# Patient Record
Sex: FEMALE | Race: WHITE | Employment: FULL TIME | URBAN - NONMETROPOLITAN AREA
[De-identification: names, ages, dates, MRNs, and addresses within clinical notes are randomized per-mention and may not be internally consistent; named-entity substitution may affect disease eponyms.]

---

## 2020-11-04 ENCOUNTER — OFFICE VISIT (OUTPATIENT)
Dept: OBGYN CLINIC | Age: 30
End: 2020-11-04
Payer: COMMERCIAL

## 2020-11-04 VITALS — WEIGHT: 236 LBS | SYSTOLIC BLOOD PRESSURE: 104 MMHG | DIASTOLIC BLOOD PRESSURE: 64 MMHG

## 2020-11-04 PROCEDURE — 99213 OFFICE O/P EST LOW 20 MIN: CPT | Performed by: ADVANCED PRACTICE MIDWIFE

## 2020-11-04 RX ORDER — HYDROXYZINE 50 MG/1
TABLET, FILM COATED ORAL
COMMUNITY
Start: 2020-10-14 | End: 2021-06-10 | Stop reason: ALTCHOICE

## 2020-11-04 RX ORDER — AMITRIPTYLINE HYDROCHLORIDE 25 MG/1
TABLET, FILM COATED ORAL
COMMUNITY
Start: 2020-10-14 | End: 2021-06-10 | Stop reason: ALTCHOICE

## 2020-11-04 RX ORDER — NYSTATIN 100000 U/G
CREAM TOPICAL
Qty: 1 TUBE | Refills: 1 | Status: SHIPPED | OUTPATIENT
Start: 2020-11-04 | End: 2021-02-17 | Stop reason: ALTCHOICE

## 2020-11-04 NOTE — PROGRESS NOTES
PROBLEM VISIT     Date of service: 2020    Krishna Mcgovern  Is a 31 y.o.  female    PT's PCP is: Lynne Herbert DO     : 1990                                          Review of Systems   Constitutional: Negative. HENT: Negative. Endocrine: Negative. Skin: Positive for rash (Bilateral rash to underside of breasts). Hematological: Negative. No LMP recorded. OB History    Para Term  AB Living   0 0 0 0 0 0   SAB TAB Ectopic Molar Multiple Live Births   0 0 0 0 0 0        Social History     Tobacco Use   Smoking Status Never Smoker   Smokeless Tobacco Never Used        Social History     Substance and Sexual Activity   Alcohol Use Yes       Allergies: Vicodin [hydrocodone-acetaminophen]      Current Outpatient Medications:     OMEPRAZOLE PO, Take by mouth, Disp: , Rfl:     nystatin (MYCOSTATIN) 756551 UNIT/GM cream, Apply topically 3 times daily. , Disp: 1 Tube, Rfl: 1    hydrOXYzine (ATARAX) 50 MG tablet, , Disp: , Rfl:     amitriptyline (ELAVIL) 25 MG tablet, , Disp: , Rfl:     Social History     Substance and Sexual Activity   Sexual Activity Yes         Chief Complaint   Patient presents with    Breast Problem     Pt here states that she had a rash under both breasts that was present for 2 weeks. States since then its gotten better but with family hx breast cancer wanted it checked. Physical Exam  Constitutional:       Appearance: She is obese. HENT:      Head: Normocephalic. Eyes:      Pupils: Pupils are equal, round, and reactive to light. Neck:      Musculoskeletal: Normal range of motion. Musculoskeletal: Normal range of motion. Neurological:      Mental Status: She is alert and oriented to person, place, and time. Skin:     General: Skin is warm and dry. Findings: Rash (Yeast like rash to bilateral underside, erythema, no drainage, irregular borders for lesions) present.    Psychiatric:         Mood and Affect: Mood normal.

## 2020-11-04 NOTE — PROGRESS NOTES
Chaperone for Intimate Exam   Chaperone was offered and accepted as part of the rooming process.    Chaperone: Agapito Núñez

## 2021-02-04 ENCOUNTER — TELEPHONE (OUTPATIENT)
Dept: OBGYN CLINIC | Age: 31
End: 2021-02-04

## 2021-02-04 NOTE — TELEPHONE ENCOUNTER
Patient called stating that she has an appt on 2/17 to follow up on an usn, but she is having some cramping and wanting to know what she can do in the meantime. Explained that we do not have her usn results. Fax sent to [facility] to have a copy sent to our office. Explained that I will get the results and discuss the next step with a provider. Patient agreeable to the plan.

## 2021-02-17 ENCOUNTER — OFFICE VISIT (OUTPATIENT)
Dept: OBGYN CLINIC | Age: 31
End: 2021-02-17
Payer: COMMERCIAL

## 2021-02-17 ENCOUNTER — HOSPITAL ENCOUNTER (OUTPATIENT)
Age: 31
Setting detail: SPECIMEN
Discharge: HOME | End: 2021-02-17
Payer: COMMERCIAL

## 2021-02-17 VITALS
HEIGHT: 65 IN | WEIGHT: 228.6 LBS | DIASTOLIC BLOOD PRESSURE: 88 MMHG | BODY MASS INDEX: 38.09 KG/M2 | SYSTOLIC BLOOD PRESSURE: 126 MMHG

## 2021-02-17 DIAGNOSIS — R10.2 PELVIC PAIN: ICD-10-CM

## 2021-02-17 DIAGNOSIS — Z01.419 WOMEN'S ANNUAL ROUTINE GYNECOLOGICAL EXAMINATION: Primary | ICD-10-CM

## 2021-02-17 PROCEDURE — 99395 PREV VISIT EST AGE 18-39: CPT | Performed by: NURSE PRACTITIONER

## 2021-02-17 PROCEDURE — G8484 FLU IMMUNIZE NO ADMIN: HCPCS | Performed by: NURSE PRACTITIONER

## 2021-02-17 RX ORDER — OMEPRAZOLE 20 MG/1
20 CAPSULE, DELAYED RELEASE ORAL DAILY PRN
COMMUNITY
Start: 2021-02-04 | End: 2022-03-10

## 2021-02-17 NOTE — PROGRESS NOTES
YEARLY PHYSICAL    Date of service: 2021    Xander Briscoe  Is a 27 y.o. female    PT's PCP is: Brittany Wasserman DO     : 1990                                         Chaperone for Intimate Exam   Chaperone was offered as part of the rooming process. Patient declined and agrees to continue with exam without a chaperone. Subjective:       Patient's last menstrual period was 2021 (exact date). Are your menses regular: yes    OB History    Para Term  AB Living   0 0 0 0 0 0   SAB TAB Ectopic Molar Multiple Live Births   0 0 0 0 0 0        Social History     Tobacco Use   Smoking Status Former Smoker    Types: Cigarettes   Smokeless Tobacco Never Used        Social History     Substance and Sexual Activity   Alcohol Use Yes       Family History   Problem Relation Age of Onset    Breast Cancer Maternal Grandmother         older than 48    Heart Attack Paternal Uncle     Stroke Maternal Aunt     Hypertension Father        Any family history of breast or ovarian cancer:  Yes    Any family history of blood clots: No      Allergies: Vicodin [hydrocodone-acetaminophen]      Current Outpatient Medications:     omeprazole (PRILOSEC) 20 MG delayed release capsule, , Disp: , Rfl:     Prenatal Vit-Fe Fumarate-FA (PRENATAL VITAMIN PO), Take by mouth, Disp: , Rfl:     hydrOXYzine (ATARAX) 50 MG tablet, , Disp: , Rfl:     amitriptyline (ELAVIL) 25 MG tablet, , Disp: , Rfl:     Social History     Substance and Sexual Activity   Sexual Activity Yes       Any bleeding or pain with intercourse: No    Last Yearly:  10/2019    Last pap: 2017    Last HPV: n/a    Last Mammogram: n/a    Last Dexascan n/a    Last colorectal screen- n/a    Do you do self breast exams: encouraged monthly SBE    Past Medical History:   Diagnosis Date    GERD (gastroesophageal reflux disease)     Migraines     Seasonal allergies        Past Surgical History:   Procedure Laterality Date    FOOT SURGERY      nerve release, and plantar fascitis    MANDIBLE SURGERY      tooth implant    WISDOM TOOTH EXTRACTION         Family History   Problem Relation Age of Onset    Breast Cancer Maternal Grandmother         older than 48    Heart Attack Paternal Uncle     Stroke Maternal Aunt     Hypertension Father        Chief Complaint   Patient presents with    Gynecologic Exam     Pap due.  Pelvic Pain     C/O severe intermittent pelvic pain with nausea, vomiting and diarrhea. Follow up usn from 5/2020. PE:  Vital Signs  Blood pressure 126/88, height 5' 5\" (1.651 m), weight 228 lb 9.6 oz (103.7 kg), last menstrual period 01/18/2021, not currently breastfeeding. Estimated body mass index is 38.04 kg/m² as calculated from the following:    Height as of this encounter: 5' 5\" (1.651 m). Weight as of this encounter: 228 lb 9.6 oz (103.7 kg). HPI: Patient presents today for annual and usn following from 5/2020. Denies breast concerns. Reports monthly menses. Pap due. TTC since 10/2019. Reports intermittent severe pelvic pain causing nausea, vomiting on one occasion and diarrhea- sometimes will wake up in the morning with the pain. Denies aggravating/allievating factors. Occurring 2-3x per month since May. Review of Systems   Genitourinary: Positive for pelvic pain (2-3x per month ). All other systems reviewed and are negative. Objective  Heent:   negative   Cor: regular rate and rhythm, no murmurs              Pul:clear to auscultation bilaterally- no wheezes, rales or rhonchi, normal air movement, no respiratory distress      GI: Abdomen soft, non-tender.  BS normal. No masses,  No organomegaly           Extremities: normal strength, tone, and muscle mass, ROM of all joints is normal   Breasts: Breast:normal appearance, no masses or tenderness, No nipple retraction or dimpling, No nipple discharge or bleeding   Pelvic Exam:

## 2021-02-19 LAB
HPV SAMPLE: NORMAL
HPV, GENOTYPE 16: NOT DETECTED
HPV, GENOTYPE 18: NOT DETECTED
HPV, HIGH RISK OTHER: NOT DETECTED
HPV, INTERPRETATION: NORMAL
SPECIMEN DESCRIPTION: NORMAL

## 2021-02-25 LAB — CYTOLOGY REPORT: NORMAL

## 2021-03-03 ENCOUNTER — TELEPHONE (OUTPATIENT)
Dept: OBGYN CLINIC | Age: 31
End: 2021-03-03

## 2021-03-16 ENCOUNTER — OFFICE VISIT (OUTPATIENT)
Dept: OBGYN CLINIC | Age: 31
End: 2021-03-16
Payer: COMMERCIAL

## 2021-03-16 VITALS — DIASTOLIC BLOOD PRESSURE: 83 MMHG | WEIGHT: 226.2 LBS | SYSTOLIC BLOOD PRESSURE: 116 MMHG | BODY MASS INDEX: 37.64 KG/M2

## 2021-03-16 DIAGNOSIS — R10.2 PELVIC PAIN: Primary | ICD-10-CM

## 2021-03-16 PROCEDURE — 99213 OFFICE O/P EST LOW 20 MIN: CPT | Performed by: NURSE PRACTITIONER

## 2021-03-16 PROCEDURE — G8484 FLU IMMUNIZE NO ADMIN: HCPCS | Performed by: NURSE PRACTITIONER

## 2021-03-16 PROCEDURE — 1036F TOBACCO NON-USER: CPT | Performed by: NURSE PRACTITIONER

## 2021-03-16 PROCEDURE — G8417 CALC BMI ABV UP PARAM F/U: HCPCS | Performed by: NURSE PRACTITIONER

## 2021-03-16 PROCEDURE — G8427 DOCREV CUR MEDS BY ELIG CLIN: HCPCS | Performed by: NURSE PRACTITIONER

## 2021-03-16 NOTE — PROGRESS NOTES
PROBLEM VISIT     Date of service: 3/16/2021    Shelly Garber  Is a 27 y.o. female    PT's PCP is: Kt Alexander DO     : 1990                                             Subjective:       Patient's last menstrual period was 2021. OB History    Para Term  AB Living   0 0 0 0 0 0   SAB TAB Ectopic Molar Multiple Live Births   0 0 0 0 0 0        Social History     Tobacco Use   Smoking Status Former Smoker    Types: Cigarettes   Smokeless Tobacco Never Used        Social History     Substance and Sexual Activity   Alcohol Use Yes       Allergies: Vicodin [hydrocodone-acetaminophen]      Current Outpatient Medications:     omeprazole (PRILOSEC) 20 MG delayed release capsule, , Disp: , Rfl:     Prenatal Vit-Fe Fumarate-FA (PRENATAL VITAMIN PO), Take by mouth, Disp: , Rfl:     hydrOXYzine (ATARAX) 50 MG tablet, , Disp: , Rfl:     amitriptyline (ELAVIL) 25 MG tablet, , Disp: , Rfl:     Social History     Substance and Sexual Activity   Sexual Activity Yes       Chief Complaint   Patient presents with    Follow-up     Follow up usn for pelvic pain.  Other     Bought OPT and started testing on 3/7 and has not had any positive results. PE:  Vital Signs  Blood pressure 116/83, weight 226 lb 3.2 oz (102.6 kg), last menstrual period 2021, not currently breastfeeding. HPI: Patient here following gyn usn. Started tracking menses more accurately and using ovulation predictors- has not had any positives yet indicating ovulation. Reports monthly menses, small variation in start day from month to month. PT denies fever, chills, nausea and vomiting       Review of Systems   Constitutional: Negative. Genitourinary: Negative. Physical Exam  Constitutional:       Appearance: Normal appearance. HENT:      Head: Normocephalic. Pulmonary:      Effort: Pulmonary effort is normal.   Musculoskeletal: Normal range of motion.    Neurological:      General: No focal deficit present. Mental Status: She is alert. Psychiatric:         Mood and Affect: Mood normal.         Behavior: Behavior normal.         Assessment and Plan          Diagnosis Orders   1. Pelvic pain         usn from 3/4- endometrium measuring 2.9mm, normal ovaries. No evidence of endometrial polyps on this usn- was questionable on previous usn. Encouraged to continue using OPK for the next several cycles, tracking BBT and cevrical mucus. I am having Arland Divine maintain her hydrOXYzine, amitriptyline, omeprazole, and Prenatal Vit-Fe Fumarate-FA (PRENATAL VITAMIN PO). Return if symptoms worsen or fail to improve. There are no Patient Instructions on file for this visit.     Mallorie Jorge,3/17/2021 1:22 PM

## 2021-06-10 ENCOUNTER — OFFICE VISIT (OUTPATIENT)
Dept: OBGYN CLINIC | Age: 31
End: 2021-06-10
Payer: COMMERCIAL

## 2021-06-10 VITALS — DIASTOLIC BLOOD PRESSURE: 78 MMHG | WEIGHT: 218.8 LBS | SYSTOLIC BLOOD PRESSURE: 121 MMHG | BODY MASS INDEX: 36.41 KG/M2

## 2021-06-10 DIAGNOSIS — N91.1 AMENORRHEA, SECONDARY: Primary | ICD-10-CM

## 2021-06-10 PROCEDURE — G8417 CALC BMI ABV UP PARAM F/U: HCPCS | Performed by: NURSE PRACTITIONER

## 2021-06-10 PROCEDURE — G8427 DOCREV CUR MEDS BY ELIG CLIN: HCPCS | Performed by: NURSE PRACTITIONER

## 2021-06-10 PROCEDURE — 1036F TOBACCO NON-USER: CPT | Performed by: NURSE PRACTITIONER

## 2021-06-10 PROCEDURE — 99213 OFFICE O/P EST LOW 20 MIN: CPT | Performed by: NURSE PRACTITIONER

## 2021-06-10 RX ORDER — OXYBUTYNIN CHLORIDE 5 MG/1
TABLET, EXTENDED RELEASE ORAL
COMMUNITY
Start: 2021-05-25 | End: 2021-06-10

## 2021-06-10 ASSESSMENT — ENCOUNTER SYMPTOMS
NAUSEA: 1
VOMITING: 1
ABDOMINAL PAIN: 0

## 2021-06-10 NOTE — PROGRESS NOTES
PROBLEM VISIT     Date of service: 6/10/2021    Kimberly Shukla  Is a 27 y.o. female    PT's PCP is: Teagan Kellogg DO     : 1990                                             Subjective:       Patient's last menstrual period was 2021 (exact date). OB History    Para Term  AB Living   1 0 0 0 0 0   SAB TAB Ectopic Molar Multiple Live Births   0 0 0 0 0 0      # Outcome Date GA Lbr Charli/2nd Weight Sex Delivery Anes PTL Lv   1 Current                 Social History     Tobacco Use   Smoking Status Former Smoker    Types: Cigarettes   Smokeless Tobacco Never Used        Social History     Substance and Sexual Activity   Alcohol Use Yes       Allergies: Vicodin [hydrocodone-acetaminophen]      Current Outpatient Medications:     oxybutynin (DITROPAN-XL) 5 MG extended release tablet, , Disp: , Rfl:     omeprazole (PRILOSEC) 20 MG delayed release capsule, , Disp: , Rfl:     Prenatal Vit-Fe Fumarate-FA (PRENATAL VITAMIN PO), Take by mouth, Disp: , Rfl:     hydrOXYzine (ATARAX) 50 MG tablet, , Disp: , Rfl:     amitriptyline (ELAVIL) 25 MG tablet, , Disp: , Rfl:     Social History     Substance and Sexual Activity   Sexual Activity Yes       Chief Complaint   Patient presents with    Follow-up     Follow up viability usn. Voices no concerns,         PE:  Vital Signs  Blood pressure 121/78, weight 218 lb 12.8 oz (99.2 kg), last menstrual period 2021, not currently breastfeeding. HPI: Patient presents today following viability ultrasound. Denies vaginal bleeding or abdominal pain. Has had some breast tenderness and intermittent N&V. PT denies fever, chills, nausea and vomiting       Review of Systems   Constitutional: Positive for fatigue. Gastrointestinal: Positive for nausea and vomiting. Negative for abdominal pain. Genitourinary: Positive for menstrual problem (amenorrhea, pos HPT). Negative for dysuria, frequency, pelvic pain, vaginal bleeding and vaginal discharge.

## 2021-06-10 NOTE — PROGRESS NOTES
Made PT aware that we deliver in [place], PT requested that we continue to make appts in our office and that her and her  will go home and discuss if they are transferring out.

## 2021-06-16 ENCOUNTER — INITIAL PRENATAL (OUTPATIENT)
Dept: OBGYN CLINIC | Age: 31
End: 2021-06-16
Payer: COMMERCIAL

## 2021-06-16 ENCOUNTER — HOSPITAL ENCOUNTER (OUTPATIENT)
Age: 31
Setting detail: SPECIMEN
Discharge: HOME | End: 2021-06-16
Payer: COMMERCIAL

## 2021-06-16 VITALS
BODY MASS INDEX: 36.39 KG/M2 | HEIGHT: 65 IN | WEIGHT: 218.4 LBS | DIASTOLIC BLOOD PRESSURE: 78 MMHG | SYSTOLIC BLOOD PRESSURE: 122 MMHG

## 2021-06-16 DIAGNOSIS — Z3A.09 9 WEEKS GESTATION OF PREGNANCY: Primary | ICD-10-CM

## 2021-06-16 DIAGNOSIS — Z3A.09 9 WEEKS GESTATION OF PREGNANCY: ICD-10-CM

## 2021-06-16 LAB
AMPHETAMINE SCREEN URINE: NEGATIVE
BARBITURATE SCREEN URINE: NEGATIVE
BENZODIAZEPINE SCREEN, URINE: NEGATIVE
BILIRUBIN URINE: NEGATIVE
BUPRENORPHINE URINE: ABNORMAL
CANNABINOID SCREEN URINE: POSITIVE
COCAINE METABOLITE, URINE: NEGATIVE
COLOR: YELLOW
COMMENT UA: NORMAL
GLUCOSE URINE: NEGATIVE
KETONES, URINE: NEGATIVE
LEUKOCYTE ESTERASE, URINE: NEGATIVE
MDMA URINE: ABNORMAL
METHADONE SCREEN, URINE: NEGATIVE
METHAMPHETAMINE, URINE: ABNORMAL
NITRITE, URINE: NEGATIVE
OPIATES, URINE: NEGATIVE
OXYCODONE SCREEN URINE: NEGATIVE
PH UA: 7 (ref 5–8)
PHENCYCLIDINE, URINE: NEGATIVE
PROPOXYPHENE, URINE: ABNORMAL
PROTEIN UA: NEGATIVE
SPECIFIC GRAVITY UA: 1.02 (ref 1–1.03)
TEST INFORMATION: ABNORMAL
TRICYCLIC ANTIDEPRESSANTS, UR: ABNORMAL
TURBIDITY: CLEAR
URINE HGB: NEGATIVE
UROBILINOGEN, URINE: NORMAL

## 2021-06-16 PROCEDURE — 0500F INITIAL PRENATAL CARE VISIT: CPT | Performed by: OBSTETRICS & GYNECOLOGY

## 2021-06-16 NOTE — PATIENT INSTRUCTIONS
Patient Education        Learning About Starting to Breastfeed  Planning ahead     Before your baby is born, plan ahead. Learn all you can about breastfeeding. This helps make breastfeeding easier. · Early in your pregnancy, talk to your doctor or midwife about breastfeeding. · Learn the basics before your baby is born. The staff at hospitals and birthing centers can help you find a lactation specialist. This person is often a nurse who has been trained to teach and advise about breastfeeding. Or you can take a breastfeeding class. · Plan ahead for times when you will need help after your baby is born. You may want to get help from friends and family. You can also join a support group to talk to others who breastfeed. · Buy the equipment you'll need. Examples are breast pads, nipple cream, extra pillows, and nursing bras. Find out about breast pumps too. Getting help from your hospital or birthing center  It's important to have support from the doctors, nurses, and hospital staff who care for you and your baby. Before it's time for you to give birth, ask about the breastfeeding policies at your hospital or birthing center. Look for a hospital or birthing center that has policies for:  · \"Rooming in. \" This policy encourages you to have your baby in the room with you. It can allow you to breastfeed more often. · Supplemental feedings. Tell the staff that your baby is to get only your breast milk from birth. If staff feed your baby water, sugar solution, or formula right after birth without a medical reason, it may make it harder for you to breastfeed. · Pacifiers or artificial nipples. Staff should not give your  these items. They may interfere with breastfeeding. · Follow-up. Find out if your hospital can help you with breastfeeding issues after you go home. See if you can get information on support groups or other contacts.  They might help if you need help setting up and staying with your breastfeeding routine. Your first feeding  It's best to start breastfeeding within 1 hour of birth. For each feeding, you go through these basic steps:  · Get ready for the feeding. Be calm and relaxed, and try not to be distracted. Get some water or juice for yourself. Use two or three pillows to help support your baby while nursing. · Find a breastfeeding position that is comfortable for you and your baby. Examples are the cradle and the football positions. Make sure the baby's head and chest are lined up straight and facing your breast. It's best to switch which breast you start with each time. · Get the baby latched on well. Your baby's mouth needs to be wide open, like a yawn. So you may need to gently touch the middle of your baby's lower lip. When your baby's mouth is open wide, quickly bring the baby onto your nipple and areola. The areola is the dark [place] around your nipple. · Provide a complete feeding. Let your baby decide how long to nurse. Be sure to burp your baby after each breast.  In the first days after birth, your breasts make a thick, yellow liquid called colostrum. This liquid gives your baby nutrients and antibodies against infection. It is all that babies need at first. Your breasts will fill with milk a few days after the birth. Talk to your doctor, midwife, or lactation specialist right away if you are having problems and aren't sure what to do. How often to breastfeed  Plan to breastfeed your baby on demand rather than setting a strict schedule. For the first 2 weeks, be prepared to breastfeed at least 8 times in a 24-hour period. In the first few days, you may need to wake a sleeping baby to feed. If you breastfeed more often, it will help your breasts to produce more milk. After you go home  After you're home, don't be afraid to call your doctor, midwife, or lactation specialist with questions. That's true even if you don't know what's bothering you.  They are used to parents of newborns calling. They can help you figure out if there is a problem, and if so, how to fix it. Plan for times when you will be apart from your baby. Use a breast pump to collect breast milk ahead of time. You can store milk in the refrigerator or freezer. Then it's ready when someone else will be taking care of your baby. Experts recommend waiting about a month until breastfeeding is going well before offering a bottle. Breastfeeding is a learned skill that gets easier over time. You are more likely to succeed if you plan ahead, learn the basic techniques, and know where to get help and support. Where can you learn more? Go to https://chpepiceweb.BioMedFlex. org and sign in to your [employer] account. Enter E915 in the Guardly box to learn more about \"Learning About Starting to Breastfeed. \"     If you do not have an account, please click on the \"Sign Up Now\" link. Current as of: October 8, 2020               Content Version: 12.9  © 2006-2021 [employer]. Care instructions adapted under license by [facility] ([facility]). If you have questions about a medical condition or this instruction, always ask your healthcare professional. [place] [address] any warranty or liability for your use of this information. Patient Education        Nutrition During Pregnancy: Care Instructions  Your Care Instructions     Healthy eating when you are pregnant is important for you and your baby. It can help you feel well and have a successful pregnancy and delivery. During pregnancy your nutrition needs increase. Even if you have excellent eating habits, your doctor may recommend a multivitamin to make sure you get enough iron and folic acid. Many pregnant women wonder how much weight they should gain. In general, women who were at a healthy weight before they became pregnant should gain between 25 and 35 pounds.  Women who were overweight before pregnancy are usually advised to gain 15 to 25 pounds. Women who were underweight before pregnancy are usually advised to gain 28 to 40 pounds. Your doctor will work with you to set a weight goal that is right for you. Gaining a healthy amount of weight helps you have a healthy baby. Follow-up care is a key part of your treatment and safety. Be sure to make and go to all appointments, and call your doctor if you are having problems. It's also a good idea to know your test results and keep a list of the medicines you take. How can you care for yourself at home? · Eat plenty of fruits and vegetables. Include a variety of orange, yellow, and leafy dark-green vegetables every day. · Choose whole-grain bread, cereal, and pasta. Good choices include whole wheat bread, whole wheat pasta, brown rice, and oatmeal.  · Get 4 or more servings of milk and milk products each day. Good choices include nonfat or low-fat milk, yogurt, and cheese. If you cannot eat milk products, you can get calcium from calcium-fortified products such as orange juice, soy milk, and tofu. Other non-milk sources of calcium include leafy green vegetables, such as broccoli, kale, mustard greens, turnip greens, bok sheree, and brussels sprouts. · If you eat meat, pick lower-fat types. Good choices include lean cuts of meat and chicken or turkey without the skin. · Do not eat shark, swordfish, otf mackerel, or tilefish. They have high levels of mercury, which is dangerous to your baby. You can eat up to 12 ounces a week of fish or shellfish that have low mercury levels. Good choices include shrimp, wild salmon, pollock, and catfish. Limit some other types of fish, such as white (albacore) tuna, to 4 oz (0.1 kg) a week. · Heat lunch meats (such as turkey, ham, or bologna) to 165°F before you eat them. This reduces your risk of getting sick from a kind of bacteria that can be found in lunch meats.   · Do not eat unpasteurized soft cheeses, such as brie, feta, fresh mozzarella, and blue cheese. They have a bacteria that could harm your baby. · Limit caffeine. If you drink coffee or tea, have no more than 1 cup a day. Caffeine is also found in theresa. · Do not drink any alcohol. No amount of alcohol has been found to be safe during pregnancy. · Do not diet or try to lose weight. For example, do not follow a low-carbohydrate diet. If you are overweight at the start of your pregnancy, your doctor will work with you to manage your weight gain. · Tell your doctor about all vitamins and supplements you take. When should you call for help? Watch closely for changes in your health, and be sure to contact your doctor if you have any problems. Where can you learn more? Go to https://LabcytepeChaikin Analyticseb.FeedBurner. org and sign in to your [employer]ful account. Enter Y785 in the iKoa box to learn more about \"Nutrition During Pregnancy: Care Instructions. \"     If you do not have an account, please click on the \"Sign Up Now\" link. Current as of: October 8, 2020               Content Version: 12.9  © 2006-2021 [employer]. Care instructions adapted under license by [facility] ([facility]). If you have questions about a medical condition or this instruction, always ask your healthcare professional. [address] any warranty or liability for your use of this information. Patient Education        Weeks 10 to 14 of Your Pregnancy: Care Instructions  Overview     By weeks 10 to 15 of your pregnancy, the placenta has formed inside your uterus. The placenta's main job is to give your baby oxygen and nutrients through the umbilical cord. It's possible to hear your baby's heartbeat with a special ultrasound device. Your baby's organs are developing. The arms and legs can bend. This is a good time to think about testing for birth defects. There are two types of tests: screening and diagnostic. Screening tests show the chance that a baby has a certain birth defect. They can't tell you for sure that your baby has a problem. Diagnostic tests show if a baby has a certain birth defect. It's your choice whether to have these tests. You and your partner can talk to your doctor or midwife about tests for birth defects. Follow-up care is a key part of your treatment and safety. Be sure to make and go to all appointments, and call your doctor if you are having problems. It's also a good idea to know your test results and keep a list of the medicines you take. How can you care for yourself at home? Decide about tests  · You can have screening tests and diagnostic tests to check for birth defects. The decision to have a test for birth defects is personal. Think about your age, your chance of passing on a family disease, your need to know about any problems, and what you might do after you have the test results. ? Quadruple (quad) blood test. This screening test can be done between 15 and 22 weeks of pregnancy. It checks the amount of four substances in your blood. The doctor looks at these test results, along with your age and other factors, to find out the chance that your baby may have certain problems. ? Amniocentesis. This diagnostic test is used to look for chromosomal problems in the baby's cells. It can be done between 15 and 20 weeks of pregnancy, usually around week 16.  ? Nuchal translucency test. This test uses ultrasound to measure the thickness of the area at the back of the baby's neck. An increase in the thickness can be an early sign of Down syndrome. ? Chorionic villus sampling (CVS). This is a test that looks for certain genetic problems with your baby. The same genes that are in your baby are in the placenta. A small piece of the placenta is taken out and tested. This test is done when you are 10 to 13 weeks pregnant. Ease discomfort  · Slow down and take naps when you feel tired. · If your emotions swing, talk to someone.   · If your gums bleed, try a softer toothbrush. If your gums are puffy and bleed a lot, see your dentist.  · If you feel dizzy:  ? Get up slowly after sitting or lying down. ? Drink plenty of fluids. ? Eat small snacks to keep your blood sugar stable. ? Put your head between your legs as though you were tying your shoelaces. ? Lie down with your legs higher than your head. Use pillows to prop up your feet. · If you have a headache:  ? Lie down. ? Ask your partner or a good friend for a neck massage. ? Try cool cloths over your forehead or across the back of your neck. ? Use acetaminophen (Tylenol) for pain relief. Do not use nonsteroidal anti-inflammatory drugs (NSAIDs), such as ibuprofen (Advil, Motrin) or naproxen (Aleve), unless your doctor says it is okay. · If you have a nosebleed, pinch your nose gently, and hold it for a short while. To prevent nosebleeds, try massaging a small dab of petroleum jelly, such as Vaseline, in your nostrils. · If your nose is stuffed up, try saline (saltwater) nose sprays. Do not use decongestant sprays. Care for your breasts  · Wear a bra that gives you good support. · Know that changes in your breasts are normal.  ? Your breasts may get larger and more tender. Tenderness usually gets better by 12 weeks. ? Your nipples may get darker and larger, and small bumps around your nipples may show more. ? The veins in your chest and breasts may show more. Where can you learn more? Go to https://Anemoi Renovablesadelaideeb.Instantis. org and sign in to your [employer] account. Enter S816 in the GnamGnam box to learn more about \"Weeks 10 to 14 of Your Pregnancy: Care Instructions. \"     If you do not have an account, please click on the \"Sign Up Now\" link. Current as of: October 8, 2020               Content Version: 12.9  © 9105-5168 Healthwise, Incorporated. Care instructions adapted under license by [facility] ([facility]).  If you have questions about a medical condition or this instruction, always ask your

## 2021-06-16 NOTE — PROGRESS NOTES
Here for PNI, has had nausea. Denies hx of MRSA. Unsure about genetic testing. Early 1hr gtt done today with  d/t BMI. Hx marijuana use which she states she hasn't used it since she found out she was pregnant. Urine drug screen done today. Has NOB appt on 6/30. Questions answered and forms signed.

## 2021-06-17 LAB
ABO/RH: NORMAL
ABSOLUTE EOS #: 0.06 K/UL (ref 0–0.44)
ABSOLUTE IMMATURE GRANULOCYTE: <0.03 K/UL (ref 0–0.3)
ABSOLUTE LYMPH #: 2.29 K/UL (ref 1.1–3.7)
ABSOLUTE MONO #: 0.41 K/UL (ref 0.1–1.2)
ANTIBODY SCREEN: NEGATIVE
BASOPHILS # BLD: 0 % (ref 0–2)
BASOPHILS ABSOLUTE: <0.03 K/UL (ref 0–0.2)
CULTURE: NORMAL
DIFFERENTIAL TYPE: ABNORMAL
EOSINOPHILS RELATIVE PERCENT: 1 % (ref 1–4)
GLUCOSE ADMINISTRATION: NORMAL
GLUCOSE TOLERANCE SCREEN 50G: 118 MG/DL (ref 70–135)
HCT VFR BLD CALC: 41 % (ref 36.3–47.1)
HEMOGLOBIN: 12.8 G/DL (ref 11.9–15.1)
HEPATITIS B SURFACE ANTIGEN: NONREACTIVE
HEPATITIS C ANTIBODY: NONREACTIVE
HIV AG/AB: NONREACTIVE
IMMATURE GRANULOCYTES: 0 %
LYMPHOCYTES # BLD: 26 % (ref 24–43)
Lab: NORMAL
MCH RBC QN AUTO: 29 PG (ref 25.2–33.5)
MCHC RBC AUTO-ENTMCNC: 31.2 G/DL (ref 28.4–34.8)
MCV RBC AUTO: 93 FL (ref 82.6–102.9)
MONOCYTES # BLD: 5 % (ref 3–12)
NRBC AUTOMATED: 0 PER 100 WBC
PDW BLD-RTO: 13.2 % (ref 11.8–14.4)
PLATELET # BLD: 316 K/UL (ref 138–453)
PLATELET ESTIMATE: ABNORMAL
PMV BLD AUTO: 10.2 FL (ref 8.1–13.5)
RBC # BLD: 4.41 M/UL (ref 3.95–5.11)
RBC # BLD: ABNORMAL 10*6/UL
RUBV IGG SER QL: 48.3 IU/ML
SEG NEUTROPHILS: 68 % (ref 36–65)
SEGMENTED NEUTROPHILS ABSOLUTE COUNT: 6.08 K/UL (ref 1.5–8.1)
SPECIMEN DESCRIPTION: NORMAL
T. PALLIDUM, IGG: NONREACTIVE
WBC # BLD: 8.9 K/UL (ref 3.5–11.3)
WBC # BLD: ABNORMAL 10*3/UL

## 2021-06-30 ENCOUNTER — INITIAL PRENATAL (OUTPATIENT)
Dept: OBGYN CLINIC | Age: 31
End: 2021-06-30

## 2021-06-30 ENCOUNTER — HOSPITAL ENCOUNTER (OUTPATIENT)
Age: 31
Setting detail: SPECIMEN
Discharge: HOME | End: 2021-06-30
Payer: COMMERCIAL

## 2021-06-30 VITALS — WEIGHT: 218.6 LBS | DIASTOLIC BLOOD PRESSURE: 80 MMHG | SYSTOLIC BLOOD PRESSURE: 120 MMHG | BODY MASS INDEX: 36.38 KG/M2

## 2021-06-30 DIAGNOSIS — Z3A.11 11 WEEKS GESTATION OF PREGNANCY: Primary | ICD-10-CM

## 2021-06-30 DIAGNOSIS — Z34.01 ENCOUNTER FOR SUPERVISION OF NORMAL FIRST PREGNANCY, FIRST TRIMESTER: ICD-10-CM

## 2021-06-30 PROCEDURE — 0500F INITIAL PRENATAL CARE VISIT: CPT | Performed by: NURSE PRACTITIONER

## 2021-07-01 DIAGNOSIS — Z34.01 ENCOUNTER FOR SUPERVISION OF NORMAL FIRST PREGNANCY, FIRST TRIMESTER: ICD-10-CM

## 2021-07-02 LAB
C TRACH DNA GENITAL QL NAA+PROBE: NEGATIVE
N. GONORRHOEAE DNA: NEGATIVE
SPECIMEN DESCRIPTION: NORMAL

## 2021-07-08 ENCOUNTER — TELEPHONE (OUTPATIENT)
Dept: OBGYN CLINIC | Age: 31
End: 2021-07-08

## 2021-07-19 ENCOUNTER — ROUTINE PRENATAL (OUTPATIENT)
Dept: OBGYN CLINIC | Age: 31
End: 2021-07-19

## 2021-07-19 VITALS — WEIGHT: 216.8 LBS | BODY MASS INDEX: 36.08 KG/M2 | DIASTOLIC BLOOD PRESSURE: 70 MMHG | SYSTOLIC BLOOD PRESSURE: 100 MMHG

## 2021-07-19 DIAGNOSIS — Z34.02 ENCOUNTER FOR SUPERVISION OF NORMAL FIRST PREGNANCY IN SECOND TRIMESTER: Primary | ICD-10-CM

## 2021-07-19 PROCEDURE — 0502F SUBSEQUENT PRENATAL CARE: CPT | Performed by: ADVANCED PRACTICE MIDWIFE

## 2021-07-19 NOTE — PROGRESS NOTES
Jasbir Mackey is a 27 y.o. female 14w1d      The patient was seen and evaluated. Fetal movement was Absent . No contractions or leakage of fluid. Signs and symptoms of  labor as well as labor were reviewed. Genetic testing was not ordered and reviewed. MSAFP was not ordered for a 15-20 week gestational age window.  Reviewed. Dates were reviewed with the patient. An 18-22 week anatomy ultrasound has been ordered. The patient will return to the office for her next visit in 4 weeks. See antepartum flow sheet. Discussed OTC medications for constipation and diarrhea    Positive THC- will address at new OB     Does pt have history of infant delivery before 37 weeks: No  Previous c/s: No  Prenatal testing: Unsure about genetic testing (declines 21)  Ped: Cosimo Fulling CNP  Blood type:  Rhogam:   Flu shot: Declines  TDAP (27-36 wks):  GBS:  GTT:  GC/CT:  30 week Growth:  MRSA: Denies Hx        Assessment:  1. Jasbir Mackey is a 27 y.o. female  2.   3. 14w1d    There are no problems to display for this patient. Diagnosis Orders   1. Encounter for supervision of normal first pregnancy in second trimester           Plan:  Return in about 4 weeks (around 2021) for OB Follow Up. There are no Patient Instructions on file for this visit.

## 2021-09-08 ENCOUNTER — ROUTINE PRENATAL (OUTPATIENT)
Dept: OBGYN CLINIC | Age: 31
End: 2021-09-08

## 2021-09-08 VITALS
BODY MASS INDEX: 36.94 KG/M2 | SYSTOLIC BLOOD PRESSURE: 104 MMHG | WEIGHT: 222 LBS | DIASTOLIC BLOOD PRESSURE: 74 MMHG | HEART RATE: 97 BPM

## 2021-09-08 DIAGNOSIS — Z3A.21 21 WEEKS GESTATION OF PREGNANCY: Primary | ICD-10-CM

## 2021-09-08 PROCEDURE — 0502F SUBSEQUENT PRENATAL CARE: CPT | Performed by: OBSTETRICS & GYNECOLOGY

## 2021-09-08 NOTE — PROGRESS NOTES
Santino Hernandez is here at 21w3d for:    Chief Complaint   Patient presents with    Routine Prenatal Visit     pt. had routine usn today. Estimated Due Date: Estimated Date of Delivery: 22    OB History    Para Term  AB Living   1 0 0 0 0 0   SAB TAB Ectopic Molar Multiple Live Births   0 0 0 0 0 0      # Outcome Date GA Lbr Charli/2nd Weight Sex Delivery Anes PTL Lv   1 Current                 Past Medical History:   Diagnosis Date    Anxiety     GERD (gastroesophageal reflux disease)     Migraines     Seasonal allergies        Past Surgical History:   Procedure Laterality Date    FOOT SURGERY      nerve release, and plantar fascitis    MANDIBLE SURGERY      tooth implant    MOUTH SURGERY      WISDOM TOOTH EXTRACTION         Social History     Tobacco Use   Smoking Status Former Smoker    Types: Cigarettes   Smokeless Tobacco Never Used        Social History     Substance and Sexual Activity   Alcohol Use Not Currently       No results found for this visit on 21. Vitals:  /74   Pulse 97   Wt 222 lb (100.7 kg)   LMP 2021 (Approximate)   BMI 36.94 kg/m²   Estimated body mass index is 36.94 kg/m² as calculated from the following:    Height as of 21: 5' 5\" (1.651 m). Weight as of this encounter: 222 lb (100.7 kg). HPI: here for routine ov usn and visit    Yes PT denies fever, chills, nausea and vomiting       Abdomen: enlarged, gravid     Results reviewed today:    No results found for this visit on 21. See prenatal vital sign section and fetal assessment section    ASSESSMENT & Plan    Diagnosis Orders   1. 21 weeks gestation of pregnancy               I am having Santino Hernandez maintain her omeprazole, Prenatal Vit-Fe Fumarate-FA (PRENATAL VITAMIN PO), (Doxylamine Succinate, Sleep, (UNISOM PO)), and Loratadine (CLARITIN PO). Return in about 4 weeks (around 10/6/2021) for ob.     There are no Patient Instructions on file for this visit.           Verena Cosby DO,9/8/2021 4:11 PM

## 2021-10-05 ENCOUNTER — TELEPHONE (OUTPATIENT)
Dept: OBGYN CLINIC | Age: 31
End: 2021-10-05

## 2021-10-05 ENCOUNTER — ROUTINE PRENATAL (OUTPATIENT)
Dept: OBGYN CLINIC | Age: 31
End: 2021-10-05

## 2021-10-05 VITALS — BODY MASS INDEX: 37.58 KG/M2 | WEIGHT: 225.8 LBS | SYSTOLIC BLOOD PRESSURE: 90 MMHG | DIASTOLIC BLOOD PRESSURE: 50 MMHG

## 2021-10-05 DIAGNOSIS — Z34.02 ENCOUNTER FOR SUPERVISION OF NORMAL FIRST PREGNANCY IN SECOND TRIMESTER: Primary | ICD-10-CM

## 2021-10-05 PROCEDURE — 0502F SUBSEQUENT PRENATAL CARE: CPT | Performed by: ADVANCED PRACTICE MIDWIFE

## 2021-10-05 NOTE — TELEPHONE ENCOUNTER
Was here today for a Prenatal appointment and wanted to know if you had any information of birthing classes. Please call when possible.

## 2021-10-05 NOTE — PROGRESS NOTES
Alon Alarcon is a 27 y.o. female 25w2d    The patient was seen and evaluated. There was positive fetal movements. No contractions or leakage of fluid. Signs and symptoms of  labor as well as labor were reviewed. The patients anatomy ultrasound has been completed and reviewed with patient. A 28 week lab panel was ordered. This includes a (HH, 1 hr GTT). The patient is to complete this in the next two to four weeks. Reports intermittent low back pain - has not tried heat, ice, tylenol, chiro - reviewed s/s of concern and normal changes in pregnancy    Reports bilateral groin pain - intermittent and alternates from side to side but right > left. Reports pain is short duration and sometimes a \"warm senstation\"    Friday night and Saturday had feeling of something \"sitting on my chest\" while working and discomfort came and went. Reports that when burped it would help. Felt like increased heart rate and denied SOB. Reports over weekend then started to have cold s/s and temp of 99.2. Cough Monday only related to mucus \"in my throat\". Heart rate regular s1s2, no murmur and lungs clear with auscultation. Discussed fever markers and tylenol use. Discussed COVID swab - declines. Discussed possible reflux - discussed different medications safe for this in pregnancy. The S/S of Pre-Eclampsia were reviewed with the patient in detail. She is to report any of these if they occur. She currently denies any of these. The patient is RH positive Rhogam Ordered no    The patient was instructed on fetal kick counts and was encouraged to monitor every 8 hours. This is to begin at 28 weeks gestation. She was instructed that the baby should move at a minimum of ten times within one hour after a meal. The patient was instructed to lay down on her left side twenty minutes after eating and count movements for up to one hour with a target value of ten movements.   She was instructed to notify the office if she did not make that target after two attempts or if after any attempt there was less than four movements. Positive THC- will address at new OB     Does pt have history of infant delivery before 37 weeks: No  Previous c/s: No  Prenatal testing: Unsure about genetic testing (declines 21)  Ped: Jaclyn Dominguez CNP  Blood type: O+  Rhogam: N/A  Flu shot: Declines  TDAP (27-36 wks):  GBS:  GTT:  GC/CT:  30 week Growth:  MRSA: Denies Hx      Assessment:  1. Peter Grullon is a 27 y.o. female  2.   3. 25w2d    There are no problems to display for this patient. Diagnosis Orders   1. Encounter for supervision of normal first pregnancy in second trimester           Plan:  The patient will return to the office for her next visit in 3 weeks. See antepartum flow sheet.

## 2021-10-05 NOTE — PATIENT INSTRUCTIONS
Patient Education        Fever: Care Instructions  Overview     A fever is a high body temperature. It's one way your body fights illness. A temperature of up to 102°F can be helpful, because it helps the body respond to infection. Most healthy people can have a fever as high as 103°F to 104°F for short periods of time without problems. In most cases, a fever means that you have a minor illness. Follow-up care is a key part of your treatment and safety. Be sure to make and go to all appointments, and call your doctor if you are having problems. It's also a good idea to know your test results and keep a list of the medicines you take. How can you care for yourself at home? · Drink plenty of fluids to prevent dehydration. Choose water and other clear liquids. If you have to limit fluids because of a health problem, talk with your doctor before you increase the amount of fluids you drink. · Take an over-the-counter medicine, such as acetaminophen (Tylenol) to relieve your symptoms. Read and follow all instructions on the label. No one younger than 20 should take aspirin. It has been linked to Reye syndrome, a serious illness. · Rest, and limit activity. · Wear lightweight clothing. · Eat mild foods, such as soup. When should you call for help? Call your doctor now or seek immediate medical care if:    · You have a fever of 104°F or higher.     · You have a fever that stays high.     · You have a fever and feel confused or often feel dizzy.     · You have trouble breathing.     · You have a fever with a stiff neck or a severe headache. Watch closely for changes in your health, and be sure to contact your doctor if:    · You have any problems with your medicine, or you get a fever after starting a new medicine.     · You do not get better as expected. Where can you learn more? Go to https://chadelaideeb.Flashstarts. org and sign in to your [employer] account.  Enter F025 in the [employer]ERS box to learn more about \"Fever: Care Instructions. \"     If you do not have an account, please click on the \"Sign Up Now\" link. Current as of: July 1, 2021               Content Version: 13.0  © 2902-0424 Healthwise, Incorporated. Care instructions adapted under license by [facility] ([facility]). If you have questions about a medical condition or this instruction, always ask your healthcare professional. [address] any warranty or liability for your use of this information.

## 2021-10-07 ENCOUNTER — TELEPHONE (OUTPATIENT)
Dept: OBGYN CLINIC | Age: 31
End: 2021-10-07

## 2021-10-07 NOTE — TELEPHONE ENCOUNTER
Pt states she was in for an appt on Tuesday and was offered a COVID test d/t some vague sx. Pt declined at t he time, but now believe she has it because she has now lost taste and smell. Discussed recommendations/medications because she is pregnant.  Pt does not have appt until 10/21

## 2021-10-10 ENCOUNTER — APPOINTMENT (OUTPATIENT)
Dept: GENERAL RADIOLOGY | Age: 31
End: 2021-10-10
Payer: COMMERCIAL

## 2021-10-10 ENCOUNTER — TELEPHONE (OUTPATIENT)
Dept: OBGYN CLINIC | Age: 31
End: 2021-10-10

## 2021-10-10 ENCOUNTER — HOSPITAL ENCOUNTER (EMERGENCY)
Age: 31
Discharge: HOME | End: 2021-10-10
Attending: FAMILY MEDICINE
Payer: COMMERCIAL

## 2021-10-10 VITALS
RESPIRATION RATE: 22 BRPM | OXYGEN SATURATION: 94 % | SYSTOLIC BLOOD PRESSURE: 106 MMHG | DIASTOLIC BLOOD PRESSURE: 71 MMHG | TEMPERATURE: 99.9 F | HEART RATE: 118 BPM

## 2021-10-10 DIAGNOSIS — U07.1 PNEUMONIA DUE TO COVID-19 VIRUS: Primary | ICD-10-CM

## 2021-10-10 DIAGNOSIS — J12.82 PNEUMONIA DUE TO COVID-19 VIRUS: Primary | ICD-10-CM

## 2021-10-10 LAB
-: ABNORMAL
ABSOLUTE EOS #: <0.03 K/UL (ref 0–0.44)
ABSOLUTE IMMATURE GRANULOCYTE: 0.18 K/UL (ref 0–0.3)
ABSOLUTE LYMPH #: 0.85 K/UL (ref 1.1–3.7)
ABSOLUTE MONO #: 0.27 K/UL (ref 0.1–1.2)
ALBUMIN SERPL-MCNC: 2.9 G/DL (ref 3.5–5.2)
ALBUMIN/GLOBULIN RATIO: 0.9 (ref 1–2.5)
ALP BLD-CCNC: 133 U/L (ref 35–104)
ALT SERPL-CCNC: 85 U/L (ref 5–33)
AMORPHOUS: ABNORMAL
ANION GAP SERPL CALCULATED.3IONS-SCNC: 14 MMOL/L (ref 9–17)
AST SERPL-CCNC: 67 U/L
BACTERIA: ABNORMAL
BASOPHILS # BLD: 0 % (ref 0–2)
BASOPHILS ABSOLUTE: <0.03 K/UL (ref 0–0.2)
BILIRUB SERPL-MCNC: 0.5 MG/DL (ref 0.3–1.2)
BILIRUBIN URINE: ABNORMAL
BUN BLDV-MCNC: 4 MG/DL (ref 6–20)
BUN/CREAT BLD: 9 (ref 9–20)
CALCIUM SERPL-MCNC: 8.1 MG/DL (ref 8.6–10.4)
CASTS UA: ABNORMAL /LPF
CHLORIDE BLD-SCNC: 102 MMOL/L (ref 98–107)
CO2: 19 MMOL/L (ref 20–31)
COLOR: YELLOW
COMMENT UA: ABNORMAL
CREAT SERPL-MCNC: 0.44 MG/DL (ref 0.5–0.9)
CRYSTALS, UA: ABNORMAL /HPF
DIFFERENTIAL TYPE: ABNORMAL
EOSINOPHILS RELATIVE PERCENT: 0 % (ref 1–4)
EPITHELIAL CELLS UA: ABNORMAL /HPF (ref 0–25)
GFR AFRICAN AMERICAN: >60 ML/MIN
GFR NON-AFRICAN AMERICAN: >60 ML/MIN
GFR SERPL CREATININE-BSD FRML MDRD: ABNORMAL ML/MIN/{1.73_M2}
GFR SERPL CREATININE-BSD FRML MDRD: ABNORMAL ML/MIN/{1.73_M2}
GLUCOSE BLD-MCNC: 91 MG/DL (ref 70–99)
GLUCOSE URINE: NEGATIVE
HCT VFR BLD CALC: 36.8 % (ref 36.3–47.1)
HEMOGLOBIN: 12 G/DL (ref 11.9–15.1)
IMMATURE GRANULOCYTES: 3 %
KETONES, URINE: ABNORMAL
LACTIC ACID, WHOLE BLOOD: NORMAL MMOL/L (ref 0.7–2.1)
LACTIC ACID: 0.8 MMOL/L (ref 0.5–2.2)
LEUKOCYTE ESTERASE, URINE: ABNORMAL
LYMPHOCYTES # BLD: 12 % (ref 24–43)
MCH RBC QN AUTO: 29.5 PG (ref 25.2–33.5)
MCHC RBC AUTO-ENTMCNC: 32.6 G/DL (ref 28.4–34.8)
MCV RBC AUTO: 90.4 FL (ref 82.6–102.9)
MONOCYTES # BLD: 4 % (ref 3–12)
MUCUS: ABNORMAL
NITRITE, URINE: NEGATIVE
NRBC AUTOMATED: 0 PER 100 WBC
OTHER OBSERVATIONS UA: ABNORMAL
PDW BLD-RTO: 13.7 % (ref 11.8–14.4)
PH UA: 6.5 (ref 5–9)
PLATELET # BLD: 178 K/UL (ref 138–453)
PLATELET ESTIMATE: ABNORMAL
PMV BLD AUTO: 9.1 FL (ref 8.1–13.5)
POTASSIUM SERPL-SCNC: 3.4 MMOL/L (ref 3.7–5.3)
PROTEIN UA: ABNORMAL
RBC # BLD: 4.07 M/UL (ref 3.95–5.11)
RBC # BLD: ABNORMAL 10*6/UL
RBC UA: ABNORMAL /HPF (ref 0–2)
RENAL EPITHELIAL, UA: ABNORMAL /HPF
SARS-COV-2, RAPID: DETECTED
SEG NEUTROPHILS: 81 % (ref 36–65)
SEGMENTED NEUTROPHILS ABSOLUTE COUNT: 5.73 K/UL (ref 1.5–8.1)
SODIUM BLD-SCNC: 135 MMOL/L (ref 135–144)
SPECIFIC GRAVITY UA: 1.02 (ref 1.01–1.02)
SPECIMEN DESCRIPTION: ABNORMAL
TOTAL PROTEIN: 6.1 G/DL (ref 6.4–8.3)
TRICHOMONAS: ABNORMAL
TURBIDITY: CLEAR
URINE HGB: NEGATIVE
UROBILINOGEN, URINE: ABNORMAL
WBC # BLD: 7.1 K/UL (ref 3.5–11.3)
WBC # BLD: ABNORMAL 10*3/UL
WBC UA: ABNORMAL /HPF (ref 0–5)
YEAST: ABNORMAL

## 2021-10-10 PROCEDURE — 6370000000 HC RX 637 (ALT 250 FOR IP): Performed by: FAMILY MEDICINE

## 2021-10-10 PROCEDURE — 87086 URINE CULTURE/COLONY COUNT: CPT

## 2021-10-10 PROCEDURE — 99283 EMERGENCY DEPT VISIT LOW MDM: CPT

## 2021-10-10 PROCEDURE — 81001 URINALYSIS AUTO W/SCOPE: CPT

## 2021-10-10 PROCEDURE — 83605 ASSAY OF LACTIC ACID: CPT

## 2021-10-10 PROCEDURE — 87635 SARS-COV-2 COVID-19 AMP PRB: CPT

## 2021-10-10 PROCEDURE — 36415 COLL VENOUS BLD VENIPUNCTURE: CPT

## 2021-10-10 PROCEDURE — 71045 X-RAY EXAM CHEST 1 VIEW: CPT

## 2021-10-10 PROCEDURE — 85025 COMPLETE CBC W/AUTO DIFF WBC: CPT

## 2021-10-10 PROCEDURE — 2580000003 HC RX 258: Performed by: FAMILY MEDICINE

## 2021-10-10 PROCEDURE — 80053 COMPREHEN METABOLIC PANEL: CPT

## 2021-10-10 PROCEDURE — C9803 HOPD COVID-19 SPEC COLLECT: HCPCS

## 2021-10-10 RX ORDER — ACETAMINOPHEN 325 MG/1
650 TABLET ORAL ONCE
Status: COMPLETED | OUTPATIENT
Start: 2021-10-10 | End: 2021-10-10

## 2021-10-10 RX ORDER — 0.9 % SODIUM CHLORIDE 0.9 %
1000 INTRAVENOUS SOLUTION INTRAVENOUS ONCE
Status: COMPLETED | OUTPATIENT
Start: 2021-10-10 | End: 2021-10-10

## 2021-10-10 RX ADMIN — ACETAMINOPHEN 650 MG: 325 TABLET ORAL at 15:07

## 2021-10-10 RX ADMIN — SODIUM CHLORIDE 1000 ML: 9 INJECTION, SOLUTION INTRAVENOUS at 15:08

## 2021-10-10 ASSESSMENT — ENCOUNTER SYMPTOMS
EYES NEGATIVE: 1
CHEST TIGHTNESS: 0
APNEA: 0
CHOKING: 0
SHORTNESS OF BREATH: 1
COUGH: 1
ABDOMINAL PAIN: 1

## 2021-10-10 ASSESSMENT — PAIN SCALES - GENERAL
PAINLEVEL_OUTOF10: 3
PAINLEVEL_OUTOF10: 3

## 2021-10-10 ASSESSMENT — PAIN DESCRIPTION - ORIENTATION: ORIENTATION: RIGHT;LEFT

## 2021-10-10 ASSESSMENT — PAIN DESCRIPTION - PAIN TYPE: TYPE: ACUTE PAIN

## 2021-10-10 ASSESSMENT — PAIN DESCRIPTION - FREQUENCY: FREQUENCY: CONTINUOUS

## 2021-10-10 ASSESSMENT — PAIN DESCRIPTION - LOCATION: LOCATION: ABDOMEN;CHEST

## 2021-10-10 ASSESSMENT — PAIN DESCRIPTION - DESCRIPTORS: DESCRIPTORS: ACHING

## 2021-10-10 NOTE — PROGRESS NOTES
PCT Eamon Alonzo CNM to discuss pt status. Reported that pt has no UCs reported, denies  labor sxs. Pt states she has upper abdominal pain when she coughs. FHR reactive, no leaking, no bleeding, and no UCs on monitor. Pt FHR 140s with good variability, occasional variables but pt is 26 weeks gestation. Pt will be cleared from an OB status per phone call. Pt will be taken off monitor. Pt remains in ER for further evaluation.  notified that OB RN leaving bedside and that RN should contact OB should any additional needs.

## 2021-10-10 NOTE — LETTER
[facility] ED  [address] [place] [address] [place] [address]  Phone: [phone]  Fax: [phone]               October 10, 2021    Patient: Madan Olsen   YOB: 1990   Date of Visit: 10/10/2021       To Whom It May Concern:    Mark Bob was seen and treated in our emergency department on 10/10/2021. She may return to school on October 18, 2021.       Sincerely,       Julia George RN         Signature:__________________________________

## 2021-10-10 NOTE — ED PROVIDER NOTES
[facility] ED  EMERGENCY DEPARTMENT ENCOUNTER      Pt Name: Denisha Lopez  MRN: [id number]  [place] 1990  Date of evaluation: 10/10/2021  Provider: Violet Barbour MD    CHIEF COMPLAINT     Chief Complaint   Patient presents with    Cough     started aweek ago friday loss of taste wednesday evening    Shortness of Breath     yesterday started Pt is 26 weeks preg. due date jan. 16th    Abdominal Pain     with coughing         HISTORY OF PRESENT ILLNESS   (Location/Symptom, Timing/Onset, Context/Setting,Quality, Duration, Modifying Factors, Severity)  Note limiting factors. Denisha Lopez is a34 y.o. female who presents to the emergency department      This is a 27years old patient G1, P0 at 26 weeks of pregnancy presented due to concerns of possible Covid infection. She reports that she has been having some productive cough with yellow sputum, some abdominal pain with coughing, alteration in her senses of smell and taste. She reports her appetite is somewhat decreased due to the fact that she cannot taste anything. Her symptoms started roughly about 9 days ago on the Friday before-last week. Nursing Notes werereviewed. REVIEW OF SYSTEMS    (2-9 systems for level 4, 10 or more for level 5)     Review of Systems   Constitutional: Positive for activity change, appetite change, chills, fatigue and fever. Negative for diaphoresis and unexpected weight change. HENT: Negative. Eyes: Negative. Respiratory: Positive for cough and shortness of breath. Negative for apnea, choking and chest tightness. Cardiovascular: Positive for chest pain. Negative for palpitations and leg swelling. Gastrointestinal: Positive for abdominal pain. Patient has pain mostly with coughing and at that is bilateral mid abdomen. Endocrine: Negative. Genitourinary: Negative. Musculoskeletal: Positive for myalgias. Skin: Negative. Neurological: Positive for dizziness. Hematological: Negative. Psychiatric/Behavioral: Negative. Except as noted above the remainder of the review of systems was reviewed and negative.        PAST MEDICAL HISTORY     Past Medical History:   Diagnosis Date    Anxiety     GERD (gastroesophageal reflux disease)     Migraines     Seasonal allergies          SURGICALHISTORY       Past Surgical History:   Procedure Laterality Date    FOOT SURGERY      nerve release, and plantar fascitis    MANDIBLE SURGERY      tooth implant    MOUTH SURGERY      WISDOM TOOTH EXTRACTION           CURRENT MEDICATIONS       Previous Medications    DOXYLAMINE SUCCINATE, SLEEP, (UNISOM PO)    Take by mouth    LORATADINE (CLARITIN PO)    Take by mouth    OMEPRAZOLE (PRILOSEC) 20 MG DELAYED RELEASE CAPSULE        PRENATAL VIT-FE FUMARATE-FA (PRENATAL VITAMIN PO)    Take by mouth            Vicodin [hydrocodone-acetaminophen]    FAMILY HISTORY       Family History   Problem Relation Age of Onset    Breast Cancer Maternal Grandmother         older than 48    Heart Attack Paternal Uncle     Stroke Maternal Aunt     Hypertension Father     No Known Problems Paternal Grandfather     Stroke Paternal Grandmother     No Known Problems Maternal Grandfather     Hypertension Mother     No Known Problems Brother     No Known Problems Brother           SOCIAL HISTORY       Social History     Socioeconomic History    Marital status:      Spouse name: None    Number of children: None    Years of education: None    Highest education level: None   Occupational History    None   Tobacco Use    Smoking status: Former Smoker     Types: Cigarettes    Smokeless tobacco: Never Used   Vaping Use    Vaping Use: Former    Substances: Nicotine   Substance and Sexual Activity    Alcohol use: Not Currently    Drug use: Not Currently     Types: Marijuana     Comment: last smoked May 2021    Sexual activity: Yes   Other Topics Concern    None   Social History Narrative    None     Social Determinants of Health     Financial Resource Strain:     Difficulty of Paying Living Expenses:    Food Insecurity:     Worried About Running Out of Food in the Last Year:     [address] in the Last Year:    Transportation Needs:     Lack of Transportation (Medical):  Lack of Transportation (Non-Medical):    Physical Activity:     Days of Exercise per Week:     Minutes of Exercise per Session:    Stress:     Feeling of Stress :    Social Connections:     Frequency of Communication with Friends and Family:     Frequency of Social Gatherings with Friends and Family:     Attends [employer] Services:     Active Member of Clubs or Organizations:     Attends Club or Organization Meetings:     Marital Status:    Intimate Partner Violence:     Fear of Current or Ex-Partner:     Emotionally Abused:     Physically Abused:     Sexually Abused:        SCREENINGS                    PHYSICAL EXAM    (up to 7 for level 4, 8 or more for level 5)     ED Triage Vitals [10/10/21 1151]   BP Temp Temp Source Pulse Resp SpO2 Height Weight   106/71 97.9 °F (36.6 °C) Tympanic 130 22 94 % -- --       Physical Exam  Vitals and nursing note reviewed. Constitutional:       General: She is not in acute distress. Appearance: She is well-developed. She is not ill-appearing, toxic-appearing or diaphoretic. HENT:      Head: Normocephalic and atraumatic. Cardiovascular:      Rate and Rhythm: Tachycardia present. Pulmonary:      Effort: Pulmonary effort is normal.      Breath sounds: Normal breath sounds. No decreased breath sounds, wheezing, rhonchi or rales. Abdominal:      Palpations: Abdomen is soft. There is no hepatomegaly or mass. Tenderness: There is no guarding. Comments: Pregnant uterus consistent with dates. Musculoskeletal:         General: Normal range of motion. Cervical back: Normal range of motion and neck supple. Skin:     General: Skin is warm. Capillary Refill: Capillary refill takes less than 2 seconds. Neurological:      General: No focal deficit present. Mental Status: She is alert and oriented to person, place, and time.          DIAGNOSTIC RESULTS     EKG: All EKG's are interpreted by the Emergency Department Physician who either signs orCo-signs this chart in the absence of a cardiologist.        RADIOLOGY:   plain film images such as CT, Ultrasound and MRI are read by the radiologist. Plain radiographic images are visualized and preliminarily interpreted by the emergency physician with the below findings:        Interpretation per the Radiologist below, ifavailable at the time of this note:    XR CHEST (SINGLE VIEW FRONTAL)   Final Result   Bilateral ill-defined pulmonary opacities suggesting COVID pneumonia               ED BEDSIDE ULTRASOUND:   Performed by ED Physician - none    LABS:  Labs Reviewed   COVID-19, RAPID - Abnormal; Notable for the following components:       Result Value    SARS-CoV-2, Rapid DETECTED (*)     All other components within normal limits   URINE RT REFLEX TO CULTURE - Abnormal; Notable for the following components:    Bilirubin Urine MODERATE (*)     Ketones, Urine 4+ (*)     Protein, UA 1+ (*)     Urobilinogen, Urine ELEVATED (*)     Leukocyte Esterase, Urine TRACE (*)     All other components within normal limits   MICROSCOPIC URINALYSIS - Abnormal; Notable for the following components:    Bacteria, UA 2+ (*)     All other components within normal limits   CBC WITH AUTO DIFFERENTIAL - Abnormal; Notable for the following components:    Seg Neutrophils 81 (*)     Lymphocytes 12 (*)     Eosinophils % 0 (*)     Immature Granulocytes 3 (*)     Absolute Lymph # 0.85 (*)     All other components within normal limits   COMPREHENSIVE METABOLIC PANEL - Abnormal; Notable for the following components:    BUN 4 (*)     CREATININE 0.44 (*)     Calcium 8.1 (*)     Potassium 3.4 (*)     CO2 19 (*)     Alkaline Phosphatase 133 (*)     ALT 85 (*)     AST 67 (*)     Total Protein 6.1 (*)     Albumin 2.9 (*)     Albumin/Globulin Ratio 0.9 (*)     All other components within normal limits   CULTURE, URINE   LACTIC ACID, PLASMA       All other labs were within normal range ornot returned as of this dictation. EMERGENCY DEPARTMENT COURSE and DIFFERENTIAL DIAGNOSIS/MDM:   Vitals:    Vitals:    10/10/21 1151   BP: 106/71   Pulse: 130   Resp: 22   Temp: 97.9 °F (36.6 °C)   TempSrc: Tympanic   SpO2: 94%           MDM  Number of Diagnoses or Management Options  Diagnosis management comments: 27years old patient 26-week pregnancy presenting with shortness of breath and pleuritic chest pain. She is found be tachycardic and she is positive for Covid. Chest x-ray shows Covid pneumonia. She is satting 94 to 98% on room air. Her heart rate initially 130s is down to 110s after small amount of fluid and she is continuing her infusion at this time. She reports pleuritic chest pain she states she is feeling better. Patient is a 26-year-old pregnant lady at 32 weeks pregnancy. She is here due to the fact that she has had some shortness of breath coughing and viral-like symptoms. Turn out that she is Covid positive. She improved after some fluids her heart rate went from 132 - 115. We discussed with her OB/GYN  -consensus is that she can be discharged home to follow-up outpatient as scheduled. She will have to maintain her temperature under control with Tylenol. She is to return if she has escalation of her symptoms but if he develops any significant shortness of breath or any respiratory distress. CRITICAL CARE TIME   Total CriticalCare time was  minutes, excluding separately reportable procedures. There was a high probability of clinically significant/life threatening deterioration in the patient's condition which required my urgent intervention.      CONSULTS:  None    PROCEDURES:  Unlessotherwise noted below, none

## 2021-10-10 NOTE — TELEPHONE ENCOUNTER
Caller states that she has had COVID for 9 days and she is 26 weeks pregnant. Caller reports cough and shortness of breath with activity and a radiating pain that begins in right rib area and radiates around her back to the other side when coughing. Caller also states that she has no taste or sense of smell and does not have a fever. Patient states that there is no shortness of breath right now but it has been going on the last 2 days. Writer spoke with on call provider Rosalba Trujillo CNM who advised for patient to go to [facility] ER, Patient called and informed of this direction.

## 2021-10-10 NOTE — ED NOTES
Contacted Dr. Amilcar Gold, ob/gyn per Dr. Bo Desouza request.     [facility]  10/10/21 1252

## 2021-10-11 ENCOUNTER — CARE COORDINATION (OUTPATIENT)
Dept: CARE COORDINATION | Age: 31
End: 2021-10-11

## 2021-10-11 ENCOUNTER — TELEPHONE (OUTPATIENT)
Dept: OBGYN | Age: 31
End: 2021-10-11

## 2021-10-11 LAB
CULTURE: NORMAL
Lab: NORMAL
SPECIMEN DESCRIPTION: NORMAL

## 2021-10-11 NOTE — TELEPHONE ENCOUNTER
Pt called back and stated that she feels like crap. She has a bad cough that hurts her ribs. Pt is able to keep water down and only eat poptarts. Advised to eat more protein and drink water. Pt voiced understanding.

## 2021-10-11 NOTE — TELEPHONE ENCOUNTER
Patient seen last week - offered COVID swab - declined. Ended up in ER and lab positive COVID after sense changes and s/s changed    Please call and see how she is feeling?

## 2021-10-12 NOTE — CARE COORDINATION
-2nd attempt to reach Catarina Motta for ED Follow Up/COVID-19 monitoring   -Unable to reach Catarina Motta     -No further calls planned at this time

## 2021-10-13 ENCOUNTER — HOSPITAL ENCOUNTER (EMERGENCY)
Age: 31
Discharge: ANOTHER HOSPITAL | End: 2021-10-13
Payer: COMMERCIAL

## 2021-10-13 ENCOUNTER — HOSPITAL ENCOUNTER (INPATIENT)
Age: 31
LOS: 16 days | Discharge: HOME | DRG: 003 | End: 2021-10-29
Attending: OBSTETRICS & GYNECOLOGY | Admitting: INTERNAL MEDICINE
Payer: COMMERCIAL

## 2021-10-13 ENCOUNTER — APPOINTMENT (OUTPATIENT)
Dept: CT IMAGING | Age: 31
End: 2021-10-13
Payer: COMMERCIAL

## 2021-10-13 ENCOUNTER — TELEPHONE (OUTPATIENT)
Dept: OBGYN | Age: 31
End: 2021-10-13

## 2021-10-13 ENCOUNTER — APPOINTMENT (OUTPATIENT)
Dept: GENERAL RADIOLOGY | Age: 31
End: 2021-10-13
Payer: COMMERCIAL

## 2021-10-13 VITALS
OXYGEN SATURATION: 92 % | RESPIRATION RATE: 43 BRPM | HEIGHT: 64 IN | DIASTOLIC BLOOD PRESSURE: 66 MMHG | BODY MASS INDEX: 38.41 KG/M2 | WEIGHT: 225 LBS | SYSTOLIC BLOOD PRESSURE: 118 MMHG | HEART RATE: 121 BPM

## 2021-10-13 DIAGNOSIS — A41.02 MRSA (METHICILLIN RESISTANT STAPHYLOCOCCUS AUREUS) SEPTICEMIA (HCC): ICD-10-CM

## 2021-10-13 DIAGNOSIS — U07.1 COVID-19: Primary | ICD-10-CM

## 2021-10-13 DIAGNOSIS — Z3A.26 26 WEEKS GESTATION OF PREGNANCY: Primary | ICD-10-CM

## 2021-10-13 DIAGNOSIS — I26.99 PULMONARY EMBOLISM ON LEFT (HCC): ICD-10-CM

## 2021-10-13 PROBLEM — J12.82 PNEUMONIA DUE TO COVID-19 VIRUS: Status: ACTIVE | Noted: 2021-10-13

## 2021-10-13 LAB
ABSOLUTE EOS #: 0 K/UL (ref 0–0.4)
ABSOLUTE EOS #: <0.03 K/UL (ref 0–0.44)
ABSOLUTE IMMATURE GRANULOCYTE: 0.43 K/UL (ref 0–0.3)
ABSOLUTE IMMATURE GRANULOCYTE: 0.53 K/UL (ref 0–0.3)
ABSOLUTE LYMPH #: 0.6 K/UL (ref 1–4.8)
ABSOLUTE LYMPH #: 0.99 K/UL (ref 1.1–3.7)
ABSOLUTE MONO #: 0.15 K/UL (ref 0.1–0.8)
ABSOLUTE MONO #: 0.54 K/UL (ref 0.1–1.2)
ALBUMIN SERPL-MCNC: 2.9 G/DL (ref 3.5–5.2)
ALBUMIN/GLOBULIN RATIO: 0.9 (ref 1–2.5)
ALLEN TEST: ABNORMAL
ALLEN TEST: ABNORMAL
ALP BLD-CCNC: 181 U/L (ref 35–104)
ALT SERPL-CCNC: 45 U/L (ref 5–33)
ANION GAP SERPL CALCULATED.3IONS-SCNC: 18 MMOL/L (ref 9–17)
ANION GAP SERPL CALCULATED.3IONS-SCNC: 20 MMOL/L (ref 9–17)
AST SERPL-CCNC: 30 U/L
BASOPHILS # BLD: 0 % (ref 0–2)
BASOPHILS # BLD: 0 % (ref 0–2)
BASOPHILS ABSOLUTE: 0 K/UL (ref 0–0.2)
BASOPHILS ABSOLUTE: 0.03 K/UL (ref 0–0.2)
BETA-HYDROXYBUTYRATE: 4.57 MMOL/L (ref 0.02–0.27)
BILIRUB SERPL-MCNC: 0.66 MG/DL (ref 0.3–1.2)
BILIRUBIN URINE: ABNORMAL
BNP INTERPRETATION: NORMAL
BUN BLDV-MCNC: 3 MG/DL (ref 6–20)
BUN BLDV-MCNC: 4 MG/DL (ref 6–20)
BUN/CREAT BLD: 9 (ref 9–20)
BUN/CREAT BLD: ABNORMAL (ref 9–20)
CALCIUM SERPL-MCNC: 8.4 MG/DL (ref 8.6–10.4)
CALCIUM SERPL-MCNC: 8.7 MG/DL (ref 8.6–10.4)
CARBOXYHEMOGLOBIN: 0.9 % (ref 0–5)
CARBOXYHEMOGLOBIN: ABNORMAL % (ref 0–5)
CHLORIDE BLD-SCNC: 107 MMOL/L (ref 98–107)
CHLORIDE BLD-SCNC: 112 MMOL/L (ref 98–107)
CO2: 10 MMOL/L (ref 20–31)
CO2: 7 MMOL/L (ref 20–31)
COLOR: ABNORMAL
COMMENT UA: ABNORMAL
CREAT SERPL-MCNC: 0.37 MG/DL (ref 0.5–0.9)
CREAT SERPL-MCNC: 0.44 MG/DL (ref 0.5–0.9)
D-DIMER QUANTITATIVE: 0.72 MG/L FEU
D-DIMER QUANTITATIVE: 0.83 MG/L FEU (ref 0–0.59)
DIFFERENTIAL TYPE: ABNORMAL
DIFFERENTIAL TYPE: ABNORMAL
EKG ATRIAL RATE: 118 BPM
EKG P AXIS: 34 DEGREES
EKG P-R INTERVAL: 126 MS
EKG Q-T INTERVAL: 296 MS
EKG QRS DURATION: 84 MS
EKG QTC CALCULATION (BAZETT): 414 MS
EKG R AXIS: 10 DEGREES
EKG VENTRICULAR RATE: 118 BPM
EOSINOPHILS RELATIVE PERCENT: 0 % (ref 1–4)
EOSINOPHILS RELATIVE PERCENT: 0 % (ref 1–4)
FERRITIN: 264 UG/L (ref 13–150)
FIBRINOGEN: 595 MG/DL (ref 140–420)
FIO2: 32
FIO2: ABNORMAL
GFR AFRICAN AMERICAN: >60 ML/MIN
GFR AFRICAN AMERICAN: >60 ML/MIN
GFR NON-AFRICAN AMERICAN: >60 ML/MIN
GFR NON-AFRICAN AMERICAN: >60 ML/MIN
GFR SERPL CREATININE-BSD FRML MDRD: ABNORMAL ML/MIN/{1.73_M2}
GLUCOSE BLD-MCNC: 121 MG/DL (ref 65–105)
GLUCOSE BLD-MCNC: 121 MG/DL (ref 65–105)
GLUCOSE BLD-MCNC: 127 MG/DL (ref 70–99)
GLUCOSE BLD-MCNC: 85 MG/DL (ref 70–99)
GLUCOSE URINE: NEGATIVE
HCO3 VENOUS: 11.6 MMOL/L (ref 24–30)
HCO3 VENOUS: 7.4 MMOL/L (ref 24–30)
HCT VFR BLD CALC: 38.2 % (ref 36.3–47.1)
HCT VFR BLD CALC: 40.1 % (ref 36.3–47.1)
HCT VFR BLD CALC: 40.9 % (ref 36.3–47.1)
HEMOGLOBIN: 12.2 G/DL (ref 11.9–15.1)
HEMOGLOBIN: 12.5 G/DL (ref 11.9–15.1)
HEMOGLOBIN: 13.3 G/DL (ref 11.9–15.1)
IMMATURE GRANULOCYTES: 4 %
IMMATURE GRANULOCYTES: 7 %
INR BLD: 0.9
KETONES, URINE: ABNORMAL
LACTATE DEHYDROGENASE: 389 U/L (ref 135–214)
LACTIC ACID, SEPSIS WHOLE BLOOD: 0.8 MMOL/L (ref 0.5–1.9)
LACTIC ACID, SEPSIS: NORMAL MMOL/L (ref 0.5–1.9)
LEUKOCYTE ESTERASE, URINE: NEGATIVE
LYMPHOCYTES # BLD: 10 % (ref 24–43)
LYMPHOCYTES # BLD: 8 % (ref 24–44)
MCH RBC QN AUTO: 29 PG (ref 25.2–33.5)
MCH RBC QN AUTO: 29.5 PG (ref 25.2–33.5)
MCH RBC QN AUTO: 29.6 PG (ref 25.2–33.5)
MCHC RBC AUTO-ENTMCNC: 31.2 G/DL (ref 28.4–34.8)
MCHC RBC AUTO-ENTMCNC: 31.9 G/DL (ref 28.4–34.8)
MCHC RBC AUTO-ENTMCNC: 32.5 G/DL (ref 28.4–34.8)
MCV RBC AUTO: 90.9 FL (ref 82.6–102.9)
MCV RBC AUTO: 92.5 FL (ref 82.6–102.9)
MCV RBC AUTO: 93 FL (ref 82.6–102.9)
METHEMOGLOBIN: ABNORMAL % (ref 0–1.5)
METHEMOGLOBIN: ABNORMAL % (ref 0–1.9)
MODE: ABNORMAL
MODE: ABNORMAL
MONOCYTES # BLD: 2 % (ref 1–7)
MONOCYTES # BLD: 6 % (ref 3–12)
MORPHOLOGY: NORMAL
NEGATIVE BASE EXCESS, VEN: 13.5 MMOL/L (ref 0–2)
NEGATIVE BASE EXCESS, VEN: 19.1 MMOL/L (ref 0–2)
NITRITE, URINE: NEGATIVE
NOTIFICATION TIME: ABNORMAL
NOTIFICATION TIME: ABNORMAL
NOTIFICATION: ABNORMAL
NOTIFICATION: ABNORMAL
NRBC AUTOMATED: 0 PER 100 WBC
O2 DEVICE/FLOW/%: ABNORMAL
O2 DEVICE/FLOW/%: ABNORMAL
O2 SAT, VEN: 89.2 % (ref 60–85)
O2 SAT, VEN: 99 % (ref 60–85)
OXYHEMOGLOBIN: ABNORMAL % (ref 95–98)
OXYHEMOGLOBIN: ABNORMAL % (ref 95–98)
PARTIAL THROMBOPLASTIN TIME: 31.8 SEC (ref 23.9–33.8)
PARTIAL THROMBOPLASTIN TIME: 40.5 SEC (ref 20.5–30.5)
PATIENT TEMP: 37
PATIENT TEMP: 37
PCO2, VEN, TEMP ADJ: ABNORMAL MMHG (ref 39–55)
PCO2, VEN, TEMP ADJ: ABNORMAL MMHG (ref 39–55)
PCO2, VEN: 18.8 (ref 39–55)
PCO2, VEN: 25.9 (ref 39–55)
PDW BLD-RTO: 14.1 % (ref 11.8–14.4)
PDW BLD-RTO: 14.2 % (ref 11.8–14.4)
PDW BLD-RTO: 14.3 % (ref 11.8–14.4)
PEEP/CPAP: ABNORMAL
PEEP/CPAP: ABNORMAL
PH UA: 6 (ref 5–8)
PH VENOUS: 7.22 (ref 7.32–7.42)
PH VENOUS: 7.27 (ref 7.32–7.42)
PH, VEN, TEMP ADJ: ABNORMAL (ref 7.32–7.42)
PH, VEN, TEMP ADJ: ABNORMAL (ref 7.32–7.42)
PLATELET # BLD: 247 K/UL (ref 138–453)
PLATELET # BLD: 254 K/UL (ref 138–453)
PLATELET # BLD: 282 K/UL (ref 138–453)
PLATELET ESTIMATE: ABNORMAL
PLATELET ESTIMATE: ABNORMAL
PMV BLD AUTO: 8.8 FL (ref 8.1–13.5)
PMV BLD AUTO: 9 FL (ref 8.1–13.5)
PMV BLD AUTO: 9.1 FL (ref 8.1–13.5)
PO2, VEN, TEMP ADJ: ABNORMAL MMHG (ref 30–50)
PO2, VEN, TEMP ADJ: ABNORMAL MMHG (ref 30–50)
PO2, VEN: 176 (ref 30–50)
PO2, VEN: 62.2 (ref 30–50)
POSITIVE BASE EXCESS, VEN: ABNORMAL MMOL/L (ref 0–2)
POSITIVE BASE EXCESS, VEN: ABNORMAL MMOL/L (ref 0–2)
POTASSIUM SERPL-SCNC: 3.6 MMOL/L (ref 3.7–5.3)
POTASSIUM SERPL-SCNC: 3.9 MMOL/L (ref 3.7–5.3)
PRO-BNP: <20 PG/ML
PROTEIN UA: ABNORMAL
PROTHROMBIN TIME: 10 SEC (ref 9.1–12.3)
PSV: ABNORMAL
PSV: ABNORMAL
PT. POSITION: ABNORMAL
PT. POSITION: ABNORMAL
RBC # BLD: 4.13 M/UL (ref 3.95–5.11)
RBC # BLD: 4.31 M/UL (ref 3.95–5.11)
RBC # BLD: 4.5 M/UL (ref 3.95–5.11)
RBC # BLD: ABNORMAL 10*6/UL
RBC # BLD: ABNORMAL 10*6/UL
RESPIRATORY RATE: ABNORMAL
RESPIRATORY RATE: ABNORMAL
SAMPLE SITE: ABNORMAL
SAMPLE SITE: ABNORMAL
SEG NEUTROPHILS: 80 % (ref 36–65)
SEG NEUTROPHILS: 83 % (ref 36–66)
SEGMENTED NEUTROPHILS ABSOLUTE COUNT: 6.22 K/UL (ref 1.8–7.7)
SEGMENTED NEUTROPHILS ABSOLUTE COUNT: 7.69 K/UL (ref 1.5–8.1)
SET RATE: ABNORMAL
SET RATE: ABNORMAL
SODIUM BLD-SCNC: 137 MMOL/L (ref 135–144)
SODIUM BLD-SCNC: 137 MMOL/L (ref 135–144)
SPECIFIC GRAVITY UA: 1.05 (ref 1–1.03)
T. PALLIDUM, IGG: NONREACTIVE
TEXT FOR RESPIRATORY: ABNORMAL
TEXT FOR RESPIRATORY: ABNORMAL
TOTAL HB: ABNORMAL G/DL (ref 12–16)
TOTAL HB: ABNORMAL G/DL (ref 12–16)
TOTAL PROTEIN: 6.3 G/DL (ref 6.4–8.3)
TOTAL RATE: ABNORMAL
TOTAL RATE: ABNORMAL
TROPONIN INTERP: NORMAL
TROPONIN T: NORMAL NG/ML
TROPONIN, HIGH SENSITIVITY: <6 NG/L (ref 0–14)
TURBIDITY: CLEAR
URINE HGB: NEGATIVE
UROBILINOGEN, URINE: NORMAL
VT: ABNORMAL
VT: ABNORMAL
WBC # BLD: 7.5 K/UL (ref 3.5–11.3)
WBC # BLD: 9 K/UL (ref 3.5–11.3)
WBC # BLD: 9.7 K/UL (ref 3.5–11.3)
WBC # BLD: ABNORMAL 10*3/UL
WBC # BLD: ABNORMAL 10*3/UL

## 2021-10-13 PROCEDURE — 6360000002 HC RX W HCPCS: Performed by: PHYSICIAN ASSISTANT

## 2021-10-13 PROCEDURE — 99223 1ST HOSP IP/OBS HIGH 75: CPT | Performed by: OBSTETRICS & GYNECOLOGY

## 2021-10-13 PROCEDURE — 82728 ASSAY OF FERRITIN: CPT

## 2021-10-13 PROCEDURE — 85025 COMPLETE CBC W/AUTO DIFF WBC: CPT

## 2021-10-13 PROCEDURE — 93010 ELECTROCARDIOGRAM REPORT: CPT | Performed by: INTERNAL MEDICINE

## 2021-10-13 PROCEDURE — 93005 ELECTROCARDIOGRAM TRACING: CPT | Performed by: EMERGENCY MEDICINE

## 2021-10-13 PROCEDURE — 6360000002 HC RX W HCPCS: Performed by: STUDENT IN AN ORGANIZED HEALTH CARE EDUCATION/TRAINING PROGRAM

## 2021-10-13 PROCEDURE — 71045 X-RAY EXAM CHEST 1 VIEW: CPT

## 2021-10-13 PROCEDURE — 83605 ASSAY OF LACTIC ACID: CPT

## 2021-10-13 PROCEDURE — 85379 FIBRIN DEGRADATION QUANT: CPT

## 2021-10-13 PROCEDURE — 6370000000 HC RX 637 (ALT 250 FOR IP): Performed by: PHYSICIAN ASSISTANT

## 2021-10-13 PROCEDURE — 99285 EMERGENCY DEPT VISIT HI MDM: CPT

## 2021-10-13 PROCEDURE — 96374 THER/PROPH/DIAG INJ IV PUSH: CPT

## 2021-10-13 PROCEDURE — 86901 BLOOD TYPING SEROLOGIC RH(D): CPT

## 2021-10-13 PROCEDURE — 82010 KETONE BODYS QUAN: CPT

## 2021-10-13 PROCEDURE — 96372 THER/PROPH/DIAG INJ SC/IM: CPT

## 2021-10-13 PROCEDURE — 86780 TREPONEMA PALLIDUM: CPT

## 2021-10-13 PROCEDURE — 36415 COLL VENOUS BLD VENIPUNCTURE: CPT

## 2021-10-13 PROCEDURE — 83880 ASSAY OF NATRIURETIC PEPTIDE: CPT

## 2021-10-13 PROCEDURE — 84484 ASSAY OF TROPONIN QUANT: CPT

## 2021-10-13 PROCEDURE — 96375 TX/PRO/DX INJ NEW DRUG ADDON: CPT

## 2021-10-13 PROCEDURE — 85027 COMPLETE CBC AUTOMATED: CPT

## 2021-10-13 PROCEDURE — 86920 COMPATIBILITY TEST SPIN: CPT

## 2021-10-13 PROCEDURE — 6360000002 HC RX W HCPCS

## 2021-10-13 PROCEDURE — 85730 THROMBOPLASTIN TIME PARTIAL: CPT

## 2021-10-13 PROCEDURE — 2580000003 HC RX 258: Performed by: STUDENT IN AN ORGANIZED HEALTH CARE EDUCATION/TRAINING PROGRAM

## 2021-10-13 PROCEDURE — 83615 LACTATE (LD) (LDH) ENZYME: CPT

## 2021-10-13 PROCEDURE — 82805 BLOOD GASES W/O2 SATURATION: CPT

## 2021-10-13 PROCEDURE — 99291 CRITICAL CARE FIRST HOUR: CPT | Performed by: OBSTETRICS & GYNECOLOGY

## 2021-10-13 PROCEDURE — 85384 FIBRINOGEN ACTIVITY: CPT

## 2021-10-13 PROCEDURE — 6360000004 HC RX CONTRAST MEDICATION: Performed by: PHYSICIAN ASSISTANT

## 2021-10-13 PROCEDURE — 94664 DEMO&/EVAL PT USE INHALER: CPT

## 2021-10-13 PROCEDURE — 86850 RBC ANTIBODY SCREEN: CPT

## 2021-10-13 PROCEDURE — 71260 CT THORAX DX C+: CPT

## 2021-10-13 PROCEDURE — 80048 BASIC METABOLIC PNL TOTAL CA: CPT

## 2021-10-13 PROCEDURE — 80053 COMPREHEN METABOLIC PANEL: CPT

## 2021-10-13 PROCEDURE — 2580000003 HC RX 258: Performed by: PHYSICIAN ASSISTANT

## 2021-10-13 PROCEDURE — 1220000000 HC SEMI PRIVATE OB R&B

## 2021-10-13 PROCEDURE — 81001 URINALYSIS AUTO W/SCOPE: CPT

## 2021-10-13 PROCEDURE — 82947 ASSAY GLUCOSE BLOOD QUANT: CPT

## 2021-10-13 PROCEDURE — 82308 ASSAY OF CALCITONIN: CPT

## 2021-10-13 PROCEDURE — 86900 BLOOD TYPING SEROLOGIC ABO: CPT

## 2021-10-13 PROCEDURE — 85610 PROTHROMBIN TIME: CPT

## 2021-10-13 PROCEDURE — 2500000003 HC RX 250 WO HCPCS

## 2021-10-13 RX ORDER — METHYLPREDNISOLONE SODIUM SUCCINATE 125 MG/2ML
80 INJECTION, POWDER, LYOPHILIZED, FOR SOLUTION INTRAMUSCULAR; INTRAVENOUS ONCE
Status: COMPLETED | OUTPATIENT
Start: 2021-10-13 | End: 2021-10-13

## 2021-10-13 RX ORDER — BETAMETHASONE SODIUM PHOSPHATE AND BETAMETHASONE ACETATE 3; 3 MG/ML; MG/ML
12 INJECTION, SUSPENSION INTRA-ARTICULAR; INTRALESIONAL; INTRAMUSCULAR; SOFT TISSUE ONCE
Status: COMPLETED | OUTPATIENT
Start: 2021-10-14 | End: 2021-10-14

## 2021-10-13 RX ORDER — HEPARIN SODIUM 10000 [USP'U]/100ML
5-30 INJECTION, SOLUTION INTRAVENOUS CONTINUOUS
Status: DISCONTINUED | OUTPATIENT
Start: 2021-10-13 | End: 2021-10-14

## 2021-10-13 RX ORDER — HEPARIN SODIUM 10000 [USP'U]/100ML
5-30 INJECTION, SOLUTION INTRAVENOUS CONTINUOUS
Status: DISCONTINUED | OUTPATIENT
Start: 2021-10-13 | End: 2021-10-13 | Stop reason: HOSPADM

## 2021-10-13 RX ORDER — ONDANSETRON 4 MG/1
4 TABLET, ORALLY DISINTEGRATING ORAL EVERY 8 HOURS PRN
Status: DISCONTINUED | OUTPATIENT
Start: 2021-10-13 | End: 2021-10-30 | Stop reason: HOSPADM

## 2021-10-13 RX ORDER — METOCLOPRAMIDE HYDROCHLORIDE 5 MG/ML
10 INJECTION INTRAMUSCULAR; INTRAVENOUS ONCE
Status: COMPLETED | OUTPATIENT
Start: 2021-10-13 | End: 2021-10-13

## 2021-10-13 RX ORDER — LORAZEPAM 2 MG/ML
1 INJECTION INTRAMUSCULAR ONCE
Status: COMPLETED | OUTPATIENT
Start: 2021-10-13 | End: 2021-10-13

## 2021-10-13 RX ORDER — ONDANSETRON 2 MG/ML
4 INJECTION INTRAMUSCULAR; INTRAVENOUS EVERY 6 HOURS PRN
Status: DISCONTINUED | OUTPATIENT
Start: 2021-10-13 | End: 2021-10-30 | Stop reason: HOSPADM

## 2021-10-13 RX ORDER — 0.9 % SODIUM CHLORIDE 0.9 %
1000 INTRAVENOUS SOLUTION INTRAVENOUS ONCE
Status: COMPLETED | OUTPATIENT
Start: 2021-10-13 | End: 2021-10-13

## 2021-10-13 RX ORDER — 0.9 % SODIUM CHLORIDE 0.9 %
2000 INTRAVENOUS SOLUTION INTRAVENOUS ONCE
Status: COMPLETED | OUTPATIENT
Start: 2021-10-13 | End: 2021-10-14

## 2021-10-13 RX ORDER — ACETAMINOPHEN 325 MG/1
650 TABLET ORAL ONCE
Status: COMPLETED | OUTPATIENT
Start: 2021-10-13 | End: 2021-10-13

## 2021-10-13 RX ORDER — ACETAMINOPHEN 325 MG/1
650 TABLET ORAL EVERY 4 HOURS PRN
Status: DISCONTINUED | OUTPATIENT
Start: 2021-10-13 | End: 2021-10-30 | Stop reason: HOSPADM

## 2021-10-13 RX ORDER — IPRATROPIUM BROMIDE AND ALBUTEROL SULFATE 2.5; .5 MG/3ML; MG/3ML
1 SOLUTION RESPIRATORY (INHALATION) ONCE
Status: COMPLETED | OUTPATIENT
Start: 2021-10-13 | End: 2021-10-13

## 2021-10-13 RX ORDER — ALBUTEROL SULFATE 2.5 MG/3ML
2.5 SOLUTION RESPIRATORY (INHALATION) ONCE
Status: COMPLETED | OUTPATIENT
Start: 2021-10-13 | End: 2021-10-13

## 2021-10-13 RX ORDER — HEPARIN SODIUM 1000 [USP'U]/ML
80 INJECTION, SOLUTION INTRAVENOUS; SUBCUTANEOUS PRN
Status: DISCONTINUED | OUTPATIENT
Start: 2021-10-13 | End: 2021-10-13 | Stop reason: HOSPADM

## 2021-10-13 RX ORDER — SODIUM CHLORIDE, SODIUM LACTATE, POTASSIUM CHLORIDE, CALCIUM CHLORIDE 600; 310; 30; 20 MG/100ML; MG/100ML; MG/100ML; MG/100ML
INJECTION, SOLUTION INTRAVENOUS CONTINUOUS
Status: DISCONTINUED | OUTPATIENT
Start: 2021-10-13 | End: 2021-10-14

## 2021-10-13 RX ORDER — HEPARIN SODIUM 1000 [USP'U]/ML
80 INJECTION, SOLUTION INTRAVENOUS; SUBCUTANEOUS ONCE
Status: COMPLETED | OUTPATIENT
Start: 2021-10-13 | End: 2021-10-13

## 2021-10-13 RX ORDER — LORAZEPAM 2 MG/ML
INJECTION INTRAMUSCULAR
Status: COMPLETED
Start: 2021-10-13 | End: 2021-10-13

## 2021-10-13 RX ORDER — HEPARIN SODIUM 1000 [USP'U]/ML
40 INJECTION, SOLUTION INTRAVENOUS; SUBCUTANEOUS PRN
Status: DISCONTINUED | OUTPATIENT
Start: 2021-10-13 | End: 2021-10-13 | Stop reason: HOSPADM

## 2021-10-13 RX ORDER — BETAMETHASONE SODIUM PHOSPHATE AND BETAMETHASONE ACETATE 3; 3 MG/ML; MG/ML
12 INJECTION, SUSPENSION INTRA-ARTICULAR; INTRALESIONAL; INTRAMUSCULAR; SOFT TISSUE ONCE
Status: COMPLETED | OUTPATIENT
Start: 2021-10-13 | End: 2021-10-13

## 2021-10-13 RX ADMIN — METOCLOPRAMIDE 10 MG: 5 INJECTION, SOLUTION INTRAMUSCULAR; INTRAVENOUS at 22:35

## 2021-10-13 RX ADMIN — Medication 12 MG: at 19:01

## 2021-10-13 RX ADMIN — ACETAMINOPHEN 650 MG: 325 TABLET ORAL at 13:41

## 2021-10-13 RX ADMIN — ALBUTEROL SULFATE 2.5 MG: 2.5 SOLUTION RESPIRATORY (INHALATION) at 19:07

## 2021-10-13 RX ADMIN — HEPARIN SODIUM AND DEXTROSE 18 UNITS/KG/HR: 10000; 5 INJECTION INTRAVENOUS at 21:18

## 2021-10-13 RX ADMIN — LORAZEPAM 1 MG: 2 INJECTION INTRAMUSCULAR; INTRAVENOUS at 22:59

## 2021-10-13 RX ADMIN — SODIUM CHLORIDE 1000 ML: 9 INJECTION, SOLUTION INTRAVENOUS at 11:43

## 2021-10-13 RX ADMIN — IOPAMIDOL 75 ML: 755 INJECTION, SOLUTION INTRAVENOUS at 14:15

## 2021-10-13 RX ADMIN — IPRATROPIUM BROMIDE AND ALBUTEROL SULFATE 1 AMPULE: 2.5; .5 SOLUTION RESPIRATORY (INHALATION) at 11:07

## 2021-10-13 RX ADMIN — METHYLPREDNISOLONE SODIUM SUCCINATE 80 MG: 125 INJECTION, POWDER, FOR SOLUTION INTRAMUSCULAR; INTRAVENOUS at 11:46

## 2021-10-13 RX ADMIN — HEPARIN SODIUM AND DEXTROSE 8168 UNITS/HR: 10000; 5 INJECTION INTRAVENOUS at 22:12

## 2021-10-13 RX ADMIN — HEPARIN SODIUM 8170 UNITS: 1000 INJECTION, SOLUTION INTRAVENOUS; SUBCUTANEOUS at 16:02

## 2021-10-13 RX ADMIN — HEPARIN SODIUM AND DEXTROSE 22 UNITS/KG/HR: 10000; 5 INJECTION INTRAVENOUS at 23:28

## 2021-10-13 RX ADMIN — SODIUM CHLORIDE 2000 ML: 9 INJECTION, SOLUTION INTRAVENOUS at 22:44

## 2021-10-13 RX ADMIN — HEPARIN SODIUM AND DEXTROSE 18 UNITS/KG/HR: 10000; 5 INJECTION INTRAVENOUS at 16:02

## 2021-10-13 RX ADMIN — SODIUM CHLORIDE, POTASSIUM CHLORIDE, SODIUM LACTATE AND CALCIUM CHLORIDE: 600; 310; 30; 20 INJECTION, SOLUTION INTRAVENOUS at 21:19

## 2021-10-13 RX ADMIN — LORAZEPAM 1 MG: 2 INJECTION INTRAMUSCULAR at 22:59

## 2021-10-13 ASSESSMENT — ENCOUNTER SYMPTOMS
SPUTUM PRODUCTION: 0
VOMITING: 0
COUGH: 1
SORE THROAT: 0
WHEEZING: 0
SHORTNESS OF BREATH: 1

## 2021-10-13 ASSESSMENT — PAIN DESCRIPTION - FREQUENCY: FREQUENCY: INTERMITTENT

## 2021-10-13 ASSESSMENT — PAIN DESCRIPTION - LOCATION: LOCATION: CHEST

## 2021-10-13 ASSESSMENT — PAIN SCALES - GENERAL
PAINLEVEL_OUTOF10: 4
PAINLEVEL_OUTOF10: 4
PAINLEVEL_OUTOF10: 0

## 2021-10-13 ASSESSMENT — PAIN DESCRIPTION - PAIN TYPE: TYPE: ACUTE PAIN

## 2021-10-13 ASSESSMENT — PAIN DESCRIPTION - DESCRIPTORS: DESCRIPTORS: SORE

## 2021-10-13 NOTE — ED NOTES
Dr Sole Singh called at office and connected with [facility]     Varinder Luna  10/13/21 0632

## 2021-10-13 NOTE — ED NOTES
[facility] called with Dr Lindsay Kang, OB at [facility].  Connected call to [place], [place]  10/13/21 1820

## 2021-10-13 NOTE — ED PROVIDER NOTES
[facility] ED  eMERGENCY dEPARTMENT eNCOUnter      Pt Name: Sandhya Carvalho  MRN: [id number]  [place] 1990  Date of evaluation: 10/13/21      CHIEF COMPLAINT       Chief Complaint   Patient presents with    Shortness of Breath     worsening, states pulse ox at home reading 88%    Positive For Covid-19     symptoms on 10/01 tested positive 10/10, pt 26 wks pregnant         HISTORY OF PRESENT ILLNESS    Sandhya Carvalho is a 27 y.o. female who presents complaining of sob has covid dx with covid pneumonia a few days ago  The history is provided by the patient. Shortness of Breath  Severity:  Moderate  Onset quality:  Gradual  Duration:  5 days  Timing:  Intermittent  Chronicity:  New  Context: activity    Relieved by:  Rest  Worsened by: Activity  Ineffective treatments:  None tried  Associated symptoms: cough    Associated symptoms: no headaches, no neck pain, no rash, no sore throat, no sputum production, no vomiting and no wheezing    Risk factors: obesity    Risk factors comment:  Pregnancy      REVIEW OF SYSTEMS       Review of Systems   HENT: Negative for sore throat. Respiratory: Positive for cough and shortness of breath. Negative for sputum production and wheezing. Gastrointestinal: Negative for vomiting. Musculoskeletal: Negative for neck pain. Skin: Negative for rash. Neurological: Negative for headaches. All other systems reviewed and are negative.       PAST MEDICAL HISTORY     Past Medical History:   Diagnosis Date    Anxiety     GERD (gastroesophageal reflux disease)     Migraines     Seasonal allergies        SURGICAL HISTORY       Past Surgical History:   Procedure Laterality Date     SECTION N/A 10/15/2021     SECTION performed by [facility] at [facility]      nerve release, and plantar fascitis    HC  PICC POWERPIC TRIPLE  10/15/2021         MANDIBLE SURGERY      tooth implant    MOUTH SURGERY      WISDOM TOOTH EXTRACTION         CURRENT MEDICATIONS       Discharge Medication List as of 10/13/2021  7:43 PM      CONTINUE these medications which have NOT CHANGED    Details   doxyLAMINE succinate (UNISOM SLEEPTABS) 25 MG tablet Take 25 mg by mouth nightlyHistorical Med      omeprazole (PRILOSEC) 20 MG delayed release capsule Historical Med      Prenatal Vit-Fe Fumarate-FA (PRENATAL VITAMIN PO) Take 1 tablet by mouth daily Historical Med             ALLERGIES     is allergic to vicodin [hydrocodone-acetaminophen]. FAMILY HISTORY     She indicated that her mother is alive. She indicated that her father is alive. She indicated that both of her brothers are alive. She indicated that her maternal grandmother is alive. She indicated that her maternal grandfather is . She indicated that her paternal grandmother is . She indicated that her paternal grandfather is alive. She indicated that the status of her maternal aunt is unknown. She indicated that the status of her paternal uncle is unknown. SOCIAL HISTORY      reports that she has quit smoking. Her smoking use included cigarettes. She has never used smokeless tobacco. She reports previous alcohol use. She reports previous drug use. Drug: Marijuana. PHYSICAL EXAM     INITIAL VITALS: /66   Pulse 121   Resp (!) 43   Ht 5' 4\" (1.626 m)   Wt 225 lb (102.1 kg)   LMP 2021 (Approximate)   SpO2 92%   BMI 38.62 kg/m²      Physical Exam  Vitals and nursing note reviewed. Constitutional:       Appearance: She is ill-appearing. HENT:      Head: Normocephalic and atraumatic. Cardiovascular:      Rate and Rhythm: Tachycardia present. Pulmonary:      Effort: Tachypnea present. No respiratory distress. Musculoskeletal:         General: Normal range of motion. Skin:     General: Skin is warm. Capillary Refill: Capillary refill takes less than 2 seconds. Neurological:      Mental Status: She is alert.          MEDICAL DECISION MAKING: Patient with shortness of breath cough has positive Covid diagnosis. Presents today for increased shortness of breath. On the emergency department today she was given IV fluids site Medrol and breathing treatment. She improved somewhat she is still short of breath even at rest.  CT PE protocol shows positive for pulmonary embolism in the left segmental subsegmental area. I did speak with Dr. Devon Melchor the OB/GYN on-call he requested the patient be transferred to a facility that is more capable of caring for high risk patient. Transfer ordered. Patient continues to be monitored heparin was started. She continues to have some tachypnea O2 sats ranged from 90 to 97%. Awaiting transport at this time. Patient stable. Patient was updated and reassessed frequently. DIAGNOSTIC RESULTS     EKG: All EKG's are interpreted by the Emergency Department Physician who either signs or Co-signs this chart in the absence of acardiologist.        RADIOLOGY:Allplain film, CT, MRI, and formal ultrasound images (except ED bedside ultrasound) are read by the radiologist and the images and interpretations are directly viewed by the emergency physician. LABS:All lab results were reviewed by myself, and all abnormals are listed below.   Labs Reviewed   BASIC METABOLIC PANEL - Abnormal; Notable for the following components:       Result Value    BUN 4 (*)     CREATININE 0.44 (*)     Potassium 3.6 (*)     CO2 10 (*)     Anion Gap 20 (*)     All other components within normal limits   CBC WITH AUTO DIFFERENTIAL - Abnormal; Notable for the following components:    Seg Neutrophils 80 (*)     Lymphocytes 10 (*)     Eosinophils % 0 (*)     Immature Granulocytes 4 (*)     Absolute Lymph # 0.99 (*)     Absolute Immature Granulocyte 0.43 (*)     All other components within normal limits   BLOOD GAS, VENOUS - Abnormal; Notable for the following components:    pH, Monico 7.268 (*)     pCO2, Monico 25.9 (*)     pO2, Monico 62.2 (*)     HCO3, Venous 11.6 (*)     Negative Base Excess, Monico 13.5 (*)     O2 Sat, Monico 89.2 (*)     All other components within normal limits   D-DIMER, QUANTITATIVE - Abnormal; Notable for the following components:    D-Dimer, Quant 0.83 (*)     All other components within normal limits   BRAIN NATRIURETIC PEPTIDE   TROPONIN   CBC   APTT         EMERGENCY DEPARTMENT COURSE:   Vitals:    Vitals:    10/13/21 1515 10/13/21 1700 10/13/21 1730 10/13/21 1800   BP:  125/64 108/64 118/66   Pulse: 123 116 119 121   Resp: (!) 35 (!) 33 (!) 43 (!) 43   SpO2: 94% 92% 91% 92%   Weight:       Height:           The patient was given the following medications while in the emergency department:  Orders Placed This Encounter   Medications    0.9 % sodium chloride bolus    ipratropium-albuterol (DUONEB) nebulizer solution 1 ampule     Order Specific Question:   Initiate RT Bronchodilator Protocol     Answer: Yes    methylPREDNISolone sodium (SOLU-MEDROL) injection 80 mg    acetaminophen (TYLENOL) tablet 650 mg    iopamidol (ISOVUE-370) 76 % injection 75 mL    heparin (porcine) injection 8,170 Units    DISCONTD: heparin (porcine) injection 8,170 Units    DISCONTD: heparin (porcine) injection 4,080 Units    DISCONTD: heparin 25,000 units in dextrose 5% 250 mL (premix) infusion    betamethasone acetate-betamethasone sodium phosphate (CELESTONE) injection 12 mg    albuterol (PROVENTIL) nebulizer solution 2.5 mg     Order Specific Question:   Initiate RT Bronchodilator Protocol     Answer:   Yes       -------------------------      CRITICAL CARE:    CONSULTS:  None    PROCEDURES:  Procedures    FINAL IMPRESSION      1. COVID-19    2. Pulmonary embolism on left [facility])          DISPOSITION/PLAN   DISPOSITION Decision To Transfer 10/13/2021 03:10:24 PM      PATIENT REFERREDTO:  No follow-up provider specified.     DISCHARGEMEDICATIONS:  Discharge Medication List as of 10/13/2021  7:43 PM          (Please note that portions of this note were completed with a voice recognition program.  Efforts were made to edit thedictations but occasionally words are mis-transcribed.)    CHRIS Cook PA-C  10/22/21 1047

## 2021-10-13 NOTE — ED NOTES
Patient started on heparin drip and patient given explaination by Michele ; PA, for reasons behind heparin drip.      Carmen Pope RN  10/13/21 5383

## 2021-10-14 ENCOUNTER — ANESTHESIA EVENT (OUTPATIENT)
Dept: OPERATING ROOM | Age: 31
DRG: 003 | End: 2021-10-14
Payer: COMMERCIAL

## 2021-10-14 PROBLEM — R06.82 TACHYPNEA: Status: ACTIVE | Noted: 2021-10-14

## 2021-10-14 PROBLEM — I26.99 PULMONARY EMBOLISM (HCC): Status: ACTIVE | Noted: 2021-10-14

## 2021-10-14 PROBLEM — O43.199 MARGINAL INSERTION OF UMBILICAL CORD AFFECTING MANAGEMENT OF MOTHER: Status: ACTIVE | Noted: 2021-10-14

## 2021-10-14 PROBLEM — T73.0XXA STARVATION KETOACIDOSIS: Status: ACTIVE | Noted: 2021-10-14

## 2021-10-14 PROBLEM — E43 SEVERE MALNUTRITION (HCC): Status: ACTIVE | Noted: 2021-10-14

## 2021-10-14 PROBLEM — R00.0 TACHYCARDIA: Status: ACTIVE | Noted: 2021-10-14

## 2021-10-14 PROBLEM — F41.9 ANXIETY: Status: ACTIVE | Noted: 2021-10-14

## 2021-10-14 PROBLEM — E87.29 STARVATION KETOACIDOSIS: Status: ACTIVE | Noted: 2021-10-14

## 2021-10-14 LAB
-: NORMAL
ABSOLUTE EOS #: 0 K/UL (ref 0–0.4)
ABSOLUTE IMMATURE GRANULOCYTE: 0.25 K/UL (ref 0–0.3)
ABSOLUTE LYMPH #: 0.66 K/UL (ref 1–4.8)
ABSOLUTE MONO #: 0.33 K/UL (ref 0.1–0.8)
ACTION: NORMAL
ALBUMIN SERPL-MCNC: 2.7 G/DL (ref 3.5–5.2)
ALBUMIN/GLOBULIN RATIO: 0.9 (ref 1–2.5)
ALLEN TEST: ABNORMAL
ALLEN TEST: POSITIVE
ALLEN TEST: POSITIVE
ALP BLD-CCNC: 161 U/L (ref 35–104)
ALT SERPL-CCNC: 35 U/L (ref 5–33)
AMORPHOUS: NORMAL
ANION GAP SERPL CALCULATED.3IONS-SCNC: 11 MMOL/L (ref 9–17)
ANION GAP SERPL CALCULATED.3IONS-SCNC: 13 MMOL/L (ref 9–17)
ANION GAP SERPL CALCULATED.3IONS-SCNC: 14 MMOL/L (ref 9–17)
ANION GAP SERPL CALCULATED.3IONS-SCNC: 16 MMOL/L (ref 9–17)
AST SERPL-CCNC: 37 U/L
BACTERIA: NORMAL
BASOPHILS # BLD: 1 % (ref 0–2)
BASOPHILS ABSOLUTE: 0.08 K/UL (ref 0–0.2)
BETA-HYDROXYBUTYRATE: 2.98 MMOL/L (ref 0.02–0.27)
BILIRUB SERPL-MCNC: 0.44 MG/DL (ref 0.3–1.2)
BILIRUBIN DIRECT: 0.35 MG/DL
BILIRUBIN, INDIRECT: 0.09 MG/DL (ref 0–1)
BUN BLDV-MCNC: 3 MG/DL (ref 6–20)
BUN BLDV-MCNC: 4 MG/DL (ref 6–20)
BUN BLDV-MCNC: 4 MG/DL (ref 6–20)
BUN BLDV-MCNC: 5 MG/DL (ref 6–20)
BUN/CREAT BLD: ABNORMAL (ref 9–20)
C-REACTIVE PROTEIN: 59.8 MG/L (ref 0–5)
CALCIUM IONIZED: 1.19 MMOL/L (ref 1.13–1.33)
CALCIUM IONIZED: 1.21 MMOL/L (ref 1.13–1.33)
CALCIUM SERPL-MCNC: 7.9 MG/DL (ref 8.6–10.4)
CALCIUM SERPL-MCNC: 8 MG/DL (ref 8.6–10.4)
CALCIUM SERPL-MCNC: 8.1 MG/DL (ref 8.6–10.4)
CALCIUM SERPL-MCNC: 8.5 MG/DL (ref 8.6–10.4)
CASTS UA: NORMAL /LPF (ref 0–8)
CHLORIDE BLD-SCNC: 107 MMOL/L (ref 98–107)
CHLORIDE BLD-SCNC: 110 MMOL/L (ref 98–107)
CHLORIDE BLD-SCNC: 111 MMOL/L (ref 98–107)
CHLORIDE BLD-SCNC: 113 MMOL/L (ref 98–107)
CO2: 10 MMOL/L (ref 20–31)
CO2: 13 MMOL/L (ref 20–31)
CO2: 16 MMOL/L (ref 20–31)
CO2: 8 MMOL/L (ref 20–31)
CREAT SERPL-MCNC: 0.25 MG/DL (ref 0.5–0.9)
CREAT SERPL-MCNC: 0.25 MG/DL (ref 0.5–0.9)
CREAT SERPL-MCNC: 0.26 MG/DL (ref 0.5–0.9)
CREAT SERPL-MCNC: 0.34 MG/DL (ref 0.5–0.9)
CRYSTALS, UA: NORMAL /HPF
DATE AND TIME: NORMAL
DIFFERENTIAL TYPE: ABNORMAL
EOSINOPHILS RELATIVE PERCENT: 0 % (ref 1–4)
EPITHELIAL CELLS UA: NORMAL /HPF (ref 0–5)
ESTIMATED AVERAGE GLUCOSE: 103 MG/DL
FIO2: 100
FIO2: 15
FIO2: 80
GFR AFRICAN AMERICAN: >60 ML/MIN
GFR NON-AFRICAN AMERICAN: >60 ML/MIN
GFR SERPL CREATININE-BSD FRML MDRD: ABNORMAL ML/MIN/{1.73_M2}
GLOBULIN: ABNORMAL G/DL (ref 1.5–3.8)
GLUCOSE BLD-MCNC: 123 MG/DL (ref 74–100)
GLUCOSE BLD-MCNC: 143 MG/DL (ref 65–105)
GLUCOSE BLD-MCNC: 145 MG/DL (ref 70–99)
GLUCOSE BLD-MCNC: 146 MG/DL (ref 70–99)
GLUCOSE BLD-MCNC: 155 MG/DL (ref 65–105)
GLUCOSE BLD-MCNC: 156 MG/DL (ref 65–105)
GLUCOSE BLD-MCNC: 156 MG/DL (ref 74–100)
GLUCOSE BLD-MCNC: 158 MG/DL (ref 70–99)
GLUCOSE BLD-MCNC: 158 MG/DL (ref 74–100)
GLUCOSE BLD-MCNC: 163 MG/DL (ref 65–105)
GLUCOSE BLD-MCNC: 168 MG/DL (ref 70–99)
HBA1C MFR BLD: 5.2 % (ref 4–6)
HCT VFR BLD CALC: 36.2 % (ref 36.3–47.1)
HEMOGLOBIN: 11.3 G/DL (ref 11.9–15.1)
IMMATURE GRANULOCYTES: 3 %
LACTATE DEHYDROGENASE: 338 U/L (ref 135–214)
LACTIC ACID, WHOLE BLOOD: 0.8 MMOL/L (ref 0.7–2.1)
LYMPHOCYTES # BLD: 8 % (ref 24–44)
MAGNESIUM: 1.7 MG/DL (ref 1.6–2.6)
MAGNESIUM: 1.8 MG/DL (ref 1.6–2.6)
MCH RBC QN AUTO: 29 PG (ref 25.2–33.5)
MCHC RBC AUTO-ENTMCNC: 31.2 G/DL (ref 28.4–34.8)
MCV RBC AUTO: 93.1 FL (ref 82.6–102.9)
MODE: ABNORMAL
MONOCYTES # BLD: 4 % (ref 1–7)
MORPHOLOGY: NORMAL
MUCUS: NORMAL
NEGATIVE BASE EXCESS, ART: 10 (ref 0–2)
NEGATIVE BASE EXCESS, ART: 17 (ref 0–2)
NEGATIVE BASE EXCESS, ART: 6 (ref 0–2)
NOTIFY: NORMAL
NRBC AUTOMATED: 0 PER 100 WBC
NUCLEATED RED BLOOD CELLS: 1 PER 100 WBC
O2 DEVICE/FLOW/%: ABNORMAL
OTHER OBSERVATIONS UA: NORMAL
PARTIAL THROMBOPLASTIN TIME: 45.2 SEC (ref 20.5–30.5)
PARTIAL THROMBOPLASTIN TIME: 57.1 SEC (ref 20.5–30.5)
PARTIAL THROMBOPLASTIN TIME: 83.8 SEC (ref 20.5–30.5)
PARTIAL THROMBOPLASTIN TIME: >120 SEC (ref 20.5–30.5)
PATIENT TEMP: ABNORMAL
PDW BLD-RTO: 14.4 % (ref 11.8–14.4)
PHOSPHORUS: 2.5 MG/DL (ref 2.6–4.5)
PLATELET # BLD: 262 K/UL (ref 138–453)
PLATELET ESTIMATE: ABNORMAL
PMV BLD AUTO: 9 FL (ref 8.1–13.5)
POC HCO3: 14.5 MMOL/L (ref 21–28)
POC HCO3: 17.9 MMOL/L (ref 21–28)
POC HCO3: 8.5 MMOL/L (ref 21–28)
POC LACTIC ACID: 0.76 MMOL/L (ref 0.56–1.39)
POC O2 SATURATION: 92 % (ref 94–98)
POC O2 SATURATION: 98 % (ref 94–98)
POC O2 SATURATION: 98 % (ref 94–98)
POC PCO2 TEMP: ABNORMAL MM HG
POC PCO2: 20.9 MM HG (ref 35–48)
POC PCO2: 25.7 MM HG (ref 35–48)
POC PCO2: 30.1 MM HG (ref 35–48)
POC PH TEMP: ABNORMAL
POC PH: 7.22 (ref 7.35–7.45)
POC PH: 7.36 (ref 7.35–7.45)
POC PH: 7.38 (ref 7.35–7.45)
POC PO2 TEMP: ABNORMAL MM HG
POC PO2: 101.3 MM HG (ref 83–108)
POC PO2: 73.5 MM HG (ref 83–108)
POC PO2: 99.8 MM HG (ref 83–108)
POSITIVE BASE EXCESS, ART: ABNORMAL (ref 0–3)
POTASSIUM SERPL-SCNC: 3.4 MMOL/L (ref 3.7–5.3)
POTASSIUM SERPL-SCNC: 3.6 MMOL/L (ref 3.7–5.3)
POTASSIUM SERPL-SCNC: 3.9 MMOL/L (ref 3.7–5.3)
POTASSIUM SERPL-SCNC: 4 MMOL/L (ref 3.7–5.3)
RBC # BLD: 3.89 M/UL (ref 3.95–5.11)
RBC # BLD: ABNORMAL 10*6/UL
RBC UA: NORMAL /HPF (ref 0–4)
READ BACK: YES
RENAL EPITHELIAL, UA: NORMAL /HPF
SAMPLE SITE: ABNORMAL
SEG NEUTROPHILS: 84 % (ref 36–66)
SEGMENTED NEUTROPHILS ABSOLUTE COUNT: 6.88 K/UL (ref 1.8–7.7)
SODIUM BLD-SCNC: 134 MMOL/L (ref 135–144)
SODIUM BLD-SCNC: 135 MMOL/L (ref 135–144)
SODIUM BLD-SCNC: 136 MMOL/L (ref 135–144)
SODIUM BLD-SCNC: 137 MMOL/L (ref 135–144)
TCO2 (CALC), ART: ABNORMAL MMOL/L (ref 22–29)
TOTAL PROTEIN: 5.7 G/DL (ref 6.4–8.3)
TRICHOMONAS: NORMAL
URIC ACID: 5.5 MG/DL (ref 2.4–5.7)
WBC # BLD: 8.2 K/UL (ref 3.5–11.3)
WBC # BLD: ABNORMAL 10*3/UL
WBC UA: NORMAL /HPF (ref 0–5)
YEAST: NORMAL

## 2021-10-14 PROCEDURE — 82947 ASSAY GLUCOSE BLOOD QUANT: CPT

## 2021-10-14 PROCEDURE — 82010 KETONE BODYS QUAN: CPT

## 2021-10-14 PROCEDURE — 94660 CPAP INITIATION&MGMT: CPT

## 2021-10-14 PROCEDURE — 94761 N-INVAS EAR/PLS OXIMETRY MLT: CPT

## 2021-10-14 PROCEDURE — 2580000003 HC RX 258: Performed by: STUDENT IN AN ORGANIZED HEALTH CARE EDUCATION/TRAINING PROGRAM

## 2021-10-14 PROCEDURE — 80048 BASIC METABOLIC PNL TOTAL CA: CPT

## 2021-10-14 PROCEDURE — 83036 HEMOGLOBIN GLYCOSYLATED A1C: CPT

## 2021-10-14 PROCEDURE — 99255 IP/OBS CONSLTJ NEW/EST HI 80: CPT | Performed by: INTERNAL MEDICINE

## 2021-10-14 PROCEDURE — 82570 ASSAY OF URINE CREATININE: CPT

## 2021-10-14 PROCEDURE — 80076 HEPATIC FUNCTION PANEL: CPT

## 2021-10-14 PROCEDURE — 93970 EXTREMITY STUDY: CPT

## 2021-10-14 PROCEDURE — 83735 ASSAY OF MAGNESIUM: CPT

## 2021-10-14 PROCEDURE — 2500000003 HC RX 250 WO HCPCS: Performed by: STUDENT IN AN ORGANIZED HEALTH CARE EDUCATION/TRAINING PROGRAM

## 2021-10-14 PROCEDURE — 99291 CRITICAL CARE FIRST HOUR: CPT | Performed by: INTERNAL MEDICINE

## 2021-10-14 PROCEDURE — 99253 IP/OBS CNSLTJ NEW/EST LOW 45: CPT | Performed by: PEDIATRICS

## 2021-10-14 PROCEDURE — 2700000000 HC OXYGEN THERAPY PER DAY

## 2021-10-14 PROCEDURE — 3E0333Z INTRODUCTION OF ANTI-INFLAMMATORY INTO PERIPHERAL VEIN, PERCUTANEOUS APPROACH: ICD-10-PCS | Performed by: INTERNAL MEDICINE

## 2021-10-14 PROCEDURE — 36620 INSERTION CATHETER ARTERY: CPT

## 2021-10-14 PROCEDURE — 6360000002 HC RX W HCPCS: Performed by: STUDENT IN AN ORGANIZED HEALTH CARE EDUCATION/TRAINING PROGRAM

## 2021-10-14 PROCEDURE — 82330 ASSAY OF CALCIUM: CPT

## 2021-10-14 PROCEDURE — 86140 C-REACTIVE PROTEIN: CPT

## 2021-10-14 PROCEDURE — 36415 COLL VENOUS BLD VENIPUNCTURE: CPT

## 2021-10-14 PROCEDURE — 85730 THROMBOPLASTIN TIME PARTIAL: CPT

## 2021-10-14 PROCEDURE — 2000000000 HC ICU R&B

## 2021-10-14 PROCEDURE — APPSS60 APP SPLIT SHARED TIME 46-60 MINUTES: Performed by: NURSE PRACTITIONER

## 2021-10-14 PROCEDURE — 83615 LACTATE (LD) (LDH) ENZYME: CPT

## 2021-10-14 PROCEDURE — 6360000002 HC RX W HCPCS: Performed by: NURSE PRACTITIONER

## 2021-10-14 PROCEDURE — 82803 BLOOD GASES ANY COMBINATION: CPT

## 2021-10-14 PROCEDURE — 03H833Z INSERTION OF INFUSION DEVICE INTO LEFT BRACHIAL ARTERY, PERCUTANEOUS APPROACH: ICD-10-PCS | Performed by: INTERNAL MEDICINE

## 2021-10-14 PROCEDURE — 83605 ASSAY OF LACTIC ACID: CPT

## 2021-10-14 PROCEDURE — 85025 COMPLETE CBC W/AUTO DIFF WBC: CPT

## 2021-10-14 PROCEDURE — 6370000000 HC RX 637 (ALT 250 FOR IP): Performed by: STUDENT IN AN ORGANIZED HEALTH CARE EDUCATION/TRAINING PROGRAM

## 2021-10-14 PROCEDURE — 37799 UNLISTED PX VASCULAR SURGERY: CPT

## 2021-10-14 PROCEDURE — 36600 WITHDRAWAL OF ARTERIAL BLOOD: CPT

## 2021-10-14 PROCEDURE — 99231 SBSQ HOSP IP/OBS SF/LOW 25: CPT | Performed by: OBSTETRICS & GYNECOLOGY

## 2021-10-14 PROCEDURE — 84550 ASSAY OF BLOOD/URIC ACID: CPT

## 2021-10-14 PROCEDURE — 84156 ASSAY OF PROTEIN URINE: CPT

## 2021-10-14 PROCEDURE — 84100 ASSAY OF PHOSPHORUS: CPT

## 2021-10-14 RX ORDER — DEXTROSE MONOHYDRATE 25 G/50ML
12.5 INJECTION, SOLUTION INTRAVENOUS PRN
Status: DISCONTINUED | OUTPATIENT
Start: 2021-10-14 | End: 2021-10-30 | Stop reason: HOSPADM

## 2021-10-14 RX ORDER — POTASSIUM CHLORIDE 7.45 MG/ML
10 INJECTION INTRAVENOUS ONCE
Status: COMPLETED | OUTPATIENT
Start: 2021-10-14 | End: 2021-10-14

## 2021-10-14 RX ORDER — DEXTROSE AND SODIUM CHLORIDE 5; .45 G/100ML; G/100ML
INJECTION, SOLUTION INTRAVENOUS CONTINUOUS
Status: DISCONTINUED | OUTPATIENT
Start: 2021-10-14 | End: 2021-10-14

## 2021-10-14 RX ORDER — BETAMETHASONE SODIUM PHOSPHATE AND BETAMETHASONE ACETATE 3; 3 MG/ML; MG/ML
12 INJECTION, SUSPENSION INTRA-ARTICULAR; INTRALESIONAL; INTRAMUSCULAR; SOFT TISSUE ONCE
Status: DISCONTINUED | OUTPATIENT
Start: 2021-10-15 | End: 2021-10-14

## 2021-10-14 RX ORDER — LORAZEPAM 2 MG/ML
0.5 INJECTION INTRAMUSCULAR EVERY 6 HOURS PRN
Status: DISCONTINUED | OUTPATIENT
Start: 2021-10-14 | End: 2021-10-15

## 2021-10-14 RX ORDER — HEPARIN SODIUM 10000 [USP'U]/100ML
5-30 INJECTION, SOLUTION INTRAVENOUS CONTINUOUS
Status: DISCONTINUED | OUTPATIENT
Start: 2021-10-14 | End: 2021-10-22

## 2021-10-14 RX ORDER — DEXAMETHASONE SODIUM PHOSPHATE 10 MG/ML
6 INJECTION INTRAMUSCULAR; INTRAVENOUS EVERY 24 HOURS
Status: DISCONTINUED | OUTPATIENT
Start: 2021-10-14 | End: 2021-10-22

## 2021-10-14 RX ORDER — HEPARIN SODIUM 1000 [USP'U]/ML
40 INJECTION, SOLUTION INTRAVENOUS; SUBCUTANEOUS PRN
Status: DISCONTINUED | OUTPATIENT
Start: 2021-10-14 | End: 2021-10-17

## 2021-10-14 RX ORDER — SODIUM CHLORIDE 9 MG/ML
INJECTION, SOLUTION INTRAVENOUS CONTINUOUS
Status: DISCONTINUED | OUTPATIENT
Start: 2021-10-14 | End: 2021-10-30 | Stop reason: HOSPADM

## 2021-10-14 RX ORDER — HEPARIN SODIUM 1000 [USP'U]/ML
80 INJECTION, SOLUTION INTRAVENOUS; SUBCUTANEOUS PRN
Status: DISCONTINUED | OUTPATIENT
Start: 2021-10-14 | End: 2021-10-17

## 2021-10-14 RX ORDER — DEXTROSE MONOHYDRATE 50 MG/ML
100 INJECTION, SOLUTION INTRAVENOUS PRN
Status: DISCONTINUED | OUTPATIENT
Start: 2021-10-14 | End: 2021-10-30 | Stop reason: HOSPADM

## 2021-10-14 RX ORDER — SODIUM CHLORIDE, SODIUM LACTATE, POTASSIUM CHLORIDE, AND CALCIUM CHLORIDE .6; .31; .03; .02 G/100ML; G/100ML; G/100ML; G/100ML
1000 INJECTION, SOLUTION INTRAVENOUS ONCE
Status: COMPLETED | OUTPATIENT
Start: 2021-10-14 | End: 2021-10-14

## 2021-10-14 RX ORDER — NICOTINE POLACRILEX 4 MG
15 LOZENGE BUCCAL PRN
Status: DISCONTINUED | OUTPATIENT
Start: 2021-10-14 | End: 2021-10-30 | Stop reason: HOSPADM

## 2021-10-14 RX ADMIN — INSULIN LISPRO 2 UNITS: 100 INJECTION, SOLUTION INTRAVENOUS; SUBCUTANEOUS at 04:35

## 2021-10-14 RX ADMIN — BETAMETHASONE SODIUM PHOSPHATE AND BETAMETHASONE ACETATE 12 MG: 3; 3 INJECTION, SUSPENSION INTRA-ARTICULAR; INTRALESIONAL; INTRAMUSCULAR at 18:31

## 2021-10-14 RX ADMIN — LORAZEPAM 0.5 MG: 2 INJECTION INTRAMUSCULAR; INTRAVENOUS at 05:55

## 2021-10-14 RX ADMIN — SODIUM BICARBONATE 50 MEQ: 84 INJECTION, SOLUTION INTRAVENOUS at 04:48

## 2021-10-14 RX ADMIN — DEXAMETHASONE SODIUM PHOSPHATE 6 MG: 10 INJECTION INTRAMUSCULAR; INTRAVENOUS at 11:27

## 2021-10-14 RX ADMIN — LORAZEPAM 0.5 MG: 2 INJECTION INTRAMUSCULAR; INTRAVENOUS at 22:25

## 2021-10-14 RX ADMIN — LORAZEPAM 0.5 MG: 2 INJECTION INTRAMUSCULAR; INTRAVENOUS at 12:57

## 2021-10-14 RX ADMIN — INSULIN LISPRO 2 UNITS: 100 INJECTION, SOLUTION INTRAVENOUS; SUBCUTANEOUS at 15:38

## 2021-10-14 RX ADMIN — DEXTROSE AND SODIUM CHLORIDE: 5; 450 INJECTION, SOLUTION INTRAVENOUS at 01:04

## 2021-10-14 RX ADMIN — INSULIN LISPRO 2 UNITS: 100 INJECTION, SOLUTION INTRAVENOUS; SUBCUTANEOUS at 21:04

## 2021-10-14 RX ADMIN — SODIUM CHLORIDE 5 ML/HR: 9 INJECTION, SOLUTION INTRAVENOUS at 05:55

## 2021-10-14 RX ADMIN — HEPARIN SODIUM 3950 UNITS: 1000 INJECTION, SOLUTION INTRAVENOUS; SUBCUTANEOUS at 11:28

## 2021-10-14 RX ADMIN — Medication: at 12:43

## 2021-10-14 RX ADMIN — INSULIN LISPRO 2 UNITS: 100 INJECTION, SOLUTION INTRAVENOUS; SUBCUTANEOUS at 09:50

## 2021-10-14 RX ADMIN — SODIUM CHLORIDE, POTASSIUM CHLORIDE, SODIUM LACTATE AND CALCIUM CHLORIDE 1000 ML: 600; 310; 30; 20 INJECTION, SOLUTION INTRAVENOUS at 01:23

## 2021-10-14 RX ADMIN — POTASSIUM CHLORIDE 10 MEQ: 7.46 INJECTION, SOLUTION INTRAVENOUS at 04:44

## 2021-10-14 RX ADMIN — POTASSIUM PHOSPHATE, MONOBASIC AND POTASSIUM PHOSPHATE, DIBASIC 10 MMOL: 224; 236 INJECTION, SOLUTION, CONCENTRATE INTRAVENOUS at 12:04

## 2021-10-14 ASSESSMENT — PAIN SCALES - GENERAL
PAINLEVEL_OUTOF10: 0

## 2021-10-14 NOTE — FLOWSHEET NOTE
Pt to L&D via life flight from [facility] for [facility] admission. Pt arrives on 4L of O2. EFM applied, . Vitals as charted. Dr. Carolyn Villarreal present in room discussing POC with pt and significant other via phone.

## 2021-10-14 NOTE — TELEPHONE ENCOUNTER
Received information via [facility] OB dept that patient was transferred for positive COVID and PE to [facility].   Will ensure to check on patient status

## 2021-10-14 NOTE — PROGRESS NOTES
Obstetric/Gynecology Resident Interval Note    Dr. Bessy Fonseca has remained at bedside and continued to assess patient as labs have resulted. CMP w/ glucose 127, critical CO2 of 7, anion gap 18, ALT elevated at 45. CBC wnl. VBG Ph 7/216, pCO2 18.8, Po2 176.0, HCO3 7.4, average O2 sat 99.0. INR 0.9. PTT 40.5. Fibrinogen 595     Lactate 0.8. Ferritin 264.     D- Dimer 0.72, improved from 0.0.83 before transfer. Conference call w/ writer, Dr. Bessy Fonseca and critical care resident Dr. Korin Quintero. Patient appears to be in metabolic acidosis w/ large anion gap. Euglycemic. After discussion there is now high suspicion for euglycemic starvation acidosis as patient as not eaten a meal in at least one week per patient report. UA is pending. Will add on BHB and begin resuscitation w/ fluids. 2L NS ordered. Labs have resulted and UA w/ large ketones and +2 protein and BHB 4.57. Will continue to treat for euglycemic starvation acidosis w/ aggressive resuscitation. Will continue trending labs. Patient has now been transported down to SICU w/ cEFM. She has been transitioned to 40L hig flow NC and is more comfortable. Will continue to trend labs. Critical care team will take primary at this time and OBGYN will remain on as consult and readily available for collaboration and/or delivery. Please see H&P and below for guidance for management of COVID in pregnancy. Respiratory Considerations for Pregnant Patients  Patients who are pregnant have a significantly higher risk of respiratory failure. This is due to a decreased functional residual capacity (decreased by 18%). The risk of respiratory failure is highest in the third trimester. Physiologic changes of pregnancy due to progesterone cause hyperventilation and subsequent respiratory alkalosis.        In pregnancy, be aware of these changes to respiratory mechanics:   -decrease in FRC (decrease 18%)  -increase minute volume (20-40% above baseline)  -increase in oxygen consumption (increase 20%)  -decrease in pulmonary vascular resistance (decrease 34%)   -decrease in colloid osmotic pressure (14%, and as much as 50% in preeclampsia)  -increase in tidal volume  -FEV1 doesnt change     Normal PaCo2 in pregnancy is <31mmHg. Normal O2 in pregnancy is approximately 105mmHg ( range). Normal HCO3 is 16-22. Below are normal values in pregnancy in the second and third trimesters:  CRP: 0.4-20.3  D Dimer: 0.32-1.7  Ferritin: 0-230  LDH:   Fibrinogen: 200-600 (consider cryo if <200)     Hypoxia increases uterine activity in pregnancy. Therefore, consider treating the hypoxia prior to using tocolytics as stabilization of the patient will improve fetal outcomes. Magnesium sulfate is tocolytic of choice in the majority of critically ill patients. Please be aware that Magnesium increases aveolar permeability and is associated with a risk of pulmonary edema. If Magnesium therapy is performed, closely monitor Is/Os and attempt to leave patient balanced. Total fluids should not exceed 75cc/hour. Adjuvant Therapy in Pregnant/ Postpartum Patients:  -Decadron: Dexamethasone is regularly used to induce fetal lung maturity with two doses of 12mg twenty four hours apart between [id number]. If Decadron needs to be used for both fetal lung maturity and maternal respiratory status, the recommendation is to dose it 12mg q 24 x 2 doses, and then complete the remaining doses as the usual 6mg q day for 10 days or until discharge. The risk of continued use of Decadron in pregnancy is unknown and risk to the fetus cannot be ruled out. The lactation risk of Decadron has no data, but is likely compatible.  If Decadron is indicated for maternal respiratory status, the benefits of this medication exceed the theoretical risk to the fetus and critical illness in the mother has far more catastrophic consequences for the fetus.   -Convalescent Plasma: recommended in pregnancy if patient meets inclusion requirements  -Remdesivir: recommended in pregnancy if patient meets inclusion requirements  -Monoclonal Antibodies: little data to inform recommendations, weigh risks and benefits     Anticoagulation in COVID positive pregnant patients  While there are no definitive recommendations yet, below are generally agreed upon expert recommendations:  -->Symptomatic hospitalized patients: Prophylatic dosing during hospital stay, can be discharged on 2 weeks of prophylactic dosing if additional risk factors. Recommend Heparin if patient is pregnant and there is any chance of delivery during the hospitalization.  -->Asymptomatic hospitalized patients: May consider prophylactic dosing while in the hospital, no recommendation to continue dosing after discharge  -->Symptomatic patients being managed at home: No recommendation for anticoagulation     Managing Hypoxia in Pregnant Patients  CPAP in pregnancy can be considered if it is believed that the patient is likely to only require respiratory support short term. Their is a highest risk of aspiration in pregnancy with CPAP. Respiratory failure requiring intubation can be considered for the usual indications: hypoxia, hypercapnia, increased work of breathing, severe metabolic acidosis, need for sedation, reduction in myocardial/ systemic oxygen demand, and altered mental status.  As respiratory failure is a higher risk in pregnancy, the following are potential indications for intubation:     -O2 saturations <95%  -RR >30     Initial vent settings in pregnancy do not deviate significantly from non-pregnant adults:     Tidal Volume: 6-8ml/kg (use predicted body weight)  Inspiratory Flow rate: 60L/min  RR: 15-18 breaths/min  PEEP: 5  FiO2: start at 100% decrease quickly, titrate to sat >95%     -Peak pressures: 30-35cm (consider repositioning as needed as engorged breasts can make it challenging to achieve goal if patient is lying completely flat) -Attempt to avoid permissive hypercapnia if possible d/t fetal concerns. However, if optimization of maternal oxygenation requires permissive hypercapnia, prioritize maternal status     There are no changes in sedation for pregnancy and all medications used for sedation are well tolerated during pregnancy. Pregnancy increases GFR and therefore patients may require more sedation medication. Nitric Oxide for pulmonary hypertension is tolerated in pregnancy. Pregnant patients are candidates for ECMO without delivery in certain clinical situations. However, if pregnant patient has a severe event and requires code, perimortem section should be immediately performed to optimize maternal survivability if patient is greater >20 weeks. COVID in pregnancy general considerations      Risks of COVID-19 infection in pregnancy includes: worsening respiratory status, early intubation, ICU admission, injury to other organs such as kidneys and liver, blood product transfusion,  delivery, fetal distress, and maternal/fetal/ mortality. COVID in pregnancy often presents with lymphopenia, elevated LFTs/LDH/CRP/ferritin/D Dimer/PT/Troponin, and XIMENA. Clinically, it presents as  labor and/or intrapartum fever, or mimicking preeclampsia. Droplet Plus precautions should be utilized for all COVID+/PUI patients. Aerosolization procedures unique to pregnancy include  delivery, second stage of labor (pushing), and postpartum hemorrhage in which all medical staff should have appropriate PPE (N95 or PAPR). In labor, the following considerations occur. An early epidural is encouraged to decrease the need for and risks related to general anesthesia. Due to the extra time needed for appropriate protective equipment, there may be a higher risk of  delivery if there is early evidence of fetal intolerance to labor.  Considerable delays will result to ensure the safety of the medical staff and therefore a stat c/s would be impossible to achieve in this patient population. All efforts should be made to prevent emergent or urgent situations during labor for these patients. Studies to date have not shown low rates of vertical transmission of COVID-19 (1-3%), however it is unclear in these small number of documented cases whether transmission was vertical or postpartum exposure. Currently, ACOG and the CDC are recommending shared decision making to determine if maternal fetal separation at the time of delivery is appropriate. Breastmilk via nursing or pumping is encouraged and there has been no documented transmission via breastmilk.       Isabel Jones DO  OB/GYN Resident, PGY3  [facility]  10/14/2021, 3:44 AM

## 2021-10-14 NOTE — PLAN OF CARE
Problem: Airway Clearance - Ineffective  Goal: Achieve or maintain patent airway  Outcome: Met This Shift     Problem: Gas Exchange - Impaired  Goal: Absence of hypoxia  Outcome: Met This Shift  Goal: Promote optimal lung function  Outcome: Met This Shift     Problem: Breathing Pattern - Ineffective  Goal: Ability to achieve and maintain a regular respiratory rate  Outcome: Met This Shift     Problem:  Body Temperature -  Risk of, Imbalanced  Goal: Ability to maintain a body temperature within defined limits  Outcome: Met This Shift  Goal: Will regain or maintain usual level of consciousness  Outcome: Met This Shift  Goal: Complications related to the disease process, condition or treatment will be avoided or minimized  Outcome: Met This Shift     Problem: Isolation Precautions - Risk of Spread of Infection  Goal: Prevent transmission of infection  Outcome: Met This Shift     Problem: Nutrition Deficits  Goal: Optimize nutritional status  Outcome: Met This Shift     Problem: Risk for Fluid Volume Deficit  Goal: Maintain normal heart rhythm  Outcome: Met This Shift  Goal: Maintain absence of muscle cramping  Outcome: Met This Shift  Goal: Maintain normal serum potassium, sodium, calcium, phosphorus, and pH  Outcome: Met This Shift     Problem: Loneliness or Risk for Loneliness  Goal: Demonstrate positive use of time alone when socialization is not possible  Outcome: Met This Shift     Problem: Fatigue  Goal: Verbalize increase energy and improved vitality  Outcome: Met This Shift     Problem: Patient Education: Go to Patient Education Activity  Goal: Patient/Family Education  Outcome: Met This Shift     Problem: Nutrition  Goal: Optimal nutrition therapy  10/14/2021 1916 by Chavo House RN  Outcome: Met This Shift  10/14/2021 1336 by Tammi Sim RD, LD  Note: Nutrition Problem #1: Inadequate oral intake  Intervention: Food and/or Nutrient Delivery: Continue Current Diet, Start Oral Nutrition Supplement  Nutritional Goals: Intake to meet > 50% of estimated nutrition needs

## 2021-10-14 NOTE — PROGRESS NOTES
OB/GYN PROGRESS NOTE    Dejon Lambert is a 27 y.o. female  at [facility] Day: 2    Subjective:   Patient has been seen this morning. She appears more comfortable. Not working hard to breath. O2 sats improving. Labs improving. Objective:   Vitals:  Vitals:    10/14/21 0400 10/14/21 0500 10/14/21 0509 10/14/21 0600   BP: 137/82 126/88  136/74   Pulse: 126 113  111   Resp: (!) 40 (!) 36 (!) 31 26   Temp:       TempSrc: Axillary      SpO2: (!) 87% 95% 95% 97%   Weight: 217 lb 9.5 oz (98.7 kg)      Height: 5' 4\" (1.626 m)            FHT: 150 moderate variability w/ accels (10x10) occasional variables TOCO quiet         Assessment/Plan:  Dejon Lambert is a 27 y.o. female  at 30w1d IUP   - Rh +/ Rubella immune/ GBS negative   - Cat 1 FHT, TOCO quiet    COVID 19 w/ worsening respiratory status              - Tachycardia and tachypnea, now necessitating 40L HF NL and satting 97-99%              - Heparin drip              - Next dose of celestone ordered for 10/14 2 1900   - Critical care primary   - OBGYN on as consult, please do no hesitate to contact               - Colleagues in NICU aware of patient and that situation is ever evolving. They have vocalized importance of steroids and mag sulfate at this gestational age, appreciate collaboration.  Will complete celestone course and will initiate mag sulfate bolus if clinical status necessitates delivery              - CS consent obtained and on chart   - Calcitonin pending    - PTT, POCT glucose q6h   - BMP, CA, CBC, Mag, Phos QD   - MD ISS for coverage, patient has received celestone and solumedrol so anticipate hyperglycemia              - Trending labs                         - BHB 4.57    -    -Crt 0.44>0.37>0.34    -ALT/AST 45/30    -CO2 7>8    Lactate 0.8>0.8    -PTT 40.5> >120.0    - INR 0.9    -D-Dimer 0.83>0.72    -Fibrinogen 595    -Ferritin 264    -Trop <6    -BNP <20    Euglycemic starvation ketoacidosis   - Patient reports no real oral intake for about 1 week   - BHB 4.57, large ketone on UA   - Metabolic acidosis    - Will continue to treat w/ aggressive fluid hydration   - Patient can eat if she feels she is able to, prefer CLD 2/2 difficult airway     Left lower lobe PE              - Dx 10/13 at [place] [facility]              - Single small nonocclusive segmental to subsegmental pulmonary embolism in the left lower lobe, no central emboli, multilobar COVID-19 pneumonia               - Will continue heparin drip and trend coags     Tachycardia              - 2/2 COVID, pregnancy, PE                Tachypnea               - respirations in the 30s-40 per minute              - Patient does not appear to be working hard to breath at this time              - On 40L HF NC and satting 97-99%     Marginal cord              - Per chart review \"cord insertion is right at the tip of the placenta\"     GERD              - IV reglan ordered     Hx migraines              - Per chart review     Anxiety     BMI 38     Respiratory Considerations for Pregnant Patients  Patients who are pregnant have a significantly higher risk of respiratory failure. This is due to a decreased functional residual capacity (decreased by 18%). The risk of respiratory failure is highest in the third trimester. Physiologic changes of pregnancy due to progesterone cause hyperventilation and subsequent respiratory alkalosis.       In pregnancy, be aware of these changes to respiratory mechanics:   -decrease in FRC (decrease 18%)  -increase minute volume (20-40% above baseline)  -increase in oxygen consumption (increase 20%)  -decrease in pulmonary vascular resistance (decrease 34%)   -decrease in colloid osmotic pressure (14%, and as much as 50% in preeclampsia)  -increase in tidal volume  -FEV1 doesnt change     Normal PaCo2 in pregnancy is <31mmHg. Normal O2 in pregnancy is approximately 105mmHg ( range).  Normal HCO3 is 16-22.       Below are normal values in pregnancy in the second and third trimesters:  CRP: 0.4-20.3  D Dimer: 0.32-1.7  Ferritin: 0-230  LDH:   Fibrinogen: 200-600 (consider cryo if <200)     Hypoxia increases uterine activity in pregnancy. Therefore, consider treating the hypoxia prior to using tocolytics as stabilization of the patient will improve fetal outcomes. Magnesium sulfate is tocolytic of choice in the majority of critically ill patients. Please be aware that Magnesium increases aveolar permeability and is associated with a risk of pulmonary edema. If Magnesium therapy is performed, closely monitor Is/Os and attempt to leave patient balanced. Total fluids should not exceed 75cc/hour.      Adjuvant Therapy in Pregnant/ Postpartum Patients:  -Decadron: Dexamethasone is regularly used to induce fetal lung maturity with two doses of 12mg twenty four hours apart between [id number]-[id number]. If Decadron needs to be used for both fetal lung maturity and maternal respiratory status, the recommendation is to dose it 12mg q 24 x 2 doses, and then complete the remaining doses as the usual 6mg q day for 10 days or until discharge.   The risk of continued use of Decadron in pregnancy is unknown and risk to the fetus cannot be ruled out. The lactation risk of Decadron has no data, but is likely compatible.  If Decadron is indicated for maternal respiratory status, the benefits of this medication exceed the theoretical risk to the fetus and critical illness in the mother has far more catastrophic consequences for the fetus.   -Convalescent Plasma: recommended in pregnancy if patient meets inclusion requirements  -Remdesivir: recommended in pregnancy if patient meets inclusion requirements  -Monoclonal Antibodies: little data to inform recommendations, weigh risks and benefits     Anticoagulation in COVID positive pregnant patients  While there are no definitive recommendations yet, below are generally agreed upon expert recommendations:  -->Symptomatic hospitalized patients: Prophylatic dosing during hospital stay, can be discharged on 2 weeks of prophylactic dosing if additional risk factors. Recommend Heparin if patient is pregnant and there is any chance of delivery during the hospitalization.  -->Asymptomatic hospitalized patients: May consider prophylactic dosing while in the hospital, no recommendation to continue dosing after discharge  -->Symptomatic patients being managed at home: No recommendation for anticoagulation     Managing Hypoxia in Pregnant Patients  CPAP in pregnancy can be considered if it is believed that the patient is likely to only require respiratory support short term. Their is a highest risk of aspiration in pregnancy with CPAP.     Respiratory failure requiring intubation can be considered for the usual indications: hypoxia, hypercapnia, increased work of breathing, severe metabolic acidosis, need for sedation, reduction in myocardial/ systemic oxygen demand, and altered mental status. As respiratory failure is a higher risk in pregnancy, the following are potential indications for intubation:     -O2 saturations <95%  -RR >30     Initial vent settings in pregnancy do not deviate significantly from non-pregnant adults:     Tidal Volume: 6-8ml/kg (use predicted body weight)  Inspiratory Flow rate: 60L/min  RR: 15-18 breaths/min  PEEP: 5  FiO2: start at 100% decrease quickly, titrate to sat >95%     -Peak pressures: 30-35cm (consider repositioning as needed as engorged breasts can make it challenging to achieve goal if patient is lying completely flat)     -Attempt to avoid permissive hypercapnia if possible d/t fetal concerns. However, if optimization of maternal oxygenation requires permissive hypercapnia, prioritize maternal status     There are no changes in sedation for pregnancy and all medications used for sedation are well tolerated during pregnancy. Pregnancy increases GFR and therefore patients may require more sedation medication.  Nitric Oxide for pulmonary hypertension is tolerated in pregnancy.     Pregnant patients are candidates for ECMO without delivery in certain clinical situations. However, if pregnant patient has a severe event and requires code, perimortem section should be immediately performed to optimize maternal survivability if patient is greater >20 weeks.     COVID in pregnancy general considerations      Risks of COVID-19 infection in pregnancy includes: worsening respiratory status, early intubation, ICU admission, injury to other organs such as kidneys and liver, blood product transfusion,  delivery, fetal distress, and maternal/fetal/ mortality.      COVID in pregnancy often presents with lymphopenia, elevated LFTs/LDH/CRP/ferritin/D Dimer/PT/Troponin, and XIMENA. Clinically, it presents as  labor and/or intrapartum fever, or mimicking preeclampsia.     Droplet Plus precautions should be utilized for all COVID+/PUI patients. Aerosolization procedures unique to pregnancy include  delivery, second stage of labor (pushing), and postpartum hemorrhage in which all medical staff should have appropriate PPE (N95 or PAPR).       In labor, the following considerations occur. An early epidural is encouraged to decrease the need for and risks related to general anesthesia. Due to the extra time needed for appropriate protective equipment, there may be a higher risk of  delivery if there is early evidence of fetal intolerance to labor. Considerable delays will result to ensure the safety of the medical staff and therefore a stat c/s would be impossible to achieve in this patient population. All efforts should be made to prevent emergent or urgent situations during labor for these patients.       Studies to date have not shown low rates of vertical transmission of COVID-19 (1-3%), however it is unclear in these small number of documented cases whether transmission was vertical or postpartum exposure. Currently, ACOG and the CDC are recommending shared decision making to determine if maternal fetal separation at the time of delivery is appropriate. Breastmilk via nursing or pumping is encouraged and there has been no documented transmission via breastmilk.        Patient Active Problem List    Diagnosis Date Noted    Tachycardia 10/14/2021    Tachypnea 10/14/2021    Anxiety 10/14/2021    Pulmonary embolism ([address] 10/14/2021    Marginal insertion of umbilical cord affecting management of mother 10/14/2021    Starvation ketoacidosis 10/14/2021    Pneumonia due to COVID-19 virus 10/13/2021    26 weeks gestation of pregnancy 10/13/2021       Will update Dr. Ruben Ballesteros. Lena Martin DO  Ob/Gyn Resident  10/14/2021, 4:08 AM     Date: 10/14/2021  Time: 7:52 PM      Patient Name: Kyree Novoa  Patient : 1990  Room/Bed: [facility]03-  Admission Date/Time: 10/13/2021  8:19 PM        Attending Physician Statement  I have personally seen, evaluated and discussed the care of Kyree Novoa, including pertinent history and exam findings with the resident. I have reviewed and edited their note in the electronic medical record. The key elements of all parts of the encounter have been performed/reviewed by me. I agree with the assessment, plan and orders as documented by the resident. The level of care submitted represents to the best of my ability the care documented in the medical record today. GC Modifier. This service has been performed in part by a resident under the direction of a teaching physician. Attending's Name:  Herminio Mendez MD        Patient seen and spoke also by phone to her  Mely Ayala. She remains stable on high flow oxygen and her tachypnea is mid 30s improved from 40s. She remains critically ill in the ICU. We again reviewed the indications for delivery and need to optimize Anika's state for both her benefit as well as her fetus. The ob team remains readily available. Please also see Dr. Griselda Labs note for details for management of severe covid in pregnancy. She will need to remain anticoagulated for the remainder of the pregnancy and post-partum state. Once clinically stable and risk of delivery decreased may consider transition to Lovenox. Her questions were answered as were those of her .

## 2021-10-14 NOTE — PROGRESS NOTES
OB Attending Summary Note    Mykel Horton is a 26 yo  at 26w4d who was transferred from [facility] with COVID pneumonia and PE. I have been coordinating her care with the team in [place] since 5pm yesterday evening. Mykel Horton was accepted to the internal medicine service with the plan for OB on consult. However, upon review of her worsening vital signs (respirations 35-45), increasing oxygen requirements (on 3L not meeting goal O2 saturation of 95% for pregnancy), and concerning VBG, she was deemed critical and decision was made to fly her to [facility] for higher level of care. Plan was full maternal and fetal assessment and stabilization on L&D, with ultimate transfer to [facility] or SICU pending her level of need. I ordered immediate initiation of betamethasone course for fetal lung maturity and this initial dose was given in [place] at 7pm on 10/13. Upon arrival, Mykel Horton was tachypnic with significant conversation dyspnea. She has been transported on 10L of oxygen and required 15L upon arrival to maintain sats of >95%. I discussed her care and her transport with Dr. Shanelle Sifuentes who agreed that she was appropriate for the SICU and that the only appropriate adjuvant therapy at this stage of her disease was Dexamethasone. Greatly appreciate Dr. [place] assistance in care coordination and facilitation of transfer. Worked closely with Padmini Lundberg, house supervisor to obtain ICU bed after it was clear that Mykel Horton would require these services. We successfully were able to keep her close to the Main OR in the SICU in case of a need for urgent delivery. Case discussed with critical care team member Dr. Vivi Washburn given concern for profound acidemia, critically low CO2, and critically low bicarbonate. Dr. Sarah Jennings noted metabolic acidemia that was unusual in the setting of tachypnea and respiratory distress. We discussed possible etiologies of the metabolic acidosis. Her lactic acid is normal at 0.8.  This patient does not have a history of pregestational or gestational diabetes, but she has not eaten in several days and we strongly suspected euglycemic starvation ketoacidosis as the most likely etiology. This was confirmed by beta hydroxybutyrate of 4.57 and no hyperglycemia despite concurrent betamethasone and dexamethasone administration. Treatment for euglycemic starvation acidosis is mainly supportive with aggressive IV hydration. We initiated an immediate 2L bolus of NS and this aggressive treatment (likely 6L over 24 hours) will continue when patient transitions to ICU. I am not concerned about fluid overload as Celina Kelly has no evidence of cardiomyopathy and a well functioning renal system. She has a miles in place. I have discussed this case extensively with Celina Kelly, her  Julissa Cash, and Boo's mother and stepmother who came to the hospital. We discussed indications for delivery and specified that we would move towards delivery if Celina Kelly had worsening oxygen requirements that could not be satisfied by either high flow or BiPAP. If we moved towards intubation, it has been the practice of our team to move toward delivery as this will improve maternal respiratory mechanics. We also discussed that the only adjuvant therapy appropriate in this case is dexamethasone and that we would proceed with two days of betamethasone (12mg IM q 24 x 2 doses) and then finish her course with the routine Dexamethasone 6mg q day for the next 8 days. We discussed that this would not only serve Anika, but that this would promote fetal lung maturity, reduce the risk of NEC in the infant, and reduce the risk of  IVH as well as optimize care for Anika's COVID pneumonia.  We discussed that her course is highly variable and that it is impossible to make predictions about how long she will be in the hospital, but it is the experience of this team that the majority of patients we have cared for with this level of critical illness have required delivery and anywhere from a 6-125 day hospital stay. Our hope is that Anika's course may be substantially different given her unique metabolic acidosis component that we can treat. Finally, the plan for now will be continuous fetal monitoring until [place] stabilizes. I am hopeful that a resolution of her starvation ketoacidosis with aggressive IV hydration will greatly improve her tachypnea and overall respiratory status. We will also encourage clear liquids (especially high carb drinks) if she can tolerate and slow transition back to solids when appropriate. She should remain on the Heparin drip until she has stabilized as we still have to maintain of a high level of preparedness for emergent delivery via c/s in case of acute maternal decompensation. Our team remains exceedingly grateful to the critical care team, the transport team, and the many multidisciplinary team members caring for Noe baez and her baby. Noe baez clarified that her  Trip Jensen [phone] is her decision maker and the decision maker for the baby. We have been in close contact with him via phone all night. Neuro  -no acute issues    CV  -BNP <20, troponins negative  -normotensive  -doing well    Resp  -Currently comfortable on high flow 40L, meeting O2 sat goal in pregnancy of >95%  -Will continue aggressive IV hydration (goal 6L over 24 hours) to resolve metabolic acidosis and improve critically low bicarb (7.4 on VBG, 8.5 on ABG) and low CO2  -Continue to monitor closely  -CT at [place] demonstrate small segmental PE likely is not yielding hypoxia.  CT demonstrated multilobar pneumonia    GI  -clear liquids for now as patient will have a very challenging airway (pregnant/ COVID/ respiratory distress) if she has sudden decompensation  -currently no plans for delivery, but we maintain high level of preparedness given her critically ill state      -maintain miles until Anika can ambulate to bedside commode without significant hypoxia  -Cr 0.25, no evidence of renal failure    Heme  -small segmental PE in left lower lobe, continue Heparin drip  -maintain Heparin for now until stabilizes in case patient needs to be emergently delivered via c/s  -6 hr PTT for heparin drip    ID  -Dexamethasone for adjuvant therapy for COVID  -otherwise care is supportive    Endo  -Euglycemic starvation acidosis will be treated with aggressive fluid hydration (goal 6L over 24 hours, agree with NS or LR pending no electrolyte derangement)  -Recommend q 6 hour CMP to evaluate electrolyte, anion gap, bicarb, as well as serum ketones  -Agree with D5  -Encourage carbohydrate drinks and slowly attempt to refeed    OB  -continuous monitoring for now  -when patient stabilizes, perform BPP for fetal assessment and then can stop CEFM  -second dose of BMZ today at 7pm  -Category 1 tracing    Will continue to monitor closely. Respiratory Considerations for Pregnant Patients  Patients who are pregnant have a significantly higher risk of respiratory failure. This is due to a decreased functional residual capacity (decreased by 18%). The risk of respiratory failure is highest in the third trimester. Physiologic changes of pregnancy due to progesterone cause hyperventilation and subsequent respiratory alkalosis. In pregnancy, be aware of these changes to respiratory mechanics:   -decrease in FRC (decrease 18%)  -increase minute volume (20-40% above baseline)  -increase in oxygen consumption (increase 20%)  -decrease in pulmonary vascular resistance (decrease 34%)   -decrease in colloid osmotic pressure (14%, and as much as 50% in preeclampsia)  -increase in tidal volume  -FEV1 doesnt change    Normal PaCo2 in pregnancy is <31mmHg. Normal O2 in pregnancy is approximately 105mmHg ( range). Normal HCO3 is 16-22.       Below are normal values in pregnancy in the second and third trimesters:  CRP: 0.4-20.3  D Dimer: 0.32-1.7  Ferritin: 0-230  LDH:   Fibrinogen: 200-600 (consider cryo if <200)    Hypoxia increases uterine activity in pregnancy. Therefore, consider treating the hypoxia prior to using tocolytics as stabilization of the patient will improve fetal outcomes. Magnesium sulfate is tocolytic of choice in the majority of critically ill patients. Please be aware that Magnesium increases aveolar permeability and is associated with a risk of pulmonary edema. If Magnesium therapy is performed, closely monitor Is/Os and attempt to leave patient balanced. Total fluids should not exceed 75cc/hour. Adjuvant Therapy in Pregnant/ Postpartum Patients:  -Decadron: Dexamethasone is regularly used to induce fetal lung maturity with two doses of 12mg twenty four hours apart between [id number]. If Decadron needs to be used for both fetal lung maturity and maternal respiratory status, the recommendation is to dose it 12mg q 24 x 2 doses, and then complete the remaining doses as the usual 6mg q day for 10 days or until discharge. The risk of continued use of Decadron in pregnancy is unknown and risk to the fetus cannot be ruled out. The lactation risk of Decadron has no data, but is likely compatible. If Decadron is indicated for maternal respiratory status, the benefits of this medication exceed the theoretical risk to the fetus and critical illness in the mother has far more catastrophic consequences for the fetus.   -Convalescent Plasma: recommended in pregnancy if patient meets inclusion requirements  -Remdesivir: recommended in pregnancy if patient meets inclusion requirements  -Monoclonal Antibodies: little data to inform recommendations, weigh risks and benefits    Anticoagulation in COVID positive pregnant patients  While there are no definitive recommendations yet, below are generally agreed upon expert recommendations:  -->Symptomatic hospitalized patients: Prophylatic dosing during hospital stay, can be discharged on 2 weeks of prophylactic dosing if additional risk factors. Recommend Heparin if patient is pregnant and there is any chance of delivery during the hospitalization.  -->Asymptomatic hospitalized patients: May consider prophylactic dosing while in the hospital, no recommendation to continue dosing after discharge  -->Symptomatic patients being managed at home: No recommendation for anticoagulation    Managing Hypoxia in Pregnant Patients  CPAP in pregnancy can be considered if it is believed that the patient is likely to only require respiratory support short term. Their is a highest risk of aspiration in pregnancy with CPAP. Respiratory failure requiring intubation can be considered for the usual indications: hypoxia, hypercapnia, increased work of breathing, severe metabolic acidosis, need for sedation, reduction in myocardial/ systemic oxygen demand, and altered mental status. As respiratory failure is a higher risk in pregnancy, the following are potential indications for intubation:    -O2 saturations <95%  -RR >30    Initial vent settings in pregnancy do not deviate significantly from non-pregnant adults:    Tidal Volume: 6-8ml/kg (use predicted body weight)  Inspiratory Flow rate: 60L/min  RR: 15-18 breaths/min  PEEP: 5  FiO2: start at 100% decrease quickly, titrate to sat >95%    -Peak pressures: 30-35cm (consider repositioning as needed as engorged breasts can make it challenging to achieve goal if patient is lying completely flat)    -Attempt to avoid permissive hypercapnia if possible d/t fetal concerns. However, if optimization of maternal oxygenation requires permissive hypercapnia, prioritize maternal status    There are no changes in sedation for pregnancy and all medications used for sedation are well tolerated during pregnancy. Pregnancy increases GFR and therefore patients may require more sedation medication. Nitric Oxide for pulmonary hypertension is tolerated in pregnancy.     Pregnant patients are candidates for ECMO without delivery in certain clinical situations. However, if pregnant patient has a severe event and requires code, perimortem section should be immediately performed to optimize maternal survivability if patient is greater >20 weeks. COVID in pregnancy general considerations     Risks of COVID-19 infection in pregnancy includes: worsening respiratory status, early intubation, ICU admission, injury to other organs such as kidneys and liver, blood product transfusion,  delivery, fetal distress, and maternal/fetal/ mortality. COVID in pregnancy often presents with lymphopenia, elevated LFTs/LDH/CRP/ferritin/D Dimer/PT/Troponin, and XIMENA. Clinically, it presents as  labor and/or intrapartum fever, or mimicking preeclampsia. Droplet Plus precautions should be utilized for all COVID+/PUI patients. Aerosolization procedures unique to pregnancy include  delivery, second stage of labor (pushing), and postpartum hemorrhage in which all medical staff should have appropriate PPE (N95 or PAPR). In labor, the following considerations occur. An early epidural is encouraged to decrease the need for and risks related to general anesthesia. Due to the extra time needed for appropriate protective equipment, there may be a higher risk of  delivery if there is early evidence of fetal intolerance to labor. Considerable delays will result to ensure the safety of the medical staff and therefore a stat c/s would be impossible to achieve in this patient population. All efforts should be made to prevent emergent or urgent situations during labor for these patients. Studies to date have not shown low rates of vertical transmission of COVID-19 (1-3%), however it is unclear in these small number of documented cases whether transmission was vertical or postpartum exposure.  Currently, ACOG and the CDC are recommending shared decision making to determine if maternal fetal separation at the time of delivery is appropriate. Breastmilk via nursing or pumping is encouraged and there has been no documented transmission via breastmilk.

## 2021-10-14 NOTE — H&P
reports pregnancy is otherwise uncomplicated and denies any other significant medical history. The patient reports fetal movement is present, denies contractions, denies loss of fluid, denies vaginal bleeding. DATING:  LMP: Patient's last menstrual period was 2021 (approximate).   Estimated Date of Delivery: 22   Based on: early ultrasound, at 8 4/7 weeks GA    PREGNANCY RISK FACTORS:  Patient Active Problem List   Diagnosis    Pneumonia due to COVID-19 virus    26 weeks gestation of pregnancy        Steroids Given In This Pregnancy:  yes, date: 10/13/21     REVIEW OF SYSTEMS:   Constitutional: negative fever, negative chills, negative weight changes   HEENT: negative visual disturbances, negative headaches, negative dizziness, negative hearing loss  Breast: Negative breast abnormalities, negative breast lumps, negative nipple discharge  Respiratory: + dyspnea, + cough, + SOB  Cardiovascular: negative chest pain,  negative palpitations, negative arrhythmia, negative syncope   Gastrointestinal: negative abdominal pain, negative RUQ pain, negative N/V, negative diarrhea, negative constipation, negative bowel changes, negative heartburn   Genitourinary: negative dysuria, negative hematuria, negative urinary incontinence, negative vaginal discharge, negative vaginal bleeding or spotting  Dermatological: negative rash, negative pruritis, negative mole or other skin changes  Hematologic: negative bruising  Immunologic/Lymphatic: negative recent illness, negative recent sick contact  Musculoskeletal: negative back pain, negative myalgias, negative arthralgias  Neurological:  negative dizziness, negative migraines, negative seizures, negative weakness  Behavior/Psych: negative depression, negative anxiety, negative SI, negative HI    OBSTETRICAL HISTORY:   OB History    Para Term  AB Living   1 0 0 0 0 0   SAB TAB Ectopic Molar Multiple Live Births   0 0 0 0 0 0      # Outcome Date GA Lbr Charli/2nd Weight Sex Delivery Anes PTL Lv   1 Current                PAST MEDICAL HISTORY:   has a past medical history of Anxiety, GERD (gastroesophageal reflux disease), Migraines, and Seasonal allergies. PAST SURGICAL HISTORY:   has a past surgical history that includes [place] tooth extraction; Mandible surgery; Foot surgery; and Mouth surgery. ALLERGIES:  is allergic to vicodin [hydrocodone-acetaminophen]. MEDICATIONS:  Prior to Admission medications    Medication Sig Start Date End Date Taking? Authorizing Provider   doxyLAMINE succinate (UNISOM SLEEPTABS) 25 MG tablet Take 25 mg by mouth nightly   Yes Historical Provider, MD   Prenatal Vit-Fe Fumarate-FA (PRENATAL VITAMIN PO) Take 1 tablet by mouth daily    Yes Historical Provider, MD   omeprazole (PRILOSEC) 20 MG delayed release capsule  2/4/21   Historical Provider, MD       FAMILY HISTORY:  family history includes Breast Cancer in her maternal grandmother; Heart Attack in her paternal uncle; Hypertension in her father and mother; No Known Problems in her brother, brother, maternal grandfather, and paternal grandfather; Stroke in her maternal aunt and paternal grandmother. SOCIAL HISTORY:   reports that she has quit smoking. Her smoking use included cigarettes. She has never used smokeless tobacco. She reports previous alcohol use. She reports previous drug use. Drug: Marijuana.     VITALS:  10/13/21 2019  99.8 (37.7)  38  120  136/71  --  --  --  --  94  --  --  --  --      10/13/21 2015  --  --  --  --  --  --  --  --  --  --  --  225 lb (102.1 kg)  0         PHYSICAL EXAM:  Fetal Heart Monitor:  Baseline Heart Rate 155, moderate variability, present accelerations (10x10), absent decelerations  [place]: contractions, none    General appearance:  Apparent respiratory distress, alert and cooperative  Lungs:  Unable to speak in full sentences, working hard to breath, utilizing accessory muscles   Heart:  Tachycardic, regular rate  Abdomen: soft, gravid, non-tender, no rebound, guarding, or rigidity, no RUQ or epigastric tenderness, no signs or symptoms of abruption, no signs or symptoms of chorioamnionitis  Extremities:  no calf tenderness, non edematous, no varicosities, full range of motion in all four extremities  Musculoskeletal: Gross strength equal and intact throughout, no gross abnormalities, range of motion normal in hips, knees, shoulders and spine  Psychiatric: Mood appropriate, normal affect   Rectal Exam: not indicated        PRENATAL LAB RESULTS:   Blood Type/Rh: O pos  Antibody Screen: negative  Hemoglobin, Hematocrit, Platelets: Hgb 80.2/NHH 41. 0/Plt 316  Rubella: immune  T. Pallidum, IgG: non-reactive  Hepatitis B Surface Antigen: non-reactive   Hepatitis C Antibody: non-reactive   HIV: non-reactive   Sickle Cell Screen: not available  Gonorrhea: negative  Chlamydia: negative  Urine culture: negative, date: 10/10/21    Early 1 hour Glucose Tolerance Test: 118  1 hour Glucose Tolerance Test: not available    Group B Strep: not done  Cystic Fibrosis Screen: not available  First Trimester Screen: not available  MSAFP/Multiple Markers: not available  Non-Invasive Prenatal Testing: not available  Anatomy US: anterior placenta, 3VC with marginal insertion, male gender, normal anatomy    ASSESSMENT & PLAN:  Kwadwo Barton is a 27 y.o. female  at 30w1d    - GBS unknown / Rh positive / R immune   - No indication for GBS prophylaxis unless vaginal delivery is imminent    COVID 19 w/ worsening respiratory status   - Tachycardia and tachypnea, now necessitating 15L non-re breather to saturate >95%    - ATSO Dr. Peterson Murrell. Will evaluate on L&D and determine need for delivery vs appropriate unit placement, ICU vs step down.  Already in contact w/ colleagues on for critical care, appreciate recs and collaboration as we care for this patient   - Stat labs ordered: VBG, CBC, CMP, T&S, TPAL, PT/PTT, fibrinogen, lactate   - Will continue heparin drip   - Next dose of celestone ordered for 10/14 2 1900   - cEFM/TOCO: currently cat 1   - Colleagues in NICU aware of patient and that situation is ever evolving. They have vocalized importance of steroids and mag sulfate at this gestational age, appreciate collaboration. Will complete celestone course and will initiate mag sulfate bolus if clinical status necessitates delivery   -  ml/hr LR   - CS consent obtained and on chart   - Dr. Hank Hale in room with patient and in contact with family that is present at the hospital as well as patient's partner over the phone. Will continue to update as situation evolves. - Labs prior to transfer:     - D-Dimer 0.83    - Trop <6    - BNP <20    - Creatinine 0.44    - CO2 10    - Anion gap 20    - CBC unremarkable     - VBG Ph 7.268 pCO2 25.9 pO2 62.2 HCO3 11.6 O2 Sat 89.2    Left lower lobe PE   - Dx 10/13 at [facility]   - Single small nonocclusive segmental to subsegmental pulmonary embolism in the left lower lobe, no central emboli, multilobar COVID-19 pneumonia    - Will continue heparin drip and trend coags    Tachycardia   - 2/2 COVID, pregnancy, PE     Tachypnea    - respirations in the 30s-40 per minute   - Patient not able to maintain conversation at this time d/t SOB   - O2 increased to 15 L non re breather    Marginal cord   - Per chart review \"cord insertion is right at the tip of the placenta\"    GERD   - IV reglan ordered    Hx migraines   - Per chart review    Anxiety    BMI 38    Respiratory Considerations for Pregnant Patients  Patients who are pregnant have a significantly higher risk of respiratory failure. This is due to a decreased functional residual capacity (decreased by 18%). The risk of respiratory failure is highest in the third trimester. Physiologic changes of pregnancy due to progesterone cause hyperventilation and subsequent respiratory alkalosis.        In pregnancy, be aware of these changes to respiratory mechanics:   -decrease in [facility] (decrease 18%)  -increase minute volume (20-40% above baseline)  -increase in oxygen consumption (increase 20%)  -decrease in pulmonary vascular resistance (decrease 34%)   -decrease in colloid osmotic pressure (14%, and as much as 50% in preeclampsia)  -increase in tidal volume  -FEV1 doesnt change     Normal PaCo2 in pregnancy is <31mmHg. Normal O2 in pregnancy is approximately 105mmHg ( range). Normal HCO3 is 16-22. Below are normal values in pregnancy in the second and third trimesters:  CRP: 0.4-20.3  D Dimer: 0.32-1.7  Ferritin: 0-230  LDH:   Fibrinogen: 200-600 (consider cryo if <200)     Hypoxia increases uterine activity in pregnancy. Therefore, consider treating the hypoxia prior to using tocolytics as stabilization of the patient will improve fetal outcomes. Magnesium sulfate is tocolytic of choice in the majority of critically ill patients. Please be aware that Magnesium increases aveolar permeability and is associated with a risk of pulmonary edema. If Magnesium therapy is performed, closely monitor Is/Os and attempt to leave patient balanced. Total fluids should not exceed 75cc/hour. Adjuvant Therapy in Pregnant/ Postpartum Patients:  -Decadron: Dexamethasone is regularly used to induce fetal lung maturity with two doses of 12mg twenty four hours apart between [id number]. If Decadron needs to be used for both fetal lung maturity and maternal respiratory status, the recommendation is to dose it 12mg q 24 x 2 doses, and then complete the remaining doses as the usual 6mg q day for 10 days or until discharge. The risk of continued use of Decadron in pregnancy is unknown and risk to the fetus cannot be ruled out. The lactation risk of Decadron has no data, but is likely compatible.  If Decadron is indicated for maternal respiratory status, the benefits of this medication exceed the theoretical risk to the fetus and critical illness in the mother has far more catastrophic consequences for the fetus.   -Convalescent Plasma: recommended in pregnancy if patient meets inclusion requirements  -Remdesivir: recommended in pregnancy if patient meets inclusion requirements  -Monoclonal Antibodies: little data to inform recommendations, weigh risks and benefits     Anticoagulation in COVID positive pregnant patients  While there are no definitive recommendations yet, below are generally agreed upon expert recommendations:  -->Symptomatic hospitalized patients: Prophylatic dosing during hospital stay, can be discharged on 2 weeks of prophylactic dosing if additional risk factors. Recommend Heparin if patient is pregnant and there is any chance of delivery during the hospitalization.  -->Asymptomatic hospitalized patients: May consider prophylactic dosing while in the hospital, no recommendation to continue dosing after discharge  -->Symptomatic patients being managed at home: No recommendation for anticoagulation     Managing Hypoxia in Pregnant Patients  CPAP in pregnancy can be considered if it is believed that the patient is likely to only require respiratory support short term. Their is a highest risk of aspiration in pregnancy with CPAP. Respiratory failure requiring intubation can be considered for the usual indications: hypoxia, hypercapnia, increased work of breathing, severe metabolic acidosis, need for sedation, reduction in myocardial/ systemic oxygen demand, and altered mental status.  As respiratory failure is a higher risk in pregnancy, the following are potential indications for intubation:     -O2 saturations <95%  -RR >30     Initial vent settings in pregnancy do not deviate significantly from non-pregnant adults:     Tidal Volume: 6-8ml/kg (use predicted body weight)  Inspiratory Flow rate: 60L/min  RR: 15-18 breaths/min  PEEP: 5  FiO2: start at 100% decrease quickly, titrate to sat >95%     -Peak pressures: 30-35cm (consider repositioning as needed as engorged breasts can make it challenging to achieve goal if patient is lying completely flat)     -Attempt to avoid permissive hypercapnia if possible d/t fetal concerns. However, if optimization of maternal oxygenation requires permissive hypercapnia, prioritize maternal status     There are no changes in sedation for pregnancy and all medications used for sedation are well tolerated during pregnancy. Pregnancy increases GFR and therefore patients may require more sedation medication. Nitric Oxide for pulmonary hypertension is tolerated in pregnancy. Pregnant patients are candidates for ECMO without delivery in certain clinical situations. However, if pregnant patient has a severe event and requires code, perimortem section should be immediately performed to optimize maternal survivability if patient is greater >20 weeks. COVID in pregnancy general considerations      Risks of COVID-19 infection in pregnancy includes: worsening respiratory status, early intubation, ICU admission, injury to other organs such as kidneys and liver, blood product transfusion,  delivery, fetal distress, and maternal/fetal/ mortality. COVID in pregnancy often presents with lymphopenia, elevated LFTs/LDH/CRP/ferritin/D Dimer/PT/Troponin, and XIMENA. Clinically, it presents as  labor and/or intrapartum fever, or mimicking preeclampsia. Droplet Plus precautions should be utilized for all COVID+/PUI patients. Aerosolization procedures unique to pregnancy include  delivery, second stage of labor (pushing), and postpartum hemorrhage in which all medical staff should have appropriate PPE (N95 or PAPR). In labor, the following considerations occur. An early epidural is encouraged to decrease the need for and risks related to general anesthesia. Due to the extra time needed for appropriate protective equipment, there may be a higher risk of  delivery if there is early evidence of fetal intolerance to labor. Considerable delays will result to ensure the safety of the medical staff and therefore a stat c/s would be impossible to achieve in this patient population. All efforts should be made to prevent emergent or urgent situations during labor for these patients. Studies to date have not shown low rates of vertical transmission of COVID-19 (1-3%), however it is unclear in these small number of documented cases whether transmission was vertical or postpartum exposure. Currently, ACOG and the CDC are recommending shared decision making to determine if maternal fetal separation at the time of delivery is appropriate. Breastmilk via nursing or pumping is encouraged and there has been no documented transmission via breastmilk. Patient Active Problem List    Diagnosis Date Noted    Tachycardia 10/14/2021    Tachypnea 10/14/2021    Anxiety 10/14/2021    Pulmonary embolism ([address] 10/14/2021    Marginal insertion of umbilical cord affecting management of mother 10/14/2021    Pneumonia due to COVID-19 virus 10/13/2021    26 weeks gestation of pregnancy 10/13/2021       Plan discussed with Dr. Jonn Blank, who is agreeable. Steroids given this admission: given prior to transfer, next dose tomorrow    Risks, benefits, alternatives and possible complications have been discussed in detail with the patient. Admission, and post admission procedures and expectations were discussed in detail. All questions were answered. Attending's Name: Dr. Sudheer Maciel DO  Ob/Gyn Resident  10/14/2021, 2:58 AM    Date: 10/14/2021  Time: 10:30 AM      Patient Name: Vera Horton  Patient : 1990  Room/Bed: 0103/0103-01  Admission Date/Time: 10/13/2021  8:19 PM        Attending Physician Statement  I have discussed the care of Vera Horton, including pertinent history and exam findings with the resident. I have reviewed and edited their note in the electronic medical record.  The key elements of all parts of the encounter have been performed/reviewed by me . I agree with the assessment, plan and orders as documented by the resident. The level of care submitted represents to the best of my ability the care documented in the medical record today. GC Modifier. This service has been performed in part by a resident under the direction of a teaching physician.         Attending's Name:  Parker Ramirez DO

## 2021-10-14 NOTE — CONSULTS
Prenatal Consult      At the request of OB, I was asked to do a prenatal consult on [address]  regarding premature birth and the associated  complications of a 26 4/7 weeks gestation delivery. Mother is a 28 y/o  with covid pneumonia and pulmonary embolism. She is on supplemental oxygen at this time, receiving prenatal steroids. Prenatal labs are unremarkable. UDS  In  is + for [facility]       Discussed: with Omid Claros the following issues  1. Survival rates: 80-90%  2. Fetal growth & development associated with 26 4/7weeks gestation  3. Respiratory related complications:  RDS-etiology & treatment including surfactant administration & modes of ventilation & oxygen administration   Apnea of prematurity, use of caffeine  4. Intraventricular Hemorrhage and future developmental risks   5. Infection & universal precautions  6. Hyperbilirubinemia  7. Patent ductus arteriosus, use of medication and possibility of surgical                ligation  8. ROP  9. Feeding & Growth issues: Mother's plan-discussed breast feeding and its benefits for the baby  Immaturity of suck-swallow-breath coordination   Indications for parenteral vs. enteral nutrition  Indications for fortification & supplementation of feeding  Monitoring growth  10. Thermoregulation issues  11. Anemia & blood transfusions  12. Immature renal function and low urine output  13. Prolonged NICU stay and discharge criteria    Parent(s) given opportunity to ask questions. Verbalized understanding of premature birth and the associated  complications.      Time spent >55 min, > 50% spent in face to face counseling of the family and care management    Electronically signed by: Zuhair Flores MD 10/14/2021 12:55 PM

## 2021-10-14 NOTE — FLOWSHEET NOTE
Vascular tech here at bedside to perform bilat lower extremity dopplers. Pt back on her back, tracing intermittently.

## 2021-10-14 NOTE — H&P
Critical Care - History and Physical Examination    Patient's name:  Becka Neves Record Number: [id number]  Patient's account/billing number: [de-identified]  Patient's YOB: 1990  Age: 27 y.o. Date of Admission: 10/13/2021  8:19 PM  Date of History and Physical Examination: 10/14/2021      Primary Care Physician: Orin Shepherd  Attending Physician: Dr. Samia Mccollum     Code Status: Full Code    Chief complaint:   Chief Complaint   Patient presents with    Other     covid positive          HISTORY OF PRESENT ILLNESS:      History was obtained from chart review and the patient. Vandana Mercer is a 27 y.o. with PMH of current pregnancy at 26 weeks and 3 days gestation who initially presented to outside facility, [facility] on 10/13/2021. Patient reported that symptoms began on 10/01 although she did not test positive until 10/10. Patient reports that on 10/01 she started having general symptoms including shortness of breath and cough. Patient has had associated decreased oral intake for 7 days now reportedly having her last meal on 10/7/2021. Patient states that she has been tolerating oral liquids since that time although has not been able to keep any substantial food products down. Upon arrival to outside facility patient was noted to have an SPO2 of 90% with tachycardia and tachypnea. Patient was transferred to [address] [place] [address] labor and delivery unit for higher level of care due to patient being 26 weeks gestation and need for a tertiary facility pending patient may require immediate delivery. Initially was accepted by internal medicine team due to main concern for COVID pneumonia being primary cause of hospitalization and patient was planned to e stabilized in the labor and delivery unit with continuous fetal monitoring and transferred to covid unit as able.      Critical care consulted due to patient having an VBG which was notable for pH of 7.2 and increasing new oxygen requirements upon arrival to our facility. Discussion with OB team was had and it was noted that despite patient's acidosis she was compensating respiratory wise significantly and CO2 was initially 18 with a bicarb of 7. At this time consideration for euglycemic vs starvation ketoacidosis was made due to picture being more metabolic acidosis with an appropriate respiratory compromise although acidosis was uncompensated for fully. Upon my evaluation of patient she is tachypneic and tachycardic although able to respond appropriately A+O x4. Patient is denying any chest pain at this time. Has had intermittent diarrhea and vomiting since onset of symptoms. Does have some nausea although no vomiting currently. Denies any dysuria. Patient does note that she has had increased thirst and increased frequency of urination. Admission VBG showed pH of 7.2, PCO2 of 18, HCO3 of 7 and Lactic of 0.8    Labs showed no leukocytosis,  normal electrolites (  ), normal kidney function tests, and normal LFT. Xray showed: Scattered bilateral pulmonary infiltrates consistent with pneumonia which has mildly worsened compared to prior study on 10/10. CT with PE protocol reviewed from previous facility. Small subsegmental pulmonary embolism noted. Pt was started on nonrebreather at 15 L in the labor and delivery unit. Discussed with OB team and patient will transition to critical care team as primary, patient will be transferred to SICU unit and we will take over as primary with OB team following closely as well.     PAST MEDICAL HISTORY:         Diagnosis Date    Anxiety     GERD (gastroesophageal reflux disease)     Migraines     Seasonal allergies          PAST SURGICAL HISTORY:         Procedure Laterality Date    FOOT SURGERY      nerve release, and plantar fascitis    MANDIBLE SURGERY      tooth implant    MOUTH SURGERY      WISDOM TOOTH EXTRACTION         ALLERGIES:      Allergies   Allergen Reactions    Vicodin [Hydrocodone-Acetaminophen]      nausea         HOME MEDS: :      Prior to Admission medications    Medication Sig Start Date End Date Taking? Authorizing Provider   doxyLAMINE succinate (UNISOM SLEEPTABS) 25 MG tablet Take 25 mg by mouth nightly   Yes Historical Provider, MD   Prenatal Vit-Fe Fumarate-FA (PRENATAL VITAMIN PO) Take 1 tablet by mouth daily    Yes Historical Provider, MD   omeprazole (PRILOSEC) 20 MG delayed release capsule  2/4/21   Historical Provider, MD       SOCIAL HISTORY:       TOBACCO:   reports that she has quit smoking. Her smoking use included cigarettes. She has never used smokeless tobacco.  ETOH:   reports previous alcohol use. DRUGS:  reports previous drug use. Drug: Marijuana.   OCCUPATION:  n/a     FAMILY HISTORY:          Problem Relation Age of Onset    Breast Cancer Maternal Grandmother         older than 48    Heart Attack Paternal Uncle     Stroke Maternal Aunt     Hypertension Father     No Known Problems Paternal Grandfather     Stroke Paternal Grandmother     No Known Problems Maternal Grandfather     Hypertension Mother     No Known Problems Brother     No Known Problems Brother        REVIEW OF SYSTEMS (ROS):     Review of Systems -   General ROS: generally unwell, weak, fatigued  Psychological ROS: negative  Hematological and Lymphatic ROS: negative  Respiratory ROS: positive for cough, intermittently productive, no hemoptysis, shortness of breath   Cardiovascular ROS: no chest pain, positive for dyspnea on exertion   Gastrointestinal ROS:nausea, vomiting, diarrhea,   Genito-Urinary ROS: negative  Musculoskeletal ROS: weak, generalized body aches  Neurological ROS: negative  Dermatological ROS: negative    OBJECTIVE:     VITAL SIGNS:  BP (!) 119/56   Pulse 122   Temp 99.8 °F (37.7 °C) (Axillary)   Resp (!) 31   Ht 5' 4\" (1.626 m)   Wt 225 lb (102.1 kg)   LMP 04/01/2021 (Approximate)   SpO2 98%   BMI 38.62 kg/m²   Tmax over 24 hours: Temp (24hrs), Av.8 °F (37.7 °C), Min:99.8 °F (37.7 °C), Max:99.8 °F (37.7 °C)      Patient Vitals for the past 8 hrs:   BP Temp Temp src Pulse Resp SpO2 Height Weight   10/14/21 0030 -- -- -- -- (!) 31 98 % -- --   10/14/21 0022 -- -- -- -- (!) 46 96 % -- --   10/13/21 2331 (!) 119/56 -- -- 122 (!) 40 94 % -- --   10/13/21 2301 134/73 -- -- 129 (!) 35 94 % -- --   10/13/21 2231 129/78 -- -- 118 (!) 35 96 % -- --   10/13/21 2205 (!) 117/48 -- -- 115 (!) 34 96 % -- --   10/13/21 2131 125/74 -- -- 105 (!) 35 95 % -- --   10/13/21 2124 132/67 -- -- 127 (!) 35 94 % -- --   10/13/21 2036 -- -- -- -- -- -- 5' 4\" (1.626 m) 225 lb (102.1 kg)   10/13/21 2019 136/71 99.8 °F (37.7 °C) Axillary 120 (!) 38 94 % -- --   10/13/21 2015 -- -- -- -- -- -- -- 225 lb (102.1 kg)         Intake/Output Summary (Last 24 hours) at 10/14/2021 0149  Last data filed at 10/13/2021 2240  Gross per 24 hour   Intake --   Output 450 ml   Net -450 ml       Wt Readings from Last 3 Encounters:   10/13/21 225 lb (102.1 kg)   10/13/21 225 lb (102.1 kg)   10/05/21 225 lb 12.8 oz (102.4 kg)     Body mass index is 38.62 kg/m². PHYSICAL EXAM:  Constitutional: Appears ill, in moderate respiratory distress, speaking in broken sentences, requiring supplemental oxygen support. EENT: neck supple with midline trachea. Neck: Supple, symmetrical, trachea midline, no jvd, skin normal  Respiratory: labored breathing, tachypnea, course breath sounds bilaterally, diminished and course in bilateral lower lung fields.    Cardiovascular: tachycardic and regular rhythm, normal heart sounds, no murmur noted  Abdomen: soft, nontender, nondistended, no masses or organomegaly  Extremities:   no pedal edema, no clubbing or cyanosis    MEDICATIONS:  Scheduled Meds:   lactated ringers bolus  1,000 mL IntraVENous Once    insulin lispro  0-12 Units SubCUTAneous Q6H    betamethasone acetate-betamethasone sodium phosphate  12 mg IntraMUSCular Once UROBILINOGEN Normal 10/13/2021    BILIRUBINUR NEGATIVE  Verified by ictotest. 10/13/2021    GLUCOSEU NEGATIVE 10/13/2021    KETUA LARGE 10/13/2021    AMORPHOUS NOT REPORTED 10/13/2021       HgBA1c:  No results found for: LABA1C  TSH:  No results found for: TSH    Lactic Acid:   Lab Results   Component Value Date    LACTA 0.8 10/10/2021      Troponin: No results for input(s): TROPONINI in the last 72 hours. Radiological imaging  XR CHEST (SINGLE VIEW FRONTAL)    Result Date: 10/10/2021  EXAMINATION: ONE XRAY VIEW OF THE CHEST 10/10/2021 12:34 pm COMPARISON: None. HISTORY: ORDERING SYSTEM PROVIDED HISTORY: sob cough poss covid TECHNOLOGIST PROVIDED HISTORY: sob cough poss covid FINDINGS: Cardiomediastinal silhouette within normal limits. Bilateral ill-defined pulmonary opacities. No pneumothorax. No pleural effusion. Bilateral ill-defined pulmonary opacities suggesting COVID pneumonia     XR CHEST PORTABLE    Result Date: 10/13/2021  EXAMINATION: ONE XRAY VIEW OF THE CHEST 10/13/2021 11:00 am COMPARISON: Chest radiograph performed 10/10/2021. HISTORY: ORDERING SYSTEM PROVIDED HISTORY: dyspnea TECHNOLOGIST PROVIDED HISTORY: dyspnea FINDINGS: There are scattered bilateral infiltrates that are mildly worse. There is no pneumothorax. The heart is prominent. The upper abdomen unremarkable. The extrathoracic soft tissues are unremarkable. Scattered bilateral pulmonary infiltrates consistent with pneumonia and this is mildly worse compared to prior. CT CHEST PULMONARY EMBOLISM W CONTRAST    Result Date: 10/13/2021  EXAMINATION: CTA OF THE CHEST 10/13/2021 2:16 pm TECHNIQUE: CTA of the chest was performed after the administration of intravenous contrast.  Multiplanar reformatted images are provided for review. MIP images are provided for review.  Dose modulation, iterative reconstruction, and/or weight based adjustment of the mA/kV was utilized to reduce the radiation dose to as low as reasonably Latate dehydrogenase elevated at 389    - Ferritin elevated at 264    - Fibrinogen elevated at 595    - Will consult ID for recommendations    - Received Betamethasone 12 mg for fetal lung maturity x's 1     - OB team recommending Betamethasone 12 mg for one more dose, 24 hours after initial   - After Betamethasone course will proceed with Decadron 6 mg daily of 10 days, pending ID recs    Acute Hypoxic Respiratory Failure    - Initial saturations in the high 80's on non-rebreather   - Transition to High Flow Nasal Cannula    - Titrate for spo2 >95% as this is the ACOG recommendations    - ABG daily    - Discussed with RRT team.     Metabolic acidosis with inadequate respiratory compensation   - Starvation ketoacidosis suspected vs euglycemic acidosis    - VBG upon arrival pH of 7.21, pco2 18, po2 176, hco3 7, negative base excess 19    - Will obtain ABG    - Will send Urine for ketones - will follow up    - Betahydroxybutirate sent - will follow up    - Blood glucose 127 upon arrival although high suspicion for metabolic acidosis due to elevated   anion gap at 18    - initial lactic 0.8    - 2 liters IV fluids ordered - aggressive fluid resuscitation    - Will establish 2 large bore IVs.    - D5 and 0.45% maintenance fluids ordered    - Suspect patient will become hyperglycemic secondary to D5 fluids, steroids, and increased  insulin resistance in pregnancy     -Will monitor blood glucose closely - q6 hour     - medium dose sliding scale ordered   - General diet as tolerated     PE   -Continue heparin gtt    - CT from previous facility reviewed - small subsegmental PE noted    - PTT q6 hours    Pregnancy at 26 weeks and 3 days    - Discussed with OB team, they will be following closely    - Continuous Fetal monitoring - OB, RN at bedside     - Repeat labs pending this am - will follow up     Patient to continue care in the ICU for close monitoring of respiratory status and metabolic acidosis.      Ayaz Guardian Hans Mendes,  ICU resident   Jeevan  10/14/2021 1:49 AM    The critical care team assigned to the patient will be following up the patient in the intensive care unit. I have discussed the current plan with the critical care attending. The above mentioned assessment and plan will be reviewed again in detail by the critical care attending at bedside, and can be further changed or modified accordingly by the attending physician. Attending Physician Statement  I have discussed the care of Landen Desai, including pertinent history and exam findings,  with the resident. I have seen and examined the patient and the key elements of all parts of the encounter have been performed by me. I agree with the assessment, plan and orders as documented by the resident with additions . Acute hypoxic respiratory failure due to covid - 19 pneumonia   ag and nag metabolic acidosis with respiratory compensation   Ketosis - due to starvation - poor oral intake   26 weeks and 3 days gestation   Dehydration   Subsegmental pe small clot burden - venous dopplers negative     Plan   Continue high flow o2 keep sats >92 %   High risk for intubation   Cont iv fluids but will start bicarb gtt to optimize metabolic acidosis and dec respiratory work load to maintain compensation . Continue decadrone    Total critical care time caring for this patient with life threatening, unstable organ failure, including direct patient contact, management of life support systems, review of data including imaging and labs, discussions with other team members and physicians at least 28   Min so far today, excluding procedures. Treatment plan Discussed with nursing staff in detail , all questions answered . Electronically signed by Harsh Berry MD on   10/14/21 at 6:16 PM EDT    Please note that this chart was generated using voice recognition Dragon dictation software.   Although every effort was made to ensure the accuracy of this automated transcription, some errors in transcription may have occurred.

## 2021-10-14 NOTE — PLAN OF CARE
Nutrition Problem #1: Inadequate oral intake  Intervention: Food and/or Nutrient Delivery: Continue Current Diet, Start Oral Nutrition Supplement  Nutritional Goals: Intake to meet > 50% of estimated nutrition needs

## 2021-10-14 NOTE — CONSULTS
tachycardic with an SpO2 of 94-98% on room air. CXR showed bilateral ill-defined pulmonary opacities suggesting COVID pneumonia. After receiving a small fluid bolus and discussing her case with the patient's OBGYN, Dr. Mayank Singh, she was discharged home with instructions to return if her symptoms worsen. On 10/13/21, the patient again presented to [facility] ED after a home SpO2 reading was 88%. A CT chest revealed a small, non-obstructing LLL PE for which she was initiated on a heparin gtt. The patient's respiratory status worsened throughout the day and she was life-flighted to [facility] for a higher level of care. The patient was found to be tachypneic and tachycardic upon arrival. She was maintaining her SpO2 >95% on 15 L/min non-re-breather. Heparin gtt was continued and the patient was also ordered celestone for fetal lung maturity. The patient was admitted to the Covid ICU      CURRENT EVALUATION : 10/14/2021    Low grade fevers  VS stable with tachycardia  Tachycardic during the day, improved to 90 heart rate when she slept. Baby so far is doing okay Case discussed with obstetrics. Patient exhibiting respiratory distress  Respiratory secretions: yellow    Patient receiving supplemental oxygen. Hi-flow %  So2 94    Labs, X rays reviewed: 10/14/2021    BUN:4  Cr:0.25    WBC:8.2  Hb:11.3  Plat: 262    Absolute Neutrophils:6.88  Absolute Lymphocytes:0.66  Neutrophil/Lymphocyte Ratio: 10.4 high risk    CRP: pending  Ferritin:264  LDH: 389    D-dimer: 0.72  Fibrinogen: 592    Pro Calcitonin:      Cultures:  Urine:    Blood:    Sputum :    Wound:      CXR:   10/13/21      10/10/21          CAT:  10/13/21    Discussed with patient, RN, CC, IM. I have personally reviewed the past medical history, past surgical history, medications, social history, and family history, and I have updated the database accordingly.   Past Medical History:     Past Medical History:   Diagnosis Date  Anxiety     GERD (gastroesophageal reflux disease)     Migraines     Seasonal allergies        Past Surgical  History:     Past Surgical History:   Procedure Laterality Date    FOOT SURGERY      nerve release, and plantar fascitis    MANDIBLE SURGERY      tooth implant    MOUTH SURGERY      WISDOM TOOTH EXTRACTION         Medications:      insulin lispro  0-12 Units SubCUTAneous Q6H    betamethasone acetate-betamethasone sodium phosphate  12 mg IntraMUSCular Once       Social History:     Social History     Socioeconomic History    Marital status:      Spouse name: Not on file    Number of children: Not on file    Years of education: Not on file    Highest education level: Not on file   Occupational History    Not on file   Tobacco Use    Smoking status: Former Smoker     Types: Cigarettes    Smokeless tobacco: Never Used   Vaping Use    Vaping Use: Former    Substances: Nicotine   Substance and Sexual Activity    Alcohol use: Not Currently    Drug use: Not Currently     Types: Marijuana     Comment: last smoked May 2021    Sexual activity: Yes   Other Topics Concern    Not on file   Social History Narrative    Not on file     Social Determinants of Health     Financial Resource Strain:     Difficulty of Paying Living Expenses:    Food Insecurity:     Worried About Running Out of Food in the Last Year:     Ran Out of Food in the Last Year:    Transportation Needs:     Lack of Transportation (Medical):      Lack of Transportation (Non-Medical):    Physical Activity:     Days of Exercise per Week:     Minutes of Exercise per Session:    Stress:     Feeling of Stress :    Social Connections:     Frequency of Communication with Friends and Family:     Frequency of Social Gatherings with Friends and Family:     Attends [employer] Services:     Active Member of Clubs or Organizations:     Attends Club or Organization Meetings:     Marital Status:    Intimate Partner Violence:  Fear of Current or Ex-Partner:     Emotionally Abused:     Physically Abused:     Sexually Abused:        Family History:     Family History   Problem Relation Age of Onset    Breast Cancer Maternal Grandmother         older than 48    Heart Attack Paternal Uncle     Stroke Maternal Aunt     Hypertension Father     No Known Problems Paternal Grandfather     Stroke Paternal Grandmother     No Known Problems Maternal Grandfather     Hypertension Mother     No Known Problems Brother     No Known Problems Brother         Allergies:   Vicodin [hydrocodone-acetaminophen]     Review of Systems:       Constitutional: Low grade fevers  Head: No headaches  Eyes: No double vision or blurry vision. No conjunctival inflammation. ENT: No sore throat or runny nose. . No hearing loss, tinnitus or vertigo. Cardiovascular: No chest pain or palpitations. Shortness of breath. LLANOS  Lung:  Shortness of breath with cough. yellow sputum production  Abdomen: Positive intermittent nausea and diarrhea, Genitourinary: No increased urinary frequency, or dysuria. No hematuria. No suprapubic or CVA pain  Musculoskeletal: No muscle aches or pains. No joint effusions, swelling or deformities  Hematologic: No bleeding or bruising. Neurologic: No headache, weakness, numbness, or tingling. Integument: No rash, no ulcers. Psychiatric: No depression. Endocrine: No polyuria, no polydipsia, no polyphagia.     Physical Examination :     Patient Vitals for the past 8 hrs:   BP Temp src Pulse Resp SpO2 Height Weight   10/14/21 0600 136/74 -- 111 26 97 % -- --   10/14/21 0509 -- -- -- (!) 31 95 % -- --   10/14/21 0500 126/88 -- 113 (!) 36 95 % -- --   10/14/21 0400 137/82 Axillary 126 (!) 40 (!) 87 % 5' 4\" (1.626 m) 217 lb 9.5 oz (98.7 kg)   10/14/21 0342 -- -- -- (!) 31 98 % -- --   10/14/21 0300 -- -- 112 (!) 34 99 % -- --   10/14/21 0245 -- -- 107 (!) 34 98 % -- --   10/14/21 0230 -- -- 108 (!) 39 97 % -- --   10/14/21 0200 (!) 136/120 -- 117 29 93 % -- --   10/14/21 0100 121/71 -- 109 (!) 38 98 % -- --   10/14/21 0030 -- -- -- (!) 31 98 % -- --   10/14/21 0022 -- -- -- (!) 46 96 % -- --     General Appearance: Awake, alert, and in respiratory distress  Head:  Normocephalic, no trauma  Eyes: Pupils equal, round, reactive to light; sclera anicteric; conjunctivae pink. No embolic phenomena. ENT: Oropharynx clear, without erythema, exudate, or thrush. No tenderness of sinuses. Mouth/throat: mucosa pink and moist. No lesions. Dentition in good repair. Neck:Supple, without lymphadenopathy. Thyroid normal, No bruits. Pulmonary/Chest: Clear to auscultation, without wheezes, rales, or rhonchi. No dullness to percussion. Cardiovascular: tachycardia Regular  rhythm without murmurs, rubs, or gallops. Abdomen: Soft, non tender. Bowel sounds normal. No organomegaly  All four Extremities: No cyanosis, clubbing, edema, or effusions. Neurologic: No gross sensory or motor deficits. Skin: Warm and dry with good turgor. No signs of peripheral arterial or venous insufficiency. No ulcerations. No open wounds. Medical Decision Making -Laboratory:   I have independently reviewed/ordered the following labs:    CBC with Differential:   Recent Labs     10/13/21  2038 10/14/21  0303   WBC 7.5 8.2   HGB 12.5 11.3*   HCT 40.1 36.2*    262   LYMPHOPCT 8* 8*   MONOPCT 2 4     BMP:   Recent Labs     10/14/21  0303 10/14/21  0558    135   K 3.9 4.0   * 111*   CO2 8* 10*   BUN 3* 4*   CREATININE 0.34* 0.25*   MG 1.7  --      Hepatic Function Panel:   Recent Labs     10/13/21  2038   PROT 6.3*   LABALBU 2.9*   BILITOT 0.66   ALKPHOS 181*   ALT 45*   AST 30     No results for input(s): RPR in the last 72 hours. No results for input(s): HIV in the last 72 hours. No results for input(s): BC in the last 72 hours.   Lab Results   Component Value Date    MUCUS NOT REPORTED 10/13/2021    RBC 3.89 10/14/2021    TRICHOMONAS NOT REPORTED 10/13/2021 WBC 8.2 10/14/2021    YEAST NOT REPORTED 10/13/2021    TURBIDITY Clear 10/13/2021     Lab Results   Component Value Date    CREATININE 0.25 10/14/2021    GLUCOSE 168 10/14/2021       Medical Decision Making-Imaging:     Narrative   EXAMINATION:   CTA OF THE CHEST 10/13/2021 2:16 pm       TECHNIQUE:   CTA of the chest was performed after the administration of intravenous   contrast.  Multiplanar reformatted images are provided for review.  MIP   images are provided for review. Dose modulation, iterative reconstruction,   and/or weight based adjustment of the mA/kV was utilized to reduce the   radiation dose to as low as reasonably achievable.       COMPARISON:   Chest x-rays over the last few days. .       HISTORY:   ORDERING SYSTEM PROVIDED HISTORY: sob, tachy, 32 WEEKS PREGNANT, Spoke with   Radiologist   TECHNOLOGIST PROVIDED HISTORY:   sob, tachy, 32 WEEKS PREGNANT, Spoke with Radiologist   Decision Support Exception - unselect if not a suspected or confirmed   emergency medical condition->Emergency Medical Condition (MA)       FINDINGS:   Pulmonary Arteries:  There is a small nonocclusive segmental to subsegmental   embolism noted in the left lower lobe, on and around axial image [id number].  No   other definite emboli are seen.  No central emboli.  No pulmonary arterial   enlargement is identified.       Mediastinum: Mediastinal and hilar lymphadenopathy is identified.  No focal   esophageal thickening is identified.  The thyroid gland appears unremarkable.       Lungs/pleura: Multifocal ground-glass pneumonia with some mild areas of   consolidation.  No pleural effusion is identified.       Upper Abdomen: No acute process seen in the upper abdomen.       Soft Tissues/Bones: No axillary or supraclavicular lymphadenopathy is   identified.  No acute osseous abnormality is identified.           Impression   Single small nonocclusive segmental to subsegmental pulmonary embolism in the   left lower lobe.  No central emboli.  This was discussed with Yuly Rivera   at 2:44 p.m. on 10/13/2021.       Multilobar COVID-19 pneumonia.                 Medical Decision [id number]:       Medical Decision Making-Other:     Note:  Labs, medications, radiologic studies were reviewed with personal review of films  Large amounts of data were reviewed  Discussed with nursing Staff, Discharge planner  Infection Control and Prevention measures reviewed  All prior entries were reviewed  Administer medications as ordered  Prognosis: Guarded  Discharge planning reviewed  Follow up as outpatient. Thank you for allowing us to participate in the care of this patient. Please call with questions. FADUMO Richards - CNP  Pager: [phone] - Office: [phone]      I have discussed the care of the patient, including pertinent history and exam findings,  with the resident. I have seen and examined the patient and the key elements of all parts of the encounter have been performed by me. I agree with the assessment, plan and orders as documented by the resident.     Arti Chong, Infectious Diseases

## 2021-10-14 NOTE — PLAN OF CARE
Problem: Airway Clearance - Ineffective  Goal: Achieve or maintain patent airway  Outcome: Ongoing     Problem: Gas Exchange - Impaired  Goal: Absence of hypoxia  Outcome: Ongoing     Problem: Breathing Pattern - Ineffective  Goal: Ability to achieve and maintain a regular respiratory rate  Outcome: Ongoing     Problem:  Body Temperature -  Risk of, Imbalanced  Goal: Will regain or maintain usual level of consciousness  Outcome: Ongoing

## 2021-10-14 NOTE — CARE COORDINATION
Case Management Initial Discharge Plan  Doris Hansen to pt's  Jose Brandy to discuss discharge plans. (phone in pt room not working)  Information verified: address, contacts, phone number, , insurance Yes  Insurance Provider: [facility] [place]    Emergency Contact/Next of Kin name & number: pt's  Sarina Yen [phone]  Who are involved in patient's support system? Pt's     PCP: Orin Shepherd  Date of last visit: between 6mo- 1yr ago, pt sees OB, Dr. Criselda Bumpers in [place] regularly      Discharge Planning    Living Arrangements:  Spouse/Significant Other     Home has 1 story  2 stairs to climb to get into front door    Patient able to perform ADL's:Independent    Current Services (outpatient & in home) none  DME equipment: none  DME provider: n/a    Is patient receiving oral anticoagulation therapy? No    If indicated:   Physician managing anticoagulation treatment: n/a  Where does patient obtain lab work for ATC treatment? n/a      Potential Assistance Needed:  Home Care    Patient agreeable to home care: Yes  Freedom of choice provided: Follow for [facility] needs    Prior SNF/Rehab Placement and Facility: none  Agreeable to SNF/Rehab: No  [place] of choice provided: n/a     Evaluation: n/a    Expected Discharge date:  10/20/21    Patient expects to be discharged to: If home: is the family and/or caregiver wiling & able to provide support at home? yes  Who will be providing this support? Pt's     Follow Up Appointment: Best Day/ Time:      Transportation provider:   Transportation arrangements needed for discharge: No    Readmission Risk              Risk of Unplanned Readmission:  13           Does patient have a readmission risk score greater than 14?: No  If yes, follow-up appointment must be made within 7 days of discharge.      Goals of Care: safety    Educated pt's  on transitional options, provided freedom of choice and he is  agreeable with plan      Discharge Plan:  Pt on [place] O@ 40 L @ 100%, Goal is home- monitor for [facility]. needs.   will transport- pt sees OB in [facility] regularly    Electronically signed by Shannan Elena RN on 10/14/21 at 5:06 PM EDT

## 2021-10-14 NOTE — FLOWSHEET NOTE
Dr. Nurys Castañeda at bedside to discuss plan of care; she updates patient's  on her current status.

## 2021-10-15 ENCOUNTER — APPOINTMENT (OUTPATIENT)
Dept: GENERAL RADIOLOGY | Age: 31
DRG: 003 | End: 2021-10-15
Payer: COMMERCIAL

## 2021-10-15 ENCOUNTER — ANESTHESIA (OUTPATIENT)
Dept: OPERATING ROOM | Age: 31
DRG: 003 | End: 2021-10-15
Payer: COMMERCIAL

## 2021-10-15 VITALS — SYSTOLIC BLOOD PRESSURE: 122 MMHG | DIASTOLIC BLOOD PRESSURE: 73 MMHG | OXYGEN SATURATION: 91 % | TEMPERATURE: 95.8 F

## 2021-10-15 PROBLEM — Z98.891 HISTORY OF CLASSICAL CESAREAN SECTION: Status: ACTIVE | Noted: 2021-10-15

## 2021-10-15 PROBLEM — U07.1 COVID-19: Status: ACTIVE | Noted: 2021-10-15

## 2021-10-15 LAB
ABO/RH: NORMAL
ABSOLUTE EOS #: 0 K/UL (ref 0–0.4)
ABSOLUTE EOS #: 0 K/UL (ref 0–0.44)
ABSOLUTE IMMATURE GRANULOCYTE: 0.32 K/UL (ref 0–0.3)
ABSOLUTE IMMATURE GRANULOCYTE: 0.7 K/UL (ref 0–0.3)
ABSOLUTE LYMPH #: 0.83 K/UL (ref 1.1–3.7)
ABSOLUTE LYMPH #: 1.16 K/UL (ref 1–4.8)
ABSOLUTE MONO #: 0.32 K/UL (ref 0.1–0.8)
ABSOLUTE MONO #: 0.7 K/UL (ref 0.1–1.2)
ALBUMIN SERPL-MCNC: 2.4 G/DL (ref 3.5–5.2)
ALBUMIN/GLOBULIN RATIO: 1 (ref 1–2.5)
ALLEN TEST: ABNORMAL
ALP BLD-CCNC: 138 U/L (ref 35–104)
ALT SERPL-CCNC: 34 U/L (ref 5–33)
ANION GAP SERPL CALCULATED.3IONS-SCNC: 12 MMOL/L (ref 9–17)
ANION GAP SERPL CALCULATED.3IONS-SCNC: 13 MMOL/L (ref 9–17)
ANTIBODY SCREEN: NEGATIVE
ARM BAND NUMBER: NORMAL
AST SERPL-CCNC: 38 U/L
BASOPHILS # BLD: 0 % (ref 0–2)
BASOPHILS # BLD: 0 % (ref 0–2)
BASOPHILS ABSOLUTE: 0 K/UL (ref 0–0.2)
BASOPHILS ABSOLUTE: 0 K/UL (ref 0–0.2)
BETA-HYDROXYBUTYRATE: 0.17 MMOL/L (ref 0.02–0.27)
BILIRUB SERPL-MCNC: 0.44 MG/DL (ref 0.3–1.2)
BILIRUBIN DIRECT: 0.33 MG/DL
BILIRUBIN, INDIRECT: 0.11 MG/DL (ref 0–1)
BLD PROD TYP BPU: NORMAL
BUN BLDV-MCNC: 4 MG/DL (ref 6–20)
BUN BLDV-MCNC: 4 MG/DL (ref 6–20)
BUN/CREAT BLD: ABNORMAL (ref 9–20)
BUN/CREAT BLD: ABNORMAL (ref 9–20)
C-REACTIVE PROTEIN: 13.4 MG/L (ref 0–5)
CALCIUM IONIZED: 1.08 MMOL/L (ref 1.13–1.33)
CALCIUM SERPL-MCNC: 7.6 MG/DL (ref 8.6–10.4)
CALCIUM SERPL-MCNC: 7.6 MG/DL (ref 8.6–10.4)
CHLORIDE BLD-SCNC: 106 MMOL/L (ref 98–107)
CHLORIDE BLD-SCNC: 106 MMOL/L (ref 98–107)
CO2: 16 MMOL/L (ref 20–31)
CO2: 17 MMOL/L (ref 20–31)
CREAT SERPL-MCNC: 0.25 MG/DL (ref 0.5–0.9)
CREAT SERPL-MCNC: 0.28 MG/DL (ref 0.5–0.9)
CREATININE URINE: 71.6 MG/DL (ref 28–217)
CROSSMATCH RESULT: NORMAL
D-DIMER QUANTITATIVE: 2.11 MG/L FEU
DIFFERENTIAL TYPE: ABNORMAL
DIFFERENTIAL TYPE: ABNORMAL
DISPENSE STATUS BLOOD BANK: NORMAL
EOSINOPHILS RELATIVE PERCENT: 0 % (ref 1–4)
EOSINOPHILS RELATIVE PERCENT: 0 % (ref 1–4)
EXPIRATION DATE: NORMAL
FIBRINOGEN: 490 MG/DL (ref 140–420)
FIO2: 100
GFR AFRICAN AMERICAN: >60 ML/MIN
GFR AFRICAN AMERICAN: >60 ML/MIN
GFR NON-AFRICAN AMERICAN: >60 ML/MIN
GFR NON-AFRICAN AMERICAN: >60 ML/MIN
GFR SERPL CREATININE-BSD FRML MDRD: ABNORMAL ML/MIN/{1.73_M2}
GLOBULIN: ABNORMAL G/DL (ref 1.5–3.8)
GLUCOSE BLD-MCNC: 136 MG/DL (ref 65–105)
GLUCOSE BLD-MCNC: 139 MG/DL (ref 65–105)
GLUCOSE BLD-MCNC: 140 MG/DL (ref 65–105)
GLUCOSE BLD-MCNC: 153 MG/DL (ref 70–99)
GLUCOSE BLD-MCNC: 164 MG/DL (ref 70–99)
GLUCOSE BLD-MCNC: 172 MG/DL (ref 74–100)
HCT VFR BLD CALC: 29.7 % (ref 36.3–47.1)
HCT VFR BLD CALC: 30.1 % (ref 36.3–47.1)
HCT VFR BLD CALC: 32.6 % (ref 36.3–47.1)
HCT VFR BLD CALC: 33.4 % (ref 36.3–47.1)
HEMOGLOBIN: 10.8 G/DL (ref 11.9–15.1)
HEMOGLOBIN: 11 G/DL (ref 11.9–15.1)
HEMOGLOBIN: 9.7 G/DL (ref 11.9–15.1)
HEMOGLOBIN: 9.7 G/DL (ref 11.9–15.1)
IMMATURE GRANULOCYTES: 3 %
IMMATURE GRANULOCYTES: 5 %
INR BLD: 1
LV EF: 61 %
LVEF MODALITY: NORMAL
LYMPHOCYTES # BLD: 11 % (ref 24–44)
LYMPHOCYTES # BLD: 6 % (ref 24–43)
MAGNESIUM: 2.2 MG/DL (ref 1.6–2.6)
MCH RBC QN AUTO: 29.3 PG (ref 25.2–33.5)
MCH RBC QN AUTO: 29.3 PG (ref 25.2–33.5)
MCH RBC QN AUTO: 30 PG (ref 25.2–33.5)
MCHC RBC AUTO-ENTMCNC: 32.7 G/DL (ref 28.4–34.8)
MCHC RBC AUTO-ENTMCNC: 32.9 G/DL (ref 28.4–34.8)
MCHC RBC AUTO-ENTMCNC: 33.1 G/DL (ref 28.4–34.8)
MCV RBC AUTO: 88.8 FL (ref 82.6–102.9)
MCV RBC AUTO: 89.7 FL (ref 82.6–102.9)
MCV RBC AUTO: 90.6 FL (ref 82.6–102.9)
MODE: ABNORMAL
MONOCYTES # BLD: 3 % (ref 1–7)
MONOCYTES # BLD: 5 % (ref 3–12)
MORPHOLOGY: NORMAL
MORPHOLOGY: NORMAL
NEGATIVE BASE EXCESS, ART: 7 (ref 0–2)
NRBC AUTOMATED: 0 PER 100 WBC
O2 DEVICE/FLOW/%: ABNORMAL
PARTIAL THROMBOPLASTIN TIME: 25.4 SEC (ref 20.5–30.5)
PARTIAL THROMBOPLASTIN TIME: 53.4 SEC (ref 20.5–30.5)
PATIENT TEMP: 36
PDW BLD-RTO: 14 % (ref 11.8–14.4)
PDW BLD-RTO: 14 % (ref 11.8–14.4)
PDW BLD-RTO: 14.1 % (ref 11.8–14.4)
PHOSPHORUS: 2.3 MG/DL (ref 2.6–4.5)
PLATELET # BLD: 257 K/UL (ref 138–453)
PLATELET # BLD: 285 K/UL (ref 138–453)
PLATELET # BLD: 317 K/UL (ref 138–453)
PLATELET ESTIMATE: ABNORMAL
PLATELET ESTIMATE: ABNORMAL
PMV BLD AUTO: 8.9 FL (ref 8.1–13.5)
PMV BLD AUTO: 8.9 FL (ref 8.1–13.5)
PMV BLD AUTO: 9.8 FL (ref 8.1–13.5)
POC HCO3: 20.4 MMOL/L (ref 21–28)
POC LACTIC ACID: 0.48 MMOL/L (ref 0.56–1.39)
POC O2 SATURATION: 99 % (ref 94–98)
POC PCO2 TEMP: ABNORMAL MM HG
POC PCO2: 47.7 MM HG (ref 35–48)
POC PH TEMP: ABNORMAL
POC PH: 7.24 (ref 7.35–7.45)
POC PO2 TEMP: ABNORMAL MM HG
POC PO2: 185.8 MM HG (ref 83–108)
POSITIVE BASE EXCESS, ART: ABNORMAL (ref 0–3)
POTASSIUM SERPL-SCNC: 3.2 MMOL/L (ref 3.7–5.3)
POTASSIUM SERPL-SCNC: 4.1 MMOL/L (ref 3.7–5.3)
PROTHROMBIN TIME: 10.3 SEC (ref 9.1–12.3)
RBC # BLD: 3.31 M/UL (ref 3.95–5.11)
RBC # BLD: 3.6 M/UL (ref 3.95–5.11)
RBC # BLD: 3.76 M/UL (ref 3.95–5.11)
RBC # BLD: ABNORMAL 10*6/UL
RBC # BLD: ABNORMAL 10*6/UL
SAMPLE SITE: ABNORMAL
SEG NEUTROPHILS: 83 % (ref 36–66)
SEG NEUTROPHILS: 84 % (ref 36–65)
SEGMENTED NEUTROPHILS ABSOLUTE COUNT: 11.67 K/UL (ref 1.5–8.1)
SEGMENTED NEUTROPHILS ABSOLUTE COUNT: 8.7 K/UL (ref 1.8–7.7)
SODIUM BLD-SCNC: 135 MMOL/L (ref 135–144)
SODIUM BLD-SCNC: 135 MMOL/L (ref 135–144)
TCO2 (CALC), ART: ABNORMAL MMOL/L (ref 22–29)
TOTAL PROTEIN, URINE: 59 MG/DL
TOTAL PROTEIN: 4.9 G/DL (ref 6.4–8.3)
TRANSFUSION STATUS: NORMAL
UNIT DIVISION: 0
UNIT NUMBER: NORMAL
URINE TOTAL PROTEIN CREATININE RATIO: 0.82 (ref 0–0.2)
WBC # BLD: 10.5 K/UL (ref 3.5–11.3)
WBC # BLD: 13.9 K/UL (ref 3.5–11.3)
WBC # BLD: 16.2 K/UL (ref 3.5–11.3)
WBC # BLD: ABNORMAL 10*3/UL
WBC # BLD: ABNORMAL 10*3/UL

## 2021-10-15 PROCEDURE — 6360000002 HC RX W HCPCS: Performed by: STUDENT IN AN ORGANIZED HEALTH CARE EDUCATION/TRAINING PROGRAM

## 2021-10-15 PROCEDURE — 2000000000 HC ICU R&B

## 2021-10-15 PROCEDURE — 5A1955Z RESPIRATORY VENTILATION, GREATER THAN 96 CONSECUTIVE HOURS: ICD-10-PCS | Performed by: INTERNAL MEDICINE

## 2021-10-15 PROCEDURE — 2580000003 HC RX 258: Performed by: STUDENT IN AN ORGANIZED HEALTH CARE EDUCATION/TRAINING PROGRAM

## 2021-10-15 PROCEDURE — 99233 SBSQ HOSP IP/OBS HIGH 50: CPT | Performed by: INTERNAL MEDICINE

## 2021-10-15 PROCEDURE — 3600000013 HC SURGERY LEVEL 3 ADDTL 15MIN: Performed by: OBSTETRICS & GYNECOLOGY

## 2021-10-15 PROCEDURE — 02HV33Z INSERTION OF INFUSION DEVICE INTO SUPERIOR VENA CAVA, PERCUTANEOUS APPROACH: ICD-10-PCS | Performed by: STUDENT IN AN ORGANIZED HEALTH CARE EDUCATION/TRAINING PROGRAM

## 2021-10-15 PROCEDURE — 2580000003 HC RX 258: Performed by: OBSTETRICS & GYNECOLOGY

## 2021-10-15 PROCEDURE — 80076 HEPATIC FUNCTION PANEL: CPT

## 2021-10-15 PROCEDURE — 94002 VENT MGMT INPAT INIT DAY: CPT

## 2021-10-15 PROCEDURE — 83605 ASSAY OF LACTIC ACID: CPT

## 2021-10-15 PROCEDURE — 71045 X-RAY EXAM CHEST 1 VIEW: CPT

## 2021-10-15 PROCEDURE — 6370000000 HC RX 637 (ALT 250 FOR IP): Performed by: STUDENT IN AN ORGANIZED HEALTH CARE EDUCATION/TRAINING PROGRAM

## 2021-10-15 PROCEDURE — 2500000003 HC RX 250 WO HCPCS: Performed by: STUDENT IN AN ORGANIZED HEALTH CARE EDUCATION/TRAINING PROGRAM

## 2021-10-15 PROCEDURE — 3700000000 HC ANESTHESIA ATTENDED CARE: Performed by: OBSTETRICS & GYNECOLOGY

## 2021-10-15 PROCEDURE — 93306 TTE W/DOPPLER COMPLETE: CPT

## 2021-10-15 PROCEDURE — 36415 COLL VENOUS BLD VENIPUNCTURE: CPT

## 2021-10-15 PROCEDURE — 2580000003 HC RX 258: Performed by: NURSE ANESTHETIST, CERTIFIED REGISTERED

## 2021-10-15 PROCEDURE — 85384 FIBRINOGEN ACTIVITY: CPT

## 2021-10-15 PROCEDURE — 2700000000 HC OXYGEN THERAPY PER DAY

## 2021-10-15 PROCEDURE — 82010 KETONE BODYS QUAN: CPT

## 2021-10-15 PROCEDURE — 0BH17EZ INSERTION OF ENDOTRACHEAL AIRWAY INTO TRACHEA, VIA NATURAL OR ARTIFICIAL OPENING: ICD-10-PCS | Performed by: INTERNAL MEDICINE

## 2021-10-15 PROCEDURE — 59514 CESAREAN DELIVERY ONLY: CPT | Performed by: OBSTETRICS & GYNECOLOGY

## 2021-10-15 PROCEDURE — 82330 ASSAY OF CALCIUM: CPT

## 2021-10-15 PROCEDURE — 36620 INSERTION CATHETER ARTERY: CPT

## 2021-10-15 PROCEDURE — 2720000010 HC SURG SUPPLY STERILE: Performed by: OBSTETRICS & GYNECOLOGY

## 2021-10-15 PROCEDURE — 93356 MYOCRD STRAIN IMG SPCKL TRCK: CPT

## 2021-10-15 PROCEDURE — 85025 COMPLETE CBC W/AUTO DIFF WBC: CPT

## 2021-10-15 PROCEDURE — 2709999900 HC NON-CHARGEABLE SUPPLY: Performed by: OBSTETRICS & GYNECOLOGY

## 2021-10-15 PROCEDURE — 6360000002 HC RX W HCPCS

## 2021-10-15 PROCEDURE — 84100 ASSAY OF PHOSPHORUS: CPT

## 2021-10-15 PROCEDURE — 36569 INSJ PICC 5 YR+ W/O IMAGING: CPT

## 2021-10-15 PROCEDURE — 94761 N-INVAS EAR/PLS OXIMETRY MLT: CPT

## 2021-10-15 PROCEDURE — 85014 HEMATOCRIT: CPT

## 2021-10-15 PROCEDURE — 82947 ASSAY GLUCOSE BLOOD QUANT: CPT

## 2021-10-15 PROCEDURE — 37799 UNLISTED PX VASCULAR SURGERY: CPT

## 2021-10-15 PROCEDURE — 85018 HEMOGLOBIN: CPT

## 2021-10-15 PROCEDURE — 2500000003 HC RX 250 WO HCPCS: Performed by: NURSE ANESTHETIST, CERTIFIED REGISTERED

## 2021-10-15 PROCEDURE — 99291 CRITICAL CARE FIRST HOUR: CPT | Performed by: INTERNAL MEDICINE

## 2021-10-15 PROCEDURE — 3600000003 HC SURGERY LEVEL 3 BASE: Performed by: OBSTETRICS & GYNECOLOGY

## 2021-10-15 PROCEDURE — 83735 ASSAY OF MAGNESIUM: CPT

## 2021-10-15 PROCEDURE — 82803 BLOOD GASES ANY COMBINATION: CPT

## 2021-10-15 PROCEDURE — 85610 PROTHROMBIN TIME: CPT

## 2021-10-15 PROCEDURE — 6370000000 HC RX 637 (ALT 250 FOR IP): Performed by: ANESTHESIOLOGY

## 2021-10-15 PROCEDURE — 88307 TISSUE EXAM BY PATHOLOGIST: CPT

## 2021-10-15 PROCEDURE — 86140 C-REACTIVE PROTEIN: CPT

## 2021-10-15 PROCEDURE — 85027 COMPLETE CBC AUTOMATED: CPT

## 2021-10-15 PROCEDURE — 94660 CPAP INITIATION&MGMT: CPT

## 2021-10-15 PROCEDURE — 85730 THROMBOPLASTIN TIME PARTIAL: CPT

## 2021-10-15 PROCEDURE — 76937 US GUIDE VASCULAR ACCESS: CPT

## 2021-10-15 PROCEDURE — C1751 CATH, INF, PER/CENT/MIDLINE: HCPCS

## 2021-10-15 PROCEDURE — 3700000001 HC ADD 15 MINUTES (ANESTHESIA): Performed by: OBSTETRICS & GYNECOLOGY

## 2021-10-15 PROCEDURE — 6360000002 HC RX W HCPCS: Performed by: NURSE ANESTHETIST, CERTIFIED REGISTERED

## 2021-10-15 PROCEDURE — 80048 BASIC METABOLIC PNL TOTAL CA: CPT

## 2021-10-15 PROCEDURE — 85379 FIBRIN DEGRADATION QUANT: CPT

## 2021-10-15 RX ORDER — ONDANSETRON 2 MG/ML
4 INJECTION INTRAMUSCULAR; INTRAVENOUS EVERY 6 HOURS PRN
Status: CANCELLED | OUTPATIENT
Start: 2021-10-15

## 2021-10-15 RX ORDER — HEPARIN SODIUM 10000 [USP'U]/100ML
5-30 INJECTION, SOLUTION INTRAVENOUS CONTINUOUS
Status: CANCELLED | OUTPATIENT
Start: 2021-10-15

## 2021-10-15 RX ORDER — SODIUM CHLORIDE 0.9 % (FLUSH) 0.9 %
10 SYRINGE (ML) INJECTION PRN
Status: DISCONTINUED | OUTPATIENT
Start: 2021-10-15 | End: 2021-10-30 | Stop reason: HOSPADM

## 2021-10-15 RX ORDER — BISACODYL 10 MG
10 SUPPOSITORY, RECTAL RECTAL DAILY PRN
Status: DISCONTINUED | OUTPATIENT
Start: 2021-10-15 | End: 2021-10-30 | Stop reason: HOSPADM

## 2021-10-15 RX ORDER — HEPARIN SODIUM 1000 [USP'U]/ML
80 INJECTION, SOLUTION INTRAVENOUS; SUBCUTANEOUS PRN
Status: CANCELLED | OUTPATIENT
Start: 2021-10-15

## 2021-10-15 RX ORDER — VITAMIN A, ASCORBIC ACID, CHOLECALCIFEROL, .ALPHA.-TOCOPHEROL ACETATE, DL-, THIAMINE MONONITRATE, RIBOFLAVIN, NIACINAMIDE, PYRIDOXINE HYDROCHLORIDE, FOLIC ACID, CYANOCOBALAMIN, CALCIUM CARBONATE, IRON, ZINC OXIDE, AND CUPRIC OXIDE 4000; 120; 400; 22; 1.84; 3; 20; 10; 1; 12; 200; 29; 25; 2 [IU]/1; MG/1; [IU]/1; [IU]/1; MG/1; MG/1; MG/1; MG/1; MG/1; UG/1; MG/1; MG/1; MG/1; MG/1
1 TABLET ORAL DAILY
Status: CANCELLED | OUTPATIENT
Start: 2021-10-16

## 2021-10-15 RX ORDER — SUCCINYLCHOLINE/SOD CL,ISO/PF 100 MG/5ML
SYRINGE (ML) INTRAVENOUS PRN
Status: DISCONTINUED | OUTPATIENT
Start: 2021-10-15 | End: 2021-10-15 | Stop reason: SDUPTHER

## 2021-10-15 RX ORDER — LORAZEPAM 2 MG/ML
2 INJECTION INTRAMUSCULAR EVERY 5 MIN PRN
Status: CANCELLED | OUTPATIENT
Start: 2021-10-15

## 2021-10-15 RX ORDER — SODIUM CHLORIDE 9 MG/ML
25 INJECTION, SOLUTION INTRAVENOUS PRN
Status: DISCONTINUED | OUTPATIENT
Start: 2021-10-15 | End: 2021-10-30 | Stop reason: HOSPADM

## 2021-10-15 RX ORDER — CEPHALEXIN 500 MG/1
500 CAPSULE ORAL EVERY 8 HOURS SCHEDULED
Status: CANCELLED | OUTPATIENT
Start: 2021-10-16 | End: 2021-10-18

## 2021-10-15 RX ORDER — SODIUM CHLORIDE, SODIUM LACTATE, POTASSIUM CHLORIDE, CALCIUM CHLORIDE 600; 310; 30; 20 MG/100ML; MG/100ML; MG/100ML; MG/100ML
INJECTION, SOLUTION INTRAVENOUS CONTINUOUS
Status: DISCONTINUED | OUTPATIENT
Start: 2021-10-15 | End: 2021-10-15

## 2021-10-15 RX ORDER — HEPARIN SODIUM 1000 [USP'U]/ML
40 INJECTION, SOLUTION INTRAVENOUS; SUBCUTANEOUS PRN
Status: CANCELLED | OUTPATIENT
Start: 2021-10-15

## 2021-10-15 RX ORDER — MIDAZOLAM HYDROCHLORIDE 1 MG/ML
INJECTION INTRAMUSCULAR; INTRAVENOUS PRN
Status: DISCONTINUED | OUTPATIENT
Start: 2021-10-15 | End: 2021-10-15 | Stop reason: SDUPTHER

## 2021-10-15 RX ORDER — ALBUTEROL SULFATE 90 UG/1
2 AEROSOL, METERED RESPIRATORY (INHALATION) EVERY 6 HOURS PRN
Status: DISCONTINUED | OUTPATIENT
Start: 2021-10-15 | End: 2021-10-15 | Stop reason: HOSPADM

## 2021-10-15 RX ORDER — POLYETHYLENE GLYCOL 3350 17 G/17G
17 POWDER, FOR SOLUTION ORAL DAILY PRN
Status: DISCONTINUED | OUTPATIENT
Start: 2021-10-15 | End: 2021-10-30 | Stop reason: HOSPADM

## 2021-10-15 RX ORDER — NALOXONE HYDROCHLORIDE 0.4 MG/ML
0.4 INJECTION, SOLUTION INTRAMUSCULAR; INTRAVENOUS; SUBCUTANEOUS PRN
Status: DISCONTINUED | OUTPATIENT
Start: 2021-10-15 | End: 2021-10-18

## 2021-10-15 RX ORDER — POTASSIUM CHLORIDE 20MEQ/15ML
40 LIQUID (ML) ORAL ONCE
Status: COMPLETED | OUTPATIENT
Start: 2021-10-15 | End: 2021-10-15

## 2021-10-15 RX ORDER — SIMETHICONE 80 MG
80 TABLET,CHEWABLE ORAL EVERY 6 HOURS PRN
Status: CANCELLED | OUTPATIENT
Start: 2021-10-15

## 2021-10-15 RX ORDER — METRONIDAZOLE 500 MG/1
500 TABLET ORAL EVERY 8 HOURS SCHEDULED
Status: CANCELLED | OUTPATIENT
Start: 2021-10-16 | End: 2021-10-18

## 2021-10-15 RX ORDER — ONDANSETRON 2 MG/ML
4 INJECTION INTRAMUSCULAR; INTRAVENOUS EVERY 6 HOURS PRN
Status: DISCONTINUED | OUTPATIENT
Start: 2021-10-15 | End: 2021-10-30 | Stop reason: HOSPADM

## 2021-10-15 RX ORDER — LIDOCAINE HYDROCHLORIDE 10 MG/ML
5 INJECTION, SOLUTION INFILTRATION; PERINEURAL ONCE
Status: DISCONTINUED | OUTPATIENT
Start: 2021-10-15 | End: 2021-10-30 | Stop reason: HOSPADM

## 2021-10-15 RX ORDER — PROPOFOL 10 MG/ML
5-50 INJECTION, EMULSION INTRAVENOUS
Status: DISCONTINUED | OUTPATIENT
Start: 2021-10-15 | End: 2021-10-17

## 2021-10-15 RX ORDER — DIPHENHYDRAMINE HYDROCHLORIDE 50 MG/ML
25 INJECTION INTRAMUSCULAR; INTRAVENOUS EVERY 6 HOURS PRN
Status: CANCELLED | OUTPATIENT
Start: 2021-10-15

## 2021-10-15 RX ORDER — NICOTINE POLACRILEX 4 MG
15 LOZENGE BUCCAL PRN
Status: CANCELLED | OUTPATIENT
Start: 2021-10-15

## 2021-10-15 RX ORDER — BISACODYL 10 MG
10 SUPPOSITORY, RECTAL RECTAL DAILY PRN
Status: CANCELLED | OUTPATIENT
Start: 2021-10-15

## 2021-10-15 RX ORDER — METRONIDAZOLE 500 MG/1
500 TABLET ORAL EVERY 8 HOURS SCHEDULED
Status: DISCONTINUED | OUTPATIENT
Start: 2021-10-16 | End: 2021-10-16

## 2021-10-15 RX ORDER — SODIUM CHLORIDE 9 MG/ML
25 INJECTION, SOLUTION INTRAVENOUS PRN
Status: DISCONTINUED | OUTPATIENT
Start: 2021-10-15 | End: 2021-10-15

## 2021-10-15 RX ORDER — LIDOCAINE HYDROCHLORIDE 10 MG/ML
INJECTION, SOLUTION EPIDURAL; INFILTRATION; INTRACAUDAL; PERINEURAL PRN
Status: DISCONTINUED | OUTPATIENT
Start: 2021-10-15 | End: 2021-10-15 | Stop reason: SDUPTHER

## 2021-10-15 RX ORDER — DOCUSATE SODIUM 100 MG/1
100 CAPSULE, LIQUID FILLED ORAL 2 TIMES DAILY
Status: DISCONTINUED | OUTPATIENT
Start: 2021-10-15 | End: 2021-10-15

## 2021-10-15 RX ORDER — SODIUM CHLORIDE 0.9 % (FLUSH) 0.9 %
5-40 SYRINGE (ML) INJECTION EVERY 12 HOURS SCHEDULED
Status: DISCONTINUED | OUTPATIENT
Start: 2021-10-15 | End: 2021-10-30 | Stop reason: HOSPADM

## 2021-10-15 RX ORDER — PROPOFOL 10 MG/ML
INJECTION, EMULSION INTRAVENOUS
Status: COMPLETED
Start: 2021-10-15 | End: 2021-10-15

## 2021-10-15 RX ORDER — PROPOFOL 10 MG/ML
5-50 INJECTION, EMULSION INTRAVENOUS
Status: CANCELLED | OUTPATIENT
Start: 2021-10-15

## 2021-10-15 RX ORDER — ONDANSETRON 4 MG/1
4 TABLET, ORALLY DISINTEGRATING ORAL EVERY 8 HOURS PRN
Status: CANCELLED | OUTPATIENT
Start: 2021-10-15

## 2021-10-15 RX ORDER — SODIUM CHLORIDE, SODIUM LACTATE, POTASSIUM CHLORIDE, CALCIUM CHLORIDE 600; 310; 30; 20 MG/100ML; MG/100ML; MG/100ML; MG/100ML
INJECTION, SOLUTION INTRAVENOUS CONTINUOUS PRN
Status: DISCONTINUED | OUTPATIENT
Start: 2021-10-15 | End: 2021-10-15 | Stop reason: SDUPTHER

## 2021-10-15 RX ORDER — TRANEXAMIC ACID 100 MG/ML
INJECTION, SOLUTION INTRAVENOUS PRN
Status: DISCONTINUED | OUTPATIENT
Start: 2021-10-15 | End: 2021-10-15 | Stop reason: SDUPTHER

## 2021-10-15 RX ORDER — KETOROLAC TROMETHAMINE 30 MG/ML
30 INJECTION, SOLUTION INTRAMUSCULAR; INTRAVENOUS EVERY 6 HOURS
Status: DISCONTINUED | OUTPATIENT
Start: 2021-10-15 | End: 2021-10-15

## 2021-10-15 RX ORDER — CARBOPROST TROMETHAMINE 250 UG/ML
INJECTION, SOLUTION INTRAMUSCULAR PRN
Status: DISCONTINUED | OUTPATIENT
Start: 2021-10-15 | End: 2021-10-15 | Stop reason: SDUPTHER

## 2021-10-15 RX ORDER — SODIUM CHLORIDE 0.9 % (FLUSH) 0.9 %
5-40 SYRINGE (ML) INJECTION EVERY 12 HOURS SCHEDULED
Status: CANCELLED | OUTPATIENT
Start: 2021-10-15

## 2021-10-15 RX ORDER — SODIUM CHLORIDE 0.9 % (FLUSH) 0.9 %
5-40 SYRINGE (ML) INJECTION PRN
Status: DISCONTINUED | OUTPATIENT
Start: 2021-10-15 | End: 2021-10-30 | Stop reason: HOSPADM

## 2021-10-15 RX ORDER — SODIUM CHLORIDE 9 MG/ML
INJECTION, SOLUTION INTRAVENOUS CONTINUOUS
Status: CANCELLED | OUTPATIENT
Start: 2021-10-15

## 2021-10-15 RX ORDER — VITAMIN A, ASCORBIC ACID, CHOLECALCIFEROL, .ALPHA.-TOCOPHEROL ACETATE, DL-, THIAMINE MONONITRATE, RIBOFLAVIN, NIACINAMIDE, PYRIDOXINE HYDROCHLORIDE, FOLIC ACID, CYANOCOBALAMIN, CALCIUM CARBONATE, IRON, ZINC OXIDE, AND CUPRIC OXIDE 4000; 120; 400; 22; 1.84; 3; 20; 10; 1; 12; 200; 29; 25; 2 [IU]/1; MG/1; [IU]/1; [IU]/1; MG/1; MG/1; MG/1; MG/1; MG/1; UG/1; MG/1; MG/1; MG/1; MG/1
1 TABLET ORAL DAILY
Status: DISCONTINUED | OUTPATIENT
Start: 2021-10-15 | End: 2021-10-30 | Stop reason: HOSPADM

## 2021-10-15 RX ORDER — MAGNESIUM HYDROXIDE 1200 MG/15ML
LIQUID ORAL CONTINUOUS PRN
Status: COMPLETED | OUTPATIENT
Start: 2021-10-15 | End: 2021-10-15

## 2021-10-15 RX ORDER — SODIUM CHLORIDE 9 MG/ML
25 INJECTION, SOLUTION INTRAVENOUS PRN
Status: CANCELLED | OUTPATIENT
Start: 2021-10-15

## 2021-10-15 RX ORDER — SODIUM CHLORIDE 0.9 % (FLUSH) 0.9 %
5-40 SYRINGE (ML) INJECTION PRN
Status: CANCELLED | OUTPATIENT
Start: 2021-10-15

## 2021-10-15 RX ORDER — SENNA AND DOCUSATE SODIUM 50; 8.6 MG/1; MG/1
2 TABLET, FILM COATED ORAL DAILY
Status: CANCELLED | OUTPATIENT
Start: 2021-10-16

## 2021-10-15 RX ORDER — POLYETHYLENE GLYCOL 3350 17 G/17G
17 POWDER, FOR SOLUTION ORAL DAILY PRN
Status: CANCELLED | OUTPATIENT
Start: 2021-10-15

## 2021-10-15 RX ORDER — POTASSIUM CHLORIDE 7.45 MG/ML
10 INJECTION INTRAVENOUS
Status: COMPLETED | OUTPATIENT
Start: 2021-10-15 | End: 2021-10-15

## 2021-10-15 RX ORDER — DIPHENHYDRAMINE HYDROCHLORIDE 50 MG/ML
25 INJECTION INTRAMUSCULAR; INTRAVENOUS EVERY 6 HOURS PRN
Status: DISCONTINUED | OUTPATIENT
Start: 2021-10-15 | End: 2021-10-30 | Stop reason: HOSPADM

## 2021-10-15 RX ORDER — PROPOFOL 10 MG/ML
INJECTION, EMULSION INTRAVENOUS PRN
Status: DISCONTINUED | OUTPATIENT
Start: 2021-10-15 | End: 2021-10-15 | Stop reason: SDUPTHER

## 2021-10-15 RX ORDER — POLYETHYLENE GLYCOL 3350 17 G/17G
17 POWDER, FOR SOLUTION ORAL DAILY PRN
Status: DISCONTINUED | OUTPATIENT
Start: 2021-10-15 | End: 2021-10-15 | Stop reason: SDUPTHER

## 2021-10-15 RX ORDER — SENNA AND DOCUSATE SODIUM 50; 8.6 MG/1; MG/1
2 TABLET, FILM COATED ORAL DAILY
Status: DISCONTINUED | OUTPATIENT
Start: 2021-10-15 | End: 2021-10-30 | Stop reason: HOSPADM

## 2021-10-15 RX ORDER — SODIUM CHLORIDE, SODIUM LACTATE, POTASSIUM CHLORIDE, AND CALCIUM CHLORIDE .6; .31; .03; .02 G/100ML; G/100ML; G/100ML; G/100ML
500 INJECTION, SOLUTION INTRAVENOUS ONCE
Status: COMPLETED | OUTPATIENT
Start: 2021-10-15 | End: 2021-10-16

## 2021-10-15 RX ORDER — SODIUM CHLORIDE 0.9 % (FLUSH) 0.9 %
10 SYRINGE (ML) INJECTION PRN
Status: CANCELLED | OUTPATIENT
Start: 2021-10-15

## 2021-10-15 RX ORDER — CEPHALEXIN 500 MG/1
500 CAPSULE ORAL EVERY 8 HOURS SCHEDULED
Status: DISCONTINUED | OUTPATIENT
Start: 2021-10-16 | End: 2021-10-16

## 2021-10-15 RX ORDER — DEXTROSE MONOHYDRATE 50 MG/ML
100 INJECTION, SOLUTION INTRAVENOUS PRN
Status: CANCELLED | OUTPATIENT
Start: 2021-10-15

## 2021-10-15 RX ORDER — LANOLIN 72 %
OINTMENT (GRAM) TOPICAL
Status: CANCELLED | OUTPATIENT
Start: 2021-10-15

## 2021-10-15 RX ORDER — ROCURONIUM BROMIDE 10 MG/ML
INJECTION, SOLUTION INTRAVENOUS PRN
Status: DISCONTINUED | OUTPATIENT
Start: 2021-10-15 | End: 2021-10-15 | Stop reason: SDUPTHER

## 2021-10-15 RX ORDER — FENTANYL CITRATE 50 UG/ML
INJECTION, SOLUTION INTRAMUSCULAR; INTRAVENOUS PRN
Status: DISCONTINUED | OUTPATIENT
Start: 2021-10-15 | End: 2021-10-15 | Stop reason: SDUPTHER

## 2021-10-15 RX ORDER — CALCIUM GLUCONATE 20 MG/ML
2000 INJECTION, SOLUTION INTRAVENOUS ONCE
Status: COMPLETED | OUTPATIENT
Start: 2021-10-15 | End: 2021-10-15

## 2021-10-15 RX ORDER — LORAZEPAM 2 MG/ML
2 INJECTION INTRAMUSCULAR EVERY 5 MIN PRN
Status: DISCONTINUED | OUTPATIENT
Start: 2021-10-15 | End: 2021-10-30 | Stop reason: HOSPADM

## 2021-10-15 RX ORDER — DEXTROSE MONOHYDRATE 25 G/50ML
12.5 INJECTION, SOLUTION INTRAVENOUS PRN
Status: CANCELLED | OUTPATIENT
Start: 2021-10-15

## 2021-10-15 RX ORDER — NALOXONE HYDROCHLORIDE 0.4 MG/ML
0.4 INJECTION, SOLUTION INTRAMUSCULAR; INTRAVENOUS; SUBCUTANEOUS PRN
Status: CANCELLED | OUTPATIENT
Start: 2021-10-15

## 2021-10-15 RX ORDER — SIMETHICONE 80 MG
80 TABLET,CHEWABLE ORAL EVERY 6 HOURS PRN
Status: DISCONTINUED | OUTPATIENT
Start: 2021-10-15 | End: 2021-10-30 | Stop reason: HOSPADM

## 2021-10-15 RX ORDER — LANOLIN 72 %
OINTMENT (GRAM) TOPICAL
Status: DISCONTINUED | OUTPATIENT
Start: 2021-10-15 | End: 2021-10-30 | Stop reason: HOSPADM

## 2021-10-15 RX ORDER — DEXAMETHASONE SODIUM PHOSPHATE 10 MG/ML
6 INJECTION INTRAMUSCULAR; INTRAVENOUS EVERY 24 HOURS
Status: CANCELLED | OUTPATIENT
Start: 2021-10-16 | End: 2021-10-24

## 2021-10-15 RX ADMIN — PROPOFOL 50 MCG/KG/MIN: 10 INJECTION, EMULSION INTRAVENOUS at 17:22

## 2021-10-15 RX ADMIN — HEPARIN SODIUM AND DEXTROSE 18 UNITS/KG/HR: 10000; 5 INJECTION INTRAVENOUS at 15:10

## 2021-10-15 RX ADMIN — PROPOFOL 50 MCG/KG/MIN: 10 INJECTION, EMULSION INTRAVENOUS at 14:38

## 2021-10-15 RX ADMIN — CALCIUM GLUCONATE 2000 MG: 20 INJECTION, SOLUTION INTRAVENOUS at 09:18

## 2021-10-15 RX ADMIN — Medication 25 MCG/HR: at 02:16

## 2021-10-15 RX ADMIN — PROPOFOL 25 MCG/KG/MIN: 10 INJECTION, EMULSION INTRAVENOUS at 02:14

## 2021-10-15 RX ADMIN — Medication 200 MG: at 00:55

## 2021-10-15 RX ADMIN — INSULIN LISPRO 2 UNITS: 100 INJECTION, SOLUTION INTRAVENOUS; SUBCUTANEOUS at 03:35

## 2021-10-15 RX ADMIN — SODIUM CHLORIDE, POTASSIUM CHLORIDE, SODIUM LACTATE AND CALCIUM CHLORIDE: 600; 310; 30; 20 INJECTION, SOLUTION INTRAVENOUS at 00:59

## 2021-10-15 RX ADMIN — ROCURONIUM BROMIDE 10 MG: 10 INJECTION INTRAVENOUS at 00:55

## 2021-10-15 RX ADMIN — ROCURONIUM BROMIDE 40 MG: 10 INJECTION INTRAVENOUS at 01:06

## 2021-10-15 RX ADMIN — Medication 909 ML/HR: at 00:59

## 2021-10-15 RX ADMIN — CISATRACURIUM BESYLATE 2 MCG/KG/MIN: 200 INJECTION, SOLUTION INTRAVENOUS at 16:44

## 2021-10-15 RX ADMIN — POTASSIUM CHLORIDE 10 MEQ: 7.46 INJECTION, SOLUTION INTRAVENOUS at 07:40

## 2021-10-15 RX ADMIN — SODIUM CHLORIDE, PRESERVATIVE FREE 10 ML: 5 INJECTION INTRAVENOUS at 09:22

## 2021-10-15 RX ADMIN — MIDAZOLAM HYDROCHLORIDE 2 MG: 1 INJECTION, SOLUTION INTRAMUSCULAR; INTRAVENOUS at 01:14

## 2021-10-15 RX ADMIN — Medication 200 MCG/HR: at 17:47

## 2021-10-15 RX ADMIN — PROPOFOL 50 MCG/KG/MIN: 10 INJECTION, EMULSION INTRAVENOUS at 20:46

## 2021-10-15 RX ADMIN — Medication 100 MG: at 20:40

## 2021-10-15 RX ADMIN — MAGNESIUM SULFATE HEPTAHYDRATE 6000 MG: 500 INJECTION, SOLUTION INTRAMUSCULAR; INTRAVENOUS at 00:08

## 2021-10-15 RX ADMIN — FENTANYL CITRATE 100 MCG: 50 INJECTION, SOLUTION INTRAMUSCULAR; INTRAVENOUS at 01:29

## 2021-10-15 RX ADMIN — POTASSIUM CHLORIDE 40 MEQ: 40 SOLUTION ORAL at 04:51

## 2021-10-15 RX ADMIN — Medication 1 MG/HR: at 13:50

## 2021-10-15 RX ADMIN — Medication 1 TABLET: at 09:18

## 2021-10-15 RX ADMIN — Medication 100 MCG/HR: at 09:24

## 2021-10-15 RX ADMIN — TRANEXAMIC ACID 1000 MG: 100 INJECTION, SOLUTION INTRAVENOUS at 01:00

## 2021-10-15 RX ADMIN — DEXAMETHASONE SODIUM PHOSPHATE 6 MG: 10 INJECTION INTRAMUSCULAR; INTRAVENOUS at 09:21

## 2021-10-15 RX ADMIN — LIDOCAINE HYDROCHLORIDE 50 MG: 10 INJECTION, SOLUTION EPIDURAL; INFILTRATION; INTRACAUDAL; PERINEURAL at 00:55

## 2021-10-15 RX ADMIN — POTASSIUM CHLORIDE 10 MEQ: 7.46 INJECTION, SOLUTION INTRAVENOUS at 03:39

## 2021-10-15 RX ADMIN — SODIUM CHLORIDE, PRESERVATIVE FREE 10 ML: 5 INJECTION INTRAVENOUS at 10:45

## 2021-10-15 RX ADMIN — PROPOFOL 45 MCG/KG/MIN: 10 INJECTION, EMULSION INTRAVENOUS at 10:33

## 2021-10-15 RX ADMIN — PROPOFOL 45 MCG/KG/MIN: 10 INJECTION, EMULSION INTRAVENOUS at 06:36

## 2021-10-15 RX ADMIN — POTASSIUM CHLORIDE 10 MEQ: 7.46 INJECTION, SOLUTION INTRAVENOUS at 04:50

## 2021-10-15 RX ADMIN — PROPOFOL 200 MG: 10 INJECTION, EMULSION INTRAVENOUS at 00:55

## 2021-10-15 RX ADMIN — Medication: at 00:11

## 2021-10-15 RX ADMIN — SODIUM CHLORIDE, POTASSIUM CHLORIDE, SODIUM LACTATE AND CALCIUM CHLORIDE 500 ML: 600; 310; 30; 20 INJECTION, SOLUTION INTRAVENOUS at 23:13

## 2021-10-15 RX ADMIN — SODIUM CHLORIDE, POTASSIUM CHLORIDE, SODIUM LACTATE AND CALCIUM CHLORIDE: 600; 310; 30; 20 INJECTION, SOLUTION INTRAVENOUS at 00:37

## 2021-10-15 RX ADMIN — SODIUM CHLORIDE, PRESERVATIVE FREE 10 ML: 5 INJECTION INTRAVENOUS at 20:40

## 2021-10-15 RX ADMIN — Medication: at 13:09

## 2021-10-15 RX ADMIN — CARBOPROST TROMETHAMINE 250 MCG: 250 INJECTION, SOLUTION INTRAMUSCULAR at 01:02

## 2021-10-15 RX ADMIN — DOCUSATE SODIUM 50MG AND SENNOSIDES 8.6MG 2 TABLET: 8.6; 5 TABLET, FILM COATED ORAL at 11:49

## 2021-10-15 RX ADMIN — Medication 100 MG: at 11:49

## 2021-10-15 RX ADMIN — Medication 200 MCG/HR: at 20:40

## 2021-10-15 RX ADMIN — FENTANYL CITRATE 100 MCG: 50 INJECTION, SOLUTION INTRAMUSCULAR; INTRAVENOUS at 01:14

## 2021-10-15 RX ADMIN — HEPARIN SODIUM 3950 UNITS: 1000 INJECTION, SOLUTION INTRAVENOUS; SUBCUTANEOUS at 23:14

## 2021-10-15 RX ADMIN — ALBUTEROL SULFATE 5 PUFF: 90 AEROSOL, METERED RESPIRATORY (INHALATION) at 01:20

## 2021-10-15 RX ADMIN — MAGNESIUM SULFATE HEPTAHYDRATE 2000 MG/HR: 40 INJECTION, SOLUTION INTRAVENOUS at 00:19

## 2021-10-15 RX ADMIN — POTASSIUM CHLORIDE 10 MEQ: 7.46 INJECTION, SOLUTION INTRAVENOUS at 06:36

## 2021-10-15 RX ADMIN — ROCURONIUM BROMIDE 50 MG: 10 INJECTION INTRAVENOUS at 01:24

## 2021-10-15 RX ADMIN — DEXTROSE MONOHYDRATE 2000 MG: 50 INJECTION, SOLUTION INTRAVENOUS at 01:00

## 2021-10-15 ASSESSMENT — PULMONARY FUNCTION TESTS
PIF_VALUE: 33
PIF_VALUE: 32
PIF_VALUE: 2
PIF_VALUE: 32
PIF_VALUE: 0
PIF_VALUE: 32
PIF_VALUE: 61
PIF_VALUE: 32
PIF_VALUE: 35
PIF_VALUE: 32
PIF_VALUE: 1
PIF_VALUE: 32
PIF_VALUE: 2
PIF_VALUE: 33
PIF_VALUE: 29
PIF_VALUE: 0
PIF_VALUE: 33
PIF_VALUE: 33
PIF_VALUE: 0
PIF_VALUE: 4
PIF_VALUE: 32
PIF_VALUE: 2
PIF_VALUE: 32
PIF_VALUE: 3
PIF_VALUE: 23
PIF_VALUE: 0
PIF_VALUE: 33
PIF_VALUE: 1
PIF_VALUE: 17
PIF_VALUE: 32
PIF_VALUE: 32
PIF_VALUE: 1
PIF_VALUE: 32
PIF_VALUE: 0
PIF_VALUE: 32
PIF_VALUE: 0
PIF_VALUE: 35
PIF_VALUE: 32
PIF_VALUE: 1
PIF_VALUE: 1
PIF_VALUE: 33
PIF_VALUE: 32
PIF_VALUE: 3
PIF_VALUE: 32
PIF_VALUE: 0
PIF_VALUE: 34
PIF_VALUE: 32
PIF_VALUE: 1
PIF_VALUE: 32
PIF_VALUE: 32
PIF_VALUE: 0
PIF_VALUE: 0
PIF_VALUE: 30
PIF_VALUE: 0
PIF_VALUE: 32
PIF_VALUE: 5
PIF_VALUE: 32
PIF_VALUE: 32
PIF_VALUE: 36
PIF_VALUE: 32
PIF_VALUE: 34
PIF_VALUE: 0
PIF_VALUE: 30
PIF_VALUE: 5
PIF_VALUE: 32
PIF_VALUE: 0
PIF_VALUE: 0
PIF_VALUE: 33
PIF_VALUE: 0
PIF_VALUE: 2
PIF_VALUE: 34
PIF_VALUE: 32
PIF_VALUE: 32
PIF_VALUE: 33
PIF_VALUE: 32
PIF_VALUE: 35
PIF_VALUE: 0
PIF_VALUE: 1
PIF_VALUE: 0
PIF_VALUE: 32
PIF_VALUE: 1
PIF_VALUE: 32
PIF_VALUE: 33
PIF_VALUE: 2
PIF_VALUE: 31
PIF_VALUE: 32
PIF_VALUE: 32

## 2021-10-15 NOTE — PLAN OF CARE
Problem: OXYGENATION/RESPIRATORY FUNCTION  Goal: Patient will maintain patent airway  10/15/2021 0258 by Meri Georges RCP  Outcome: Ongoing  Goal: Patient will achieve/maintain normal respiratory rate/effort  Description: Respiratory rate and effort will be within normal limits for the patient  10/15/2021 0258 by Meri Georges RCP  Outcome: Ongoing     Problem: MECHANICAL VENTILATION  Goal: Patient will maintain patent airway  10/15/2021 0258 by Meri Georges RCP  Outcome: Ongoing  Goal: Oral health is maintained or improved  10/15/2021 0258 by Meri Georges RCP  Outcome: Ongoing  Goal: ET tube will be managed safely  10/15/2021 0258 by Meri Georges RCP  Outcome: Ongoing  Goal: Ability to express needs and understand communication  10/15/2021 0258 by Meri Georges RCP  Outcome: Ongoing  Goal: Mobility/activity is maintained at optimum level for patient  10/15/2021 0258 by Meri Georges RCP  Outcome: Ongoing     Problem: SKIN INTEGRITY  Goal: Skin integrity is maintained or improved  10/15/2021 0258 by Meri Georges RCP  Outcome: Ongoing

## 2021-10-15 NOTE — PLAN OF CARE
Plan to get a PICC line in for the patient. Called  Jv Francisco at [phone] to obtain a consent. [employer] and its full, can't leave a message. Will try again in a while. Got the consent for PICC line from Mr. Jv Francisco and witnessed  By two nurses, consent form signed and handed over to RN.      Rey Matthews MD, PGY2

## 2021-10-15 NOTE — PLAN OF CARE
Problem: OXYGENATION/RESPIRATORY FUNCTION  Goal: Patient will maintain patent airway  10/15/2021 1928 by Wood Latham RCP  Outcome: Ongoing     Problem: OXYGENATION/RESPIRATORY FUNCTION  Goal: Patient will achieve/maintain normal respiratory rate/effort  Description: Respiratory rate and effort will be within normal limits for the patient  10/15/2021 1928 by Tashia Latham RCP  Outcome: Ongoing     Problem: MECHANICAL VENTILATION  Goal: Patient will maintain patent airway  10/15/2021 1928 by Wood Latham RCP  Outcome: Ongoing     Problem: MECHANICAL VENTILATION  Goal: Oral health is maintained or improved  10/15/2021 1928 by Wood Latham RCP  Outcome: Ongoing     Problem: MECHANICAL VENTILATION  Goal: ET tube will be managed safely  10/15/2021 1928 by Tashia Latham RCP  Outcome: Ongoing     Problem: MECHANICAL VENTILATION  Goal: Ability to express needs and understand communication  10/15/2021 1928 by Tashia Latham RCP  Outcome: Ongoing     Problem: MECHANICAL VENTILATION  Goal: Mobility/activity is maintained at optimum level for patient  10/15/2021 1928 by Wood Latham RCP  Outcome: Ongoing     Problem: SKIN INTEGRITY  Goal: Skin integrity is maintained or improved  10/15/2021 1928 by Wood Latham RCP  Outcome: Ongoing

## 2021-10-15 NOTE — PROGRESS NOTES
Insert Arterial Line  Date/Time:  10/14/21, 11:31 PM  Performed by: Roger Truong RCP    Patient identity confirmed: arm band and provided demographic data   Time out: Immediately prior to procedure a \"time out\" was called to verify the correct patient, procedure, equipment, support staff. Preparation: Patient was prepped and draped in the usual sterile fashion.     Location:right radial    Travis's test normal: yes  Needle gauge: 20     Number of attempts: 1  Post-procedure: transparent dressing applied and line secured    Patient tolerance: well

## 2021-10-15 NOTE — PLAN OF CARE
Problem: Non-Violent Restraints  Goal: Removal from restraints as soon as assessed to be safe  Outcome: Completed  Goal: No harm/injury to patient while restraints in use  Outcome: Completed  Goal: Patient's dignity will be maintained  Outcome: Completed   Electronically signed by Zahraa Hardwick RN on 10/15/2021 at 5:31 PM

## 2021-10-15 NOTE — PLAN OF CARE
Problem: Airway Clearance - Ineffective  Goal: Achieve or maintain patent airway  Outcome: Ongoing     Problem: Gas Exchange - Impaired  Goal: Absence of hypoxia  Outcome: Ongoing  Goal: Promote optimal lung function  Outcome: Ongoing     Problem: Breathing Pattern - Ineffective  Goal: Ability to achieve and maintain a regular respiratory rate  Outcome: Ongoing     Problem:  Body Temperature -  Risk of, Imbalanced  Goal: Ability to maintain a body temperature within defined limits  Outcome: Ongoing  Goal: Will regain or maintain usual level of consciousness  Outcome: Ongoing  Goal: Complications related to the disease process, condition or treatment will be avoided or minimized  Outcome: Ongoing     Problem: Isolation Precautions - Risk of Spread of Infection  Goal: Prevent transmission of infection  Outcome: Ongoing     Problem: Nutrition Deficits  Goal: Optimize nutritional status  Outcome: Ongoing     Problem: Risk for Fluid Volume Deficit  Goal: Maintain normal heart rhythm  Outcome: Ongoing  Goal: Maintain absence of muscle cramping  Outcome: Ongoing  Goal: Maintain normal serum potassium, sodium, calcium, phosphorus, and pH  Outcome: Ongoing     Problem: Loneliness or Risk for Loneliness  Goal: Demonstrate positive use of time alone when socialization is not possible  Outcome: Ongoing     Problem: Fatigue  Goal: Verbalize increase energy and improved vitality  Outcome: Ongoing     Problem: Patient Education: Go to Patient Education Activity  Goal: Patient/Family Education  Outcome: Ongoing     Problem: Nutrition  Goal: Optimal nutrition therapy  10/15/2021 1800 by Edgar Middleton  Outcome: Ongoing  10/15/2021 1241 by Antonio Mackey RD, LD  Outcome: Ongoing  Note: Nutrition Problem #1: Inadequate oral intake  Intervention: Food and/or Nutrient Delivery: Start Tube Feeding  Nutritional Goals: Intake to meet > 50% of estimated nutrition needs       Problem: Skin Integrity:  Goal: Will show no infection signs and symptoms  Description: Will show no infection signs and symptoms  Outcome: Ongoing  Goal: Absence of new skin breakdown  Description: Absence of new skin breakdown  Outcome: Ongoing     Problem: OXYGENATION/RESPIRATORY FUNCTION  Goal: Patient will maintain patent airway  Outcome: Ongoing  Goal: Patient will achieve/maintain normal respiratory rate/effort  Description: Respiratory rate and effort will be within normal limits for the patient  Outcome: Ongoing     Problem: MECHANICAL VENTILATION  Goal: Patient will maintain patent airway  Outcome: Ongoing  Goal: Oral health is maintained or improved  Outcome: Ongoing  Goal: ET tube will be managed safely  Outcome: Ongoing  Goal: Ability to express needs and understand communication  Outcome: Ongoing  Goal: Mobility/activity is maintained at optimum level for patient  Outcome: Ongoing     Problem: SKIN INTEGRITY  Goal: Skin integrity is maintained or improved  Outcome: Ongoing

## 2021-10-15 NOTE — SIGNIFICANT EVENT
Ob Attending     I have been coordinating this critically ill patient's care for the past hour at the bedside as her respiratory status worsened. She required bipap with increasing respiratory rate. After discussion with the critical care team it was determined she would need intubation with in 1-2 hours. Given the coordination required with anesthesia teams, NICU team, ICU team and OB team will call in the OR staff and make preparations for delivery. I spoke twice on the phone with her  Lizzeth Hobbs first with the change to bipap and then with the decision that she would require intubation. Discussed the plan for  section for delivery to optimize her respiratory status and increase her changes for improvement. Rosina Tabor had an opportunity to ask questions as did her  Lizzeth Hobbs. NICU and anesthesia teams aware and setting up OR. Discussed briefly the plan of care and details of  delivery. She previously had been consented for  delivery and had no further questions about the risks/benefits/common complications of surgery. To OR for primary  delivery and intubation. Magnesium bolus given for neuroprotection, she is s/p course of steroids for fetal lung maturity. All questions answered, teams available.      Chiqui Oneil MD

## 2021-10-15 NOTE — PLAN OF CARE
Problem: Gas Exchange - Impaired  Goal: Absence of hypoxia  10/14/2021 2218 by Génesis Madrigal RN  Outcome: Ongoing     Problem: Breathing Pattern - Ineffective  Goal: Ability to achieve and maintain a regular respiratory rate  10/14/2021 2218 by Génesis Madrigal RN  Outcome: Ongoing     Problem: Isolation Precautions - Risk of Spread of Infection  Goal: Prevent transmission of infection  10/14/2021 2218 by Génesis Madrigal RN  Outcome: Ongoing

## 2021-10-15 NOTE — PROGRESS NOTES
Ob Attending     Called patient's  Thania Aguirre at [phone] discussed that Sinai Michael is now intubated in the ICU and stable. Their son born this evening via classical  section will be named Cheo Ponce and I was able to give him an update from the NICU team that he is stable, intubated and transitioning as expected for a baby born at 29 weeks. Reviewed the unpredictable course of recovery. Sinai Michael remains critically ill but with intubation we will continue to monitor her progress closely. His questions were answered. Will plan to have day team update him again mid morning.      Jeannette Jeffery MD

## 2021-10-15 NOTE — PROGRESS NOTES
Patient's respiratory status worsened on high flow and began having oxygen saturations in the mid to low 80s. Patient was then switched to BiPAP and oxygen saturations leonardo appropriately. Patient however, even when not being disturbed was breathing with a respiratory rate in the 40s and was beginning to become fatigued. Alerted the OB attending and OB team as well as critical care attending physician. After speaking with OB attending, decided that the best course of action would be to move patient to the operating room for an emergent  prior to patient's respiratory status worsening even further. For both mother and fetus, there would be clinically worse outcomes if  was delayed until patient required intubation. OB attending spoke with patient and patient's  and obtained consent for emergent . Patient kept on BiPAP, multiple labs were sent off including ABG. NICU and anesthesia team was notified by [facility] attending. Patient was then transported to the OR by OB attending, resident, myself, ICU nurse, and OB nurses.

## 2021-10-15 NOTE — FLOWSHEET NOTE
Dr. Maren Romeo talking with critical care residents about need for c/section if need for intubation. Dr. Maren Romeo in to discuss plan of care with patient.

## 2021-10-15 NOTE — ANESTHESIA PRE PROCEDURE
units in dextrose 5% 250 mL (premix) infusion  5-30 Units/kg/hr IntraVENous Continuous Madeline Milton MD 18.8 mL/hr at 10/14/21 1847 19 Units/kg/hr at 10/14/21 1847    sodium bicarbonate 75 mEq in dextrose 5 % 1,000 mL infusion   IntraVENous Continuous Madeline Milton  mL/hr at 10/14/21 1243 New Bag at 10/14/21 1243    magnesium sulfate 6,000 mg in dextrose 5 % 100 mL IVPB  6,000 mg IntraVENous Once Luvenia Lama, DO        magnesium sulfate (99089 mg/500mL infusion)  2,000 mg/hr IntraVENous Continuous Luvenia Lama, DO        ondansetron (ZOFRAN-ODT) disintegrating tablet 4 mg  4 mg Oral Q8H PRN Luvenia Lama, DO        Or    ondansetron [facility] injection 4 mg  4 mg IntraVENous Q6H PRN Luvenia Lama, DO        acetaminophen (TYLENOL) tablet 650 mg  650 mg Oral Q4H PRN Luvenia Lama, DO           Allergies:     Allergies   Allergen Reactions    Vicodin [Hydrocodone-Acetaminophen]      nausea       Problem List:    Patient Active Problem List   Diagnosis Code    Pneumonia due to COVID-19 virus U07.1, J12.82    26 weeks gestation of pregnancy Z3A.26    Tachycardia R00.0    Tachypnea R06.82    Anxiety F41.9    Pulmonary embolism (HCC) I26.99    Marginal insertion of umbilical cord affecting management of mother O43.80    Starvation ketoacidosis E87.2    Severe malnutrition ([address] E43       Past Medical History:        Diagnosis Date    Anxiety     GERD (gastroesophageal reflux disease)     Migraines     Seasonal allergies        Past Surgical History:        Procedure Laterality Date    FOOT SURGERY      nerve release, and plantar fascitis    MANDIBLE SURGERY      tooth implant    MOUTH SURGERY      WISDOM TOOTH EXTRACTION         Social History:    Social History     Tobacco Use    Smoking status: Former Smoker     Types: Cigarettes    Smokeless tobacco: Never Used   Substance Use Topics    Alcohol use: Not Currently                                Counseling given: Not Answered      Vital Signs (Current):   Vitals:    10/14/21 2000 10/14/21 2100 10/14/21 2200 10/14/21 2222   BP: (!) 118/58 122/74 115/71    Pulse: 105 103 87    Resp: (!) 44 (!) 38 (!) 37 (!) 40   Temp: 98.2 °F (36.8 °C)      TempSrc: Oral      SpO2: 92% 94% 91% 98%   Weight:       Height:                                                  BP Readings from Last 3 Encounters:   10/14/21 115/71   10/13/21 118/66   10/10/21 106/71       NPO Status:                                                                                 BMI:   Wt Readings from Last 3 Encounters:   10/14/21 217 lb 9.5 oz (98.7 kg)   10/13/21 225 lb (102.1 kg)   10/05/21 225 lb 12.8 oz (102.4 kg)     Body mass index is 37.35 kg/m².     CBC:   Lab Results   Component Value Date    WBC 8.2 10/14/2021    RBC 3.89 10/14/2021    HGB 11.3 10/14/2021    HCT 36.2 10/14/2021    MCV 93.1 10/14/2021    RDW 14.4 10/14/2021     10/14/2021       CMP:   Lab Results   Component Value Date     10/14/2021    K 3.6 10/14/2021     10/14/2021    CO2 13 10/14/2021    BUN 4 10/14/2021    CREATININE 0.26 10/14/2021    GFRAA >60 10/14/2021    LABGLOM >60 10/14/2021    GLUCOSE 158 10/14/2021    PROT 6.3 10/13/2021    CALCIUM 8.1 10/14/2021    BILITOT 0.66 10/13/2021    ALKPHOS 181 10/13/2021    AST 30 10/13/2021    ALT 45 10/13/2021       POC Tests:   Recent Labs     10/14/21  2324   POCGLU 156*       Coags:   Lab Results   Component Value Date    PROTIME 10.0 10/13/2021    INR 0.9 10/13/2021    APTT 83.8 10/14/2021       HCG (If Applicable): No results found for: PREGTESTUR, PREGSERUM, HCG, HCGQUANT     ABGs: No results found for: PHART, PO2ART, JRL2ASS, LKY6HLV, BEART, S7BVQJVZ     Type & Screen (If Applicable):  No results found for: LABABO, LABRH    Drug/Infectious Status (If Applicable):  Lab Results   Component Value Date    HEPCAB NONREACTIVE 06/16/2021       COVID-19 Screening (If Applicable):   Lab Results   Component Value Date    COVID19 DETECTED 10/10/2021           Anesthesia Evaluation    Airway: Mallampati: III  TM distance: >3 FB   Neck ROM: full  Mouth opening: > = 3 FB Dental: normal exam   (+) other      Pulmonary:   (+) pneumonia:  decreased breath sounds,                             Cardiovascular:              Rate: normal                    Neuro/Psych:   (+) neuromuscular disease:, headaches:,    (-) CVA           GI/Hepatic/Renal:   (+) GERD:,           Endo/Other: Negative Endo/Other ROS                    Abdominal:   (+) obese,           Vascular: negative vascular ROS.  + PE. Other Findings:             Anesthesia Plan      general     ASA 4 - emergent     (GETA)  Induction: intravenous. MIPS: Postoperative opioids intended and Postoperative ventilation. Anesthetic plan and risks discussed with patient. Plan discussed with CRNA.                   Mick Freitas MD   10/14/2021

## 2021-10-15 NOTE — PLAN OF CARE
Problem: Gas Exchange - Impaired  Goal: Absence of hypoxia  10/14/2021 2218 by Bayron Paige RN  Outcome: Ongoing     Problem: Breathing Pattern - Ineffective  Goal: Ability to achieve and maintain a regular respiratory rate  10/14/2021 2218 by Bayron Paige RN  Outcome: Ongoing     Problem: Isolation Precautions - Risk of Spread of Infection  Goal: Prevent transmission of infection  10/14/2021 2218 by Bayron Paige RN  Outcome: Ongoing     Problem: Non-Violent Restraints  Goal: Removal from restraints as soon as assessed to be safe  Outcome: Not Met This Shift  Goal: No harm/injury to patient while restraints in use  Outcome: Met This Shift  Goal: Patient's dignity will be maintained  Outcome: Met This Shift

## 2021-10-15 NOTE — PROGRESS NOTES
Comprehensive Nutrition Assessment    Type and Reason for Visit:  Reassess, Consult (TF ordering/management)    Nutrition Recommendations/Plan: Start Tube Feeding- suggest Peptide Based High Protein Formula (Vital HP) with goal rate of 40 mL/hr while on propofol at current rate. Will monitor TF tolerance/adequacy and care plans. Nutrition Assessment:  Chart reviewed. S/p ceserean section this morning. Pt is now intubated. No nutrition at present, but plan to start tube feeding today. RD consulted for TF ordering/management. Meds/labs reviewed. Estimated Daily Nutrient Needs:  Energy (kcal):  1700 kcal/day; Weight Used for Energy Requirements:  Current     Protein (g):  70-85 g protein/d (1.3-1.5 g/kg); Weight Used for Protein Requirements:  Ideal (55 kg)        Fluid (ml/day):  2200 mL fluid/d or per MD; Method Used for Fluid Requirements:   (Ori Louise)      Nutrition Related Findings:  meds/labs reviewed. Wounds:  Surgical Incision (abdomen)       Current Nutrition Therapies:    Diet NPO    Additional Calorie Sources:   Sodium Bicarbonate 75 mEq in D5% at 100 mL/qa=163 kcal/day.  Propofol at 26.6 ml/hr =702 kcal/day    Anthropometric Measures:  · Height: 5' 4\" (162.6 cm)  · Current Body Weight: 218 lb 4.1 oz (99 kg)   · Admission Body Weight: 225 lb (102.1 kg)    · Usual Body Weight: 220 lb (99.8 kg) (Pre-Pregnancy)     · Ideal Body Weight: 120 lbs; % Ideal Body Weight 181.9 %   · BMI: 37.4  · BMI Categories: Obese Class 2 (BMI 35.0 -39.9)       Nutrition Diagnosis:   · Inadequate oral intake related to impaired respiratory function ,current medical condition/illness as evidenced by poor intake over past week, current NPO status    Nutrition Interventions:   Food and/or Nutrient Delivery:  Start Tube Feeding  Nutrition Education/Counseling:  No recommendation at this time   Coordination of Nutrition Care:  Continue to monitor while inpatient    Goals:  Intake to meet > 50% of estimated nutrition needs -progressing towards goal     Nutrition Monitoring and Evaluation:   Behavioral-Environmental Outcomes:  None Identified   Food/Nutrient Intake Outcomes:  Enteral Nutrition Intake/Tolerance  Physical Signs/Symptoms Outcomes:  Biochemical Data, Fluid Status or Edema, Nutrition Focused Physical Findings, Skin, Weight     Discharge Planning:     Too soon to determine     Electronically signed by Kelsie Hernandez RD, LD on 10/15/21 at 12:37 PM EDT    Contact: [phone]

## 2021-10-15 NOTE — BRIEF OP NOTE
Department of Obstetrics and Gynecology  Obstetrical Brief Operative Report  [address]    Patient: Sameer Caldera   : 1990  MRN: [id number]       Acct: [de-identified]   Date of Procedure: 10/15/21    Pre-operative Diagnosis: 27 y.o. female  at 34w7d    COVID pneumonia with worsening respiratory status  Left lower lobe PE   Euglycemic starvation ketoacidosis   Tachycardia   Tachypnea   Marginal cord   Anxiety   BMI 38     Post-operative Diagnosis: Same as above   Viable Male Infant  Intraop Hemorrhage (EBL 1500mL)    Procedure: primary classical  section     Surgeon: Dr. Delfin Vera MD3  Assistant(s): Mallory Noel DO, PGY3    Anesthesia: general     Information for the patient's : [facility] [[id number]]   male   Birth Weight: N/A     Information for the patient's : [facility] [[id number]]          Findings:  Live Born male infant in breech presentation. Apgars pending per NICU. Normal appearing uterus tubes and ovaries   Estimated Blood Loss: 1500ml immediately post-operatively  Total IV Fluids: 1000ml  Urine output: 150ml clear urine   Drains:  miles catheter  Specimens:  placenta sent to pathology, cord blood and cord gases  Instrument and Sponge Count: Correct  Complications: none  Condition: Infant unstable, transfer to Special Care Nursery, Mother stable, transfer to SICU    See dictated operative report for full details.     Mallory Noel DO  Ob/Gyn Resident  10/15/2021, 1:59 AM

## 2021-10-15 NOTE — DISCHARGE SUMMARY
[address] [place] [address]     Department of Internal Medicine - Critical Care Service    INPATIENT DISCHARGE SUMMARY      PATIENT IDENTIFICATION:  NAME:  Becky Trujillo   :   1990  MRN:    [id number]     Acct:    [de-identified]   Admit Date:  10/13/2021  Discharge date:  10/15/2021  Attending Provider: Angelika Fonseca DO                                     Principal Problem:    PCCS 10/15/21 M Apg  Wt 2#3  Active Problems:    Pneumonia due to COVID-19 virus    26 weeks gestation of pregnancy    Tachycardia    Tachypnea    Anxiety    Pulmonary embolism ([address]    Marginal insertion of umbilical cord affecting management of mother    Starvation ketoacidosis    Severe malnutrition ([address]  Resolved Problems:    * No resolved hospital problems. *       REASON FOR HOSPITALIZATION:   Chief Complaint   Patient presents with    Other     covid positive           Hospital Course    History was obtained from chart review and the patient.    Vishnu Valles is a 27 y.o. with PMH of current pregnancy at 26 weeks and 3 days gestation who initially presented to outside facility, [facility] on 10/13/2021. Patient reported that symptoms began on 10/01 although she did not test positive until 10/10. Patient reports that on 10/01 she started having general symptoms including shortness of breath and cough. Patient has had associated decreased oral intake for 7 days now reportedly having her last meal on 10/7/2021. Patient states that she has been tolerating oral liquids since that time although has not been able to keep any substantial food products down. Upon arrival to outside facility patient was noted to have an SPO2 of 90% with tachycardia and tachypnea. Patient was transferred to [facility] for higher level of care due to patient being 26 weeks gestation and need for a tertiary facility pending patient may require immediate delivery.  Initially was accepted by internal medicine team due to main concern for COVID pneumonia being primary cause of hospitalization and patient was planned to e stabilized in the labor and delivery unit with continuous fetal monitoring and transferred to covid unit as able.      Critical care consulted due to patient having an VBG which was notable for pH of 7.2 and increasing new oxygen requirements upon arrival to our facility.     Discussion with OB team was had and it was noted that despite patient's acidosis she was compensating respiratory wise significantly and CO2 was initially 18 with a bicarb of 7. At this time consideration for euglycemic vs starvation ketoacidosis was made due to picture being more metabolic acidosis with an appropriate respiratory compromise although acidosis was uncompensated for fully.      Upon my evaluation of patient she is tachypneic and tachycardic although able to respond appropriately A+O x4. Patient is denying any chest pain at this time. Has had intermittent diarrhea and vomiting since onset of symptoms. Does have some nausea although no vomiting currently. Denies any dysuria. Patient does note that she has had increased thirst and increased frequency of urination.          Admission VBG showed pH of 7.2, PCO2 of 18, HCO3 of 7 and Lactic of 0.8     Labs showed no leukocytosis,  normal electrolites (  ), normal kidney function tests, and normal LFT.       Xray showed: Scattered bilateral pulmonary infiltrates consistent with pneumonia which has mildly worsened compared to prior study on 10/10.     CT with PE protocol reviewed from previous facility.   Small subsegmental pulmonary embolism noted.     Pt was started on nonrebreather at 15 L in the labor and delivery unit.     Discussed with OB team and patient will transition to critical care team as primary, patient will be transferred to SICU unit and we will take over as primary with OB team following closely as well    Overnight events:   Patient desaturated on high flow, placed on BiPAP, saturating okay. OB attending notified. Decision was made to deliver the baby via . Patient intubated, sedated with propofol, fentanyl. Sent to ICU. Baby transferred to NICU.     Today, patient is sedated with versed. Start to paralyze with nimbex. Restart on high dose heparin drip, f/u on h&H. Plan to put the patient on ecmo. ABG in metabolic acidosis, on bicarb fluids. On keflex and flagyl. PICC line ordered. Transfer initiated and accepted to [facility] for ecmo.  Getting a stat echo      Consults:   ID and OB, neonatology    Procedures:  C- section, intubation    Any Hospital Acquired Infections: None    PATIENT'S DISCHARGE CONDITION:  Transfer requiring ECMO    PATIENT/FAMILY INSTRUCTIONS:   Current Discharge Medication List      CONTINUE these medications which have NOT CHANGED    Details   doxyLAMINE succinate (UNISOM SLEEPTABS) 25 MG tablet Take 25 mg by mouth nightly      Prenatal Vit-Fe Fumarate-FA (PRENATAL VITAMIN PO) Take 1 tablet by mouth daily       omeprazole (PRILOSEC) 20 MG delayed release capsule            Activity: bedrest    Diet: tube feeds    Disposition: Discharge-readmit    Electronically signed by Carlos A Muhammad MD on 10/15/2021 at 3:52 PM       Carlos A Muhammad MD  Internal Medicine Resident  Critical Care Service

## 2021-10-15 NOTE — PROCEDURES
History/labs/allergies reviewed  Placed by ESTEFANIA Patel RN  Assisted by n/a  Consent signed and obtained by physician  Time out performed using two identifiers  Catheter type Triple  lumen picc  Product type solo2 w VPS Rhythm  Lot # [id number]  Expiration date 10/31/2022  Catheter size 5  Irish  Trimmed at 45  Total length 45  External catheter length 4 cm  Location RBV  Number of attempts 1  Estimated blood loss 1 ml  Pre procedure cardiac Rhythm NSR per bedside telemetry and/or VPS Tracing. Placement verified by- CXR and/or VPS Max P wave noted by amplitude changes of the P wave, positive blood return, flushes easily  Special equipment used- ultrasound, micro-introducer technique and VPS technology if indicated  Catheter secured with statlock  Dressing applied- Tegaderm CHG  Lidocaine administered intradermally conc. 1% 2 mL  PICC line education:   [ X ] Discussed with patient/Family or POA prior to signing Informed Procedural Consent. Risks and Benefits along with reason for procedure were discussed and teaching was reinforced with an education handout on PICC insertion. [facility] FAQ Catheter Associated Blood Stream Infections and [address] [address] REV. 7/13 Nursing and Booklet left at bedside or in chart. Patient (Family or POA) acknowledged understanding of information taught and agreed to procedure. [  ] Was not discussed with patient/family or POA due to pts medical status at time of procedure. pts family or POA not available to discuss PICC education.  [facility] FAQ Catheter Associated Blood Stream Infections and [address] [address] REV. 7/13 Nursing and Booklet left at bedside or in chart

## 2021-10-15 NOTE — OP NOTE
Operative Note  Department of Obstetrics and Gynecology  [facility]     Patient: Sandhya Ripper   : 1990  MRN: [id number]       Acct: [de-identified]   PCP: Og Echeverria  Date of Procedure: 10/15/21    Pre-operative Diagnosis: 27 y.o. female  at 34w7d    COVID pneumonia with worsening respiratory status   Left lower lobe PE   Rh Positive  Euglycemic starvation ketoacidosis   Tachycardia   Tachypnea   Marginal cord   Anxiety   BMI 38     Post-operative Diagnosis: Same as above  Viable Male Infant  Intraop Hemorrhage (EBL 1500 mL)    Procedure: primary classical  section    Indications: The patient was received as a transfer from [facility] with worsening COVID-19 pneumonia and left lower lobe PE. She arrived to [facility] on 10/13 where she was evaluated on labor and delivery and transferred to the ICU. Over the 24 hours her respiratory status continued to worsen requiring 40L of high flow nasal canula, then moved to BiPap with increasing respiratory rate and inability to maintain appropriate oxygenation status. The critical care team determined that the patient will require intubation imminently and thus decision was made to proceed with  section at the time of intubation in order to maximize maternal respiratory status to preserve maternal life. Fetal status has remained category one by continuous external fetal monitoring. Surgeon: Dr. Gina Sifuentes MD  Assistants: Analia Varghese DO, PGY3    Anesthesia: general     Procedure Details   The patient was seen pre-operatively. The risks, benefits, complications, treatment options, and expected outcomes were discussed with the patient. The patient concurred with the proposed plan, giving informed consent. The patient was taken to the Operating Room, identified as Marshel Ripper and the procedure verified as  Delivery. A Time Out was held and the above information confirmed.      The patient was draped and prepped in the usual sterile manner. After general anesthesia a Pfannenstiel incision was made and carried down through the subcutaneous tissue to the fascia using scalpel. Fascial incision was made and extended transversely using blunt dissection for sharp dissection. The peritoneum was identified and entered bluntly. Blunt dissection was used to take the peritoneum down. Peritoneal incision was extended longitudinally with blunt stretch, bladder retractor was placed. A midline vertical uterine incision (CLASSICAL) was made using a new scalpel blade. Blunt stretch on the hysterotomy incision was made with immediate bleeding due to entry into the anteriorly located placenta. Delivered from breech presentation was a Live Born male infant. The umbilical cord was immediately clamped and cut and the infant was handed to the NICU team for evaluation. A second section of cord was clamped and cut and sent for gases. Cord blood was obtained for evaluation. The placenta was removed spontaneously with gentle traction and appeared intact, whole and that the umbilical cord had three vessels noted. Pitocin was started. The uterine outline appeared normal. The uterus was exteriorized and cleaned of all clots and debris. The uterine incision was closed with running locked sutures of 0-Monocryl. An imbricating layer was placed with 0-Monocryl in running fashion. Hemostasis was observed with the exception of the superior most portion of the hysterotomy. A figure of eight was placed at that location and hemostasis was achieved. The outermost layer of the uterine myometrium and serosa was closed with 3-0 vicryl in running fashion. Hemostasis was observed. Surgicel powder was placed over the incision with care taken to remove excess powder. The uterus was reintroduced into the abdominal cavity. Bilateral tubes and ovaries were visualized and appeared normal. The hysterotomy was again inspected and found to be hemostatic. Rectus muscles were inspected and found to be hemostatic. The fascia was then reapproximated with running sutures of 0 Vicryl. The subcuticular space was irrigated copiously. The subcuticular space was closed using a 2-0 Vicryl suture in a running fashion. The skin was reapproximated with insorb staples. The skin was then cleansed and dressed with a prevena dressing in sterile fashion. Instrument, sponge, and needle counts were correct prior the abdominal closure and at the conclusion of the case. The urine remained clear throughout the case. Ancef was given for antibiotic prophylaxis. SCDs for DVT prophylaxis remain in place for the post operative period. Dr. Rose Santo was present for the entire operation. Findings:  Live Born male infant in breech presentation. Apgars pending per NICU. Normal appearing uterus tubes and ovaries   Estimated Blood Loss: 1500ml immediately post-operatively  Total IV Fluids: 1000ml  Urine output: 150ml clear urine   Drains:  miles catheter  Specimens:  placenta sent to pathology, cord blood and cord gases  Instrument and Sponge Count: Correct  Complications: none  Condition: Infant unstable, transfer to Special Care Nursery, Mother stable, transfer to [address] [place] [address] [address], DO  OB/GYN Resident  10/15/2021, 8:50 AM    This dictation was performed by a resident physician and then was thoroughly reviewed by the attending prior to being signed.

## 2021-10-15 NOTE — FLOWSHEET NOTE
Dr. Carlos A May spoke to pts  in regards to upcoming transfer to [facility] for ECMO treatment. Writer then took over phone call and discussed ECMO, transfer process, and answered all questions and concerns. Family expressed want for a zoom call. Writer then contacted pastoral care to set up call.     Electronically signed by Martin Gonzalez RN on 10/15/2021 at 6:52 PM

## 2021-10-15 NOTE — PLAN OF CARE
Nutrition Problem #1: Inadequate oral intake  Intervention: Food and/or Nutrient Delivery: Start Tube Feeding  Nutritional Goals: Intake to meet > 50% of estimated nutrition needs

## 2021-10-15 NOTE — PROGRESS NOTES
Pt seen and examined. Intubated and sedated, but is able to communicate by drawing letters on caregiver's hand. Inquires about baby,number for NICU given to primary RN. Pt remains intubated, with FiO2 at 85. BP readings this AM were showing hypotension, but this has improved. Hg stable at 9.7, and there are no s/s intraperitoneal hemorrhage. Heparin re-started this AM for PE. Alverta Madalyn in place, fundus is firm and below umbilicus. Pt and RN state pain is controlled. Meets criteria for pre-eclampsia without severe features. Appreciate critical care management.      Neuro  -stable, communicative while sedated    CV  -HR WNL, BP being monitored closely by peripheral cuff and art line    Resp  -intubated and sedated, appreciate greatly critical care management  -metabolic acidosis from admission improved (pH 7.239 this AM), HCO3 20.4, PO2 185.8, O2 sat 99%, base excess 7  -Heparin drip resumed this morning for PE  -no contra-indication for prone positioning  -day 2 of Decadron therapy    GI  -IVF while intubated (NS currently)  -nutrition consult 10/14  -sliding scale insulin for BGTs       -Lozano catheter in place, UOP adequate    Heme/Onc  -Hg stable today  -resumed Heparin drip    ID  -continue Dexamethasone for COVID  -Keflex/Flagyl for post op  wound infection prophylaxis    Endo  -NS for IVF  -electrolyte replacement PRN    OB  -POD #0 s/p classical  with pfannenstiel incision for worsening maternal status  -pain is controlled, Hg stable  -will provide breast pump as maternal status allowed    Spoke to Patient's  Corina Him multiple times this AM    Danelle Guzman MD  10/15/21  12:18 PM

## 2021-10-15 NOTE — PROGRESS NOTES
Critical Care - Progress Notes    Patient's name:  Shagufta Olivares  Medical Record Number: [id number]  Patient's account/billing number: [de-identified]  Patient's YOB: 1990  Age: 27 y.o. Date of Admission: 10/13/2021  8:19 PM  Date of History and Physical Examination: 10/15/2021      Primary Care Physician: Chela Longoria  Attending Physician: Dr. Juliann Rice Status: Full Code    Chief complaint:   Chief Complaint   Patient presents with    Other     covid positive      Overnight events:   Patient desaturated on high flow, placed on BiPAP, saturating okay. OB attending notified. Decision was made to deliver the baby via . Patient intubated, sedated with propofol, fentanyl. Sent to ICU. Baby transferred to NICU. Today, patient is sedated with versed. Start to paralyze with nimbex. Restart on high dose heparin drip, f/u on h&H. Plan to put the patient on ecmo. ABG in metabolic acidosis, on bicarb fluids. On keflex and flagyl. PICC line ordered. Transfer initiated and accepted to [facility]. Chel's for ecmo. Getting a stat echo. HISTORY OF PRESENT ILLNESS:      History was obtained from chart review and the patient. Shagufta Olivares is a 27 y.o. with PMH of current pregnancy at 26 weeks and 3 days gestation who initially presented to outside facility, [facility] on 10/13/2021. Patient reported that symptoms began on 10/01 although she did not test positive until 10/10. Patient reports that on 10/01 she started having general symptoms including shortness of breath and cough. Patient has had associated decreased oral intake for 7 days now reportedly having her last meal on 10/7/2021. Patient states that she has been tolerating oral liquids since that time although has not been able to keep any substantial food products down. Upon arrival to outside facility patient was noted to have an SPO2 of 90% with tachycardia and tachypnea.   Patient was transferred to [facility]. [facility] labor and delivery unit for higher level of care due to patient being 26 weeks gestation and need for a tertiary facility pending patient may require immediate delivery. Initially was accepted by internal medicine team due to main concern for COVID pneumonia being primary cause of hospitalization and patient was planned to e stabilized in the labor and delivery unit with continuous fetal monitoring and transferred to covid unit as able. Critical care consulted due to patient having an VBG which was notable for pH of 7.2 and increasing new oxygen requirements upon arrival to our facility. Discussion with OB team was had and it was noted that despite patient's acidosis she was compensating respiratory wise significantly and CO2 was initially 18 with a bicarb of 7. At this time consideration for euglycemic vs starvation ketoacidosis was made due to picture being more metabolic acidosis with an appropriate respiratory compromise although acidosis was uncompensated for fully. Upon my evaluation of patient she is tachypneic and tachycardic although able to respond appropriately A+O x4. Patient is denying any chest pain at this time. Has had intermittent diarrhea and vomiting since onset of symptoms. Does have some nausea although no vomiting currently. Denies any dysuria. Patient does note that she has had increased thirst and increased frequency of urination. Admission VBG showed pH of 7.2, PCO2 of 18, HCO3 of 7 and Lactic of 0.8    Labs showed no leukocytosis,  normal electrolites (  ), normal kidney function tests, and normal LFT. Xray showed: Scattered bilateral pulmonary infiltrates consistent with pneumonia which has mildly worsened compared to prior study on 10/10. CT with PE protocol reviewed from previous facility. Small subsegmental pulmonary embolism noted. Pt was started on nonrebreather at 15 L in the labor and delivery unit.     Discussed with OB team and patient will transition to critical care team as primary, patient will be transferred to SICU unit and we will take over as primary with OB team following closely as well. PAST MEDICAL HISTORY:         Diagnosis Date    Anxiety     GERD (gastroesophageal reflux disease)     Migraines     Seasonal allergies          PAST SURGICAL HISTORY:         Procedure Laterality Date    FOOT SURGERY      nerve release, and plantar fascitis    HC  PICC POWERPIC TRIPLE  10/15/2021         MANDIBLE SURGERY      tooth implant    MOUTH SURGERY      WISDOM TOOTH EXTRACTION         ALLERGIES:      Allergies   Allergen Reactions    Vicodin [Hydrocodone-Acetaminophen]      nausea         HOME MEDS: :      Prior to Admission medications    Medication Sig Start Date End Date Taking? Authorizing Provider   doxyLAMINE succinate (UNISOM SLEEPTABS) 25 MG tablet Take 25 mg by mouth nightly   Yes Historical Provider, MD   Prenatal Vit-Fe Fumarate-FA (PRENATAL VITAMIN PO) Take 1 tablet by mouth daily    Yes Historical Provider, MD   omeprazole (PRILOSEC) 20 MG delayed release capsule  2/4/21   Historical Provider, MD       SOCIAL HISTORY:       TOBACCO:   reports that she has quit smoking. Her smoking use included cigarettes. She has never used smokeless tobacco.  ETOH:   reports previous alcohol use. DRUGS:  reports previous drug use. Drug: Marijuana.   OCCUPATION:  n/a     FAMILY HISTORY:          Problem Relation Age of Onset    Breast Cancer Maternal Grandmother         older than 48    Heart Attack Paternal Uncle     Stroke Maternal Aunt     Hypertension Father     No Known Problems Paternal Grandfather     Stroke Paternal Grandmother     No Known Problems Maternal Grandfather     Hypertension Mother     No Known Problems Brother     No Known Problems Brother        REVIEW OF SYSTEMS (ROS):     Review of Systems - unable to obtain due to patient     OBJECTIVE:     VITAL SIGNS:  /63   Pulse 86   Temp 96.3 °F (35.7 °C) (Esophageal)   Resp (!) 31   Ht 5' 4\" (1.626 m)   Wt 218 lb 4.1 oz (99 kg)   LMP 2021 (Approximate)   SpO2 93%   Breastfeeding Unknown   BMI 37.46 kg/m²   Tmax over 24 hours:  Temp (24hrs), Av.2 °F (35.7 °C), Min:94.5 °F (34.7 °C), Max:98.4 °F (36.9 °C)      Patient Vitals for the past 8 hrs:   BP Temp Temp src Pulse Resp SpO2 Weight   10/15/21 1100 104/63 -- -- 86 (!) 31 93 % --   10/15/21 1030 94/79 -- -- 80 27 95 % --   10/15/21 1000 (!) 99/56 -- -- 89 27 94 % --   10/15/21 0930 101/70 -- -- 90 29 94 % --   10/15/21 0900 97/66 -- -- 80 22 97 % --   10/15/21 0833 -- -- -- 84 29 99 % --   10/15/21 0830 101/66 -- -- 83 27 100 % --   10/15/21 0800 108/82 96.3 °F (35.7 °C) Esophageal 74 30 98 % --   10/15/21 0700 92/62 -- -- 77 24 95 % --   10/15/21 0600 92/67 96.3 °F (35.7 °C) Esophageal 77 25 95 % 218 lb 4.1 oz (99 kg)   10/15/21 0500 101/68 -- -- 97 28 99 % --         Intake/Output Summary (Last 24 hours) at 10/15/2021 1220  Last data filed at 10/15/2021 0800  Gross per 24 hour   Intake 8582.06 ml   Output 3480 ml   Net 5102.06 ml     Date 10/15/21 0000 - 10/15/21 2359   Shift 2158-7130 2321-8278 7085-1003 24 Hour Total   INTAKE   I.V.(mL/kg) 2976.5(30.1) 953(9.6)  3929. 5(39.7)   IV Piggyback(mL/kg) 143.7(1.5)   143.7(1.5)   Shift Total(mL/kg) 3120. 2(31.5) 953(9.6)  4073. 2(41.1)   OUTPUT   Urine(mL/kg/hr) 1100(1.4) 140  1240   Blood(mL/kg) 1500(15.2)   1500(15.2)   Shift Total(mL/kg) 9515(11.2) 140(1.4)  2740(27.7)   Weight (kg) 99 99 99 99     Wt Readings from Last 3 Encounters:   10/15/21 218 lb 4.1 oz (99 kg)   10/13/21 225 lb (102.1 kg)   10/05/21 225 lb 12.8 oz (102.4 kg)     Body mass index is 37.46 kg/m². PHYSICAL EXAM:  Constitutional: INTUBATED and sedated  EENT: neck supple with midline trachea. Neck: Supple, symmetrical, trachea midline, no jvd, skin normal  Respiratory: labored breathing, tachypnea, course breath sounds bilaterally, diminished and course in bilateral lower lung fields. Cardiovascular: tachycardic and regular rhythm, normal heart sounds, no murmur noted  Abdomen: soft, nontender, nondistended, no masses or organomegaly, post c- section scar with dressing.    Extremities:   no pedal edema, no clubbing or cyanosis    MEDICATIONS:  Scheduled Meds:   prenatal vitamin  1 tablet Oral Daily    Tdap-Dtap  0.5 mL IntraMUSCular Prior to discharge    [START ON 10/16/2021] cephALEXin  500 mg Oral 3 times per day    And    [START ON 10/16/2021] metroNIDAZOLE  500 mg Oral 3 times per day    lidocaine 1 % injection  5 mL IntraDERmal Once    sodium chloride flush  5-40 mL IntraVENous 2 times per day    docusate  100 mg Oral BID    sennosides-docusate sodium  2 tablet Oral Daily    insulin lispro  0-12 Units SubCUTAneous Q6H    dexamethasone  6 mg IntraVENous Q24H     Continuous Infusions:   propofol 50 mcg/kg/min (10/15/21 1155)    fentaNYL 125 mcg/hr (10/15/21 1154)    sodium chloride      midazolam      cisatracurium (NIMBEX) infusion      dextrose      sodium chloride 5 mL/hr at 10/14/21 0700    heparin (PORCINE) Infusion Stopped (10/14/21 2330)    IV infusion builder 100 mL/hr at 10/15/21 0011     PRN Meds:   sodium chloride flush, 10 mL, PRN  naloxone, 0.4 mg, PRN  ondansetron, 4 mg, Q6H PRN  diphenhydrAMINE, 25 mg, Q6H PRN  simethicone, 80 mg, Q6H PRN  polyethylene glycol, 17 g, Daily PRN  magnesium hydroxide, 30 mL, Daily PRN  bisacodyl, 10 mg, Daily PRN  lanolin, , Q1H PRN  LORazepam, 2 mg, Q5 Min PRN  sodium chloride flush, 5-40 mL, PRN  sodium chloride, 25 mL, PRN  glucose, 15 g, PRN  dextrose, 12.5 g, PRN  glucagon (rDNA), 1 mg, PRN  dextrose, 100 mL/hr, PRN  heparin (porcine), 80 Units/kg, PRN  heparin (porcine), 40 Units/kg, PRN  ondansetron, 4 mg, Q8H PRN   Or  ondansetron, 4 mg, Q6H PRN  acetaminophen, 650 mg, Q4H PRN          ABGs:   Lab Results   Component Value Date    [id number] NOT REPORTED 10/15/2021    FIO2 100.0 10/15/2021       DATA:  Complete Blood Count: Recent Labs     10/14/21  2326 10/14/21  2326 10/15/21  0225 10/15/21  0539 10/15/21  1135   WBC 10.5  --  16.2* 13.9*  --    RBC 3.76*  --  3.60* 3.31*  --    HGB 11.0*   < > 10.8* 9.7* 9.7*   HCT 33.4*   < > 32.6* 29.7* 30.1*   MCV 88.8  --  90.6 89.7  --    MCH 29.3  --  30.0 29.3  --    MCHC 32.9  --  33.1 32.7  --    RDW 14.1  --  14.0 14.0  --      --  317 257  --    MPV 8.9  --  8.9 9.8  --     < > = values in this interval not displayed. Last 3 Blood Glucose:   Recent Labs     10/13/21  1050 10/13/21  2038 10/14/21  0303 10/14/21  0558 10/14/21  1742 10/14/21  2326 10/15/21  0225 10/15/21  0539   GLUCOSE 85 127* 146* 168* 158* 145* 164* 153*        PT/INR:    Lab Results   Component Value Date    PROTIME 10.3 10/15/2021    INR 1.0 10/15/2021     PTT:    Lab Results   Component Value Date    APTT 25.4 10/15/2021       Comprehensive Metabolic Profile:   Recent Labs     10/13/21  2038 10/14/21  0303 10/14/21  2326 10/15/21  0225 10/15/21  0539      < > 134* 135 135   K 3.9   < > 3.4* 3.2* 4.1   *   < > 107 106 106   CO2 7*   < > 16* 16* 17*   BUN 3*   < > 5* 4* 4*   CREATININE 0.37*   < > 0.25* 0.25* 0.28*   GLUCOSE 127*   < > 145* 164* 153*   CALCIUM 8.4*   < > 8.5* 7.6* 7.6*   PROT 6.3*  --  5.7*  --  4.9*   LABALBU 2.9*  --  2.7*  --  2.4*   BILITOT 0.66  --  0.44  --  0.44   ALKPHOS 181*  --  161*  --  138*   AST 30  --  37*  --  38*   ALT 45*  --  35*  --  34*    < > = values in this interval not displayed.       Magnesium:   Lab Results   Component Value Date    MG 2.2 10/15/2021    MG 1.8 10/14/2021    MG 1.7 10/14/2021     Phosphorus:   Lab Results   Component Value Date    PHOS 2.3 10/15/2021    PHOS 2.5 10/14/2021     Ionized Calcium:   Lab Results   Component Value Date    CAION 1.08 10/15/2021    CAION 1.21 10/14/2021    CAION 1.19 10/14/2021        Urinalysis:   Lab Results   Component Value Date    NITRU NEGATIVE 10/13/2021    COLORU Dark Yellow 10/13/2021    PHUR 6.0 10/13/2021    WBCUA 2 TO 5 10/13/2021    RBCUA 5 TO 10 10/13/2021    MUCUS NOT REPORTED 10/13/2021    TRICHOMONAS NOT REPORTED 10/13/2021    YEAST NOT REPORTED 10/13/2021    BACTERIA NOT REPORTED 10/13/2021    SPECGRAV 1.046 10/13/2021    LEUKOCYTESUR NEGATIVE 10/13/2021    UROBILINOGEN Normal 10/13/2021    BILIRUBINUR NEGATIVE  Verified by ictotest. 10/13/2021    GLUCOSEU NEGATIVE 10/13/2021    KETUA LARGE 10/13/2021    AMORPHOUS NOT REPORTED 10/13/2021       HgBA1c:    Lab Results   Component Value Date    LABA1C 5.2 10/14/2021     TSH:  No results found for: TSH    Lactic Acid:   Lab Results   Component Value Date    LACTA 0.8 10/10/2021      Troponin: No results for input(s): TROPONINI in the last 72 hours. Radiological imaging  XR CHEST (SINGLE VIEW FRONTAL)    Result Date: 10/10/2021  EXAMINATION: ONE XRAY VIEW OF THE CHEST 10/10/2021 12:34 pm COMPARISON: None. HISTORY: ORDERING SYSTEM PROVIDED HISTORY: sob cough poss covid TECHNOLOGIST PROVIDED HISTORY: sob cough poss covid FINDINGS: Cardiomediastinal silhouette within normal limits. Bilateral ill-defined pulmonary opacities. No pneumothorax. No pleural effusion. Bilateral ill-defined pulmonary opacities suggesting COVID pneumonia     XR CHEST PORTABLE    Result Date: 10/13/2021  EXAMINATION: ONE XRAY VIEW OF THE CHEST 10/13/2021 11:00 am COMPARISON: Chest radiograph performed 10/10/2021. HISTORY: ORDERING SYSTEM PROVIDED HISTORY: dyspnea TECHNOLOGIST PROVIDED HISTORY: dyspnea FINDINGS: There are scattered bilateral infiltrates that are mildly worse. There is no pneumothorax. The heart is prominent. The upper abdomen unremarkable. The extrathoracic soft tissues are unremarkable. Scattered bilateral pulmonary infiltrates consistent with pneumonia and this is mildly worse compared to prior.      CT CHEST PULMONARY EMBOLISM W CONTRAST    Result Date: 10/13/2021  EXAMINATION: CTA OF THE CHEST 10/13/2021 2:16 pm TECHNIQUE: CTA of the chest was ketoacidosis    Severe malnutrition ([facility] [address]  Resolved Problems:    * No resolved hospital problems. *      PLAN:     ICU PROPHYLAXIS:  Stress ulcer:  [] PPI Agent  [] [id number] [] Sucralfate  [] Other:  VTE:   [] Enoxaparin  [x] Heparin gtt  [x] EPC Cuffs    NUTRITION:  [x] NPO  [x] Tube Feeding  [] TPN  [x] PO    CONSULTATION NEEDED:  [] No   [x] Yes - OB and ID     HOME MEDICATIONS RECONCILED: [x] No  [] Yes    FAMILY UPDATED:    [] No   [x] Yes     ADDITIONAL PLAN:  -  COVID- 19    - Symptoms began on 10/1    - Tested positive on 10/10    - Latate dehydrogenase elevated at 389    - Ferritin elevated at 264    - Fibrinogen elevated at 595    -  consulted ID for recommendations    - Received Betamethasone 12 mg for fetal lung maturity x's 1     - OB team recommending Betamethasone 12 mg for one more dose, 24 hours after initial   - After Betamethasone course will proceed with Decadron 6 mg daily of 10 days, as per ID    Acute Hypoxic Respiratory Failure    - Initial saturations in the high 80's on non-rebreather   - Currently intubated,sedated. Metabolic acidosis with inadequate respiratory compensation   - Starvation ketoacidosis suspected vs euglycemic acidosis    -resolving   Subsegmental PE   -Continue heparin gtt    - CT from previous facility reviewed - small subsegmental PE noted    - PTT q6 hours    Pregnancy at 26 weeks and 3 days ,s/p emergent  on 10/15/21   - Discussed with OB team, they will be following closely     Today, patient is sedated with versed. Start to paralyze with nimbex. Restart on high dose heparin drip, f/u on h&H. Plan to put the patient on ecmo. ABG in metabolic acidosis, on bicarb fluids. On keflex and flagyl. PICC line ordered. Transfer initiated and accepted to [facility]'s for ecmo. Getting a stat echo. Patient to continue care in the ICU for close monitoring of respiratory status and metabolic acidosis.      Clarissa Greenfield MD,  ICU resident   [facility]  10/15/2021 12:20 PM    Please note that this chart was generated using voice recognition Dragon dictation software. Although every effort was made to ensure the accuracy of this automated transcription, some errors in transcription may have occurred. Attending Physician Statement  I have discussed the care of Dejon Lambert, including pertinent history and exam findings,  with the resident. I have seen and examined the patient and the key elements of all parts of the encounter have been performed by me. I agree with the assessment, plan and orders as documented by the resident with additions . Overnight events noted   Post c section   ARDS due to covid -19 pneumonia - tv 8 ml/kg pbw , fio2 85 % - 90 % peep 14 . Plateau pressure - 32   Will start nimbex   And prone positioning   D/w Dr Shanelle Sifuentes , Dr Antonina Cruz regarding ECMO . Advised transfer since ecmo service not available at present at [facility]   Reached out to [address] [place] [address]   ecmo available at [facility] . Transfer initiated . Geff updated . Unable to place joanna 4 attempts for left radial artery     Total critical care time caring for this patient with life threatening, unstable organ failure, including direct patient contact, management of life support systems, review of data including imaging and labs, discussions with other team members and physicians at least 61 Min so far today, excluding procedures. Treatment plan Discussed with nursing staff in detail , all questions answered . Electronically signed by Yina Oliver MD on   10/15/21 at 6:28 PM EDT    Please note that this chart was generated using voice recognition Dragon dictation software. Although every effort was made to ensure the accuracy of this automated transcription, some errors in transcription may have occurred.

## 2021-10-15 NOTE — H&P
Was informed by the RN that they have a bed a [facility] and that they patient will need to go life flight. [facility] SALOMON called and informed me that lif flight is not running today d/t rain and that the patient will have to go ground transport. Spoke to Urmila Augustin at [facility], she has faxed forms that are needed. Information faxed to [facility]. RN informs me taht they will  patient in 2-3 hours.   I have asked the nurse to have physician please do a discharge readmit so that [facility] can open our chart once patient arrives

## 2021-10-16 ENCOUNTER — APPOINTMENT (OUTPATIENT)
Dept: GENERAL RADIOLOGY | Age: 31
DRG: 003 | End: 2021-10-16
Payer: COMMERCIAL

## 2021-10-16 ENCOUNTER — ANESTHESIA EVENT (OUTPATIENT)
Dept: ICU | Age: 31
DRG: 003 | End: 2021-10-16
Payer: COMMERCIAL

## 2021-10-16 ENCOUNTER — ANESTHESIA (OUTPATIENT)
Dept: ICU | Age: 31
DRG: 003 | End: 2021-10-16
Payer: COMMERCIAL

## 2021-10-16 PROBLEM — J80 ACUTE RESPIRATORY DISTRESS SYNDROME (ARDS) DUE TO COVID-19 VIRUS (HCC): Status: ACTIVE | Noted: 2021-10-15

## 2021-10-16 LAB
ABSOLUTE EOS #: 0 K/UL (ref 0–0.4)
ABSOLUTE IMMATURE GRANULOCYTE: 0.38 K/UL (ref 0–0.3)
ABSOLUTE LYMPH #: 1.05 K/UL (ref 1–4.8)
ABSOLUTE MONO #: 0.29 K/UL (ref 0.1–0.8)
ALBUMIN SERPL-MCNC: 2.2 G/DL (ref 3.5–5.2)
ALBUMIN/GLOBULIN RATIO: 0.9 (ref 1–2.5)
ALLEN TEST: ABNORMAL
ALLEN TEST: ABNORMAL
ALLEN TEST: POSITIVE
ALP BLD-CCNC: 120 U/L (ref 35–104)
ALT SERPL-CCNC: 39 U/L (ref 5–33)
ANION GAP SERPL CALCULATED.3IONS-SCNC: 11 MMOL/L (ref 9–17)
AST SERPL-CCNC: 43 U/L
BASOPHILS # BLD: 0 % (ref 0–2)
BASOPHILS ABSOLUTE: 0 K/UL (ref 0–0.2)
BILIRUB SERPL-MCNC: 0.26 MG/DL (ref 0.3–1.2)
BILIRUBIN DIRECT: 0.28 MG/DL
BILIRUBIN, INDIRECT: ABNORMAL MG/DL (ref 0–1)
BUN BLDV-MCNC: 7 MG/DL (ref 6–20)
BUN/CREAT BLD: ABNORMAL (ref 9–20)
C-REACTIVE PROTEIN: 30.8 MG/L (ref 0–5)
CALCITONIN LEVEL: <2 PG/ML (ref 0–5.1)
CALCIUM IONIZED: 1.08 MMOL/L (ref 1.13–1.33)
CALCIUM SERPL-MCNC: 7.7 MG/DL (ref 8.6–10.4)
CHLORIDE BLD-SCNC: 103 MMOL/L (ref 98–107)
CO2: 19 MMOL/L (ref 20–31)
CREAT SERPL-MCNC: 0.33 MG/DL (ref 0.5–0.9)
D-DIMER QUANTITATIVE: 0.53 MG/L FEU
DIFFERENTIAL TYPE: ABNORMAL
EOSINOPHILS RELATIVE PERCENT: 0 % (ref 1–4)
FERRITIN: 200 UG/L (ref 13–150)
FIO2: 65
FIO2: 80
FIO2: 85
GFR AFRICAN AMERICAN: >60 ML/MIN
GFR NON-AFRICAN AMERICAN: >60 ML/MIN
GFR SERPL CREATININE-BSD FRML MDRD: ABNORMAL ML/MIN/{1.73_M2}
GFR SERPL CREATININE-BSD FRML MDRD: ABNORMAL ML/MIN/{1.73_M2}
GLOBULIN: ABNORMAL G/DL (ref 1.5–3.8)
GLUCOSE BLD-MCNC: 110 MG/DL (ref 65–105)
GLUCOSE BLD-MCNC: 113 MG/DL (ref 74–100)
GLUCOSE BLD-MCNC: 114 MG/DL (ref 65–105)
GLUCOSE BLD-MCNC: 120 MG/DL (ref 70–99)
GLUCOSE BLD-MCNC: 129 MG/DL (ref 74–100)
GLUCOSE BLD-MCNC: 97 MG/DL (ref 74–100)
HCT VFR BLD CALC: 27 % (ref 36.3–47.1)
HEMOGLOBIN: 8.6 G/DL (ref 11.9–15.1)
IMMATURE GRANULOCYTES: 4 %
LYMPHOCYTES # BLD: 11 % (ref 24–44)
MAGNESIUM: 2.3 MG/DL (ref 1.6–2.6)
MCH RBC QN AUTO: 30 PG (ref 25.2–33.5)
MCHC RBC AUTO-ENTMCNC: 31.9 G/DL (ref 28.4–34.8)
MCV RBC AUTO: 94.1 FL (ref 82.6–102.9)
MODE: ABNORMAL
MONOCYTES # BLD: 3 % (ref 1–7)
MORPHOLOGY: NORMAL
NEGATIVE BASE EXCESS, ART: ABNORMAL (ref 0–2)
NRBC AUTOMATED: 0 PER 100 WBC
O2 DEVICE/FLOW/%: ABNORMAL
PARTIAL THROMBOPLASTIN TIME: 54.3 SEC (ref 20.5–30.5)
PARTIAL THROMBOPLASTIN TIME: 63.5 SEC (ref 20.5–30.5)
PARTIAL THROMBOPLASTIN TIME: >120 SEC (ref 20.5–30.5)
PATIENT TEMP: ABNORMAL
PDW BLD-RTO: 14.3 % (ref 11.8–14.4)
PHOSPHORUS: 3.3 MG/DL (ref 2.6–4.5)
PLATELET # BLD: 233 K/UL (ref 138–453)
PLATELET ESTIMATE: ABNORMAL
PMV BLD AUTO: 9.4 FL (ref 8.1–13.5)
POC HCO3: 26 MMOL/L (ref 21–28)
POC HCO3: 27.5 MMOL/L (ref 21–28)
POC HCO3: 29.3 MMOL/L (ref 21–28)
POC O2 SATURATION: 100 % (ref 94–98)
POC O2 SATURATION: 91 % (ref 94–98)
POC O2 SATURATION: 99 % (ref 94–98)
POC PCO2 TEMP: ABNORMAL MM HG
POC PCO2: 44.1 MM HG (ref 35–48)
POC PCO2: 50.4 MM HG (ref 35–48)
POC PCO2: 55.1 MM HG (ref 35–48)
POC PH TEMP: ABNORMAL
POC PH: 7.32 (ref 7.35–7.45)
POC PH: 7.33 (ref 7.35–7.45)
POC PH: 7.4 (ref 7.35–7.45)
POC PO2 TEMP: ABNORMAL MM HG
POC PO2: 161.6 MM HG (ref 83–108)
POC PO2: 258.1 MM HG (ref 83–108)
POC PO2: 68 MM HG (ref 83–108)
POSITIVE BASE EXCESS, ART: 0 (ref 0–3)
POSITIVE BASE EXCESS, ART: 2 (ref 0–3)
POSITIVE BASE EXCESS, ART: 3 (ref 0–3)
POTASSIUM SERPL-SCNC: 3.8 MMOL/L (ref 3.7–5.3)
RBC # BLD: 2.87 M/UL (ref 3.95–5.11)
RBC # BLD: ABNORMAL 10*6/UL
SAMPLE SITE: ABNORMAL
SEG NEUTROPHILS: 82 % (ref 36–66)
SEGMENTED NEUTROPHILS ABSOLUTE COUNT: 7.78 K/UL (ref 1.8–7.7)
SODIUM BLD-SCNC: 133 MMOL/L (ref 135–144)
TCO2 (CALC), ART: ABNORMAL MMOL/L (ref 22–29)
TOTAL PROTEIN: 4.6 G/DL (ref 6.4–8.3)
TRIGL SERPL-MCNC: 480 MG/DL
WBC # BLD: 9.5 K/UL (ref 3.5–11.3)
WBC # BLD: ABNORMAL 10*3/UL

## 2021-10-16 PROCEDURE — 36415 COLL VENOUS BLD VENIPUNCTURE: CPT

## 2021-10-16 PROCEDURE — 84100 ASSAY OF PHOSPHORUS: CPT

## 2021-10-16 PROCEDURE — 99291 CRITICAL CARE FIRST HOUR: CPT | Performed by: INTERNAL MEDICINE

## 2021-10-16 PROCEDURE — 94003 VENT MGMT INPAT SUBQ DAY: CPT

## 2021-10-16 PROCEDURE — 6360000002 HC RX W HCPCS: Performed by: STUDENT IN AN ORGANIZED HEALTH CARE EDUCATION/TRAINING PROGRAM

## 2021-10-16 PROCEDURE — 2700000000 HC OXYGEN THERAPY PER DAY

## 2021-10-16 PROCEDURE — 2580000003 HC RX 258: Performed by: STUDENT IN AN ORGANIZED HEALTH CARE EDUCATION/TRAINING PROGRAM

## 2021-10-16 PROCEDURE — 36620 INSERTION CATHETER ARTERY: CPT | Performed by: NURSE ANESTHETIST, CERTIFIED REGISTERED

## 2021-10-16 PROCEDURE — 2000000000 HC ICU R&B

## 2021-10-16 PROCEDURE — 6370000000 HC RX 637 (ALT 250 FOR IP): Performed by: STUDENT IN AN ORGANIZED HEALTH CARE EDUCATION/TRAINING PROGRAM

## 2021-10-16 PROCEDURE — 86140 C-REACTIVE PROTEIN: CPT

## 2021-10-16 PROCEDURE — 71045 X-RAY EXAM CHEST 1 VIEW: CPT

## 2021-10-16 PROCEDURE — 99233 SBSQ HOSP IP/OBS HIGH 50: CPT | Performed by: INTERNAL MEDICINE

## 2021-10-16 PROCEDURE — 2500000003 HC RX 250 WO HCPCS: Performed by: STUDENT IN AN ORGANIZED HEALTH CARE EDUCATION/TRAINING PROGRAM

## 2021-10-16 PROCEDURE — 80048 BASIC METABOLIC PNL TOTAL CA: CPT

## 2021-10-16 PROCEDURE — 36620 INSERTION CATHETER ARTERY: CPT

## 2021-10-16 PROCEDURE — 85379 FIBRIN DEGRADATION QUANT: CPT

## 2021-10-16 PROCEDURE — 89220 SPUTUM SPECIMEN COLLECTION: CPT

## 2021-10-16 PROCEDURE — 85025 COMPLETE CBC W/AUTO DIFF WBC: CPT

## 2021-10-16 PROCEDURE — 99254 IP/OBS CNSLTJ NEW/EST MOD 60: CPT | Performed by: NURSE PRACTITIONER

## 2021-10-16 PROCEDURE — 82728 ASSAY OF FERRITIN: CPT

## 2021-10-16 PROCEDURE — 82330 ASSAY OF CALCIUM: CPT

## 2021-10-16 PROCEDURE — 87070 CULTURE OTHR SPECIMN AEROBIC: CPT

## 2021-10-16 PROCEDURE — 36600 WITHDRAWAL OF ARTERIAL BLOOD: CPT

## 2021-10-16 PROCEDURE — 37799 UNLISTED PX VASCULAR SURGERY: CPT

## 2021-10-16 PROCEDURE — 85730 THROMBOPLASTIN TIME PARTIAL: CPT

## 2021-10-16 PROCEDURE — 84478 ASSAY OF TRIGLYCERIDES: CPT

## 2021-10-16 PROCEDURE — 82947 ASSAY GLUCOSE BLOOD QUANT: CPT

## 2021-10-16 PROCEDURE — 87205 SMEAR GRAM STAIN: CPT

## 2021-10-16 PROCEDURE — 82803 BLOOD GASES ANY COMBINATION: CPT

## 2021-10-16 PROCEDURE — 94761 N-INVAS EAR/PLS OXIMETRY MLT: CPT

## 2021-10-16 PROCEDURE — 80076 HEPATIC FUNCTION PANEL: CPT

## 2021-10-16 PROCEDURE — 83735 ASSAY OF MAGNESIUM: CPT

## 2021-10-16 RX ORDER — SODIUM CHLORIDE, SODIUM LACTATE, POTASSIUM CHLORIDE, CALCIUM CHLORIDE 600; 310; 30; 20 MG/100ML; MG/100ML; MG/100ML; MG/100ML
INJECTION, SOLUTION INTRAVENOUS CONTINUOUS
Status: DISCONTINUED | OUTPATIENT
Start: 2021-10-16 | End: 2021-10-19

## 2021-10-16 RX ORDER — SODIUM CHLORIDE, SODIUM LACTATE, POTASSIUM CHLORIDE, AND CALCIUM CHLORIDE .6; .31; .03; .02 G/100ML; G/100ML; G/100ML; G/100ML
500 INJECTION, SOLUTION INTRAVENOUS ONCE
Status: COMPLETED | OUTPATIENT
Start: 2021-10-16 | End: 2021-10-16

## 2021-10-16 RX ADMIN — PROPOFOL 50 MCG/KG/MIN: 10 INJECTION, EMULSION INTRAVENOUS at 21:11

## 2021-10-16 RX ADMIN — Medication: at 00:26

## 2021-10-16 RX ADMIN — Medication 1 TABLET: at 08:16

## 2021-10-16 RX ADMIN — PROPOFOL 50 MCG/KG/MIN: 10 INJECTION, EMULSION INTRAVENOUS at 13:30

## 2021-10-16 RX ADMIN — PROPOFOL 50 MCG/KG/MIN: 10 INJECTION, EMULSION INTRAVENOUS at 10:01

## 2021-10-16 RX ADMIN — CISATRACURIUM BESYLATE 2.5 MCG/KG/MIN: 200 INJECTION, SOLUTION INTRAVENOUS at 17:27

## 2021-10-16 RX ADMIN — HEPARIN SODIUM AND DEXTROSE 19 UNITS/KG/HR: 10000; 5 INJECTION INTRAVENOUS at 21:12

## 2021-10-16 RX ADMIN — PROPOFOL 50 MCG/KG/MIN: 10 INJECTION, EMULSION INTRAVENOUS at 06:32

## 2021-10-16 RX ADMIN — PROPOFOL 50 MCG/KG/MIN: 10 INJECTION, EMULSION INTRAVENOUS at 03:59

## 2021-10-16 RX ADMIN — SODIUM CHLORIDE, PRESERVATIVE FREE 10 ML: 5 INJECTION INTRAVENOUS at 08:17

## 2021-10-16 RX ADMIN — Medication 200 MCG/HR: at 18:51

## 2021-10-16 RX ADMIN — HEPARIN SODIUM AND DEXTROSE 20 UNITS/KG/HR: 10000; 5 INJECTION INTRAVENOUS at 04:00

## 2021-10-16 RX ADMIN — CISATRACURIUM BESYLATE 2.5 MCG/KG/MIN: 200 INJECTION, SOLUTION INTRAVENOUS at 05:08

## 2021-10-16 RX ADMIN — Medication 5 MG/HR: at 05:44

## 2021-10-16 RX ADMIN — PROPOFOL 50 MCG/KG/MIN: 10 INJECTION, EMULSION INTRAVENOUS at 00:31

## 2021-10-16 RX ADMIN — PROPOFOL 50 MCG/KG/MIN: 10 INJECTION, EMULSION INTRAVENOUS at 17:26

## 2021-10-16 RX ADMIN — DOCUSATE SODIUM 50MG AND SENNOSIDES 8.6MG 2 TABLET: 8.6; 5 TABLET, FILM COATED ORAL at 08:16

## 2021-10-16 RX ADMIN — Medication 200 MCG/HR: at 13:29

## 2021-10-16 RX ADMIN — HEPARIN SODIUM 3950 UNITS: 1000 INJECTION, SOLUTION INTRAVENOUS; SUBCUTANEOUS at 15:39

## 2021-10-16 RX ADMIN — SODIUM CHLORIDE, POTASSIUM CHLORIDE, SODIUM LACTATE AND CALCIUM CHLORIDE: 600; 310; 30; 20 INJECTION, SOLUTION INTRAVENOUS at 11:42

## 2021-10-16 RX ADMIN — Medication 200 MCG/HR: at 08:02

## 2021-10-16 RX ADMIN — Medication 200 MCG/HR: at 02:15

## 2021-10-16 RX ADMIN — DEXAMETHASONE SODIUM PHOSPHATE 6 MG: 10 INJECTION INTRAMUSCULAR; INTRAVENOUS at 08:16

## 2021-10-16 RX ADMIN — SODIUM CHLORIDE, POTASSIUM CHLORIDE, SODIUM LACTATE AND CALCIUM CHLORIDE 500 ML: 600; 310; 30; 20 INJECTION, SOLUTION INTRAVENOUS at 08:16

## 2021-10-16 RX ADMIN — PROPOFOL 50 MCG/KG/MIN: 10 INJECTION, EMULSION INTRAVENOUS at 21:10

## 2021-10-16 RX ADMIN — Medication 100 MG: at 08:16

## 2021-10-16 RX ADMIN — SODIUM CHLORIDE, PRESERVATIVE FREE 10 ML: 5 INJECTION INTRAVENOUS at 21:12

## 2021-10-16 RX ADMIN — Medication 100 MG: at 21:12

## 2021-10-16 ASSESSMENT — PULMONARY FUNCTION TESTS
PIF_VALUE: 27
PIF_VALUE: 26
PIF_VALUE: 32
PIF_VALUE: 30
PIF_VALUE: 33
PIF_VALUE: 33

## 2021-10-16 ASSESSMENT — ENCOUNTER SYMPTOMS: TACHYPNEA: 1

## 2021-10-16 NOTE — PROGRESS NOTES
[facility] - Initial Consult Note COVID 19 Patient  Today's Date and Time: 10/16/2021, 2:35 PM    Impression :   COVID 23 Suspect  COVID 19 Confirmed Infection  Covid tests:  10/10/21: Positive  26 weeks gestation pregnancy  S/P C Section 10-15-21  Acute hypoxic respiratory distress  Pulmonary embolism LLL  Metabolic acidosis with euglycemic starvation    Recommendations:   Antibiotic treatment:  Monitor off antibiotics  Covid Rx:  Remdesivir-Out of window  Decadron-Initiated 10/14/21. Stop date 10-24-21  Prophylactic anticoagulation  DEFER barcitinib and  Actemra because of pregnancy  Intubated 10/14  Plans for ECMO        Medical Decision Making/Summary/Discussion:10/16/2021     Patient admitted with suspected COVID 19 infection  Covid test confirmed positive. Infection Control Recommendations   [place] Precautions  Airborne isolation  Droplet plus Isolation      Start of illness 10/1/21  Stop isolation after 10/20  Antimicrobial Stewardship Recommendations       Discontinuation of therapy  Coordination of Outpatient Care:   Estimated Length of IV antimicrobials:TBD  Patient will need Midline Catheter Insertion: TBD  Patient will need PICC line Insertion: No  Patient will need: Home IV , [place],  SNF,  LTAC:TBD  Patient will need outpatient wound care:No    Chief complaint/reason for consultation:   Concern for COVID infection      History of Present Illness:   Omid Hidalgo is a 27y.o.-year-old  female who was initially admitted on 10/13/2021. Patient seen at the request of Dr. Noa Senior:    Patient, who is 26 weeks pregnant, initially presented to [facility] ED on 10/10/21 presented through ER with complaints of productive cough with yellow sputum X 9 days & shortness of breath with pleuritic chest pain that started the day before. She ws also experiencing decreased appetite because of loss of taste and smell.      Work-up at that time history, and I have updated the database accordingly. Past Medical History:     Past Medical History:   Diagnosis Date    Anxiety     GERD (gastroesophageal reflux disease)     Migraines     Seasonal allergies        Past Surgical  History:     Past Surgical History:   Procedure Laterality Date    FOOT SURGERY      nerve release, and plantar fascitis      PIC POWERPIC TRIPLE  10/15/2021         MANDIBLE SURGERY      tooth implant    MOUTH SURGERY      WISDOM TOOTH EXTRACTION         Medications:      prenatal vitamin  1 tablet Oral Daily    Tdap-Dtap  0.5 mL IntraMUSCular Prior to discharge    lidocaine 1 % injection  5 mL IntraDERmal Once    sodium chloride flush  5-40 mL IntraVENous 2 times per day    docusate  100 mg Oral BID    sennosides-docusate sodium  2 tablet Oral Daily    insulin lispro  0-12 Units SubCUTAneous Q6H    dexamethasone  6 mg IntraVENous Q24H       Social History:     Social History     Socioeconomic History    Marital status:      Spouse name: Not on file    Number of children: Not on file    Years of education: Not on file    Highest education level: Not on file   Occupational History    Not on file   Tobacco Use    Smoking status: Former Smoker     Types: Cigarettes    Smokeless tobacco: Never Used   Vaping Use    Vaping Use: Former    Substances: Nicotine   Substance and Sexual Activity    Alcohol use: Not Currently    Drug use: Not Currently     Types: Marijuana     Comment: last smoked May 2021    Sexual activity: Yes   Other Topics Concern    Not on file   Social History Narrative    Not on file     Social Determinants of Health     Financial Resource Strain:     Difficulty of Paying Living Expenses:    Food Insecurity:     Worried About Running Out of Food in the Last Year:     [address] in the Last Year:    Transportation Needs:     Lack of Transportation (Medical):      Lack of Transportation (Non-Medical):    Physical Activity:     Days of Exercise per Week:     Minutes of Exercise per Session:    Stress:     Feeling of Stress :    Social Connections:     Frequency of Communication with Friends and Family:     Frequency of Social Gatherings with Friends and Family:     Attends [employer] Services:     Active Member of Clubs or Organizations:     Attends Club or Organization Meetings:     Marital Status:    Intimate Partner Violence:     Fear of Current or Ex-Partner:     Emotionally Abused:     Physically Abused:     Sexually Abused:        Family History:     Family History   Problem Relation Age of Onset    Breast Cancer Maternal Grandmother         older than 48    Heart Attack Paternal Uncle     Stroke Maternal Aunt     Hypertension Father     No Known Problems Paternal Grandfather     Stroke Paternal Grandmother     No Known Problems Maternal Grandfather     Hypertension Mother     No Known Problems Brother     No Known Problems Brother         Allergies:   Vicodin [hydrocodone-acetaminophen]     Review of Systems:     Review of Systems   Unable to perform ROS: Intubated          Physical Examination :     Patient Vitals for the past 8 hrs:   BP Temp Temp src Pulse Resp SpO2   10/16/21 1300 121/66 -- -- 91 24 96 %   10/16/21 1230 123/72 -- -- 95 24 95 %   10/16/21 1200 115/61 98.4 °F (36.9 °C) Esophageal 84 24 96 %   10/16/21 1130 (!) 112/59 -- -- 85 24 94 %   10/16/21 1125 -- -- -- 88 20 94 %   10/16/21 1100 119/62 -- -- 83 24 95 %   10/16/21 1030 112/60 -- -- 87 24 94 %   10/16/21 1000 113/62 -- -- 84 24 96 %   10/16/21 0930 108/61 -- -- 80 24 97 %   10/16/21 0900 108/61 -- -- 78 24 98 %   10/16/21 0830 106/62 -- -- 74 24 98 %   10/16/21 0801 -- -- -- -- 24 99 %   10/16/21 0800 108/63 96.3 °F (35.7 °C) Axillary 73 24 99 %   10/16/21 0757 -- -- -- 83 24 99 %   10/16/21 0730 104/63 -- -- 71 24 99 %   10/16/21 0700 (!) 103/58 -- -- 74 24 99 %     Physical Exam  Constitutional:       General: She is not in acute distress. Appearance: She is ill-appearing. Comments: Intubated sedated   HENT:      Head: Normocephalic and atraumatic. Nose: Nose normal.   Eyes:      General: No scleral icterus. Conjunctiva/sclera: Conjunctivae normal.      Pupils: Pupils are equal, round, and reactive to light. Cardiovascular:      Rate and Rhythm: Normal rate and regular rhythm. Heart sounds: Normal heart sounds. No murmur heard. Pulmonary:      Breath sounds: No stridor. No rhonchi. Abdominal:      Palpations: There is no mass. Hernia: No hernia is present. Genitourinary:     Comments: Urine bong  Musculoskeletal:         General: No swelling or deformity. Cervical back: Neck supple. No rigidity. Skin:     Coloration: Skin is not jaundiced or pale. Neurological:      Comments: sedated   Psychiatric:      Comments: sedated          Medical Decision Making -Laboratory:   I have independently reviewed/ordered the following labs:    CBC with Differential:   Recent Labs     10/15/21  0539 10/15/21  0539 10/15/21  1135 10/16/21  0454   WBC 13.9*  --   --  9.5   HGB 9.7*   < > 9.7* 8.6*   HCT 29.7*   < > 30.1* 27.0*     --   --  233   LYMPHOPCT 6*  --   --  11*   MONOPCT 5  --   --  3    < > = values in this interval not displayed. BMP:   Recent Labs     10/15/21  0539 10/16/21  0454    133*   K 4.1 3.8    103   CO2 17* 19*   BUN 4* 7   CREATININE 0.28* 0.33*   MG 2.2 2.3     Hepatic Function Panel:   Recent Labs     10/15/21  0539 10/16/21  0454   PROT 4.9* 4.6*   LABALBU 2.4* 2.2*   BILIDIR 0.33* 0.28   IBILI 0.11 CANNOT BE CALCULATED   BILITOT 0.44 0.26*   ALKPHOS 138* 120*   ALT 34* 39*   AST 38* 43*     No results for input(s): RPR in the last 72 hours. No results for input(s): HIV in the last 72 hours. No results for input(s): BC in the last 72 hours.   Lab Results   Component Value Date    MUCUS NOT REPORTED 10/13/2021    RBC 2.87 10/16/2021    TRICHOMONAS NOT REPORTED 10/13/2021    WBC 9.5 10/16/2021    YEAST NOT REPORTED 10/13/2021    TURBIDITY Clear 10/13/2021     Lab Results   Component Value Date    CREATININE 0.33 10/16/2021    GLUCOSE 120 10/16/2021       Medical Decision Making-Imaging:     Narrative   EXAMINATION:   CTA OF THE CHEST 10/13/2021 2:16 pm       TECHNIQUE:   CTA of the chest was performed after the administration of intravenous   contrast.  Multiplanar reformatted images are provided for review.  MIP   images are provided for review. Dose modulation, iterative reconstruction,   and/or weight based adjustment of the mA/kV was utilized to reduce the   radiation dose to as low as reasonably achievable.       COMPARISON:   Chest x-rays over the last few days. .       HISTORY:   ORDERING SYSTEM PROVIDED HISTORY: sob, tachy, 32 WEEKS PREGNANT, Spoke with   Radiologist   TECHNOLOGIST PROVIDED HISTORY:   sob, tachy, 32 WEEKS PREGNANT, Spoke with Radiologist   Decision Support Exception - unselect if not a suspected or confirmed   emergency medical condition->Emergency Medical Condition (MA)       FINDINGS:   Pulmonary Arteries:  There is a small nonocclusive segmental to subsegmental   embolism noted in the left lower lobe, on and around axial image [id number].  No   other definite emboli are seen.  No central emboli.  No pulmonary arterial   enlargement is identified.       Mediastinum: Mediastinal and hilar lymphadenopathy is identified.  No focal   esophageal thickening is identified.  The thyroid gland appears unremarkable.       Lungs/pleura: Multifocal ground-glass pneumonia with some mild areas of   consolidation.  No pleural effusion is identified.       Upper Abdomen: No acute process seen in the upper abdomen.       Soft Tissues/Bones: No axillary or supraclavicular lymphadenopathy is   identified.  No acute osseous abnormality is identified.           Impression   Single small nonocclusive segmental to subsegmental pulmonary embolism in the   left lower lobe.  No central emboli.  This was discussed with Mira Hendrix   at 2:44 p.m. on 10/13/2021.       Multilobar COVID-19 pneumonia.                 Medical Decision [id number]:       Medical Decision Making-Other:     Note:  Labs, medications, radiologic studies were reviewed with personal review of films  Large amounts of data were reviewed  Discussed with nursing Staff, Discharge planner  Infection Control and Prevention measures reviewed  All prior entries were reviewed  Administer medications as ordered  Prognosis: Guarded  Discharge planning reviewed  Follow up as outpatient. Thank you for allowing us to participate in the care of this patient. Please call with questions.     Hannah Sloan MD  Pager: [phone] - Office: [phone]

## 2021-10-16 NOTE — PROGRESS NOTES
Critical Care - Progress Notes    Patient's name:  Landen Desai  Medical Record Number: [id number]  Patient's account/billing number: [de-identified]  Patient's YOB: 1990  Age: 27 y.o. Date of Admission: 10/13/2021  8:19 PM  Date of History and Physical Examination: 10/16/2021      Primary Care Physician: Daja Green  Attending Physician: Dr. Cook March     Code Status: Full Code    Chief complaint:   Chief Complaint   Patient presents with    Other     covid positive      Overnight events:   Patient was to transfer to [address] [place] [address] for ECMO. Outside facility had a patient decompensate and available ECMO was taken so patient was unable to transfer. It is possible that ECMO has become available at our facility. Cardiothoracic surgery was consulted for consideration of ECMO. Patient had decreased urine output. 500 cc bolus x 2 was given with improvement. HISTORY OF PRESENT ILLNESS:      History was obtained from chart review and the patient. Landen Desai is a 27 y.o. with PMH of current pregnancy at 26 weeks and 3 days gestation who initially presented to outside facility, [facility] on 10/13/2021. Patient reported that symptoms began on 10/01 although she did not test positive until 10/10. Patient reports that on 10/01 she started having general symptoms including shortness of breath and cough. Patient has had associated decreased oral intake for 7 days now reportedly having her last meal on 10/7/2021. Patient states that she has been tolerating oral liquids since that time although has not been able to keep any substantial food products down. Upon arrival to outside facility patient was noted to have an SPO2 of 90% with tachycardia and tachypnea. Patient was transferred to [address] [place] [address] labor and delivery unit for higher level of care due to patient being 26 weeks gestation and need for a tertiary facility pending patient may require immediate delivery.  Initially was accepted by internal medicine team due to main concern for COVID pneumonia being primary cause of hospitalization and patient was planned to e stabilized in the labor and delivery unit with continuous fetal monitoring and transferred to covid unit as able. Critical care consulted due to patient having an VBG which was notable for pH of 7.2 and increasing new oxygen requirements upon arrival to our facility. Discussion with OB team was had and it was noted that despite patient's acidosis she was compensating respiratory wise significantly and CO2 was initially 18 with a bicarb of 7. At this time consideration for euglycemic vs starvation ketoacidosis was made due to picture being more metabolic acidosis with an appropriate respiratory compromise although acidosis was uncompensated for fully. Upon my evaluation of patient she is tachypneic and tachycardic although able to respond appropriately A+O x4. Patient is denying any chest pain at this time. Has had intermittent diarrhea and vomiting since onset of symptoms. Does have some nausea although no vomiting currently. Denies any dysuria. Patient does note that she has had increased thirst and increased frequency of urination. Admission VBG showed pH of 7.2, PCO2 of 18, HCO3 of 7 and Lactic of 0.8    Labs showed no leukocytosis,  normal electrolites (  ), normal kidney function tests, and normal LFT. Xray showed: Scattered bilateral pulmonary infiltrates consistent with pneumonia which has mildly worsened compared to prior study on 10/10. CT with PE protocol reviewed from previous facility. Small subsegmental pulmonary embolism noted. Pt was started on nonrebreather at 15 L in the labor and delivery unit. Discussed with OB team and patient will transition to critical care team as primary, patient will be transferred to SICU unit and we will take over as primary with OB team following closely as well.     PAST MEDICAL HISTORY: Diagnosis Date    Anxiety     GERD (gastroesophageal reflux disease)     Migraines     Seasonal allergies          PAST SURGICAL HISTORY:         Procedure Laterality Date    FOOT SURGERY      nerve release, and plantar fascitis    HC  PICC POWERPIC TRIPLE  10/15/2021         MANDIBLE SURGERY      tooth implant    MOUTH SURGERY      WISDOM TOOTH EXTRACTION         ALLERGIES:      Allergies   Allergen Reactions    Vicodin [Hydrocodone-Acetaminophen]      nausea         HOME MEDS: :      Prior to Admission medications    Medication Sig Start Date End Date Taking? Authorizing Provider   doxyLAMINE succinate (UNISOM SLEEPTABS) 25 MG tablet Take 25 mg by mouth nightly   Yes Historical Provider, MD   Prenatal Vit-Fe Fumarate-FA (PRENATAL VITAMIN PO) Take 1 tablet by mouth daily    Yes Historical Provider, MD   omeprazole (PRILOSEC) 20 MG delayed release capsule  2/4/21   Historical Provider, MD       SOCIAL HISTORY:       TOBACCO:   reports that she has quit smoking. Her smoking use included cigarettes. She has never used smokeless tobacco.  ETOH:   reports previous alcohol use. DRUGS:  reports previous drug use. Drug: Marijuana.   OCCUPATION:  n/a     FAMILY HISTORY:          Problem Relation Age of Onset    Breast Cancer Maternal Grandmother         older than 48    Heart Attack Paternal Uncle     Stroke Maternal Aunt     Hypertension Father     No Known Problems Paternal Grandfather     Stroke Paternal Grandmother     No Known Problems Maternal Grandfather     Hypertension Mother     No Known Problems Brother     No Known Problems Brother        REVIEW OF SYSTEMS (ROS):     Review of Systems - unable to obtain due to patient     OBJECTIVE:     VITAL SIGNS:  /62   Pulse 63   Temp 96.8 °F (36 °C) (Axillary)   Resp 24   Ht 5' 4\" (1.626 m)   Wt 223 lb 1.7 oz (101.2 kg)   LMP 04/01/2021 (Approximate)   SpO2 100%   Breastfeeding Unknown   BMI 38.30 kg/m²   Tmax over 24 hours:  Temp (24hrs), Av.5 °F (35.8 °C), Min:95.7 °F (35.4 °C), Max:97 °F (36.1 °C)      Patient Vitals for the past 8 hrs:   BP Temp Temp src Pulse Resp SpO2 Weight   10/16/21 0600 107/62 -- -- 63 24 100 % 223 lb 1.7 oz (101.2 kg)   10/16/21 0500 106/61 -- -- 65 24 99 % --   10/16/21 0400 (!) 111/56 96.8 °F (36 °C) Axillary 69 24 96 % --   10/16/21 0347 -- -- -- 75 24 95 % --   10/16/21 0300 107/64 -- -- 72 20 97 % --   10/16/21 0200 102/60 -- -- 80 20 96 % --   10/16/21 0100 113/62 -- -- 85 20 98 % --         Intake/Output Summary (Last 24 hours) at 10/16/2021 0801  Last data filed at 10/16/2021 0600  Gross per 24 hour   Intake 4121.34 ml   Output 1825 ml   Net 2296.34 ml     Date 10/16/21 0000 - 10/16/21 2359   Shift 9156-1203 2366-1797 5553-9364 24 Hour Total   INTAKE   I.V.(mL/kg) 0195.9(99.9)   3639.9(28.9)   Shift Total(mL/kg) 5454.9(16.5)   7260.8(22.4)   OUTPUT   Urine(mL/kg/hr) 325(0.4)   325   Shift Total(mL/kg) 325(3.2)   325(3.2)   Weight (kg) 101.2 101.2 101.2 101.2     Wt Readings from Last 3 Encounters:   10/16/21 223 lb 1.7 oz (101.2 kg)   10/13/21 225 lb (102.1 kg)   10/05/21 225 lb 12.8 oz (102.4 kg)     Body mass index is 38.3 kg/m². PHYSICAL EXAM:  Constitutional: INTUBATED and sedated  EENT: neck supple with midline trachea. Neck: Supple, symmetrical, trachea midline, no jvd, skin normal  Respiratory: labored breathing, tachypnea, course breath sounds bilaterally, diminished and course in bilateral lower lung fields. Cardiovascular: tachycardic and regular rhythm, normal heart sounds, no murmur noted  Abdomen: soft, nontender, nondistended, no masses or organomegaly, post c- section scar with dressing.    Extremities:   no pedal edema, no clubbing or cyanosis    MEDICATIONS:  Scheduled Meds:   lactated ringers bolus  500 mL IntraVENous Once    prenatal vitamin  1 tablet Oral Daily    Tdap-Dtap  0.5 mL IntraMUSCular Prior to discharge    lidocaine 1 % injection  5 mL IntraDERmal Once    sodium chloride flush  5-40 mL IntraVENous 2 times per day    docusate  100 mg Oral BID    sennosides-docusate sodium  2 tablet Oral Daily    insulin lispro  0-12 Units SubCUTAneous Q6H    dexamethasone  6 mg IntraVENous Q24H     Continuous Infusions:   propofol 50 mcg/kg/min (10/16/21 2671)    fentaNYL 200 mcg/hr (10/16/21 0215)    sodium chloride      midazolam 5 mg/hr (10/16/21 1993)    cisatracurium (NIMBEX) infusion 2.5 mcg/kg/min (10/16/21 2058)    dextrose      sodium chloride 5 mL/hr at 10/14/21 0700    heparin (PORCINE) Infusion 17 Units/kg/hr (10/16/21 5353)    IV infusion builder 100 mL/hr at 10/16/21 0026     PRN Meds:   sodium chloride flush, 10 mL, PRN  naloxone, 0.4 mg, PRN  ondansetron, 4 mg, Q6H PRN  diphenhydrAMINE, 25 mg, Q6H PRN  simethicone, 80 mg, Q6H PRN  polyethylene glycol, 17 g, Daily PRN  magnesium hydroxide, 30 mL, Daily PRN  bisacodyl, 10 mg, Daily PRN  lanolin, , Q1H PRN  LORazepam, 2 mg, Q5 Min PRN  sodium chloride flush, 5-40 mL, PRN  sodium chloride, 25 mL, PRN  glucose, 15 g, PRN  dextrose, 12.5 g, PRN  glucagon (rDNA), 1 mg, PRN  dextrose, 100 mL/hr, PRN  heparin (porcine), 80 Units/kg, PRN  heparin (porcine), 40 Units/kg, PRN  ondansetron, 4 mg, Q8H PRN   Or  ondansetron, 4 mg, Q6H PRN  acetaminophen, 650 mg, Q4H PRN          ABGs:   Lab Results   Component Value Date    OPF0QMF NOT REPORTED 10/16/2021    FIO2 85.0 10/16/2021       DATA:  Complete Blood Count:   Recent Labs     10/15/21  0225 10/15/21  0225 10/15/21  0539 10/15/21  0185 10/16/21  4095   WBC 16.2*  --  13.9*  --  9.5   RBC 3.60*  --  3.31*  --  2.87*   HGB 10.8*   < > 9.7* 9.7* 8.6*   HCT 32.6*   < > 29.7* 30.1* 27.0*   MCV 90.6  --  89.7  --  94.1   MCH 30.0  --  29.3  --  30.0   MCHC 33.1  --  32.7  --  31.9   RDW 14.0  --  14.0  --  14.3     --  257  --  233   MPV 8.9  --  9.8  --  9.4    < > = values in this interval not displayed.         Last 3 Blood Glucose:   Recent Labs 10/13/21  2038 10/14/21  0303 10/14/21  0558 10/14/21  1742 10/14/21  2326 10/15/21  0225 10/15/21  0539 10/16/21  0454   GLUCOSE 127* 146* 168* 158* 145* 164* 153* 120*        PT/INR:    Lab Results   Component Value Date    PROTIME 10.3 10/15/2021    INR 1.0 10/15/2021     PTT:    Lab Results   Component Value Date    APTT >120.0 10/16/2021       Comprehensive Metabolic Profile:   Recent Labs     10/14/21  2326 10/14/21  2326 10/15/21  0225 10/15/21  0539 10/16/21  0454   *   < > 135 135 133*   K 3.4*   < > 3.2* 4.1 3.8      < > 106 106 103   CO2 16*   < > 16* 17* 19*   BUN 5*   < > 4* 4* 7   CREATININE 0.25*   < > 0.25* 0.28* 0.33*   GLUCOSE 145*   < > 164* 153* 120*   CALCIUM 8.5*   < > 7.6* 7.6* 7.7*   PROT 5.7*  --   --  4.9* 4.6*   LABALBU 2.7*  --   --  2.4* 2.2*   BILITOT 0.44  --   --  0.44 0.26*   ALKPHOS 161*  --   --  138* 120*   AST 37*  --   --  38* 43*   ALT 35*  --   --  34* 39*    < > = values in this interval not displayed.       Magnesium:   Lab Results   Component Value Date    MG 2.3 10/16/2021    MG 2.2 10/15/2021    MG 1.8 10/14/2021     Phosphorus:   Lab Results   Component Value Date    PHOS 3.3 10/16/2021    PHOS 2.3 10/15/2021    PHOS 2.5 10/14/2021     Ionized Calcium:   Lab Results   Component Value Date    CAION 1.08 10/16/2021    CAION 1.08 10/15/2021    CAION 1.21 10/14/2021        Urinalysis:   Lab Results   Component Value Date    NITRU NEGATIVE 10/13/2021    COLORU Dark Yellow 10/13/2021    PHUR 6.0 10/13/2021    WBCUA 2 TO 5 10/13/2021    RBCUA 5 TO 10 10/13/2021    MUCUS NOT REPORTED 10/13/2021    TRICHOMONAS NOT REPORTED 10/13/2021    YEAST NOT REPORTED 10/13/2021    BACTERIA NOT REPORTED 10/13/2021    SPECGRAV 1.046 10/13/2021    LEUKOCYTESUR NEGATIVE 10/13/2021    UROBILINOGEN Normal 10/13/2021    BILIRUBINUR NEGATIVE  Verified by ictotest. 10/13/2021    GLUCOSEU NEGATIVE 10/13/2021    KETUA LARGE 10/13/2021    AMORPHOUS NOT REPORTED 10/13/2021       HgBA1c:    Lab Results   Component Value Date    LABA1C 5.2 10/14/2021     TSH:  No results found for: TSH    Lactic Acid:   Lab Results   Component Value Date    LACTA 0.8 10/10/2021      Troponin: No results for input(s): TROPONINI in the last 72 hours. Radiological imaging  XR CHEST (SINGLE VIEW FRONTAL)    Result Date: 10/10/2021  EXAMINATION: ONE XRAY VIEW OF THE CHEST 10/10/2021 12:34 pm COMPARISON: None. HISTORY: ORDERING SYSTEM PROVIDED HISTORY: sob cough poss covid TECHNOLOGIST PROVIDED HISTORY: sob cough poss covid FINDINGS: Cardiomediastinal silhouette within normal limits. Bilateral ill-defined pulmonary opacities. No pneumothorax. No pleural effusion. Bilateral ill-defined pulmonary opacities suggesting COVID pneumonia     XR CHEST PORTABLE    Result Date: 10/13/2021  EXAMINATION: ONE XRAY VIEW OF THE CHEST 10/13/2021 11:00 am COMPARISON: Chest radiograph performed 10/10/2021. HISTORY: ORDERING SYSTEM PROVIDED HISTORY: dyspnea TECHNOLOGIST PROVIDED HISTORY: dyspnea FINDINGS: There are scattered bilateral infiltrates that are mildly worse. There is no pneumothorax. The heart is prominent. The upper abdomen unremarkable. The extrathoracic soft tissues are unremarkable. Scattered bilateral pulmonary infiltrates consistent with pneumonia and this is mildly worse compared to prior. CT CHEST PULMONARY EMBOLISM W CONTRAST    Result Date: 10/13/2021  EXAMINATION: CTA OF THE CHEST 10/13/2021 2:16 pm TECHNIQUE: CTA of the chest was performed after the administration of intravenous contrast.  Multiplanar reformatted images are provided for review. MIP images are provided for review. Dose modulation, iterative reconstruction, and/or weight based adjustment of the mA/kV was utilized to reduce the radiation dose to as low as reasonably achievable. COMPARISON: Chest x-rays over the last few days. Diamond Crumble  HISTORY: ORDERING SYSTEM PROVIDED HISTORY: sob, tachy, 32 WEEKS PREGNANT, Spoke with Radiologist TECHNOLOGIST PROVIDED HISTORY: sob, tachy, 26 WEEKS PREGNANT, Spoke with Radiologist Decision Support Exception - unselect if not a suspected or confirmed emergency medical condition->Emergency Medical Condition (MA) FINDINGS: Pulmonary Arteries: There is a small nonocclusive segmental to subsegmental embolism noted in the left lower lobe, on and around axial image [id number]. No other definite emboli are seen. No central emboli. No pulmonary arterial enlargement is identified. Mediastinum: Mediastinal and hilar lymphadenopathy is identified. No focal esophageal thickening is identified. The thyroid gland appears unremarkable. Lungs/pleura: Multifocal ground-glass pneumonia with some mild areas of consolidation. No pleural effusion is identified. Upper Abdomen: No acute process seen in the upper abdomen. Soft Tissues/Bones: No axillary or supraclavicular lymphadenopathy is identified. No acute osseous abnormality is identified. Single small nonocclusive segmental to subsegmental pulmonary embolism in the left lower lobe. No central emboli. This was discussed with Valentino Worthy at 2:44 p.m. on 10/13/2021. Multilobar COVID-19 pneumonia. ASSESSMENT:     Principal Problem:    PCCS 10/15/21 M Apg 1/1/1 Wt 2#3  Active Problems:    Pneumonia due to COVID-19 virus    26 weeks gestation of pregnancy    Tachycardia    Tachypnea    Anxiety    Pulmonary embolism (HCC)    Marginal insertion of umbilical cord affecting management of mother    Starvation ketoacidosis    Severe malnutrition ([facility] [address]  Resolved Problems:    * No resolved hospital problems.  *      PLAN:     ICU PROPHYLAXIS:  Stress ulcer:  [] PPI Agent  [] C9Lcnax [] Sucralfate  [] Other:  VTE:   [] Enoxaparin  [x] Heparin gtt  [x] EPC Cuffs    NUTRITION:  [x] NPO  [x] Tube Feeding  [] TPN  [x] PO    CONSULTATION NEEDED:  [] No   [x] Yes - OB and ID     HOME MEDICATIONS RECONCILED: [x] No  [] Yes    FAMILY UPDATED:    [] No   [x] Yes     ADDITIONAL PLAN:  -  COVID- 19 - Symptoms began on 10/1    - Tested positive on 10/10    - Latate dehydrogenase elevated at 389    - Ferritin elevated at 264    - Fibrinogen elevated at 595   - We will re-draw these levels and continue to trend   - Consulted ID for recommendations    - Received Betamethasone 12 mg for fetal lung maturity x's 1    - OB team recommending Betamethasone 12 mg for one more dose, 24 hours after initial   - After Betamethasone course will proceed with Decadron 6 mg daily of 10 days, as per ID   -Patient to be seen by cardiothoracic surgery today in anticipation that she be placed on ecmo here at our facility. Plan to transfer the patient to the cardiothoracic unit after cath lab. Acute Hypoxic Respiratory Failure 2nd to ARDS 2nd to COVID   - Initial saturations in the high 80's on non-rebreather   - Currently intubated,sedated, and paralyzed   - Patient was not proned due to pending transfer. We will start proning today and see if lung function improves. Plan for  proning/supine. Metabolic acidosis with inadequate respiratory compensation    - Starvation ketoacidosis suspected vs euglycemic acidosis     -resolving, bicarb drip has been discontinued and started on LR  Subsegmental PE   - Continue heparin gtt    - CT from previous facility reviewed - small subsegmental PE noted    - PTT q6 hours    Pregnancy at 26 weeks and 3 days ,s/p emergent  on 10/15/21   - Discussed with OB team, they will be following closely     Today, patient is sedated with versed. Start to paralyze with nimbex to plan proning patient. Restart on high dose heparin drip, f/u on h&H. Plan to put the patient on ecmo tomorrow around 12 AM. On keflex and flagyl.     Patient to continue care in the ICU for close monitoring of respiratory status     -------------------------------------  Cali Cavanaugh DO  PGY-1, Department of [address] [place] [address]    Please note that this chart was generated using voice recognition Dragon dictation software. Although every effort was made to ensure the accuracy of this automated transcription, some errors in transcription may have occurred.

## 2021-10-16 NOTE — PLAN OF CARE
Problem: OXYGENATION/RESPIRATORY FUNCTION  Goal: Patient will maintain patent airway  10/16/2021 1916 by Joaquina Latham RCP  Outcome: Ongoing     Problem: OXYGENATION/RESPIRATORY FUNCTION  Goal: Patient will achieve/maintain normal respiratory rate/effort  Description: Respiratory rate and effort will be within normal limits for the patient  10/16/2021 1916 by Nasreen Latham RCP  Outcome: Ongoing     Problem: MECHANICAL VENTILATION  Goal: Patient will maintain patent airway  10/16/2021 1916 by Joaquina Latham RCP  Outcome: Ongoing     Problem: MECHANICAL VENTILATION  Goal: Oral health is maintained or improved  10/16/2021 1916 by Joaquina Latham RCP  Outcome: Ongoing     Problem: MECHANICAL VENTILATION  Goal: ET tube will be managed safely  10/16/2021 1916 by Nasreen Latham RCP  Outcome: Ongoing     Problem: MECHANICAL VENTILATION  Goal: Ability to express needs and understand communication  10/16/2021 1916 by Joaquina Latham RCP  Outcome: Ongoing     Problem: MECHANICAL VENTILATION  Goal: Mobility/activity is maintained at optimum level for patient  10/16/2021 1916 by Joaquina Latham RCP  Outcome: Ongoing     Problem: SKIN INTEGRITY  Goal: Skin integrity is maintained or improved  10/16/2021 1916 by Joaquina Latham RCP  Outcome: Ongoing

## 2021-10-16 NOTE — PLAN OF CARE
Problem: Isolation Precautions - Risk of Spread of Infection  Goal: Prevent transmission of infection  10/16/2021 0042 by Jenny Reaves RN  Outcome: Ongoing     Problem: Loneliness or Risk for Loneliness  Goal: Demonstrate positive use of time alone when socialization is not possible  10/16/2021 0042 by Jenny Reaves RN  Outcome: Ongoing     Problem: Fatigue  Goal: Verbalize increase energy and improved vitality  10/16/2021 0042 by Jenny Reaves RN  Outcome: Ongoing

## 2021-10-16 NOTE — ANESTHESIA PROCEDURE NOTES
Arterial Line:    An arterial line was placed using ultrasound guidance and surface landmarks, in the ICU for the following indication(s): continuous blood pressure monitoring and blood sampling needed. A 20 gauge (size), 5 cm (length), Arrow (type) catheter was placed, Seldinger technique used, into the left brachial artery, secured by Tegaderm, tape and suture. Anesthesia type: Local  Local infiltration: None    Events:  patient tolerated procedure well with no complications.   10/16/2021 12:30 PM10/16/2021 12:40 PM  Anesthesiologist: FADUMO Chang CRNA  Resident/CRNA: FADUMO Whalen CRNA  Performed: Resident/CRNA   Preanesthetic Checklist  Completed: patient identified, IV checked, site marked, risks and benefits discussed, surgical consent, monitors and equipment checked, pre-op evaluation, timeout performed, anesthesia consent given, oxygen available and patient being monitored

## 2021-10-16 NOTE — PROGRESS NOTES
POST OPERATIVE DAY # 1    Dejon Lambert is a 27 y.o. female   This patient was seen and examined today. Primary Classical  Section     Her pregnancy was complicated by:   Patient Active Problem List   Diagnosis    Pneumonia due to COVID-19 virus    26 weeks gestation of pregnancy    Tachycardia    Tachypnea    Anxiety    [facility] [address]    Marginal insertion of umbilical cord affecting management of mother    Starvation ketoacidosis    Severe malnutrition ([facility] [address] [facility]    PCCS 10/15/21 M Apg  Wt 2#3    COVID-19       Patient is intubated and sedated in the ICU. Her lochia is light per RN report and uterus has remained firm. I remained outside the room and watched the nurse check the pad and abdomen. I spoke with Dr. Vivi Washburn and she states that the patient's urine output recently dropped off to 25 cc/hr however, she is responding to a fluid bolus. Hgb has remained stable and abdomen has remained soft with firm fundus. BP has remained stable and HR nontachycardic. Low concern for surgical bleed. Patient remains critically ill. The plan was to transfer to [address] [place] [address] for ECMO however, another patient at that facility required emergent cannulation to ECMO and therefore, Mykel Horton cannot be transported there at this time.  The new plan will be for her to be cannulated to ECMO today once it becomes available per primary team.     Vital Signs:  Vitals:    10/15/21 2300 10/15/21 2352 10/16/21 0000 10/16/21 0347   BP: 107/68  92/79    Pulse: 82 82 81 75   Resp: 20 20 20 24   Temp:   96.8 °F (36 °C)    TempSrc:   Axillary    SpO2: 98% 98% 98% 95%   Weight:       Height:             Urine Input & Output last 24hrs:     Intake/Output Summary (Last 24 hours) at 10/16/2021 0419  Last data filed at 10/15/2021 2200  Gross per 24 hour   Intake 3052.92 ml   Output 1815 ml   Net 1237.92 ml       Physical Exam:  General:  Intubated/sedated   Neurologic: Intubated/sedated     Abdomen: abdomen soft, non-distended (per RN)  Fundus: normal size, firm, below umbilicus  Incision: Prevena dressing in place with good seal    Labs:  Lab Results   Component Value Date    WBC 13.9 (H) 10/15/2021    HGB 9.7 (L) 10/15/2021    HCT 30.1 (L) 10/15/2021    MCV 89.7 10/15/2021     10/15/2021       Assessment/Plan:  1. Kyree Novoa is a  POD # 1 s/p Primary Classical  Section   - Doing well  - hemodynamically stable, Afebrile   - male infant in NICU   - Toradol    - CBC completed  2. Rh positive/Rubella immune  3. Bottle feeding    - Breast assessment should be performed if the patient develops fever to rule out etiology of mastitis. 4. Anemia   - Hgb stable 11.0>10.8>9.7>9.7   - Low concerns for postoperative bleeding at this time   - Continue to assess abdomen, fundus and vaginal bleeding   - CBC pending this AM  5. Intraoperative Hemorrhage   - EBL 1500 mL   - See anemia above  6. Covid pneumonia   - All management deferred to primary team and ID. Please do not hesitate to reach out to [facility] for questions/concerns. 7. Left lower lobe PE   - Continue heparin drip   - Management per primary   8. Continue post-op care. Counseling Completed:  Secondary Smoke risks and Sudden Infant Death Syndrome were reviewed with recommendations. Infant sleeping, \"back to sleep\" and avoidance of co-sleeping recommendations were reviewed. Signs and Symptoms of Post Partum Depression were reviewed. The patient is to call if any occur. Signs and symptoms of Mastitis were reviewed. The patient is to call if any occur for follow up.   Discharge instructions including pelvic rest, incision care, 15 lb weight restriction, no driving with pain medicine and office follow-up were reviewed with patient     Attending Physician: Dr. Daniela Roque DO  Ob/Gyn Resident  10/16/2021, 4:19 AM

## 2021-10-16 NOTE — CONSULTS
[facility] Cardiothoracic Surgery  Consult    Patient's Name/Date of Birth: Martha Zhang / 1990 (4 y.o.)    Date: October 16, 2021     Chief Complaint: evaluation for ECMO VV    HPI: Martha Zhang is a 27 y.o.  female who CTS was consulted for potential ECMO VV. Pt recently delivered emergent Csection at 26 weeks. Infant is in [place]. She is currently in covid isolation for testing positive. AT bedside Dr Amee Barber and team discussing potential for ECMO.  Currently staff is working on proning patient to help with better ventilation       Is patient on any AC or AP medication prior to CTS consult? unknown    ROS:   ROHAN due to intubation  Past Medical History:   Diagnosis Date    Anxiety     GERD (gastroesophageal reflux disease)     Migraines     Seasonal allergies      Past Surgical History:   Procedure Laterality Date    FOOT SURGERY      nerve release, and plantar fascitis    HC  PICC POWERPIC TRIPLE  10/15/2021         MANDIBLE SURGERY      tooth implant    MOUTH SURGERY      WISDOM TOOTH EXTRACTION       Allergies   Allergen Reactions    Vicodin [Hydrocodone-Acetaminophen]      nausea     Family History   Problem Relation Age of Onset    Breast Cancer Maternal Grandmother         older than 48    Heart Attack Paternal Uncle     Stroke Maternal Aunt     Hypertension Father     No Known Problems Paternal Grandfather     Stroke Paternal Grandmother     No Known Problems Maternal Grandfather     Hypertension Mother     No Known Problems Brother     No Known Problems Brother      Social History     Socioeconomic History    Marital status:      Spouse name: Not on file    Number of children: Not on file    Years of education: Not on file    Highest education level: Not on file   Occupational History    Not on file   Tobacco Use    Smoking status: Former Smoker     Types: Cigarettes    Smokeless tobacco: Never Used   Vaping Use    Vaping Use: Former    Substances: Mima Wheeler MD        magnesium hydroxide (MILK OF MAGNESIA) 400 MG/5ML suspension 30 mL  30 mL Oral Daily PRN Mima Wheeler MD        bisacodyl (DULCOLAX) suppository 10 mg  10 mg Rectal Daily PRN Mima Wheeler MD        Lanolin Hydrous OINT   Topical Q1H PRN Mima Wheeler MD        prenatal vitamin plus iron 29-1 MG tablet 1 tablet  1 tablet Oral Daily Mima Wheeler MD   1 tablet at 10/16/21 0816    Tetanus-Diphth-Acell Pertussis (BOOSTRIX) injection 0.5 mL  0.5 mL IntraMUSCular Prior to discharge Mima Wheeler MD        propofol injection  5-50 mcg/kg/min IntraVENous Titrated Mima Wheeler MD 29.6 mL/hr at 10/16/21 1001 50 mcg/kg/min at 10/16/21 1001    fentaNYL 20 mcg/mL Infusion  12.5-200 mcg/hr IntraVENous Continuous Mima Wheeler MD 10 mL/hr at 10/16/21 0802 200 mcg/hr at 10/16/21 0802    LORazepam (ATIVAN) injection 2 mg  2 mg IntraVENous Q5 Min PRN Mima Wheeler MD        lidocaine 1 % injection 5 mL  5 mL IntraDERmal Once Don Lang MD        sodium chloride flush 0.9 % injection 5-40 mL  5-40 mL IntraVENous 2 times per day Don Lang MD   10 mL at 10/16/21 0817    sodium chloride flush 0.9 % injection 5-40 mL  5-40 mL IntraVENous PRN Don Lang MD        0.9 % sodium chloride infusion  25 mL IntraVENous PRN Don Lang MD        docusate (COLACE) 50 MG/5ML liquid 100 mg  100 mg Oral BID Don Lang MD   100 mg at 10/16/21 0816    midazolam (VERSED) 1 mg/mL in D5W infusion  1-10 mg/hr IntraVENous Continuous Don Lang MD 5 mL/hr at 10/16/21 0544 5 mg/hr at 10/16/21 0544    cisatracurium besylate (NIMBEX) 200 mg in sodium chloride 0.9 % 100 mL infusion  0.5-10 mcg/kg/min IntraVENous Continuous Don Lang MD 7.4 mL/hr at 10/16/21 0508 2.5 mcg/kg/min at 10/16/21 0508    sennosides-docusate sodium (SENOKOT-S) 8.6-50 MG tablet 2 tablet  2 tablet Oral Daily Don Lang MD   2 tablet at 10/16/21 0816    insulin lispro (HUMALOG) injection vial 0-12 Units  0-12 Units SubCUTAneous Q6H Rodriguez Hwang MD   2 Units at 10/15/21 0335    glucose (GLUTOSE) 40 % oral gel 15 g  15 g Oral PRN Rodriguez Hwang MD        dextrose 50 % IV solution  12.5 g IntraVENous PRN Rodriguez Hwang MD        glucagon (rDNA) injection 1 mg  1 mg IntraMUSCular PRN Rodriguez Hwang MD        dextrose 5 % solution  100 mL/hr IntraVENous RAH Hwang MD        0.9 % sodium chloride infusion   IntraVENous Continuous Rodriguez Hwang MD 5 mL/hr at 10/14/21 0700 Rate Verify at 10/14/21 0700    dexamethasone (DECADRON) injection 6 mg  6 mg IntraVENous Q24H Rodriguez Hwang MD   6 mg at 10/16/21 0816    heparin (porcine) injection 7,900 Units  80 Units/kg IntraVENous PRN Rodriguez Hwang MD        heparin (porcine) injection 3,950 Units  40 Units/kg IntraVENous PREDUARDO Hwang MD   8,501 Units at 10/15/21 2314    heparin 25,000 units in dextrose 5% 250 mL (premix) infusion  5-30 Units/kg/hr IntraVENous Continuous Rodriguez Hwang MD 16.8 mL/hr at 10/16/21 0632 17 Units/kg/hr at 10/16/21 1186    ondansetron (ZOFRAN-ODT) disintegrating tablet 4 mg  4 mg Oral Q8H PRN Rodriguez Hwang MD        Or    ondansetron [facility]) injection 4 mg  4 mg IntraVENous Q6H PRN Rodriguez Hwang MD        acetaminophen (TYLENOL) tablet 650 mg  650 mg Oral Q4H PRN Rodriguez Hwang MD           Physical Exam:  Vitals:    10/16/21 1125   BP:    Pulse: 88   Resp: 20   Temp:    SpO2: 94%     Weight: Weight: 223 lb 1.7 oz (101.2 kg)    Weight: 225 lb (102.1 kg)        General: Alert and Oriented x3. Sitting up in bed. No apparent distress. HEENT:  Normocephalic and atraumatic. PERRL. EOMI. Lips and oral mucosa moist and without lesions. Neck:  Supple. Trachea midline. Chest:  No abnormality. Equal and symmetric expansion with respiration. Lungs:  Clear to auscultation.    Cardiac:  Regular rate and rhythm without murmurs, rubs or gallops. Abdomen:  Soft, non-tender, normoactive bowel sounds. No masses or organomegaly. Extremities:  No cyanosis, clubbing, or edema. Intact pulses in all four extremities. Musculoskeletal:  Intact range of motion of peripheral joints. Normal muscular strength. Neurologic:  Cranial nerves are grossly intact. Non-focal sensory deficits on exam.  Psychiatric: Mood and affect are appropriate. Imaging Studies:        CT:  Single small nonocclusive segmental to subsegmental pulmonary embolism in the   left lower lobe.  No central emboli.  This was discussed with Eugene Gill   at 2:44 p.m. on 10/13/2021.       Multilobar COVID-19 pneumonia.             Assessment   Patient Active Problem List   Diagnosis    Pneumonia due to COVID-19 virus    26 weeks gestation of pregnancy    Tachycardia    Tachypnea    Anxiety    Pulmonary embolism ([address]    Marginal insertion of umbilical cord affecting management of mother    Starvation ketoacidosis    Severe malnutrition ([address]    PCCS 10/15/21 M Apg 1/1/1 Wt 2#3    Acute respiratory distress syndrome (ARDS) due to COVID-19 virus [facility])           PLAN:  CTS consulted for potneital ECMO VV cannulation. We currently have two patients on ECMO, but one of them is going to be decannulated today. The next 24 determine if patient is going to be cannulated for ECMO.     FADUMO Mendez - NP, CNP  Phone: [phone]

## 2021-10-16 NOTE — PLAN OF CARE
Problem: Airway Clearance - Ineffective  Goal: Achieve or maintain patent airway  Outcome: Ongoing     Problem: Gas Exchange - Impaired  Goal: Absence of hypoxia  Outcome: Ongoing  Goal: Promote optimal lung function  Outcome: Ongoing     Problem: Breathing Pattern - Ineffective  Goal: Ability to achieve and maintain a regular respiratory rate  Outcome: Ongoing     Problem:  Body Temperature -  Risk of, Imbalanced  Goal: Ability to maintain a body temperature within defined limits  Outcome: Ongoing  Goal: Will regain or maintain usual level of consciousness  Outcome: Ongoing  Goal: Complications related to the disease process, condition or treatment will be avoided or minimized  Outcome: Ongoing     Problem: Isolation Precautions - Risk of Spread of Infection  Goal: Prevent transmission of infection  Outcome: Ongoing     Problem: Nutrition Deficits  Goal: Optimize nutritional status  Outcome: Ongoing     Problem: Risk for Fluid Volume Deficit  Goal: Maintain normal heart rhythm  Outcome: Ongoing  Goal: Maintain absence of muscle cramping  Outcome: Ongoing  Goal: Maintain normal serum potassium, sodium, calcium, phosphorus, and pH  Outcome: Ongoing     Problem: Loneliness or Risk for Loneliness  Goal: Demonstrate positive use of time alone when socialization is not possible  Outcome: Ongoing     Problem: Fatigue  Goal: Verbalize increase energy and improved vitality  Outcome: Ongoing     Problem: Patient Education: Go to Patient Education Activity  Goal: Patient/Family Education  Outcome: Ongoing     Problem: Nutrition  Goal: Optimal nutrition therapy  Outcome: Ongoing     Problem: Skin Integrity:  Goal: Will show no infection signs and symptoms  Description: Will show no infection signs and symptoms  Outcome: Ongoing  Goal: Absence of new skin breakdown  Description: Absence of new skin breakdown  Outcome: Ongoing     Problem: OXYGENATION/RESPIRATORY FUNCTION  Goal: Patient will maintain patent airway  Outcome: Ongoing  Goal: Patient will achieve/maintain normal respiratory rate/effort  Description: Respiratory rate and effort will be within normal limits for the patient  Outcome: Ongoing     Problem: MECHANICAL VENTILATION  Goal: Patient will maintain patent airway  Outcome: Ongoing  Goal: Oral health is maintained or improved  Outcome: Ongoing  Goal: ET tube will be managed safely  Outcome: Ongoing  Goal: Ability to express needs and understand communication  Outcome: Ongoing  Goal: Mobility/activity is maintained at optimum level for patient  Outcome: Ongoing     Problem: SKIN INTEGRITY  Goal: Skin integrity is maintained or improved  Outcome: Ongoing   Electronically signed by Mila Wiggins RN on 10/16/2021 at 4:16 PM

## 2021-10-16 NOTE — PLAN OF CARE
Problem: OXYGENATION/RESPIRATORY FUNCTION  Goal: Patient will maintain patent airway  10/15/2021 1928 by Russell Latham RCP  Outcome: Ongoing  Goal: Patient will achieve/maintain normal respiratory rate/effort  Description: Respiratory rate and effort will be within normal limits for the patient  10/15/2021 1928 by Merline Cavalier R Boron, RCP  Outcome: Ongoing     Problem: MECHANICAL VENTILATION  Goal: Patient will maintain patent airway  10/15/2021 1928 by Kishor Sims RCP  Outcome: Ongoing  Goal: Oral health is maintained or improved  10/15/2021 1928 by Russell Latham RCP  Outcome: Ongoing  Goal: ET tube will be managed safely  10/15/2021 1928 by Russell Latham RCP  Outcome: Ongoing  Goal: Ability to express needs and understand communication  10/15/2021 1928 by Kishor Sims RCP  Outcome: Ongoing  Goal: Mobility/activity is maintained at optimum level for patient  10/15/2021 1928 by Russell Latham RCP  Outcome: Ongoing     Problem: SKIN INTEGRITY  Goal: Skin integrity is maintained or improved  10/15/2021 1928 by Russell Latham RCP  Outcome: Ongoing

## 2021-10-16 NOTE — PROGRESS NOTES
[facility] - Initial Consult Note COVID 19 Patient  Today's Date and Time: 10/15/2021, 9:54 PM    Impression :   COVID 23 Suspect  COVID 19 Confirmed Infection  Covid tests:  10/10/21: Positive  26 weeks gestation pregnancy  Acute hypoxic respiratory distress  Pulmonary embolism LLL  Metabolic acidosis with euglycemic starvation    Recommendations:   Antibiotic treatment:  Monitor off antibiotics  Covid Rx:  Remdesivir-Out of window  Decadron-Initiated 10/14/21  anticoagulation  DEFER barcitinib and  Actemra due to pregnancy  Intubated 10/14  Planned to go on ECMO to [place] soon    start on illness 10/1  Stop isolation after 10/20    Medical Decision Making/Summary/Discussion:10/15/2021     Patient admitted with suspected COVID 19 infection  Covid test confirmed positive. Infection Control Recommendations   [place] Precautions  Airborne isolation  Droplet plus Isolation    Antimicrobial Stewardship Recommendations       Discontinuation of therapy  Coordination of Outpatient Care:   Estimated Length of IV antimicrobials:TBD  Patient will need Midline Catheter Insertion: TBD  Patient will need PICC line Insertion: No  Patient will need: Home IV , [place],  SNF,  LTAC:TBD  Patient will need outpatient wound care:No    Chief complaint/reason for consultation:   Concern for COVID infection      History of Present Illness:   Kimberly Cox is a 27y.o.-year-old  female who was initially admitted on 10/13/2021. Patient seen at the request of Dr. Romy Noe:    Patient, who is 26 weeks pregnant, initially presented to [facility] ED on 10/10/21 presented through ER with complaints of productive cough with yellow sputum X 9 days & shortness of breath with pleuritic chest pain that started the day before. She ws also experiencing decreased appetite because of loss of taste and smell. Work-up at that time revealed the patient to be Covid positive.  She was tachycardic with an SpO2 of 94-98% on room air. CXR showed bilateral ill-defined pulmonary opacities suggesting COVID pneumonia. After receiving a small fluid bolus and discussing her case with the patient's OBGYN, Dr. Giovany Carpenter, she was discharged home with instructions to return if her symptoms worsen. On 10/13/21, the patient again presented to [facility] ED after a home SpO2 reading was 88%. A CT chest revealed a small, non-obstructing LLL PE for which she was initiated on a heparin gtt. The patient's respiratory status worsened throughout the day and she was life-flighted to [facility] for a higher level of care. The patient was found to be tachypneic and tachycardic upon arrival. She was maintaining her SpO2 >95% on 15 L/min non-re-breather. Heparin gtt was continued and the patient was also ordered celestone for fetal lung maturity. The patient was admitted to the Covid ICU      CURRENT EVALUATION : 10/15/2021  Intubated today and placed on 85% FiO2, family meeting, patient's plan to go to [facility] for ECMO  WBC 13.9  Urine culture 10/10 -    She has no fever    Labs, X rays reviewed: 10/15/2021    BUN:4  Cr:0.25    WBC:8.2  Hb:11.3  Plat: 262    Absolute Neutrophils:6.88  Absolute Lymphocytes:0.66  Neutrophil/Lymphocyte Ratio: 10.4 high risk    CRP: pending  Ferritin:264  LDH: 389    D-dimer: 0.72  Fibrinogen: 592    Pro Calcitonin:      Cultures:  Urine:    Blood:    Sputum :    Wound:      CXR:   10/13/21      10/10/21          CAT:  10/13/21    Discussed with patient, RN, CC, IM. I have personally reviewed the past medical history, past surgical history, medications, social history, and family history, and I have updated the database accordingly.   Past Medical History:     Past Medical History:   Diagnosis Date    Anxiety     GERD (gastroesophageal reflux disease)     Migraines     Seasonal allergies        Past Surgical  History:     Past Surgical History: Procedure Laterality Date    FOOT SURGERY      nerve release, and plantar fascitis      PICC POWERPIC TRIPLE  10/15/2021         MANDIBLE SURGERY      tooth implant    MOUTH SURGERY      WISDOM TOOTH EXTRACTION         Medications:      prenatal vitamin  1 tablet Oral Daily    Tdap-Dtap  0.5 mL IntraMUSCular Prior to discharge    [START ON 10/16/2021] cephALEXin  500 mg Oral 3 times per day    And    [START ON 10/16/2021] metroNIDAZOLE  500 mg Oral 3 times per day    lidocaine 1 % injection  5 mL IntraDERmal Once    sodium chloride flush  5-40 mL IntraVENous 2 times per day    docusate  100 mg Oral BID    sennosides-docusate sodium  2 tablet Oral Daily    insulin lispro  0-12 Units SubCUTAneous Q6H    dexamethasone  6 mg IntraVENous Q24H       Social History:     Social History     Socioeconomic History    Marital status:      Spouse name: Not on file    Number of children: Not on file    Years of education: Not on file    Highest education level: Not on file   Occupational History    Not on file   Tobacco Use    Smoking status: Former Smoker     Types: Cigarettes    Smokeless tobacco: Never Used   Vaping Use    Vaping Use: Former    Substances: Nicotine   Substance and Sexual Activity    Alcohol use: Not Currently    Drug use: Not Currently     Types: Marijuana     Comment: last smoked May 2021    Sexual activity: Yes   Other Topics Concern    Not on file   Social History Narrative    Not on file     Social Determinants of Health     Financial Resource Strain:     Difficulty of Paying Living Expenses:    Food Insecurity:     Worried About Running Out of Food in the Last Year:     [address] in the Last Year:    Transportation Needs:     Lack of Transportation (Medical):      Lack of Transportation (Non-Medical):    Physical Activity:     Days of Exercise per Week:     Minutes of Exercise per Session:    Stress:     Feeling of Stress :    Social Connections:     Frequency of Communication with Friends and Family:     Frequency of Social Gatherings with Friends and Family:     Attends Denominational Services:     Active Member of Clubs or Organizations:     Attends Club or Organization Meetings:     Marital Status:    Intimate Partner Violence:     Fear of Current or Ex-Partner:     Emotionally Abused:     Physically Abused:     Sexually Abused:        Family History:     Family History   Problem Relation Age of Onset    Breast Cancer Maternal Grandmother         older than 48    Heart Attack Paternal Uncle     Stroke Maternal Aunt     Hypertension Father     No Known Problems Paternal Grandfather     Stroke Paternal Grandmother     No Known Problems Maternal Grandfather     Hypertension Mother     No Known Problems Brother     No Known Problems Brother         Allergies:   Vicodin [hydrocodone-acetaminophen]     Review of Systems:     Review of Systems   Unable to perform ROS: Intubated          Physical Examination :     Patient Vitals for the past 8 hrs:   BP Temp Temp src Pulse Resp SpO2   10/15/21 2128 -- -- -- 83 20 97 %   10/15/21 2000 -- 95.7 °F (35.4 °C) -- -- -- --   10/15/21 1900 -- -- -- 87 20 97 %   10/15/21 1845 97/65 -- -- 89 20 97 %   10/15/21 1830 97/65 -- -- 88 20 97 %   10/15/21 1815 91/61 -- -- 88 20 97 %   10/15/21 1800 95/64 -- -- 84 20 97 %   10/15/21 1745 (!) 87/54 -- -- 85 20 98 %   10/15/21 1730 (!) 83/54 -- -- 85 20 98 %   10/15/21 1715 (!) 88/56 -- -- 82 20 98 %   10/15/21 1700 (!) 87/54 -- -- 84 20 95 %   10/15/21 1645 91/63 -- -- 82 17 94 %   10/15/21 1630 -- -- -- 85 20 92 %   10/15/21 1615 -- -- -- 85 20 91 %   10/15/21 1600 (!) 90/57 96.1 °F (35.6 °C) Esophageal 86 21 (!) 89 %   10/15/21 1549 -- -- -- 83 17 (!) 89 %   10/15/21 1545 (!) 89/51 -- -- 88 21 92 %   10/15/21 1530 (!) 87/56 -- -- 88 22 93 %   10/15/21 1515 (!) 95/58 -- -- 89 23 93 %   10/15/21 1500 90/63 -- -- 86 24 94 %   10/15/21 1445 89/60 -- -- 81 24 97 % 10/15/21 1430 (!) 99/57 -- -- 82 18 90 %   10/15/21 1415 97/62 -- -- 85 24 94 %   10/15/21 1400 97/69 -- -- 87 25 92 %     Physical Exam  Constitutional:       General: She is not in acute distress. Appearance: She is ill-appearing. Comments: Intubated sedated   HENT:      Head: Normocephalic and atraumatic. Nose: Nose normal.   Eyes:      General: No scleral icterus. Conjunctiva/sclera: Conjunctivae normal.      Pupils: Pupils are equal, round, and reactive to light. Cardiovascular:      Rate and Rhythm: Normal rate and regular rhythm. Heart sounds: Normal heart sounds. No murmur heard. Pulmonary:      Breath sounds: No stridor. No rhonchi. Abdominal:      Palpations: There is no mass. Hernia: No hernia is present. Genitourinary:     Comments: Urine bong  Musculoskeletal:         General: No swelling or deformity. Cervical back: Neck supple. No rigidity. Skin:     Coloration: Skin is not jaundiced or pale. Neurological:      Comments: sedated   Psychiatric:      Comments: sedated          Medical Decision Making -Laboratory:   I have independently reviewed/ordered the following labs:    CBC with Differential:   Recent Labs     10/14/21  2326 10/14/21  2326 10/15/21  0225 10/15/21  0225 10/15/21  0539 10/15/21  1135   WBC 10.5   < > 16.2*  --  13.9*  --    HGB 11.0*   < > 10.8*   < > 9.7* 9.7*   HCT 33.4*   < > 32.6*   < > 29.7* 30.1*      < > 317  --  257  --    LYMPHOPCT 11*  --   --   --  6*  --    MONOPCT 3  --   --   --  5  --     < > = values in this interval not displayed. BMP:   Recent Labs     10/14/21  2326 10/14/21  2326 10/15/21  0225 10/15/21  0539   *   < > 135 135   K 3.4*   < > 3.2* 4.1      < > 106 106   CO2 16*   < > 16* 17*   BUN 5*   < > 4* 4*   CREATININE 0.25*   < > 0.25* 0.28*   MG 1.8  --   --  2.2    < > = values in this interval not displayed.      Hepatic Function Panel:   Recent Labs     10/14/21  2003 10/15/21  0527 PROT 5.7* 4.9*   LABALBU 2.7* 2.4*   BILIDIR 0.35* 0.33*   IBILI 0.09 0.11   BILITOT 0.44 0.44   ALKPHOS 161* 138*   ALT 35* 34*   AST 37* 38*     No results for input(s): RPR in the last 72 hours. No results for input(s): HIV in the last 72 hours. No results for input(s): BC in the last 72 hours. Lab Results   Component Value Date    MUCUS NOT REPORTED 10/13/2021    RBC 3.31 10/15/2021    TRICHOMONAS NOT REPORTED 10/13/2021    WBC 13.9 10/15/2021    YEAST NOT REPORTED 10/13/2021    TURBIDITY Clear 10/13/2021     Lab Results   Component Value Date    CREATININE 0.28 10/15/2021    GLUCOSE 153 10/15/2021       Medical Decision Making-Imaging:     Narrative   EXAMINATION:   CTA OF THE CHEST 10/13/2021 2:16 pm       TECHNIQUE:   CTA of the chest was performed after the administration of intravenous   contrast.  Multiplanar reformatted images are provided for review.  MIP   images are provided for review. Dose modulation, iterative reconstruction,   and/or weight based adjustment of the mA/kV was utilized to reduce the   radiation dose to as low as reasonably achievable.       COMPARISON:   Chest x-rays over the last few days. .       HISTORY:   ORDERING SYSTEM PROVIDED HISTORY: sob, tachy, 32 WEEKS PREGNANT, Spoke with   Radiologist   TECHNOLOGIST PROVIDED HISTORY:   sob, tachy, 32 WEEKS PREGNANT, Spoke with Radiologist   Decision Support Exception - unselect if not a suspected or confirmed   emergency medical condition->Emergency Medical Condition (MA)       FINDINGS:   Pulmonary Arteries:  There is a small nonocclusive segmental to subsegmental   embolism noted in the left lower lobe, on and around axial image [id number].  No   other definite emboli are seen.  No central emboli.  No pulmonary arterial   enlargement is identified.       Mediastinum: Mediastinal and hilar lymphadenopathy is identified.  No focal   esophageal thickening is identified.  The thyroid gland appears unremarkable.       Lungs/pleura: Multifocal ground-glass pneumonia with some mild areas of   consolidation.  No pleural effusion is identified.       Upper Abdomen: No acute process seen in the upper abdomen.       Soft Tissues/Bones: No axillary or supraclavicular lymphadenopathy is   identified.  No acute osseous abnormality is identified.           Impression   Single small nonocclusive segmental to subsegmental pulmonary embolism in the   left lower lobe.  No central emboli.  This was discussed with Jenny Nieto   at 2:44 p.m. on 10/13/2021.       Multilobar COVID-19 pneumonia.                 Medical Decision Naemqb-Kqilieyz-Aealv:       Medical Decision Making-Other:     Note:  Labs, medications, radiologic studies were reviewed with personal review of films  Large amounts of data were reviewed  Discussed with nursing Staff, Discharge planner  Infection Control and Prevention measures reviewed  All prior entries were reviewed  Administer medications as ordered  Prognosis: Guarded  Discharge planning reviewed  Follow up as outpatient. Thank you for allowing us to participate in the care of this patient. Please call with questions.     Azucena Hall MD  Pager: [phone] - Office: [phone]

## 2021-10-16 NOTE — TELEPHONE ENCOUNTER
Patient now delivered via primary  section. Remains in hospital for covid. Will await any future communication from primary team caring for patient.

## 2021-10-16 NOTE — PROGRESS NOTES
Obstetric/Gynecology Resident Interval Note    Upon chart review it was noted that infection disease placed a note at 2200 on 10/15 stating \"DEFER baricitinib and Actemra due to pregnancy. \" The patient is no longer pregnant as she was delivered emergently at [phone] on 10/15 due to worsening maternal status. The deferment of the medication was also noted in a note from 0800 on 10/14 at which point the patient was still pregnant. Of note this was not discussed with OB team. More significantly if the patient were still pregnant it is not appropriate to withhold potentially lifesaving medications during pregnancy, if they were otherwise appropriate for the patient. While no studies are available specifically on baricitinib and Actemra in pregnancy, there are no theoretical concerns for fetal or maternal risk. Given severity of patient's illness, her recent delivery should not exclude her from receiving these. Baricitinib (information taken from uptodate)  \"Baricitinib is currently available under FDA emergency use authorization (EUA) for the treatment of COVID-19 in combination with remdesivir. The risk of severe illness from COVID-19 infection is increased in pregnant patients, and pregnancy is one of the high-risk medical conditions defined by the CDC. An increased risk of adverse pregnancy outcomes may also occur in COVID-19 positive patients with symptomatic infection. These include  birth, preeclampsia, coagulopathy, and stillbirth. Pregnant patients with symptomatic COVID-19 infection are more likely to require ICU admission, mechanical ventilation, and ventilatory support (ECMO) compared to nonpregnant symptomatic patients. Maternal age and comorbidities may also increase the risk of severe illness in pregnant and recently pregnant patients (ACOG 2020; NIH ). Baricitinib is a Janus kinase inhibitor; pregnancy outcome information for this class of drugs is limited.  Use of baricitinib for the treatment of COVID-19 in pregnancy may be considered when the potential benefits outweigh the possible risks ([facility] 2021). Information related to the treatment of COVID-19 during pregnancy continues to emerge; refer to current guidelines for the treatment of pregnant patients. \"    Tocilizumab  Tocilizumab and sarilumab - Tocilizumab and sarilumab are interleukin-6 antagonists that may be used with dexamethasone in nonpregnant hospitalized patients with progressive disease and substantial oxygen requirements. As humanized monoclonal antibodies (IgG1), they cross the placenta beginning as early as 13 weeks of gestation, with increasing transport as the pregnancy progresses and the largest amount transferred in the third trimester [145]. Minimal information is available regarding pregnancy risk [146-148]. A concern is that the transferred antibody may affect immune responses in utero in the exposed fetus. Decisions about tocilizumab administration during pregnancy should involve shared decision-making, considering potential maternal benefit and lack of information on fetal risks. (See \"COVID-19: Management in hospitalized adults\", section on 'IL-6 pathway inhibitors (eg, tocilizumab)'. )      Susan Blackwell DO  OB/GYN Resident, PGY3  [facility]  10/15/2021, 11:11 PM          Attending Physician Statement  I have discussed the care of Michelle Corey, including pertinent history and exam findings,  with the resident. I have reviewed the key elements of all parts of the encounter with the resident. I agree with the assessment, plan and orders as documented by the resident.   (GE Modifier)    Electronically signed by Latanya Gandara MD at 7:22 AM 10/16/21

## 2021-10-16 NOTE — FLOWSHEET NOTE
Pt placed prone with writer, RT & 2 other RN. Pt tolerated well. Vital signs stable. Due to be supine at 0630 on 10/17/2021.     Electronically signed by Padmini Chirinos RN on 10/16/2021 at 2:58 PM

## 2021-10-17 ENCOUNTER — APPOINTMENT (OUTPATIENT)
Dept: GENERAL RADIOLOGY | Age: 31
DRG: 003 | End: 2021-10-17
Payer: COMMERCIAL

## 2021-10-17 ENCOUNTER — ANESTHESIA EVENT (OUTPATIENT)
Dept: CARDIAC CATH/INVASIVE PROCEDURES | Age: 31
DRG: 003 | End: 2021-10-17
Payer: COMMERCIAL

## 2021-10-17 ENCOUNTER — APPOINTMENT (OUTPATIENT)
Dept: CARDIAC CATH/INVASIVE PROCEDURES | Age: 31
DRG: 003 | End: 2021-10-17
Payer: COMMERCIAL

## 2021-10-17 ENCOUNTER — ANESTHESIA (OUTPATIENT)
Dept: CARDIAC CATH/INVASIVE PROCEDURES | Age: 31
DRG: 003 | End: 2021-10-17
Payer: COMMERCIAL

## 2021-10-17 VITALS — OXYGEN SATURATION: 100 % | TEMPERATURE: 97.2 F

## 2021-10-17 LAB
ABSOLUTE EOS #: 0.09 K/UL (ref 0–0.4)
ABSOLUTE IMMATURE GRANULOCYTE: 1.08 K/UL (ref 0–0.3)
ABSOLUTE LYMPH #: 1.8 K/UL (ref 1–4.8)
ABSOLUTE MONO #: 0.36 K/UL (ref 0.1–0.8)
ACTIVATED CLOTTING TIME: 171 SEC (ref 79–149)
ACTIVATED CLOTTING TIME: 175 SEC (ref 79–149)
ACTIVATED CLOTTING TIME: 179 SEC (ref 79–149)
ACTIVATED CLOTTING TIME: 183 SEC (ref 79–149)
ACTIVATED CLOTTING TIME: 187 SEC (ref 79–149)
ACTIVATED CLOTTING TIME: 191 SEC (ref 79–149)
ACTIVATED CLOTTING TIME: 195 SEC (ref 79–149)
ACTIVATED CLOTTING TIME: 195 SEC (ref 79–149)
ACTIVATED CLOTTING TIME: 203 SEC (ref 79–149)
ALBUMIN SERPL-MCNC: 2.1 G/DL (ref 3.5–5.2)
ALBUMIN/GLOBULIN RATIO: 0.8 (ref 1–2.5)
ALLEN TEST: ABNORMAL
ALLEN TEST: NORMAL
ALP BLD-CCNC: 106 U/L (ref 35–104)
ALT SERPL-CCNC: 55 U/L (ref 5–33)
ANION GAP SERPL CALCULATED.3IONS-SCNC: 10 MMOL/L (ref 9–17)
ANION GAP SERPL CALCULATED.3IONS-SCNC: 11 MMOL/L (ref 9–17)
ANION GAP SERPL CALCULATED.3IONS-SCNC: 9 MMOL/L (ref 9–17)
AST SERPL-CCNC: 57 U/L
BASOPHILS # BLD: 0 % (ref 0–2)
BASOPHILS ABSOLUTE: 0 K/UL (ref 0–0.2)
BILIRUB SERPL-MCNC: 0.18 MG/DL (ref 0.3–1.2)
BILIRUBIN DIRECT: 0.16 MG/DL
BILIRUBIN, INDIRECT: 0.02 MG/DL (ref 0–1)
BUN BLDV-MCNC: 6 MG/DL (ref 6–20)
BUN/CREAT BLD: ABNORMAL (ref 9–20)
C-REACTIVE PROTEIN: 81.3 MG/L (ref 0–5)
CALCIUM IONIZED: 1.13 MMOL/L (ref 1.13–1.33)
CALCIUM IONIZED: 1.13 MMOL/L (ref 1.13–1.33)
CALCIUM IONIZED: 1.16 MMOL/L (ref 1.13–1.33)
CALCIUM SERPL-MCNC: 7.5 MG/DL (ref 8.6–10.4)
CALCIUM SERPL-MCNC: 8 MG/DL (ref 8.6–10.4)
CALCIUM SERPL-MCNC: 8 MG/DL (ref 8.6–10.4)
CHLORIDE BLD-SCNC: 104 MMOL/L (ref 98–107)
CHLORIDE BLD-SCNC: 105 MMOL/L (ref 98–107)
CHLORIDE BLD-SCNC: 107 MMOL/L (ref 98–107)
CO2: 21 MMOL/L (ref 20–31)
CO2: 24 MMOL/L (ref 20–31)
CO2: 25 MMOL/L (ref 20–31)
CREAT SERPL-MCNC: 0.24 MG/DL (ref 0.5–0.9)
CREAT SERPL-MCNC: 0.24 MG/DL (ref 0.5–0.9)
CREAT SERPL-MCNC: 0.34 MG/DL (ref 0.5–0.9)
DIFFERENTIAL TYPE: ABNORMAL
EOSINOPHILS RELATIVE PERCENT: 1 % (ref 1–4)
FIBRINOGEN: 380 MG/DL (ref 140–420)
FIBRINOGEN: 429 MG/DL (ref 140–420)
FIO2: 100
FIO2: 40
FIO2: 40
FIO2: 45
FIO2: 50
FIO2: ABNORMAL
FIO2: ABNORMAL
GFR AFRICAN AMERICAN: >60 ML/MIN
GFR NON-AFRICAN AMERICAN: >60 ML/MIN
GFR SERPL CREATININE-BSD FRML MDRD: ABNORMAL ML/MIN/{1.73_M2}
GLOBULIN: ABNORMAL G/DL (ref 1.5–3.8)
GLUCOSE BLD-MCNC: 101 MG/DL (ref 65–105)
GLUCOSE BLD-MCNC: 101 MG/DL (ref 74–100)
GLUCOSE BLD-MCNC: 102 MG/DL (ref 74–100)
GLUCOSE BLD-MCNC: 106 MG/DL (ref 74–100)
GLUCOSE BLD-MCNC: 111 MG/DL (ref 74–100)
GLUCOSE BLD-MCNC: 112 MG/DL (ref 74–100)
GLUCOSE BLD-MCNC: 126 MG/DL (ref 70–99)
GLUCOSE BLD-MCNC: 129 MG/DL (ref 74–100)
GLUCOSE BLD-MCNC: 92 MG/DL (ref 70–99)
GLUCOSE BLD-MCNC: 95 MG/DL (ref 70–99)
GLUCOSE BLD-MCNC: 97 MG/DL (ref 74–100)
HCO3 VENOUS: 24.8 MMOL/L (ref 22–29)
HCO3 VENOUS: 26.9 MMOL/L (ref 22–29)
HCT VFR BLD CALC: 23.2 % (ref 36.3–47.1)
HCT VFR BLD CALC: 24.3 % (ref 36.3–47.1)
HCT VFR BLD CALC: 25.8 % (ref 36.3–47.1)
HEMOGLOBIN: 7.6 G/DL (ref 11.9–15.1)
HEMOGLOBIN: 8 G/DL (ref 11.9–15.1)
HEMOGLOBIN: 8.4 G/DL (ref 11.9–15.1)
IMMATURE GRANULOCYTES: 12 %
INR BLD: 0.9
INR BLD: 1
LACTIC ACID, WHOLE BLOOD: 1 MMOL/L (ref 0.7–2.1)
LACTIC ACID, WHOLE BLOOD: 1.2 MMOL/L (ref 0.7–2.1)
LV EF: 55 %
LVEF MODALITY: NORMAL
LYMPHOCYTES # BLD: 20 % (ref 24–44)
MAGNESIUM: 1.8 MG/DL (ref 1.6–2.6)
MAGNESIUM: 1.8 MG/DL (ref 1.6–2.6)
MAGNESIUM: 2 MG/DL (ref 1.6–2.6)
MCH RBC QN AUTO: 29.7 PG (ref 25.2–33.5)
MCH RBC QN AUTO: 30.2 PG (ref 25.2–33.5)
MCH RBC QN AUTO: 30.7 PG (ref 25.2–33.5)
MCHC RBC AUTO-ENTMCNC: 32.6 G/DL (ref 28.4–34.8)
MCHC RBC AUTO-ENTMCNC: 32.8 G/DL (ref 28.4–34.8)
MCHC RBC AUTO-ENTMCNC: 32.9 G/DL (ref 28.4–34.8)
MCV RBC AUTO: 90.3 FL (ref 82.6–102.9)
MCV RBC AUTO: 92.1 FL (ref 82.6–102.9)
MCV RBC AUTO: 94.2 FL (ref 82.6–102.9)
MODE: ABNORMAL
MODE: NORMAL
MONOCYTES # BLD: 4 % (ref 1–7)
MORPHOLOGY: NORMAL
NEGATIVE BASE EXCESS, ART: ABNORMAL (ref 0–2)
NEGATIVE BASE EXCESS, ART: NORMAL (ref 0–2)
NEGATIVE BASE EXCESS, VEN: ABNORMAL (ref 0–2)
NEGATIVE BASE EXCESS, VEN: ABNORMAL (ref 0–2)
NRBC AUTOMATED: 0.2 PER 100 WBC
NRBC AUTOMATED: 0.2 PER 100 WBC
NRBC AUTOMATED: 0.3 PER 100 WBC
O2 DEVICE/FLOW/%: ABNORMAL
O2 DEVICE/FLOW/%: NORMAL
O2 SAT, VEN: 55 % (ref 60–85)
O2 SAT, VEN: 67 % (ref 60–85)
PARTIAL THROMBOPLASTIN TIME: 104.2 SEC (ref 20.5–30.5)
PARTIAL THROMBOPLASTIN TIME: 19 SEC (ref 20.5–30.5)
PARTIAL THROMBOPLASTIN TIME: 65.3 SEC (ref 20.5–30.5)
PATIENT TEMP: ABNORMAL
PATIENT TEMP: NORMAL
PCO2, VEN: 33.3 MM HG (ref 41–51)
PCO2, VEN: 35.9 MM HG (ref 41–51)
PDW BLD-RTO: 13.9 % (ref 11.8–14.4)
PDW BLD-RTO: 13.9 % (ref 11.8–14.4)
PDW BLD-RTO: 14.2 % (ref 11.8–14.4)
PH VENOUS: 7.48 (ref 7.32–7.43)
PH VENOUS: 7.48 (ref 7.32–7.43)
PHOSPHORUS: 2.9 MG/DL (ref 2.6–4.5)
PLATELET # BLD: 289 K/UL (ref 138–453)
PLATELET # BLD: 292 K/UL (ref 138–453)
PLATELET # BLD: 310 K/UL (ref 138–453)
PLATELET ESTIMATE: ABNORMAL
PMV BLD AUTO: 9.4 FL (ref 8.1–13.5)
PMV BLD AUTO: 9.4 FL (ref 8.1–13.5)
PMV BLD AUTO: 9.6 FL (ref 8.1–13.5)
PO2, VEN: 26.4 MM HG (ref 30–50)
PO2, VEN: 31.9 MM HG (ref 30–50)
POC HCO3: 24 MMOL/L (ref 21–28)
POC HCO3: 26.7 MMOL/L (ref 21–28)
POC HCO3: 26.8 MMOL/L (ref 21–28)
POC HCO3: 27.3 MMOL/L (ref 21–28)
POC HCO3: 28.6 MMOL/L (ref 21–28)
POC HCO3: 29 MMOL/L (ref 21–28)
POC HCO3: 29.4 MMOL/L (ref 21–28)
POC HEMATOCRIT: 20 % (ref 36–46)
POC HEMATOCRIT: 21 % (ref 36–46)
POC HEMATOCRIT: 37 % (ref 36–46)
POC HEMATOCRIT: 37 % (ref 36–46)
POC HEMOGLOBIN: 12.6 G/DL (ref 12–16)
POC HEMOGLOBIN: 12.6 G/DL (ref 12–16)
POC HEMOGLOBIN: 6.7 G/DL (ref 12–16)
POC HEMOGLOBIN: 7.2 G/DL (ref 12–16)
POC O2 SATURATION: 100 % (ref 94–98)
POC O2 SATURATION: 63 % (ref 94–98)
POC O2 SATURATION: 89 % (ref 94–98)
POC O2 SATURATION: 90 % (ref 94–98)
POC O2 SATURATION: 96 % (ref 94–98)
POC O2 SATURATION: 96 % (ref 94–98)
POC O2 SATURATION: 98 % (ref 94–98)
POC PCO2 TEMP: ABNORMAL MM HG
POC PCO2 TEMP: NORMAL MM HG
POC PCO2: 28.5 MM HG (ref 35–48)
POC PCO2: 30.7 MM HG (ref 35–48)
POC PCO2: 33 MM HG (ref 35–48)
POC PCO2: 40.8 MM HG (ref 35–48)
POC PCO2: 41.9 MM HG (ref 35–48)
POC PCO2: 43.3 MM HG (ref 35–48)
POC PCO2: 49 MM HG (ref 35–48)
POC PH TEMP: ABNORMAL
POC PH TEMP: NORMAL
POC PH: 7.39 (ref 7.35–7.45)
POC PH: 7.42 (ref 7.35–7.45)
POC PH: 7.42 (ref 7.35–7.45)
POC PH: 7.43 (ref 7.35–7.45)
POC PH: 7.53 (ref 7.35–7.45)
POC PH: 7.55 (ref 7.35–7.45)
POC PH: 7.55 (ref 7.35–7.45)
POC PO2 TEMP: ABNORMAL MM HG
POC PO2 TEMP: NORMAL MM HG
POC PO2: 102.3 MM HG (ref 83–108)
POC PO2: 28.3 MM HG (ref 83–108)
POC PO2: 523 MM HG (ref 83–108)
POC PO2: 56.7 MM HG (ref 83–108)
POC PO2: 58.2 MM HG (ref 83–108)
POC PO2: 70.1 MM HG (ref 83–108)
POC PO2: 78.3 MM HG (ref 83–108)
POSITIVE BASE EXCESS, ART: 1 (ref 0–3)
POSITIVE BASE EXCESS, ART: 2 (ref 0–3)
POSITIVE BASE EXCESS, ART: 3 (ref 0–3)
POSITIVE BASE EXCESS, ART: 4 (ref 0–3)
POSITIVE BASE EXCESS, ART: 6 (ref 0–3)
POSITIVE BASE EXCESS, VEN: 2 (ref 0–3)
POSITIVE BASE EXCESS, VEN: 3 (ref 0–3)
POTASSIUM SERPL-SCNC: 3.6 MMOL/L (ref 3.7–5.3)
POTASSIUM SERPL-SCNC: 3.8 MMOL/L (ref 3.7–5.3)
POTASSIUM SERPL-SCNC: 4 MMOL/L (ref 3.7–5.3)
PROTHROMBIN TIME: 10 SEC (ref 9.1–12.3)
PROTHROMBIN TIME: 10.3 SEC (ref 9.1–12.3)
RBC # BLD: 2.52 M/UL (ref 3.95–5.11)
RBC # BLD: 2.69 M/UL (ref 3.95–5.11)
RBC # BLD: 2.74 M/UL (ref 3.95–5.11)
RBC # BLD: ABNORMAL 10*6/UL
SAMPLE SITE: ABNORMAL
SAMPLE SITE: NORMAL
SEG NEUTROPHILS: 63 % (ref 36–66)
SEGMENTED NEUTROPHILS ABSOLUTE COUNT: 5.67 K/UL (ref 1.8–7.7)
SODIUM BLD-SCNC: 136 MMOL/L (ref 135–144)
SODIUM BLD-SCNC: 139 MMOL/L (ref 135–144)
SODIUM BLD-SCNC: 141 MMOL/L (ref 135–144)
TCO2 (CALC), ART: ABNORMAL MMOL/L (ref 22–29)
TCO2 (CALC), ART: NORMAL MMOL/L (ref 22–29)
TOTAL CO2, VENOUS: ABNORMAL MMOL/L (ref 23–30)
TOTAL CO2, VENOUS: ABNORMAL MMOL/L (ref 23–30)
TOTAL PROTEIN: 4.7 G/DL (ref 6.4–8.3)
TRIGL SERPL-MCNC: 422 MG/DL
WBC # BLD: 10.1 K/UL (ref 3.5–11.3)
WBC # BLD: 10.8 K/UL (ref 3.5–11.3)
WBC # BLD: 9 K/UL (ref 3.5–11.3)
WBC # BLD: ABNORMAL 10*3/UL

## 2021-10-17 PROCEDURE — 82947 ASSAY GLUCOSE BLOOD QUANT: CPT

## 2021-10-17 PROCEDURE — 6360000002 HC RX W HCPCS: Performed by: THORACIC SURGERY (CARDIOTHORACIC VASCULAR SURGERY)

## 2021-10-17 PROCEDURE — 2500000003 HC RX 250 WO HCPCS

## 2021-10-17 PROCEDURE — 6360000002 HC RX W HCPCS: Performed by: STUDENT IN AN ORGANIZED HEALTH CARE EDUCATION/TRAINING PROGRAM

## 2021-10-17 PROCEDURE — 3700000000 HC ANESTHESIA ATTENDED CARE

## 2021-10-17 PROCEDURE — 85390 FIBRINOLYSINS SCREEN I&R: CPT

## 2021-10-17 PROCEDURE — 99292 CRITICAL CARE ADDL 30 MIN: CPT | Performed by: INTERNAL MEDICINE

## 2021-10-17 PROCEDURE — 99233 SBSQ HOSP IP/OBS HIGH 50: CPT | Performed by: INTERNAL MEDICINE

## 2021-10-17 PROCEDURE — 82330 ASSAY OF CALCIUM: CPT

## 2021-10-17 PROCEDURE — 85730 THROMBOPLASTIN TIME PARTIAL: CPT

## 2021-10-17 PROCEDURE — 10700799 HC NON-CHARGEABLE SUPPLY

## 2021-10-17 PROCEDURE — 6370000000 HC RX 637 (ALT 250 FOR IP)

## 2021-10-17 PROCEDURE — 94003 VENT MGMT INPAT SUBQ DAY: CPT

## 2021-10-17 PROCEDURE — 85576 BLOOD PLATELET AGGREGATION: CPT

## 2021-10-17 PROCEDURE — 6370000000 HC RX 637 (ALT 250 FOR IP): Performed by: STUDENT IN AN ORGANIZED HEALTH CARE EDUCATION/TRAINING PROGRAM

## 2021-10-17 PROCEDURE — 5A1522F EXTRACORPOREAL OXYGENATION, MEMBRANE, CENTRAL: ICD-10-PCS | Performed by: INTERNAL MEDICINE

## 2021-10-17 PROCEDURE — 2100000001 HC CVICU R&B

## 2021-10-17 PROCEDURE — 2580000003 HC RX 258: Performed by: INTERNAL MEDICINE

## 2021-10-17 PROCEDURE — 6360000002 HC RX W HCPCS: Performed by: NURSE ANESTHETIST, CERTIFIED REGISTERED

## 2021-10-17 PROCEDURE — 82803 BLOOD GASES ANY COMBINATION: CPT

## 2021-10-17 PROCEDURE — 33948 ECMO/ECLS DAILY MGMT-VENOUS: CPT

## 2021-10-17 PROCEDURE — 85014 HEMATOCRIT: CPT

## 2021-10-17 PROCEDURE — 2000000000 HC ICU R&B

## 2021-10-17 PROCEDURE — 6370000000 HC RX 637 (ALT 250 FOR IP): Performed by: INTERNAL MEDICINE

## 2021-10-17 PROCEDURE — 2500000003 HC RX 250 WO HCPCS: Performed by: NURSE ANESTHETIST, CERTIFIED REGISTERED

## 2021-10-17 PROCEDURE — 2500000003 HC RX 250 WO HCPCS: Performed by: STUDENT IN AN ORGANIZED HEALTH CARE EDUCATION/TRAINING PROGRAM

## 2021-10-17 PROCEDURE — 85384 FIBRINOGEN ACTIVITY: CPT

## 2021-10-17 PROCEDURE — 2500000003 HC RX 250 WO HCPCS: Performed by: INTERNAL MEDICINE

## 2021-10-17 PROCEDURE — 10700799 HC MISC INTRODUCER/SHEATH

## 2021-10-17 PROCEDURE — C1894 INTRO/SHEATH, NON-LASER: HCPCS

## 2021-10-17 PROCEDURE — 37799 UNLISTED PX VASCULAR SURGERY: CPT

## 2021-10-17 PROCEDURE — 33946 ECMO/ECLS INITIATION VENOUS: CPT | Performed by: INTERNAL MEDICINE

## 2021-10-17 PROCEDURE — 85027 COMPLETE CBC AUTOMATED: CPT

## 2021-10-17 PROCEDURE — 10700799 HC MISC GUIDEWIRE

## 2021-10-17 PROCEDURE — 2580000003 HC RX 258: Performed by: STUDENT IN AN ORGANIZED HEALTH CARE EDUCATION/TRAINING PROGRAM

## 2021-10-17 PROCEDURE — 93312 ECHO TRANSESOPHAGEAL: CPT

## 2021-10-17 PROCEDURE — 33946 ECMO/ECLS INITIATION VENOUS: CPT

## 2021-10-17 PROCEDURE — 83735 ASSAY OF MAGNESIUM: CPT

## 2021-10-17 PROCEDURE — 71045 X-RAY EXAM CHEST 1 VIEW: CPT

## 2021-10-17 PROCEDURE — 84100 ASSAY OF PHOSPHORUS: CPT

## 2021-10-17 PROCEDURE — 85610 PROTHROMBIN TIME: CPT

## 2021-10-17 PROCEDURE — 80076 HEPATIC FUNCTION PANEL: CPT

## 2021-10-17 PROCEDURE — B246ZZ4 ULTRASONOGRAPHY OF RIGHT AND LEFT HEART, TRANSESOPHAGEAL: ICD-10-PCS | Performed by: INTERNAL MEDICINE

## 2021-10-17 PROCEDURE — 94761 N-INVAS EAR/PLS OXIMETRY MLT: CPT

## 2021-10-17 PROCEDURE — 99291 CRITICAL CARE FIRST HOUR: CPT | Performed by: INTERNAL MEDICINE

## 2021-10-17 PROCEDURE — 80048 BASIC METABOLIC PNL TOTAL CA: CPT

## 2021-10-17 PROCEDURE — 6360000002 HC RX W HCPCS

## 2021-10-17 PROCEDURE — 83605 ASSAY OF LACTIC ACID: CPT

## 2021-10-17 PROCEDURE — 3700000001 HC ADD 15 MINUTES (ANESTHESIA)

## 2021-10-17 PROCEDURE — 86140 C-REACTIVE PROTEIN: CPT

## 2021-10-17 PROCEDURE — 85347 COAGULATION TIME ACTIVATED: CPT

## 2021-10-17 PROCEDURE — 2700000000 HC OXYGEN THERAPY PER DAY

## 2021-10-17 PROCEDURE — 2709999900 HC NON-CHARGEABLE SUPPLY

## 2021-10-17 PROCEDURE — C1769 GUIDE WIRE: HCPCS

## 2021-10-17 PROCEDURE — 85025 COMPLETE CBC W/AUTO DIFF WBC: CPT

## 2021-10-17 PROCEDURE — 93325 DOPPLER ECHO COLOR FLOW MAPG: CPT

## 2021-10-17 PROCEDURE — 84478 ASSAY OF TRIGLYCERIDES: CPT

## 2021-10-17 RX ORDER — ROCURONIUM BROMIDE 10 MG/ML
INJECTION, SOLUTION INTRAVENOUS PRN
Status: DISCONTINUED | OUTPATIENT
Start: 2021-10-17 | End: 2021-10-17 | Stop reason: SDUPTHER

## 2021-10-17 RX ORDER — POTASSIUM BICARBONATE 25 MEQ/1
50 TABLET, EFFERVESCENT ORAL ONCE
Status: COMPLETED | OUTPATIENT
Start: 2021-10-17 | End: 2021-10-17

## 2021-10-17 RX ORDER — HEPARIN SODIUM 1000 [USP'U]/ML
10 INJECTION, SOLUTION INTRAVENOUS; SUBCUTANEOUS PRN
Status: DISCONTINUED | OUTPATIENT
Start: 2021-10-17 | End: 2021-10-28

## 2021-10-17 RX ORDER — HEPARIN SODIUM,PORCINE 10 UNIT/ML
3 VIAL (ML) INTRAVENOUS EVERY 12 HOURS
Status: DISCONTINUED | OUTPATIENT
Start: 2021-10-17 | End: 2021-10-22

## 2021-10-17 RX ORDER — HEPARIN SODIUM 1000 [USP'U]/ML
INJECTION, SOLUTION INTRAVENOUS; SUBCUTANEOUS PRN
Status: DISCONTINUED | OUTPATIENT
Start: 2021-10-17 | End: 2021-10-17 | Stop reason: SDUPTHER

## 2021-10-17 RX ORDER — OXYCODONE HYDROCHLORIDE AND ACETAMINOPHEN 5; 325 MG/1; MG/1
1 TABLET ORAL EVERY 4 HOURS PRN
Status: DISCONTINUED | OUTPATIENT
Start: 2021-10-17 | End: 2021-10-30 | Stop reason: HOSPADM

## 2021-10-17 RX ORDER — HEPARIN SODIUM 1000 [USP'U]/ML
20 INJECTION, SOLUTION INTRAVENOUS; SUBCUTANEOUS PRN
Status: DISCONTINUED | OUTPATIENT
Start: 2021-10-17 | End: 2021-10-28

## 2021-10-17 RX ADMIN — HEPARIN SODIUM AND DEXTROSE 18 UNITS/KG/HR: 10000; 5 INJECTION INTRAVENOUS at 19:12

## 2021-10-17 RX ADMIN — POTASSIUM BICARBONATE 50 MEQ: 977.5 TABLET, EFFERVESCENT ORAL at 10:55

## 2021-10-17 RX ADMIN — CISATRACURIUM BESYLATE 3.5 MCG/KG/MIN: 200 INJECTION, SOLUTION INTRAVENOUS at 22:29

## 2021-10-17 RX ADMIN — HEPARIN SODIUM 3000 UNITS: 1000 INJECTION, SOLUTION INTRAVENOUS; SUBCUTANEOUS at 13:29

## 2021-10-17 RX ADMIN — PROPOFOL 50 MCG/KG/MIN: 10 INJECTION, EMULSION INTRAVENOUS at 14:35

## 2021-10-17 RX ADMIN — Medication 100 MG: at 21:26

## 2021-10-17 RX ADMIN — Medication 30 UNITS: at 17:39

## 2021-10-17 RX ADMIN — Medication 5 MG/HR: at 00:25

## 2021-10-17 RX ADMIN — KETAMINE HYDROCHLORIDE 0.2 MG/KG/HR: 50 INJECTION INTRAMUSCULAR; INTRAVENOUS at 18:26

## 2021-10-17 RX ADMIN — Medication 200 MCG/HR: at 00:26

## 2021-10-17 RX ADMIN — Medication 1 TABLET: at 08:18

## 2021-10-17 RX ADMIN — SODIUM CHLORIDE, PRESERVATIVE FREE 10 ML: 5 INJECTION INTRAVENOUS at 08:19

## 2021-10-17 RX ADMIN — Medication 30 UNITS: at 17:38

## 2021-10-17 RX ADMIN — OXYCODONE HYDROCHLORIDE AND ACETAMINOPHEN 1 TABLET: 5; 325 TABLET ORAL at 21:26

## 2021-10-17 RX ADMIN — CISATRACURIUM BESYLATE 2.5 MCG/KG/MIN: 200 INJECTION, SOLUTION INTRAVENOUS at 08:24

## 2021-10-17 RX ADMIN — PROPOFOL 50 MCG/KG/MIN: 10 INJECTION, EMULSION INTRAVENOUS at 03:08

## 2021-10-17 RX ADMIN — ROCURONIUM BROMIDE 50 MG: 10 INJECTION INTRAVENOUS at 12:25

## 2021-10-17 RX ADMIN — Medication 200 MCG/HR: at 18:25

## 2021-10-17 RX ADMIN — SODIUM CHLORIDE, POTASSIUM CHLORIDE, SODIUM LACTATE AND CALCIUM CHLORIDE: 600; 310; 30; 20 INJECTION, SOLUTION INTRAVENOUS at 00:26

## 2021-10-17 RX ADMIN — SODIUM CHLORIDE, POTASSIUM CHLORIDE, SODIUM LACTATE AND CALCIUM CHLORIDE 100 ML/HR: 600; 310; 30; 20 INJECTION, SOLUTION INTRAVENOUS at 08:19

## 2021-10-17 RX ADMIN — PROPOFOL 50 MCG/KG/MIN: 10 INJECTION, EMULSION INTRAVENOUS at 09:00

## 2021-10-17 RX ADMIN — Medication 100 MG: at 08:18

## 2021-10-17 RX ADMIN — PROPOFOL 50 MCG/KG/MIN: 10 INJECTION, EMULSION INTRAVENOUS at 03:09

## 2021-10-17 RX ADMIN — HEPARIN SODIUM 3000 UNITS: 1000 INJECTION, SOLUTION INTRAVENOUS; SUBCUTANEOUS at 13:17

## 2021-10-17 RX ADMIN — HEPARIN SODIUM AND DEXTROSE 19 UNITS/KG/HR: 10000; 5 INJECTION INTRAVENOUS at 10:57

## 2021-10-17 RX ADMIN — Medication 200 MCG/HR: at 11:46

## 2021-10-17 RX ADMIN — Medication 200 MCG/HR: at 06:25

## 2021-10-17 RX ADMIN — Medication 200 MCG/HR: at 22:56

## 2021-10-17 RX ADMIN — DEXAMETHASONE SODIUM PHOSPHATE 6 MG: 10 INJECTION INTRAMUSCULAR; INTRAVENOUS at 08:18

## 2021-10-17 RX ADMIN — DOCUSATE SODIUM 50MG AND SENNOSIDES 8.6MG 2 TABLET: 8.6; 5 TABLET, FILM COATED ORAL at 08:18

## 2021-10-17 ASSESSMENT — PULMONARY FUNCTION TESTS
PIF_VALUE: 0
PIF_VALUE: 1
PIF_VALUE: 0
PIF_VALUE: 20
PIF_VALUE: 0
PIF_VALUE: 20
PIF_VALUE: 0
PIF_VALUE: 20
PIF_VALUE: 0
PIF_VALUE: 23
PIF_VALUE: 0
PIF_VALUE: 1
PIF_VALUE: 0
PIF_VALUE: 0
PIF_VALUE: 1
PIF_VALUE: 0
PIF_VALUE: 20
PIF_VALUE: 0
PIF_VALUE: 20
PIF_VALUE: 0
PIF_VALUE: 27
PIF_VALUE: 0
PIF_VALUE: 1
PIF_VALUE: 0
PIF_VALUE: 1
PIF_VALUE: 0
PIF_VALUE: 29
PIF_VALUE: 1

## 2021-10-17 ASSESSMENT — PAIN SCALES - GENERAL: PAINLEVEL_OUTOF10: 0

## 2021-10-17 NOTE — PROGRESS NOTES
ECMO INITIATION:     Patient on ECMO time 1332 w/ecmo specialist documenting,   cannulated in the cath lab for V-V ECMO by Dr. Elaina Broderick and Dr. John Hull with a 30 Fr [place] cannula in the right IJ under ultrasound guidance and transesophageal echocardiogram placement verification. No complications.

## 2021-10-17 NOTE — PLAN OF CARE
Problem: Airway Clearance - Ineffective  Goal: Achieve or maintain patent airway  Outcome: Ongoing     Problem: Gas Exchange - Impaired  Goal: Absence of hypoxia  Outcome: Ongoing  Goal: Promote optimal lung function  Outcome: Ongoing     Problem: Breathing Pattern - Ineffective  Goal: Ability to achieve and maintain a regular respiratory rate  10/17/2021 1206 by Trey Frederick RN  Outcome: Ongoing  10/16/2021 2207 by Jaspal Garcia RN  Outcome: Ongoing     Problem:  Body Temperature -  Risk of, Imbalanced  Goal: Ability to maintain a body temperature within defined limits  10/17/2021 1206 by Trey Frederick RN  Outcome: Ongoing  10/16/2021 2207 by Jaspal Garcia RN  Outcome: Ongoing  Goal: Will regain or maintain usual level of consciousness  Outcome: Ongoing  Goal: Complications related to the disease process, condition or treatment will be avoided or minimized  Outcome: Ongoing     Problem: Isolation Precautions - Risk of Spread of Infection  Goal: Prevent transmission of infection  10/17/2021 1206 by Trey Frederick RN  Outcome: Ongoing  10/16/2021 2207 by Jaspal Garcia RN  Outcome: Ongoing     Problem: Nutrition Deficits  Goal: Optimize nutritional status  10/17/2021 1206 by Trey Frederick RN  Outcome: Ongoing  10/16/2021 2207 by Jaspal Garcia RN  Outcome: Ongoing     Problem: Risk for Fluid Volume Deficit  Goal: Maintain normal heart rhythm  Outcome: Ongoing  Goal: Maintain absence of muscle cramping  Outcome: Ongoing  Goal: Maintain normal serum potassium, sodium, calcium, phosphorus, and pH  Outcome: Ongoing     Problem: Loneliness or Risk for Loneliness  Goal: Demonstrate positive use of time alone when socialization is not possible  Outcome: Ongoing     Problem: Fatigue  Goal: Verbalize increase energy and improved vitality  10/17/2021 1206 by Trey Frederick RN  Outcome: Ongoing  10/16/2021 2207 by Jaspal Garcia RN  Outcome: Ongoing     Problem: Patient Education: Go to Patient Education Activity  Goal: Patient/Family Education  10/17/2021 1206 by Dickie Canavan, RN  Outcome: Ongoing  10/16/2021 2207 by Bayron Paige RN  Outcome: Ongoing     Problem: Nutrition  Goal: Optimal nutrition therapy  Outcome: Ongoing     Problem: Skin Integrity:  Goal: Will show no infection signs and symptoms  Description: Will show no infection signs and symptoms  Outcome: Ongoing  Goal: Absence of new skin breakdown  Description: Absence of new skin breakdown  Outcome: Ongoing     Problem: OXYGENATION/RESPIRATORY FUNCTION  Goal: Patient will maintain patent airway  Outcome: Ongoing  Goal: Patient will achieve/maintain normal respiratory rate/effort  Description: Respiratory rate and effort will be within normal limits for the patient  Outcome: Ongoing     Problem: MECHANICAL VENTILATION  Goal: Patient will maintain patent airway  Outcome: Ongoing  Goal: Oral health is maintained or improved  Outcome: Ongoing  Goal: ET tube will be managed safely  Outcome: Ongoing  Goal: Ability to express needs and understand communication  Outcome: Ongoing  Goal: Mobility/activity is maintained at optimum level for patient  Outcome: Ongoing     Problem: SKIN INTEGRITY  Goal: Skin integrity is maintained or improved  Outcome: Ongoing   Electronically signed by Dickie Canavan, RN on 10/17/2021 at 12:07 PM

## 2021-10-17 NOTE — ANESTHESIA POSTPROCEDURE EVALUATION
Department of Anesthesiology  Postprocedure Note    Patient: Freda Bose  MRN: [id number]  YOB: 1990  Date of evaluation: 10/17/2021  Time:  2:49 PM     Procedure Summary     Date: 10/17/21 Room / Location: [facility] Cath Lab    Anesthesia Start: 1210 Anesthesia Stop: 6717    Procedure: CATH LAB WITH ANESTHESIA Diagnosis:     Scheduled Providers:  Responsible Provider: Michelle Curtis MD    Anesthesia Type: general ASA Status: 4          Anesthesia Type: general    Jazzmine Phase I:      Jazzmine Phase II:      Last vitals: Reviewed and per EMR flowsheets. Anesthesia Post Evaluation    Patient location during evaluation: ICU  Patient participation: complete - patient cannot participate  Level of consciousness: sedated and ventilated  Pain scale: Sedated.   Airway patency: patent  Nausea & Vomiting: no nausea and no vomiting  Complications: no  Cardiovascular status: hemodynamically stable  Respiratory status: acceptable, ventilator and intubated (VV ECMO)  Hydration status: euvolemic

## 2021-10-17 NOTE — ANESTHESIA PROCEDURE NOTES
Central Venous Line:    A central venous line was placed using ultrasound guidance, in the OR for the following indication(s): central venous access. 10/17/2021 12:40 PM10/17/2021 12:50 PM    Sterility preparation included the following: hand hygiene performed prior to procedure, maximum sterile barriers used and sterile technique used to drape from head to toe. The patient was placed in Trendelenburg position. The left subclavian vein was prepped. The site was prepped with Chloraprep. A 7 Fr (size), 20 (length), introducer triple lumen was placed. During the procedure, the following specific steps were taken: target vein identified, needle advanced into vein and blood aspirated and guidewire advanced into vein. Intravenous verification was obtained by venous blood return and x-ray. Post insertion care included: all ports aspirated, all ports flushed easily, guidewire removed intact, Biopatch applied, line sutured in place and dressing applied. During the procedure the patient experienced: patient tolerated procedure well with no complications.       Anesthesia type: general..No  Staffing  Performed: Anesthesiologist   Anesthesiologist: Mana Beavers MD  Preanesthetic Checklist  Completed: patient identified, IV checked, site marked, risks and benefits discussed, surgical consent, monitors and equipment checked, pre-op evaluation, timeout performed, anesthesia consent given, oxygen available and patient being monitored

## 2021-10-17 NOTE — OP NOTE
Operative Note      Patient: Luis Richmond  YOB: 1990  MRN: [id number]    Date of Procedure: 10/17/2021    Pre-Op Diagnosis: ARDS, COVID-19, Refractory hypoxia on  Mechanical ventilation     Post-Op Diagnosis: Same        Procedure:  1. Ultrasound guided right IJ venous access  2. Placement of 30 Fr VV ECMO ([place]) cannula in the right atrium   3.  Transesophageal echocardiogram     Operators:  Timothy Andrew/Josafat     Anesthesia: Local     Estimated Blood Loss (mL): 50 cc    Complications: None    Electronically signed by Jaquan Ye MD on 10/17/2021 at 1:51 PM

## 2021-10-17 NOTE — FLOWSHEET NOTE
Pts. Family requested Zoom call with Pt. To give love and support before procedure.  created zoom call and family participated with nurse. Questions were asked and nursed provided feed back. 10/17/21 1126   Encounter Summary   Services provided to: Patient and family together   Referral/Consult From: Multi-disciplinary team   Support System Family members   Continue Visiting   (10/17/21)   Complexity of Encounter Low   Length of Encounter 15 minutes   Spiritual Assessment Completed Yes   Routine   Type Initial   Assessment Calm; Approachable; Hopeful   Intervention Active listening;Contacted support as requested per patient/family request   Outcome Comfort;Expressed gratitude; Hopeful

## 2021-10-17 NOTE — PROGRESS NOTES
[facility] [address] [facility] Patient  Today's Date and Time: 10/17/2021, 11:36 AM    Impression :     COVID 19 Confirmed Infection  Covid tests:  10/10/21: Positive  26 weeks gestation pregnancy  S/P C Section 10-15-21  Acute hypoxic respiratory distress  Pulmonary embolism LLL  Metabolic acidosis with euglycemic starvation    Recommendations:   Antibiotic treatment:  Monitor off antibiotics  Covid Rx:  Remdesivir-Out of window  Decadron-Initiated 10/14/21. Stop date 10-24-21  Prophylactic anticoagulation  DEFER barcitinib and  Actemra because of pregnancy  Intubated 10/14  Plans for ECMO        Medical Decision Making/Summary/Discussion:10/17/2021     Patient admitted with suspected COVID 19 infection  Covid test confirmed positive. Infection Control Recommendations   [place] Precautions  Airborne isolation  Droplet plus Isolation      Start of illness 10/1/21  Stop isolation after 10/20  Antimicrobial Stewardship Recommendations       Discontinuation of therapy  Coordination of Outpatient Care:   Estimated Length of IV antimicrobials:TBD  Patient will need Midline Catheter Insertion: TBD  Patient will need PICC line Insertion: No  Patient will need: Home IV , [place],  SNF,  LTAC:TBD  Patient will need outpatient wound care:No    Chief complaint/reason for consultation:   Concern for COVID infection      History of Present Illness:   Karina Aaron is a 27y.o.-year-old  female who was initially admitted on 10/13/2021. Patient seen at the request of Dr. Rahul Nichole:    Patient, who is 26 weeks pregnant, initially presented to [facility] ED on 10/10/21 presented through ER with complaints of productive cough with yellow sputum X 9 days & shortness of breath with pleuritic chest pain that started the day before. She ws also experiencing decreased appetite because of loss of taste and smell.      Work-up at that time revealed the patient to be Covid positive. She was tachycardic with an SpO2 of 94-98% on room air. CXR showed bilateral ill-defined pulmonary opacities suggesting COVID pneumonia. After receiving a small fluid bolus and discussing her case with the patient's OBGYN, Dr. Mary Ann Larose, she was discharged home with instructions to return if her symptoms worsen. On 10/13/21, the patient again presented to [facility] ED after a home SpO2 reading was 88%. A CT chest revealed a small, non-obstructing LLL PE for which she was initiated on a heparin gtt. The patient's respiratory status worsened throughout the day and she was life-flighted to [facility] for a higher level of care. The patient was found to be tachypneic and tachycardic upon arrival. She was maintaining her SpO2 >95% on 15 L/min non-re-breather. Heparin gtt was continued and the patient was also ordered celestone for fetal lung maturity. The patient was admitted to the Covid ICU      CURRENT EVALUATION : 10/17/2021     Patient evaluated and examined in the ICU. Afebrile  VS stable    Patient underwent C section on 10-15-21 because of worsening respiratory function. Incision clean, healing    On vent. Difficulties persist with ventilation despite prone ventilation  RR 24  Fi02 85-->55  PEEP 14  02 sat 97-->94    ECMO planned at [facility] later this morning      Labs, X rays reviewed: 10/17/2021    BUN:4-->7-->6  Cr:0.25-->0.34    WBC:8.2-->9.5-->9.0  Hb:11.3-->8.6-->8.4  Plat: 262-->233-->289    Absolute Neutrophils:6.88  Absolute Lymphocytes:0.66  Neutrophil/Lymphocyte Ratio: 10.4 high risk    CRP: 59.8-->30.8  Ferritin:264-->200  LDH: 389    D-dimer: 0.72  Fibrinogen: 592    Pro Calcitonin:      Cultures:  Urine:  10-11-21: No growth   Blood:    Sputum :    Wound:      T pallidum: negative     CXR:     10-16-21:      10-15-21:      10/13/21      10/10/21          CAT:  10/13/21    Discussed with patient, RN, CC, IM.     I have personally reviewed the past medical history, past surgical history, medications, social history, and family history, and I have updated the database accordingly.   Past Medical History:     Past Medical History:   Diagnosis Date    Anxiety     GERD (gastroesophageal reflux disease)     Migraines     Seasonal allergies        Past Surgical  History:     Past Surgical History:   Procedure Laterality Date    FOOT SURGERY      nerve release, and plantar fascitis    HC  PICC POWERPIC TRIPLE  10/15/2021         MANDIBLE SURGERY      tooth implant    MOUTH SURGERY      WISDOM TOOTH EXTRACTION         Medications:      prenatal vitamin  1 tablet Oral Daily    Tdap-Dtap  0.5 mL IntraMUSCular Prior to discharge    lidocaine 1 % injection  5 mL IntraDERmal Once    sodium chloride flush  5-40 mL IntraVENous 2 times per day    docusate  100 mg Oral BID    sennosides-docusate sodium  2 tablet Oral Daily    insulin lispro  0-12 Units SubCUTAneous Q6H    dexamethasone  6 mg IntraVENous Q24H       Social History:     Social History     Socioeconomic History    Marital status:      Spouse name: Not on file    Number of children: Not on file    Years of education: Not on file    Highest education level: Not on file   Occupational History    Not on file   Tobacco Use    Smoking status: Former Smoker     Types: Cigarettes    Smokeless tobacco: Never Used   Vaping Use    Vaping Use: Former    Substances: Nicotine   Substance and Sexual Activity    Alcohol use: Not Currently    Drug use: Not Currently     Types: Marijuana     Comment: last smoked May 2021    Sexual activity: Yes   Other Topics Concern    Not on file   Social History Narrative    Not on file     Social Determinants of Health     Financial Resource Strain:     Difficulty of Paying Living Expenses:    Food Insecurity:     Worried About Running Out of Food in the Last Year:     [address] in the Last Year:    Transportation Needs:     Lack of Transportation (Medical):      Lack of Transportation (Non-Medical):    Physical Activity:     Days of Exercise per Week:     Minutes of Exercise per Session:    Stress:     Feeling of Stress :    Social Connections:     Frequency of Communication with Friends and Family:     Frequency of Social Gatherings with Friends and Family:     Attends Tenri[employer] Services:     Active Member of Clubs or Organizations:     Attends Club or Organization Meetings:     Marital Status:    Intimate Partner Violence:     Fear of Current or Ex-Partner:     Emotionally Abused:     Physically Abused:     Sexually Abused:        Family History:     Family History   Problem Relation Age of Onset    Breast Cancer Maternal Grandmother         older than 48    Heart Attack Paternal Uncle     Stroke Maternal Aunt     Hypertension Father     No Known Problems Paternal Grandfather     Stroke Paternal Grandmother     No Known Problems Maternal Grandfather     Hypertension Mother     No Known Problems Brother     No Known Problems Brother         Allergies:   Vicodin [hydrocodone-acetaminophen]     Review of Systems:     Review of Systems   Unable to perform ROS: Intubated          Physical Examination :     Patient Vitals for the past 8 hrs:   BP Temp Temp src Pulse Resp SpO2 Weight   10/17/21 1115 -- -- -- 89 24 94 % --   10/17/21 1000 135/68 -- -- 82 24 96 % --   10/17/21 0900 125/68 -- -- 87 24 96 % --   10/17/21 0830 -- 97.9 °F (36.6 °C) Esophageal -- -- -- --   10/17/21 0803 -- -- -- -- 24 96 % --   10/17/21 0801 -- -- -- 89 18 97 % --   10/17/21 0800 -- -- -- 86 24 97 % --   10/17/21 0700 -- -- -- 93 24 94 % --   10/17/21 0646 -- -- -- -- -- -- 224 lb 13.9 oz (102 kg)   10/17/21 0636 -- -- -- -- 24 91 % --   10/17/21 0600 -- 98.2 °F (36.8 °C) Esophageal 111 24 91 % --   10/17/21 0500 135/70 -- -- 84 24 97 % --   10/17/21 0400 125/64 98.8 °F (37.1 °C) Esophageal 90 24 96 % --   10/17/21 0352 -- -- -- 91 19 97 % --   10/17/21 0338 -- 10/13/2021    TURBIDITY Clear 10/13/2021     Lab Results   Component Value Date    CREATININE 0.34 10/17/2021    GLUCOSE 92 10/17/2021       Medical Decision Making-Imaging:     Narrative   EXAMINATION:   CTA OF THE CHEST 10/13/2021 2:16 pm       TECHNIQUE:   CTA of the chest was performed after the administration of intravenous   contrast.  Multiplanar reformatted images are provided for review.  MIP   images are provided for review. Dose modulation, iterative reconstruction,   and/or weight based adjustment of the mA/kV was utilized to reduce the   radiation dose to as low as reasonably achievable.       COMPARISON:   Chest x-rays over the last few days. .       HISTORY:   ORDERING SYSTEM PROVIDED HISTORY: sob, tachy, 32 WEEKS PREGNANT, Spoke with   Radiologist   TECHNOLOGIST PROVIDED HISTORY:   sob, tachy, 32 WEEKS PREGNANT, Spoke with Radiologist   Decision Support Exception - unselect if not a suspected or confirmed   emergency medical condition->Emergency Medical Condition (MA)       FINDINGS:   Pulmonary Arteries:  There is a small nonocclusive segmental to subsegmental   embolism noted in the left lower lobe, on and around axial image [id number].  No   other definite emboli are seen.  No central emboli.  No pulmonary arterial   enlargement is identified.       Mediastinum: Mediastinal and hilar lymphadenopathy is identified.  No focal   esophageal thickening is identified.  The thyroid gland appears unremarkable.       Lungs/pleura: Multifocal ground-glass pneumonia with some mild areas of   consolidation.  No pleural effusion is identified.       Upper Abdomen: No acute process seen in the upper abdomen.       Soft Tissues/Bones: No axillary or supraclavicular lymphadenopathy is   identified.  No acute osseous abnormality is identified.           Impression   Single small nonocclusive segmental to subsegmental pulmonary embolism in the   left lower lobe.  No central emboli.  This was discussed with Kemal Garay at 2:44 p.m. on 10/13/2021.       Multilobar COVID-19 pneumonia.                 Medical Decision Wibcei-[id number]-[id number]:       Medical Decision Making-Other:     Note:  Labs, medications, radiologic studies were reviewed with personal review of films  Large amounts of data were reviewed  Discussed with nursing Staff, Discharge planner  Infection Control and Prevention measures reviewed  All prior entries were reviewed  Administer medications as ordered  Prognosis: Guarded  Discharge planning reviewed  Follow up as outpatient. Thank you for allowing us to participate in the care of this patient. Please call with questions.     Aroldo Ramey MD  Pager: [phone] - Office: [phone]

## 2021-10-17 NOTE — PROGRESS NOTES
Attending Physician Statement  I have discussed the care of Kimberly Cox, including pertinent history and exam findings,  with the pulmonary critical care fellow/resident/CNP. I have seen and examined the patient and the key elements of all parts of the encounter have been performed by me. I agree with the assessment, plan and orders as documented by the fellow/resident/CNP. Clinically unchanged overnight despite proning. Some fluctuation in FiO2 although gas exchange remains poor and chest x-ray shows bilateral infiltrates. Plan to proceed with [facility] ECMO. Critical care time 30 minutes.

## 2021-10-17 NOTE — PLAN OF CARE
Problem: Breathing Pattern - Ineffective  Goal: Ability to achieve and maintain a regular respiratory rate  10/16/2021 2207 by Jeremias Frost RN  Outcome: Ongoing     Problem:  Body Temperature -  Risk of, Imbalanced  Goal: Ability to maintain a body temperature within defined limits  10/16/2021 2207 by Jeremias Frost RN  Outcome: Ongoing     Problem: Isolation Precautions - Risk of Spread of Infection  Goal: Prevent transmission of infection  10/16/2021 2207 by Jeremias Frost RN  Outcome: Ongoing     Problem: Nutrition Deficits  Goal: Optimize nutritional status  10/16/2021 2207 by Jeremias Frost RN  Outcome: Ongoing     Problem: Fatigue  Goal: Verbalize increase energy and improved vitality  10/16/2021 2207 by Jeremias Frost RN  Outcome: Ongoing     Problem: Patient Education: Go to Patient Education Activity  Goal: Patient/Family Education  10/16/2021 2207 by Jeremias Frost RN  Outcome: Ongoing

## 2021-10-17 NOTE — PROGRESS NOTES
Critical Care - Progress Notes    Patient's name:  Dejon Lambert  Medical Record Number: [id number]  Patient's account/billing number: [de-identified]  Patient's YOB: 1990  Age: 27 y.o. Date of Admission: 10/13/2021  8:19 PM  Date of History and Physical Examination: 10/17/2021      Primary Care Physician: Yoli Madrigal  Attending Physician: Dr. Fely Pérez     Code Status: Full Code    Chief complaint:   Chief Complaint   Patient presents with    Other     covid positive      Overnight events:   Patient scheduled for ECMO at noon today. FiO2 up and down overnight as low as 45. HISTORY OF PRESENT ILLNESS:      History was obtained from chart review and the patient. Dejon Lambert is a 27 y.o. with PMH of current pregnancy at 26 weeks and 3 days gestation who initially presented to outside facility, [facility] on 10/13/2021. Patient reported that symptoms began on 10/01 although she did not test positive until 10/10. Patient reports that on 10/01 she started having general symptoms including shortness of breath and cough. Patient has had associated decreased oral intake for 7 days now reportedly having her last meal on 10/7/2021. Patient states that she has been tolerating oral liquids since that time although has not been able to keep any substantial food products down. Upon arrival to outside facility patient was noted to have an SPO2 of 90% with tachycardia and tachypnea. Patient was transferred to [facility] labor and delivery unit for higher level of care due to patient being 26 weeks gestation and need for a tertiary facility pending patient may require immediate delivery. Initially was accepted by internal medicine team due to main concern for COVID pneumonia being primary cause of hospitalization and patient was planned to e stabilized in the labor and delivery unit with continuous fetal monitoring and transferred to covid unit as able.      Critical care consulted due to patient having an VBG which was notable for pH of 7.2 and increasing new oxygen requirements upon arrival to our facility. Discussion with OB team was had and it was noted that despite patient's acidosis she was compensating respiratory wise significantly and CO2 was initially 18 with a bicarb of 7. At this time consideration for euglycemic vs starvation ketoacidosis was made due to picture being more metabolic acidosis with an appropriate respiratory compromise although acidosis was uncompensated for fully. Upon my evaluation of patient she is tachypneic and tachycardic although able to respond appropriately A+O x4. Patient is denying any chest pain at this time. Has had intermittent diarrhea and vomiting since onset of symptoms. Does have some nausea although no vomiting currently. Denies any dysuria. Patient does note that she has had increased thirst and increased frequency of urination. Admission VBG showed pH of 7.2, PCO2 of 18, HCO3 of 7 and Lactic of 0.8    Labs showed no leukocytosis,  normal electrolites (  ), normal kidney function tests, and normal LFT. Xray showed: Scattered bilateral pulmonary infiltrates consistent with pneumonia which has mildly worsened compared to prior study on 10/10. CT with PE protocol reviewed from previous facility. Small subsegmental pulmonary embolism noted. Pt was started on nonrebreather at 15 L in the labor and delivery unit. Discussed with OB team and patient will transition to critical care team as primary, patient will be transferred to SICU unit and we will take over as primary with OB team following closely as well.     PAST MEDICAL HISTORY:         Diagnosis Date    Anxiety     GERD (gastroesophageal reflux disease)     Migraines     Seasonal allergies          PAST SURGICAL HISTORY:         Procedure Laterality Date    FOOT SURGERY      nerve release, and plantar fascitis      PICC POWERPIC TRIPLE  10/15/2021         MANDIBLE SURGERY      tooth implant    MOUTH SURGERY      WISDOM TOOTH EXTRACTION         ALLERGIES:      Allergies   Allergen Reactions    Vicodin [Hydrocodone-Acetaminophen]      nausea         HOME MEDS: :      Prior to Admission medications    Medication Sig Start Date End Date Taking? Authorizing Provider   doxyLAMINE succinate (UNISOM SLEEPTABS) 25 MG tablet Take 25 mg by mouth nightly   Yes Historical Provider, MD   Prenatal Vit-Fe Fumarate-FA (PRENATAL VITAMIN PO) Take 1 tablet by mouth daily    Yes Historical Provider, MD   omeprazole (PRILOSEC) 20 MG delayed release capsule  21   Historical Provider, MD       SOCIAL HISTORY:       TOBACCO:   reports that she has quit smoking. Her smoking use included cigarettes. She has never used smokeless tobacco.  ETOH:   reports previous alcohol use. DRUGS:  reports previous drug use. Drug: Marijuana.   OCCUPATION:  n/a     FAMILY HISTORY:          Problem Relation Age of Onset    Breast Cancer Maternal Grandmother         older than 48    Heart Attack Paternal Uncle     Stroke Maternal Aunt     Hypertension Father     No Known Problems Paternal Grandfather     Stroke Paternal Grandmother     No Known Problems Maternal Grandfather     Hypertension Mother     No Known Problems Brother     No Known Problems Brother        REVIEW OF SYSTEMS (ROS):     Review of Systems - unable to obtain due to patient intubation and sedated    OBJECTIVE:     VITAL SIGNS:  /70   Pulse 93   Temp 98.2 °F (36.8 °C) (Esophageal)   Resp 24   Ht 5' 4\" (1.626 m)   Wt 224 lb 13.9 oz (102 kg)   LMP 2021 (Approximate)   SpO2 94%   Breastfeeding Unknown   BMI 38.60 kg/m²   Tmax over 24 hours:  Temp (24hrs), Av.9 °F (36.6 °C), Min:96.3 °F (35.7 °C), Max:98.8 °F (37.1 °C)      Patient Vitals for the past 8 hrs:   BP Temp Temp src Pulse Resp SpO2 Weight   10/17/21 0700 -- -- -- 93 24 94 % --   10/17/21 0646 -- -- -- -- -- -- 224 lb 13.9 oz (102 kg)   10/17/21 8488 -- -- -- -- 24 91 % --   10/17/21 0600 -- 98.2 °F (36.8 °C) Esophageal 111 24 91 % --   10/17/21 0500 135/70 -- -- 84 24 97 % --   10/17/21 0400 125/64 98.8 °F (37.1 °C) Esophageal 90 24 96 % --   10/17/21 0352 -- -- -- 91 19 97 % --   10/17/21 0338 -- -- -- -- 24 97 % --   10/17/21 0300 121/69 -- -- 86 24 97 % --   10/17/21 0200 122/69 -- -- 84 24 97 % --   10/17/21 0100 128/73 -- -- 82 24 98 % --   10/17/21 0000 131/72 98.6 °F (37 °C) Esophageal 82 24 98 % --         Intake/Output Summary (Last 24 hours) at 10/17/2021 0739  Last data filed at 10/17/2021 0600  Gross per 24 hour   Intake 4766.69 ml   Output 3165 ml   Net 1601.69 ml     Date 10/17/21 0000 - 10/17/21 2359   Shift 9268-8774 3071-1276 1674-2423 24 Hour Total   INTAKE   I.V.(mL/kg) 1097. 7(10.8)   1097. 7(10.8)   NG/GT(mL/kg) 145(1.4)   145(1.4)   Shift Total(mL/kg) 1242. 7(12.2)   1242. 7(12.2)   OUTPUT   Urine(mL/kg/hr) 800   800   Shift Total(mL/kg) 800(7.8)   800(7.8)   Weight (kg) 102 102 102 102     Wt Readings from Last 3 Encounters:   10/17/21 224 lb 13.9 oz (102 kg)   10/13/21 225 lb (102.1 kg)   10/05/21 225 lb 12.8 oz (102.4 kg)     Body mass index is 38.6 kg/m². PHYSICAL EXAM:  Constitutional: INTUBATED and sedated  EENT: neck supple with midline trachea. Neck: Supple, symmetrical, trachea midline, no jvd, skin normal  Respiratory: labored breathing, tachypnea, course breath sounds bilaterally, diminished and course in bilateral lower lung fields. Cardiovascular: tachycardic and regular rhythm, normal heart sounds, no murmur noted  Abdomen: soft, nontender, nondistended, no masses or organomegaly, post c- section scar with dressing.    Extremities:   no pedal edema, no clubbing or cyanosis    MEDICATIONS:  Scheduled Meds:   prenatal vitamin  1 tablet Oral Daily    Tdap-Dtap  0.5 mL IntraMUSCular Prior to discharge    lidocaine 1 % injection  5 mL IntraDERmal Once    sodium chloride flush  5-40 mL IntraVENous 2 times per day    docusate  100 mg Oral BID    sennosides-docusate sodium  2 tablet Oral Daily    insulin lispro  0-12 Units SubCUTAneous Q6H    dexamethasone  6 mg IntraVENous Q24H     Continuous Infusions:   lactated ringers 100 mL/hr at 10/17/21 0026    propofol 50 mcg/kg/min (10/17/21 0309)    fentaNYL 200 mcg/hr (10/17/21 0625)    sodium chloride      midazolam 5 mg/hr (10/17/21 0025)    cisatracurium (NIMBEX) infusion 2.5 mcg/kg/min (10/16/21 1727)    dextrose      sodium chloride 5 mL/hr at 10/14/21 0700    heparin (PORCINE) Infusion 19 Units/kg/hr (10/16/21 2122)     PRN Meds:   sodium chloride flush, 10 mL, PRN  naloxone, 0.4 mg, PRN  ondansetron, 4 mg, Q6H PRN  diphenhydrAMINE, 25 mg, Q6H PRN  simethicone, 80 mg, Q6H PRN  polyethylene glycol, 17 g, Daily PRN  magnesium hydroxide, 30 mL, Daily PRN  bisacodyl, 10 mg, Daily PRN  lanolin, , Q1H PRN  LORazepam, 2 mg, Q5 Min PRN  sodium chloride flush, 5-40 mL, PRN  sodium chloride, 25 mL, PRN  glucose, 15 g, PRN  dextrose, 12.5 g, PRN  glucagon (rDNA), 1 mg, PRN  dextrose, 100 mL/hr, PRN  heparin (porcine), 80 Units/kg, PRN  heparin (porcine), 40 Units/kg, PRN  ondansetron, 4 mg, Q8H PRN   Or  ondansetron, 4 mg, Q6H PRN  acetaminophen, 650 mg, Q4H PRN          ABGs:   Lab Results   Component Value Date    [id number] NOT REPORTED 10/17/2021    FIO2 45.0 10/17/2021       DATA:  Complete Blood Count:   Recent Labs     10/15/21  0539 10/15/21  0539 10/15/21  1135 10/16/21  2285 10/17/21  5573   WBC 13.9*  --   --  9.5 9.0   RBC 3.31*  --   --  2.87* 2.74*   HGB 9.7*   < > 9.7* 8.6* 8.4*   HCT 29.7*   < > 30.1* 27.0* 25.8*   MCV 89.7  --   --  94.1 94.2   MCH 29.3  --   --  30.0 30.7   MCHC 32.7  --   --  31.9 32.6   RDW 14.0  --   --  14.3 14.2     --   --  233 289   MPV 9.8  --   --  9.4 9.4    < > = values in this interval not displayed.         Last 3 Blood Glucose:   Recent Labs     10/14/21  1742 10/14/21  2326 10/15/21  0225 10/15/21  0539 10/16/21  3737 XRAY VIEW OF THE CHEST 10/10/2021 12:34 pm COMPARISON: None. HISTORY: ORDERING SYSTEM PROVIDED HISTORY: sob cough poss covid TECHNOLOGIST PROVIDED HISTORY: sob cough poss covid FINDINGS: Cardiomediastinal silhouette within normal limits. Bilateral ill-defined pulmonary opacities. No pneumothorax. No pleural effusion. Bilateral ill-defined pulmonary opacities suggesting COVID pneumonia     XR CHEST PORTABLE    Result Date: 10/13/2021  EXAMINATION: ONE XRAY VIEW OF THE CHEST 10/13/2021 11:00 am COMPARISON: Chest radiograph performed 10/10/2021. HISTORY: ORDERING SYSTEM PROVIDED HISTORY: dyspnea TECHNOLOGIST PROVIDED HISTORY: dyspnea FINDINGS: There are scattered bilateral infiltrates that are mildly worse. There is no pneumothorax. The heart is prominent. The upper abdomen unremarkable. The extrathoracic soft tissues are unremarkable. Scattered bilateral pulmonary infiltrates consistent with pneumonia and this is mildly worse compared to prior. CT CHEST PULMONARY EMBOLISM W CONTRAST    Result Date: 10/13/2021  EXAMINATION: CTA OF THE CHEST 10/13/2021 2:16 pm TECHNIQUE: CTA of the chest was performed after the administration of intravenous contrast.  Multiplanar reformatted images are provided for review. MIP images are provided for review. Dose modulation, iterative reconstruction, and/or weight based adjustment of the mA/kV was utilized to reduce the radiation dose to as low as reasonably achievable. COMPARISON: Chest x-rays over the last few days. [facility]any [facility] HISTORY: ORDERING SYSTEM PROVIDED HISTORY: sob, tachy, 32 WEEKS PREGNANT, Spoke with Radiologist TECHNOLOGIST PROVIDED HISTORY: sob, tachy, 26 WEEKS PREGNANT, Spoke with Radiologist Decision Support Exception - unselect if not a suspected or confirmed emergency medical condition->Emergency Medical Condition (MA) FINDINGS: Pulmonary Arteries:  There is a small nonocclusive segmental to subsegmental embolism noted in the left lower lobe, on and around recommendations    - Received Betamethasone 12 mg for fetal lung maturity x's 1    - OB team recommending Betamethasone 12 mg for one more dose, 24 hours after initial   - After Betamethasone course will proceed with Decadron 6 mg daily of 10 days, as per ID   -Patient to be seen by cardiothoracic surgery today in anticipation that she be placed on ecmo here at our facility. Plan to transfer the patient to the cardiothoracic unit after cath lab. Acute Hypoxic Respiratory Failure 2nd to ARDS 2nd to COVID   - Initial saturations in the high 80's on non-rebreather   - Currently intubated,sedated, and paralyzed   - Patient proned overnight. Currently on  schedule. - Plan to start ECMO today. We are hopeful once she is on ECMO to extubate. She is have an IJ cannulation. She is currently NPO for the cath lab. Metabolic acidosis with inadequate respiratory compensation    - Starvation ketoacidosis suspected vs euglycemic acidosis     -Resolved. Bicarb drip has been discontinued and started on LR    Subsegmental PE   - Continue heparin gtt    - CT from previous facility reviewed - small subsegmental PE noted    - PTT q6 hours    Pregnancy at 26 weeks and 3 days ,s/p emergent  on 10/15/21   - Discussed with OB team, they will be following closely    - No vaginal bleeding. Metabolites   - Hypokalemia. This was replaced today. Magnesium 2. We will continue to monitor      Patient to continue care in the ICU for close monitoring of respiratory status. Plan to transfer to the cardio ICU after cath lab and ECMO.    -------------------------------------  Kenia Lane DO  PGY-1, Department of [address] [place] [address]    Please note that this chart was generated using voice recognition Dragon dictation software. Although every effort was made to ensure the accuracy of this automated transcription, some errors in transcription may have occurred.

## 2021-10-17 NOTE — PROGRESS NOTES
E[facility]o End of Shift Note:       CannuLation date/time:10/17/21 @1348  ECMO type:  Cannula sizes/locations:30 f CRESENT ON RIGHT  Flow ordered at:       Currently flowing at:3.98       FIO2 at:100       Sweep at:4  Delta pressure:20  Clot burden:NONE       Oxygenator:NONE       Circuit:NONE  Anticoagulation: 19UNITS PER KG/HR  Products given: no products given       PRBC's:       PLT's:       FFP:       Cryo:        Albumin:

## 2021-10-17 NOTE — FLOWSHEET NOTE
Report called to [facility] RN on CAR 1. All questions answered.     Electronically signed by Kristine Diana RN on 10/17/2021 at 12:14 PM

## 2021-10-17 NOTE — ANESTHESIA PRE PROCEDURE
Department of Anesthesiology  Preprocedure Note       Name:  Sameer Caldera   Age:  27 y.o.  :  1990                                          MRN:  [id number]         Date:  10/17/2021      Surgeon: * No surgeons listed *    Procedure: * No procedures listed *    Medications prior to admission:   Prior to Admission medications    Medication Sig Start Date End Date Taking? Authorizing Provider   doxyLAMINE succinate (UNISOM SLEEPTABS) 25 MG tablet Take 25 mg by mouth nightly    Historical Provider, MD   omeprazole (PRILOSEC) 20 MG delayed release capsule  21   Historical Provider, MD   Prenatal Vit-Fe Fumarate-FA (PRENATAL VITAMIN PO) Take 1 tablet by mouth daily     Historical Provider, MD       Current medications:    No current facility-administered medications for this visit. No current outpatient medications on file.      Facility-Administered Medications Ordered in Other Visits   Medication Dose Route Frequency Provider Last Rate Last Admin    lactated ringers infusion   IntraVENous Continuous Griffin Santana  mL/hr at 10/17/21 0819 100 mL/hr at 10/17/21 0819    sodium chloride flush 0.9 % injection 10 mL  10 mL IntraVENous PRN Stella Pickett MD   10 mL at 10/15/21 1899    naloxone (NARCAN) injection 0.4 mg  0.4 mg IntraVENous PRN Stella Pickett MD        ondansetron [facility]) injection 4 mg  4 mg IntraVENous Q6H PRN Stella Pickett MD        diphenhydrAMINE (BENADRYL) injection 25 mg  25 mg IntraVENous Q6H PRN Stella Pickett MD        [facility]) chewable tablet 80 mg  80 mg Oral Q6H PRN Stella Pickett MD        polyethylene glycol (GLYCOLAX) packet 17 g  17 g Oral Daily PRN Stella Pickett MD        magnesium hydroxide (MILK OF MAGNESIA) 400 MG/5ML suspension 30 mL  30 mL Oral Daily PRN Stella Pickett MD        bisacodyl (DULCOLAX) suppository 10 mg  10 mg Rectal Daily PRN Stella Pickett MD        Lanolin Hydrous OINT   Topical [facility] · At this time non-palapable PT pulses.  CFT good. Skin atrophic.  Will monitor for vascular testing at subsequent visits.  Changes will prompt testing.  · Was seen by IR and at that time they did not recommend any intervention.     PRN Mehreen Allen MD        prenatal vitamin plus iron 29-1 MG tablet 1 tablet  1 tablet Oral Daily Mehreen Allen MD   1 tablet at 10/17/21 0818    Tetanus-Diphth-Acell Pertussis (BOOSTRIX) injection 0.5 mL  0.5 mL IntraMUSCular Prior to discharge Mehreen Allen MD        propofol injection  5-50 mcg/kg/min IntraVENous Titrated Mehreen Allen MD 29.6 mL/hr at 10/17/21 0900 50 mcg/kg/min at 10/17/21 0900    fentaNYL 20 mcg/mL Infusion  12.5-200 mcg/hr IntraVENous Continuous Mehreen Allen MD 10 mL/hr at 10/17/21 1146 200 mcg/hr at 10/17/21 1146    LORazepam (ATIVAN) injection 2 mg  2 mg IntraVENous Q5 Min PRN Mehreen Allen MD        lidocaine 1 % injection 5 mL  5 mL IntraDERmal Once Joaquim Mohs, MD        sodium chloride flush 0.9 % injection 5-40 mL  5-40 mL IntraVENous 2 times per day Joaquim Mohs, MD   10 mL at 10/17/21 0819    sodium chloride flush 0.9 % injection 5-40 mL  5-40 mL IntraVENous PRN Joaquim Mohs, MD        0.9 % sodium chloride infusion  25 mL IntraVENous PRN Joaquim Mohs, MD        docusate (COLACE) 50 MG/5ML liquid 100 mg  100 mg Oral BID Joaquim Mohs, MD   100 mg at 10/17/21 0818    midazolam (VERSED) 1 mg/mL in D5W infusion  1-10 mg/hr IntraVENous Continuous Joaquim Mohs, MD 5 mL/hr at 10/17/21 0025 5 mg/hr at 10/17/21 0025    cisatracurium besylate (NIMBEX) 200 mg in sodium chloride 0.9 % 100 mL infusion  0.5-10 mcg/kg/min IntraVENous Continuous Joaquim Mohs, MD 7.4 mL/hr at 10/17/21 0824 2.5 mcg/kg/min at 10/17/21 0824    sennosides-docusate sodium (SENOKOT-S) 8.6-50 MG tablet 2 tablet  2 tablet Oral Daily Joaquim Mohs, MD   2 tablet at 10/17/21 0818    insulin lispro (HUMALOG) injection vial 0-12 Units  0-12 Units SubCUTAneous Q6H Mehreen Allen MD   2 Units at 10/15/21 0335    glucose (GLUTOSE) 40 % oral gel 15 g  15 g Oral PRN Mehreen Allen MD        dextrose 50 % IV solution  12.5 g IntraVENous PRN Mehreen Allen MD        glucagon (rDNA) injection 1 mg  1 mg IntraMUSCular PRN Mehreen Allen MD        dextrose 5 % solution  100 mL/hr IntraVENous PRN Mehreen Allen MD        0.9 % sodium chloride infusion   IntraVENous Continuous Mehreen Allen MD 5 mL/hr at 10/14/21 0700 Rate Verify at 10/14/21 0700    dexamethasone (DECADRON) injection 6 mg  6 mg IntraVENous Q24H Mehreen Allen MD   6 mg at 10/17/21 0818    heparin (porcine) injection 7,900 Units  80 Units/kg IntraVENous PRN Mehreen Allen MD        heparin (porcine) injection 3,950 Units  40 Units/kg IntraVENous PRN Mehreen Allen MD   2,506 Units at 10/16/21 1539    heparin 25,000 units in dextrose 5% 250 mL (premix) infusion  5-30 Units/kg/hr IntraVENous Continuous Mehreen Allen MD 18.8 mL/hr at 10/17/21 1057 19 Units/kg/hr at 10/17/21 1057    ondansetron (ZOFRAN-ODT) disintegrating tablet 4 mg  4 mg Oral Q8H PRN Mehreen Allen MD        Or    ondansetron [facility]) injection 4 mg  4 mg IntraVENous Q6H PRN Mehreen Allen MD        acetaminophen (TYLENOL) tablet 650 mg  650 mg Oral Q4H PRN Mehreen Allen MD           Allergies:     Allergies   Allergen Reactions    Vicodin [Hydrocodone-Acetaminophen]      nausea       Problem List:    Patient Active Problem List   Diagnosis Code    Pneumonia due to COVID-19 virus U07.1, J12.82    26 weeks gestation of pregnancy Z3A.26    Tachycardia R00.0    Tachypnea R06.82    Anxiety F41.9    Pulmonary embolism (HCC) I26.99    Marginal insertion of umbilical cord affecting management of mother O43.80    Starvation ketoacidosis E87.2    Severe malnutrition ([facility] [address] E43    PCCS 10/15/21 M Apg 1/1/1 Wt 2#3 U67.846    Acute respiratory distress syndrome (ARDS) due to COVID-19 virus ([facility] [address] U07.1, J80       Past Medical History:        Diagnosis Date    Anxiety     GERD (gastroesophageal reflux disease)     Migraines     Seasonal allergies        Past Surgical History:        Procedure Laterality Applicable):  No results found for: LABABO, LABRH    Drug/Infectious Status (If Applicable):  Lab Results   Component Value Date    HEPCAB NONREACTIVE 06/16/2021       COVID-19 Screening (If Applicable):   Lab Results   Component Value Date    COVID19 DETECTED 10/10/2021           Anesthesia Evaluation  Patient summary reviewed no history of anesthetic complications:   Airway: Mallampati: III  TM distance: >3 FB   Neck ROM: full  Comment: Oral intubated  Mouth opening: > = 3 FB Dental: normal exam   (+) other      Pulmonary:   (+) pneumonia:  decreased breath sounds,                            ROS comment: Pneumonia due to COVID-19 virus  Acute respiratory distress syndrome (ARDS) due to COVID-19 virus (HCC)   Cardiovascular:Negative CV ROS            Rhythm: regular  Rate: normal                    Neuro/Psych:   (+) neuromuscular disease:, headaches:,    (-) CVA           GI/Hepatic/Renal:   (+) GERD:,           Endo/Other: Negative Endo/Other ROS                    Abdominal:   (+) obese,           Vascular:   + PE. Other Findings:               Anesthesia Plan      general     ASA 4     (GETA)  Induction: intravenous. MIPS: Postoperative opioids intended and Postoperative ventilation. Anesthetic plan and risks discussed with patient. Plan discussed with CRNA.               Mechanical Ventilation Data   SETTINGS (Comprehensive)  Vent Information  $Ventilation: $Subsequent Day  Skin Assessment: Clean, dry, & intact  Equipment ID: [id number]  Vent Type: Servo i  Vent Mode: PRVC  Vt Ordered: 480 mL  Rate Set: 24 bmp  FiO2 : 55 %  SpO2: 94 %  SpO2/FiO2 ratio: 170.91  Sensitivity: 3  PEEP/CPAP: 10  I Time/ I Time %: 0.75 s  Humidification Source: HME  Humidification Temp: 33  Humidification Temp Measured: 33  Nitric Oxide/Epoprostenol In Use?: No  Mask Type: Full face mask  Mask Size: Medium  Additional Respiratory  Assessments  Pulse: 89  Resp: 24  SpO2: 94 %  End Tidal CO2: 30 (%)  Position: Semi-Rosas's  Humidification Source: HME  Humidification Temp: 33  Oral Care Completed?: Yes  Oral Care: Lip moisturizer applied, Mouth swabbed  Subglottic Suction Done?: No    ABG   No results found for: PH, PCO2, PO2, HCO3, O2SAT  Lab Results   Component Value Date    MODE [facility] 10/17/2021       Per ID  Impression :      · COVID 19 Confirmed Infection  · Covid tests:  · 10/10/21: Positive  · 26 weeks gestation pregnancy  ? S/P C Section 10-15-21  · Acute hypoxic respiratory distress  · Pulmonary embolism LLL  · Metabolic acidosis with euglycemic starvation     Recommendations:   · Antibiotic treatment:  ? Monitor off antibiotics  · Covid Rx:  ? Remdesivir-Out of window  ? Decadron-Initiated 10/14/21. Stop date 10-24-21  ? Prophylactic anticoagulation  ? DEFER barcitinib and  Actemra because of pregnancy  ? Intubated 10/14  ?  Plans for ECMO        Monik German MD   10/17/2021

## 2021-10-17 NOTE — PROGRESS NOTES
POST OPERATIVE DAY # 2    Karina Aaron is a 27 y.o. female   This patient was seen and examined today. Her pregnancy was complicated by:   Patient Active Problem List   Diagnosis    Pneumonia due to COVID-19 virus    26 weeks gestation of pregnancy    Tachycardia    Tachypnea    Anxiety    Pulmonary embolism ([address]    Marginal insertion of umbilical cord affecting management of mother    Starvation ketoacidosis    Severe [facility] [address] [address]    PCCS 10/15/21 M Apg 1/1/1 Wt 2#3    Acute respiratory distress syndrome (ARDS) due to COVID-19 virus [facility])     Patient is currently sedated, paralyzed, intubated. She was proned from about 1500 10/16 to 0530 10/17. After proning her FI02 improved to as low as 45 and 02 sats were around 100%. After transitioning her back to supine her 02 sats dropped to 80s but have been improving. There is no vaginal bleeding and incision is clean and dry per nursing staff. Her Sydell Silvan is functioning. Plan is for patient to be started on ECMO today around noon.      Vital Signs:  Vitals:    10/16/21 2125 10/16/21 2200 10/16/21 2333 10/17/21 0000   BP:  126/68     Pulse:  82 83    Resp: 24 24 24    Temp:    98.6 °F (37 °C)   TempSrc:    Esophageal   SpO2: 99% 98% 98%    Weight:       Height:           Urine Input & Output last 24hrs:     Intake/Output Summary (Last 24 hours) at 10/17/2021 0019  Last data filed at 10/16/2021 2100  Gross per 24 hour   Intake 4501.47 ml   Output 2425 ml   Net 2076.47 ml       Physical Exam:  General:  Patient is sedated  Neurologic:  no focal findings   Lungs:  no crackles or wheezing  Heart:  regular rate and rhythm    Abdomen: abdomen soft, non-distended, non-tender  Fundus: non-tender, normal size, firm, below umbilicus  Incision: Prevena in place and functioning  Extremities:  no calf tenderness, non edematous    Labs:  Lab Results   Component Value Date    WBC 9.5 10/16/2021    HGB 8.6 (L) 10/16/2021    HCT 27.0 (L) 10/16/2021    MCV 94.1 10/16/2021     10/16/2021       Assessment/Plan:  1. Landen Desai is a  POD # 2 s/p PCCS   - Doing well from a post op standpoint   - Male infant in NICU   - Continue miles   - CBC daily   - Patient had intraop hemorrhage of 1500 ml. S/ p TXA, Hemabate x 1, surgicel poweder (intraop)    - Hbg 8.6 currently    - Stann Zachery in place and functioning to be removed 10/22   - Please feel free to reach out with any questions or concerns. Continue routine post operative care  2. Rh positive/Rubella immune  3. Family requesting breast pumping  4. COVID Pneumonia (Positive 10/10/21)   - Plan for patient to start ECMO today. Cardiothoracic team is consulted for management   - Defer all management tot he Critical care primary team    - ID is consulted    - The patient was proned 1500 10/16/21 until 0530 10/17/21   - Patient is sedated/intubated/paralyzed with Nimbex/Versed/Propofol/Fentanyl. She is receiving tube feeds. Nutrition consulted    - IV Decadron 6 mg qD    - POCT glucose q6h    - BMP, CA, CRP, CBC, Mag, Phos, LFTs QD     - Fibrinogen 595>490     - Ferritin 264>200     - CRP 59.8>13.4>30.8>81   - Her Fi02 improved to 45 while proned. Per nursing transitioning her back to supine stressed the patient, but her vitals are improving   - Respiratory culture pending    - ECHO 10/15: Normal LV size, wall thickness & function w/ EF 61%, unremarkable    - CXR 10/16: Stable bilateral lung multifocal consolidation consistent with pneumonia. - EKG 10/13 Sinus tachycardia Cannot rule out Inferior infarct , age undetermined  5. Pulmonary Embolism in LLL   - Defer all management to primary Critical care team   - IV Heparin drip.  Titration per primary   - CT PE 10/13 Single small nonocclusive segmental to subsegmental pulmonary embolism in the left lower lobe, no central emboli, multilobar COVID-19 pneumonia    - LE Dopplers:  No evidence of superficial or deep venous thrombosis in both lower extremities.      - PTT q6hrs   6. Metabolis acidosis with inadequate respiratory compensation   - Starvation Ketoacidosis vs euglycemic acidosis   - She is S/p Bicarb drip. She was switched to LR    - ABG- pH 7.23>7.32>7.4   - Serum ketones have normalized   7. PreE w/o SFs   - She met criteria for PreE w/o Sfs   - Bps have remained none severe   - PreE labs showing LFTs that are elevated at ALT/AST 39/43 , but are not 2 times the upper limit of normal, otherwise unremarkable; P/C 0.82   - Patient is currently sedated and paralyzed   8. Continue post-op care. Counseling Completed:  Secondary Smoke risks and Sudden Infant Death Syndrome were reviewed with recommendations. Infant sleeping, \"back to sleep\" and avoidance of co-sleeping recommendations were reviewed. Signs and Symptoms of Post Partum Depression were reviewed. The patient is to call if any occur. Signs and symptoms of Mastitis were reviewed. The patient is to call if any occur for follow up. Discharge instructions including pelvic rest, incision care, 15 lb weight restriction, no driving with pain medicine and office follow-up were reviewed with patient     Attending Physician: Dr. Mariaelena Howard DO  Ob/Gyn Resident  10/17/2021, 12:19 AM     Date: 10/17/2021  Time: 12:30 PM      Patient Name: Josseline Stein  Patient : 1990  Room/Bed: 0103/0103-01  Admission Date/Time: 10/13/2021  8:19 PM        Attending Physician Statement  I have discussed the care of Josseline Stein, including pertinent history and exam findings with the resident. I have reviewed and edited their note in the electronic medical record. The key elements of all parts of the encounter have been performed/reviewed by me . I agree with the assessment, plan and orders as documented by the resident. The level of care submitted represents to the best of my ability the care documented in the medical record today. GC Modifier.   This service has been performed in part by rancho resident under the direction of a teaching physician. POD#2 and doing well from a post-op perspective. Plan for cannulation at noon.      Neuro  -Sedated on Propofol (Trigs 480-->422), Versed Fentanyl and paralyzed on Nimbex  -Neurologically intact    Resp  -FiO2 45-65 overnight (ABG PO2 ), now 55, PEEP 10  -Did well proned  -CXR yesterday unchanged w/ bilateral opacities  -Continue Heparin GTT for   -Will initiate ECMO today, cannulation at noon    GI  -Tube feeds  -GI prophylaxis      -Lozano in place, overall 10L positive since admission as patient required 6L for starvation ketoacidosis upon arrival. Excellent UOP, Cr wnl    Heme  -Heparin GTT for PE  -ECMO team can utilize whatever is required for anticoagulation, no concerns from OB perspective regarding bleeding at this time  -CBC stable    ID  -Day 5 of Dexamethasone  -Urine culture/ Resp culture negative  -Afebrile    Endo  -Starvation euglycemic ketoacidosis resolved with aggressive hydration  -Replacing K today    OB  -Preeclampsia without severe features, will continue to monitor   -Recommend continue to pump/ breast care  -Wound is c/d/i with April Child functioning well    Will continue to monitor closely      Attending's Name:  Mayuri Walker DO

## 2021-10-17 NOTE — PROGRESS NOTES
OB/GYN Progress Note    Date: 10/17/2021  Time: 7:40 PM    Johnathan Boyd is a 27 y.o. female [address] Day: 5    Patient was seen and examined. Patient is currently sedated and paralyzed with Fentanyl, Ketamine, Versed, and Nimbex gtt. Patient had trial of weaning off and no commands followed but elevated HR and BPs signifying poor pain control. Notified by RN that another weaning trial will occur today to assess neurologic function again. FiO2 40% on PEEP 10 s/p intubation and on ventilator. ECMO cannulation in place of R internal jugular vein. R PICC line was switched to L sided PICC Line due to ECMO cannulation. Still has L brachial arterial line in place. Lozano catheter in place with UOP 30-50ml/hr of yellow/clear urine. Vitals:  Vitals:    10/18/21 0515 10/18/21 0530 10/18/21 0545 10/18/21 0600   BP:       Pulse: 106 101 99 95   Resp:       Temp:       TempSrc:       SpO2:  91% 93% 93%   Weight:       Height:           PHYSICAL EXAM:   General Appearance: Intubated/sedated/paralized with ECMO cannulation of R internal jugular vein and, L PICC line, L brachial arterial line  Skin: Skin color, texture, turgor normal. No rashes or lesions. HEENT: Normocephalic and atraumatic  Respiratory: Rhonchorous expirations due to ventilator and difficult to auscultate due to ventilator  Cardiovascular: Normal rate, regular rhythm, normal S1 and S2, no murmurs, rubs or gallops. Abdomen: Soft, non-tender, no rebound, guarding, rigidity, non-distended, Prevena in place and functioning with no drainage, erythema/edema  Pelvic Exam: not indicated  Rectal Exam: not indicated  MSK/Extremities: Non-tender BLE 1+ pitting edema of bilateral extremities and upper extremities, no erythema  Psych: Oriented to time, place and person, mood and affect are within normal limits. Intake/Output:   Last Shift: I/O last 3 completed shifts:   In: 3723.7 [I.V.:3302.7; NG/GT:421]  Out: 3071 [Urine:4090; Blood:100]  Current Shift: I/O this shift: In: 816 [I.V.:816]  Out: 600 [Urine:600]     UOP: 50ml/hr over the last 12 hours clear yellow urine    Lab:  Complete Blood Count:   Recent Labs     10/16/21  0454 10/17/21  0413 10/17/21  1601   WBC 9.5 9.0 10.8   HGB 8.6* 8.4* 8.0*   HCT 27.0* 25.8* 24.3*    289 292        PT/INR:    Lab Results   Component Value Date    PROTIME 10.3 10/17/2021    INR 1.0 10/17/2021     PTT:    Lab Results   Component Value Date    APTT 19.0 10/17/2021       Comprehensive Metabolic Profile:   Recent Labs     10/16/21  0454 10/16/21  0454 10/17/21  0413 10/17/21  1601   *  --  136 139   K 3.8  --  3.6* 4.0     --  104 105   CO2 19*  --  21 24   BUN 7  --  6 6   CREATININE 0.33*  --  0.34* 0.24*   GLUCOSE 120*  --  92 126*   CALCIUM 7.7*  --  8.0* 8.0*   PROT 4.6*   < > 4.7*  --    LABALBU 2.2*   < > 2.1*  --    BILITOT 0.26*   < > 0.18*  --    ALKPHOS 120*   < > 106*  --    AST 43*   < > 57*  --    ALT 39*   < > 55*  --     < > = values in this interval not displayed. Assessment/Plan:  Becky Trujillo 27 y.o. female [address] Day: 5, Transfer from [facility] POD#3 s/p PCCS (gen) on 10/15/21 with COVID ARDS, Left lower lobe Pulmonary Embolism   - Critical care primary   - Ob/Gyn consulted   - ID consulted   - Cardiology consulted    Cardiology   - Cardiology/Cardiothoracic surgery following s/p ECMO cannulation 10/17/21   - BPs stable and non-severe range   - Art line in place of L brachial artery   - L sided PICC line in place   - DVT prophylaxis: Pt currently on heparin gtt   - Echocardiogram on 10/16/21: normal with EF 61%   - EKG 10/13/21: sinus tachycardia    - CXR 10/17/21: ECMO catheter in place with new complete opacification of left hemithorax, R sided airspace opacity significantly increased.     - LE dopplers without evidence of superficial or deep DVTs in bilateral extremities    - Lactate 0.8>0.8>0.48>1.2   - PTT 40.5>120.0>45.2>57.1>120>63>19   - INR 0.9>1.0   - D-Dimer 0.83>0.72>2.11>0.53   - Fibrinogen 229>505>427   - BNP <20    Pulmonology   - ID consulted    - CXR daily ordered   - CXR 10/17/21: ECMO catheter in place with new complete opacification of left hemithorax, R sided airspace opacity significantly increased.     - FiO2 40 %    - Intubated as of 10/15/21    - Fentanyl/ketamine/versed/nimbex gtt, weaning trial later today to re-eval neurologic function    - S/p proning, currently supine   - Decadron 6mg IV qd   - pH 7.23>7.32>7.4>7.5   - CRP 59.8>13.4>30.8   - CO2 7>8>>17>25    Neurology   - Currently sedated/paralyzed with Fentanyl/ketamine/versed/nimbex gtt, weaning trial later today to re-eval neurologic function     Gastrointestinal   - Tube feeds High protein diet   - Liquid Colace ordered    - ALT/AST 45/30>34/38>39/43 and stable     Genitourinary/Obstetrics   - S/p TXA x1, hemabate x1, surgicel powder (intra-operatively)   - Baby in  NICU    - Insorb/Prevena in place with no drainage, will need removed on 10/21/21   - Lozano catheter in place with UOP adequate overnight     Heme/Onc   - Pt currently on heparin gtt   - Pt denies any CP, SOB, lower extremity swelling/pain   - Hgb 10.8>9.7>9.7>8.6>6.7   - Lactate 0.8>0.8>0.48>1.2   - PTT 40.5>120.0>45.2>57.1>120>63>19   - INR 0.9>1.0   - D-Dimer 0.83>0.72>2.11>0.53   - Fibrinogen 453>435>894   - Ferritin 264>200    Infectious disease   - ID consulted and recommending decadron 6mg qd   - pH 7.23>7.32>7.4>7.5   - CRP 59.8>13.4>30.8   - Lactate 0.8>0.8>0.48>1.2    Endocrine   - S/p Bicarb IV, KCl replacement   - POCT glucose [id number]   - MDSSI ordered    - AG closed at 9 last     - BHB 4.57>2.98>0.17    Renal   - UOP adequate   - BHB 4.57>2.98>0.17   - Cr 0.28>0.33>0.24   - Trop <6   - P/C 0.82   - IVF NS 5ml/hr     Psych   - Unable to assess as patient is intubated/sedated/paralyzed    Disposition   - Continue care per Critical care team in the ICU    Patient Active Problem List    Diagnosis Date Noted    [facility] Wt 2#3 10/15/2021    Acute respiratory distress syndrome (ARDS) due to COVID-19 virus ([facility] [address] 10/15/2021    Tachycardia 10/14/2021    Tachypnea 10/14/2021    Anxiety 10/14/2021    Pulmonary embolism ([address] 10/14/2021    Marginal insertion of umbilical cord affecting management of mother 10/14/2021    Starvation ketoacidosis 10/14/2021    Severe malnutrition ([address] 10/14/2021     Class: Acute    Pneumonia due to COVID-19 virus 10/13/2021    26 weeks gestation of pregnancy 10/13/2021       Will discuss with Dr. Brittany Webb.      Anni Chandler, DO  Ob/Gyn Resident  Pager: [phone]  10/18/2021, 06:30AM

## 2021-10-18 ENCOUNTER — APPOINTMENT (OUTPATIENT)
Dept: GENERAL RADIOLOGY | Age: 31
DRG: 003 | End: 2021-10-18
Payer: COMMERCIAL

## 2021-10-18 PROBLEM — E43 SEVERE MALNUTRITION (HCC): Status: RESOLVED | Noted: 2021-10-14 | Resolved: 2021-10-18

## 2021-10-18 LAB
ACTIVATED CLOTTING TIME: 142 SEC (ref 79–149)
ACTIVATED CLOTTING TIME: 147 SEC (ref 79–149)
ACTIVATED CLOTTING TIME: 151 SEC (ref 79–149)
ACTIVATED CLOTTING TIME: 151 SEC (ref 79–149)
ACTIVATED CLOTTING TIME: 154 SEC (ref 79–149)
ACTIVATED CLOTTING TIME: 155 SEC (ref 79–149)
ACTIVATED CLOTTING TIME: 159 SEC (ref 79–149)
ACTIVATED CLOTTING TIME: 163 SEC (ref 79–149)
ACTIVATED CLOTTING TIME: 163 SEC (ref 79–149)
ACTIVATED CLOTTING TIME: 171 SEC (ref 79–149)
ACTIVATED CLOTTING TIME: 175 SEC (ref 79–149)
ACTIVATED CLOTTING TIME: 207 SEC (ref 79–149)
ALBUMIN SERPL-MCNC: 2.1 G/DL (ref 3.5–5.2)
ALBUMIN SERPL-MCNC: 2.2 G/DL (ref 3.5–5.2)
ALBUMIN SERPL-MCNC: 2.3 G/DL (ref 3.5–5.2)
ALBUMIN/GLOBULIN RATIO: 0.8 (ref 1–2.5)
ALBUMIN/GLOBULIN RATIO: 0.8 (ref 1–2.5)
ALBUMIN/GLOBULIN RATIO: 0.9 (ref 1–2.5)
ALLEN TEST: ABNORMAL
ALP BLD-CCNC: 105 U/L (ref 35–104)
ALP BLD-CCNC: 109 U/L (ref 35–104)
ALP BLD-CCNC: 87 U/L (ref 35–104)
ALT SERPL-CCNC: 60 U/L (ref 5–33)
ALT SERPL-CCNC: 65 U/L (ref 5–33)
ALT SERPL-CCNC: 66 U/L (ref 5–33)
ANGLE TEG W HEPARIN: 55.3 DEG (ref 53–72)
ANGLE TEG W HEPARIN: 78.3 DEG (ref 53–72)
ANGLE TEG: 17.1 DEG (ref 53–72)
ANGLE TEG: 19.2 DEG (ref 53–72)
ANION GAP SERPL CALCULATED.3IONS-SCNC: 15 MMOL/L (ref 9–17)
ANION GAP SERPL CALCULATED.3IONS-SCNC: 6 MMOL/L (ref 9–17)
ANION GAP SERPL CALCULATED.3IONS-SCNC: 8 MMOL/L (ref 9–17)
ANION GAP SERPL CALCULATED.3IONS-SCNC: 8 MMOL/L (ref 9–17)
AST SERPL-CCNC: 45 U/L
AST SERPL-CCNC: 45 U/L
AST SERPL-CCNC: 62 U/L
AT-III ACTIVITY: 86 % (ref 83–122)
BILIRUB SERPL-MCNC: 0.16 MG/DL (ref 0.3–1.2)
BILIRUB SERPL-MCNC: 0.19 MG/DL (ref 0.3–1.2)
BILIRUB SERPL-MCNC: 0.24 MG/DL (ref 0.3–1.2)
BILIRUBIN DIRECT: 0.09 MG/DL
BILIRUBIN DIRECT: 0.1 MG/DL
BILIRUBIN DIRECT: 0.13 MG/DL
BILIRUBIN, INDIRECT: 0.07 MG/DL (ref 0–1)
BILIRUBIN, INDIRECT: 0.09 MG/DL (ref 0–1)
BILIRUBIN, INDIRECT: 0.11 MG/DL (ref 0–1)
BUN BLDV-MCNC: 6 MG/DL (ref 6–20)
BUN BLDV-MCNC: 6 MG/DL (ref 6–20)
BUN BLDV-MCNC: 7 MG/DL (ref 6–20)
BUN BLDV-MCNC: 7 MG/DL (ref 6–20)
BUN/CREAT BLD: ABNORMAL (ref 9–20)
CALCIUM IONIZED: 1.11 MMOL/L (ref 1.13–1.33)
CALCIUM IONIZED: 1.11 MMOL/L (ref 1.13–1.33)
CALCIUM IONIZED: 1.13 MMOL/L (ref 1.13–1.33)
CALCIUM IONIZED: 1.14 MMOL/L (ref 1.13–1.33)
CALCIUM SERPL-MCNC: 7.4 MG/DL (ref 8.6–10.4)
CALCIUM SERPL-MCNC: 7.7 MG/DL (ref 8.6–10.4)
CALCIUM SERPL-MCNC: 8 MG/DL (ref 8.6–10.4)
CALCIUM SERPL-MCNC: 8 MG/DL (ref 8.6–10.4)
CHLORIDE BLD-SCNC: 100 MMOL/L (ref 98–107)
CHLORIDE BLD-SCNC: 103 MMOL/L (ref 98–107)
CHLORIDE BLD-SCNC: 106 MMOL/L (ref 98–107)
CHLORIDE BLD-SCNC: 107 MMOL/L (ref 98–107)
CO2: 23 MMOL/L (ref 20–31)
CO2: 27 MMOL/L (ref 20–31)
CO2: 28 MMOL/L (ref 20–31)
CO2: 29 MMOL/L (ref 20–31)
CREAT SERPL-MCNC: 0.2 MG/DL (ref 0.5–0.9)
CREAT SERPL-MCNC: 0.24 MG/DL (ref 0.5–0.9)
CREAT SERPL-MCNC: <0.2 MG/DL (ref 0.5–0.9)
CREAT SERPL-MCNC: <0.2 MG/DL (ref 0.5–0.9)
CULTURE: ABNORMAL
CULTURE: ABNORMAL
DIRECT EXAM: ABNORMAL
EPL TEG, W/HEP: 0 % (ref 0–15)
EPL TEG, W/HEP: 0 % (ref 0–15)
EPL-TEG: 0 % (ref 0–15)
EPL-TEG: 0 % (ref 0–15)
FIBRINOGEN: 377 MG/DL (ref 140–420)
FIBRINOGEN: 382 MG/DL (ref 140–420)
FIBRINOGEN: 420 MG/DL (ref 140–420)
FIBRINOGEN: 440 MG/DL (ref 140–420)
FIO2: 100
FIO2: 40
FIO2: ABNORMAL
GFR AFRICAN AMERICAN: >60 ML/MIN
GFR AFRICAN AMERICAN: >60 ML/MIN
GFR AFRICAN AMERICAN: ABNORMAL ML/MIN
GFR AFRICAN AMERICAN: ABNORMAL ML/MIN
GFR NON-AFRICAN AMERICAN: >60 ML/MIN
GFR NON-AFRICAN AMERICAN: >60 ML/MIN
GFR NON-AFRICAN AMERICAN: ABNORMAL ML/MIN
GFR NON-AFRICAN AMERICAN: ABNORMAL ML/MIN
GFR SERPL CREATININE-BSD FRML MDRD: ABNORMAL ML/MIN/{1.73_M2}
GLOBULIN: ABNORMAL G/DL (ref 1.5–3.8)
GLUCOSE BLD-MCNC: 101 MG/DL (ref 74–100)
GLUCOSE BLD-MCNC: 103 MG/DL (ref 70–99)
GLUCOSE BLD-MCNC: 106 MG/DL (ref 65–105)
GLUCOSE BLD-MCNC: 110 MG/DL (ref 70–99)
GLUCOSE BLD-MCNC: 127 MG/DL (ref 70–99)
GLUCOSE BLD-MCNC: 90 MG/DL (ref 65–105)
GLUCOSE BLD-MCNC: 93 MG/DL (ref 70–99)
HCO3 VENOUS: 30.5 MMOL/L (ref 22–29)
HCO3 VENOUS: 30.5 MMOL/L (ref 22–29)
HCT VFR BLD CALC: 23.3 % (ref 36.3–47.1)
HCT VFR BLD CALC: 24.9 % (ref 36.3–47.1)
HCT VFR BLD CALC: 27.4 % (ref 36.3–47.1)
HCT VFR BLD CALC: 29 % (ref 36.3–47.1)
HEMOGLOBIN: 7.4 G/DL (ref 11.9–15.1)
HEMOGLOBIN: 7.8 G/DL (ref 11.9–15.1)
HEMOGLOBIN: 8.8 G/DL (ref 11.9–15.1)
HEMOGLOBIN: 9.1 G/DL (ref 11.9–15.1)
HEPARIN THERAPY: ABNORMAL
HEPARIN THERAPY: ABNORMAL
HEPARIN THERAPY: YES
HEPARIN THERAPY: YES
INR BLD: 0.9
KINETICS TEG W HEPARIN: 0.8 MIN (ref 1–3)
KINETICS TEG W HEPARIN: 1.2 MIN (ref 1–3)
KINETICS TEG: 11.9 MIN (ref 1–3)
KINETICS TEG: 14.4 MIN (ref 1–3)
LACTIC ACID, WHOLE BLOOD: 0.7 MMOL/L (ref 0.7–2.1)
LACTIC ACID, WHOLE BLOOD: 0.7 MMOL/L (ref 0.7–2.1)
LACTIC ACID, WHOLE BLOOD: 1.6 MMOL/L (ref 0.7–2.1)
LY30 (LYSIS) TEG: 0 % (ref 0–8)
LY30 (LYSIS) TEG: 0 % (ref 0–8)
LY30(LYSIS) TEG W HEPARIN: 0 % (ref 0–8)
LY30(LYSIS) TEG W HEPARIN: 0 % (ref 0–8)
Lab: ABNORMAL
MA (MAX CLOT) TEG W HEPARIN: 77.7 MM (ref 50–70)
MA (MAX CLOT) TEG W HEPARIN: 81.9 MM (ref 50–70)
MA (MAX CLOT) TEG: 71.9 MM (ref 50–70)
MA (MAX CLOT) TEG: 73.6 MM (ref 50–70)
MAGNESIUM: 1.8 MG/DL (ref 1.6–2.6)
MAGNESIUM: 1.8 MG/DL (ref 1.6–2.6)
MAGNESIUM: 1.9 MG/DL (ref 1.6–2.6)
MAGNESIUM: 2.1 MG/DL (ref 1.6–2.6)
MCH RBC QN AUTO: 29.3 PG (ref 25.2–33.5)
MCH RBC QN AUTO: 29.5 PG (ref 25.2–33.5)
MCH RBC QN AUTO: 29.6 PG (ref 25.2–33.5)
MCH RBC QN AUTO: 30 PG (ref 25.2–33.5)
MCHC RBC AUTO-ENTMCNC: 31.3 G/DL (ref 28.4–34.8)
MCHC RBC AUTO-ENTMCNC: 31.4 G/DL (ref 28.4–34.8)
MCHC RBC AUTO-ENTMCNC: 31.8 G/DL (ref 28.4–34.8)
MCHC RBC AUTO-ENTMCNC: 32.1 G/DL (ref 28.4–34.8)
MCV RBC AUTO: 92.3 FL (ref 82.6–102.9)
MCV RBC AUTO: 93.2 FL (ref 82.6–102.9)
MCV RBC AUTO: 94.3 FL (ref 82.6–102.9)
MCV RBC AUTO: 94.3 FL (ref 82.6–102.9)
MODE: ABNORMAL
NEGATIVE BASE EXCESS, ART: ABNORMAL (ref 0–2)
NEGATIVE BASE EXCESS, VEN: ABNORMAL (ref 0–2)
NEGATIVE BASE EXCESS, VEN: ABNORMAL (ref 0–2)
NRBC AUTOMATED: 0.2 PER 100 WBC
NRBC AUTOMATED: 0.2 PER 100 WBC
NRBC AUTOMATED: 0.3 PER 100 WBC
NRBC AUTOMATED: 0.5 PER 100 WBC
O2 DEVICE/FLOW/%: ABNORMAL
O2 SAT, VEN: 62 % (ref 60–85)
O2 SAT, VEN: 68 % (ref 60–85)
PARTIAL THROMBOPLASTIN TIME: 62.2 SEC (ref 20.5–30.5)
PARTIAL THROMBOPLASTIN TIME: 73.5 SEC (ref 20.5–30.5)
PARTIAL THROMBOPLASTIN TIME: 77.1 SEC (ref 20.5–30.5)
PARTIAL THROMBOPLASTIN TIME: 83.8 SEC (ref 20.5–30.5)
PATIENT TEMP: ABNORMAL
PCO2, VEN: 48.3 MM HG (ref 41–51)
PCO2, VEN: 52.1 MM HG (ref 41–51)
PDW BLD-RTO: 14 % (ref 11.8–14.4)
PDW BLD-RTO: 14.2 % (ref 11.8–14.4)
PERFORMING LOCATION: ABNORMAL
PH VENOUS: 7.38 (ref 7.32–7.43)
PH VENOUS: 7.41 (ref 7.32–7.43)
PLATELET # BLD: 261 K/UL (ref 138–453)
PLATELET # BLD: 326 K/UL (ref 138–453)
PLATELET # BLD: 328 K/UL (ref 138–453)
PLATELET # BLD: 334 K/UL (ref 138–453)
PMV BLD AUTO: 9.2 FL (ref 8.1–13.5)
PMV BLD AUTO: 9.3 FL (ref 8.1–13.5)
PMV BLD AUTO: 9.4 FL (ref 8.1–13.5)
PMV BLD AUTO: 9.6 FL (ref 8.1–13.5)
PO2, VEN: 33.3 MM HG (ref 30–50)
PO2, VEN: 35.9 MM HG (ref 30–50)
POC HCO3: 28.1 MMOL/L (ref 21–28)
POC HCO3: 29.3 MMOL/L (ref 21–28)
POC HCO3: 29.8 MMOL/L (ref 21–28)
POC HCO3: 30.4 MMOL/L (ref 21–28)
POC HCO3: 30.4 MMOL/L (ref 21–28)
POC HCO3: 30.9 MMOL/L (ref 21–28)
POC HCO3: 31.3 MMOL/L (ref 21–28)
POC HCO3: 31.3 MMOL/L (ref 21–28)
POC HCO3: 31.6 MMOL/L (ref 21–28)
POC HEMATOCRIT: 21 % (ref 36–46)
POC HEMATOCRIT: 21 % (ref 36–46)
POC HEMATOCRIT: 26 % (ref 36–46)
POC HEMOGLOBIN: 7 G/DL (ref 12–16)
POC HEMOGLOBIN: 7.1 G/DL (ref 12–16)
POC HEMOGLOBIN: 8.8 G/DL (ref 12–16)
POC O2 SATURATION: 100 % (ref 94–98)
POC O2 SATURATION: 100 % (ref 94–98)
POC O2 SATURATION: 88 % (ref 94–98)
POC O2 SATURATION: 89 % (ref 94–98)
POC O2 SATURATION: 92 % (ref 94–98)
POC O2 SATURATION: 95 % (ref 94–98)
POC O2 SATURATION: 98 % (ref 94–98)
POC O2 SATURATION: 98 % (ref 94–98)
POC O2 SATURATION: 99 % (ref 94–98)
POC PCO2 TEMP: ABNORMAL MM HG
POC PCO2: 31.3 MM HG (ref 35–48)
POC PCO2: 42.7 MM HG (ref 35–48)
POC PCO2: 44.8 MM HG (ref 35–48)
POC PCO2: 47.5 MM HG (ref 35–48)
POC PCO2: 48.4 MM HG (ref 35–48)
POC PCO2: 49.3 MM HG (ref 35–48)
POC PCO2: 49.5 MM HG (ref 35–48)
POC PCO2: 49.8 MM HG (ref 35–48)
POC PCO2: 51.7 MM HG (ref 35–48)
POC PH TEMP: ABNORMAL
POC PH: 7.38 (ref 7.35–7.45)
POC PH: 7.39 (ref 7.35–7.45)
POC PH: 7.41 (ref 7.35–7.45)
POC PH: 7.43 (ref 7.35–7.45)
POC PH: 7.44 (ref 7.35–7.45)
POC PH: 7.59 (ref 7.35–7.45)
POC PO2 TEMP: ABNORMAL MM HG
POC PO2: 105.3 MM HG (ref 83–108)
POC PO2: 109.2 MM HG (ref 83–108)
POC PO2: 115.5 MM HG (ref 83–108)
POC PO2: 476.4 MM HG (ref 83–108)
POC PO2: 499 MM HG (ref 83–108)
POC PO2: 56.9 MM HG (ref 83–108)
POC PO2: 57.6 MM HG (ref 83–108)
POC PO2: 63.8 MM HG (ref 83–108)
POC PO2: 74.6 MM HG (ref 83–108)
POSITIVE BASE EXCESS, ART: 3 (ref 0–3)
POSITIVE BASE EXCESS, ART: 4 (ref 0–3)
POSITIVE BASE EXCESS, ART: 4 (ref 0–3)
POSITIVE BASE EXCESS, ART: 5 (ref 0–3)
POSITIVE BASE EXCESS, ART: 6 (ref 0–3)
POSITIVE BASE EXCESS, ART: 8 (ref 0–3)
POSITIVE BASE EXCESS, VEN: 5 (ref 0–3)
POSITIVE BASE EXCESS, VEN: 5 (ref 0–3)
POTASSIUM SERPL-SCNC: 3.8 MMOL/L (ref 3.7–5.3)
POTASSIUM SERPL-SCNC: 3.9 MMOL/L (ref 3.7–5.3)
POTASSIUM SERPL-SCNC: 4.1 MMOL/L (ref 3.7–5.3)
POTASSIUM SERPL-SCNC: 4.1 MMOL/L (ref 3.7–5.3)
PROTHROMBIN TIME: 10 SEC (ref 9.1–12.3)
PROTHROMBIN TIME: 10.1 SEC (ref 9.1–12.3)
RBC # BLD: 2.47 M/UL (ref 3.95–5.11)
RBC # BLD: 2.64 M/UL (ref 3.95–5.11)
RBC # BLD: 2.97 M/UL (ref 3.95–5.11)
RBC # BLD: 3.11 M/UL (ref 3.95–5.11)
REACTION TIME TEG W HEPARIN: 6.2 MIN (ref 5–10)
REACTION TIME TEG W HEPARIN: 6.6 MIN (ref 5–10)
REACTION TIME TEG: 32.4 MIN (ref 5–10)
REACTION TIME TEG: 40.5 MIN (ref 5–10)
SAMPLE SITE: ABNORMAL
SODIUM BLD-SCNC: 137 MMOL/L (ref 135–144)
SODIUM BLD-SCNC: 138 MMOL/L (ref 135–144)
SODIUM BLD-SCNC: 140 MMOL/L (ref 135–144)
SODIUM BLD-SCNC: 145 MMOL/L (ref 135–144)
SPECIMEN DESCRIPTION: ABNORMAL
SURGICAL PATHOLOGY REPORT: NORMAL
TCO2 (CALC), ART: ABNORMAL MMOL/L (ref 22–29)
TEG COMMENT: ABNORMAL
TOTAL CO2, VENOUS: ABNORMAL MMOL/L (ref 23–30)
TOTAL CO2, VENOUS: ABNORMAL MMOL/L (ref 23–30)
TOTAL PROTEIN: 4.4 G/DL (ref 6.4–8.3)
TOTAL PROTEIN: 4.9 G/DL (ref 6.4–8.3)
TOTAL PROTEIN: 5.1 G/DL (ref 6.4–8.3)
WBC # BLD: 12.2 K/UL (ref 3.5–11.3)
WBC # BLD: 12.3 K/UL (ref 3.5–11.3)
WBC # BLD: 12.4 K/UL (ref 3.5–11.3)
WBC # BLD: 8.5 K/UL (ref 3.5–11.3)

## 2021-10-18 PROCEDURE — 85390 FIBRINOLYSINS SCREEN I&R: CPT

## 2021-10-18 PROCEDURE — 2580000003 HC RX 258: Performed by: STUDENT IN AN ORGANIZED HEALTH CARE EDUCATION/TRAINING PROGRAM

## 2021-10-18 PROCEDURE — 86920 COMPATIBILITY TEST SPIN: CPT

## 2021-10-18 PROCEDURE — 6360000002 HC RX W HCPCS: Performed by: STUDENT IN AN ORGANIZED HEALTH CARE EDUCATION/TRAINING PROGRAM

## 2021-10-18 PROCEDURE — 6370000000 HC RX 637 (ALT 250 FOR IP): Performed by: STUDENT IN AN ORGANIZED HEALTH CARE EDUCATION/TRAINING PROGRAM

## 2021-10-18 PROCEDURE — 71045 X-RAY EXAM CHEST 1 VIEW: CPT

## 2021-10-18 PROCEDURE — 6370000000 HC RX 637 (ALT 250 FOR IP): Performed by: INTERNAL MEDICINE

## 2021-10-18 PROCEDURE — 85027 COMPLETE CBC AUTOMATED: CPT

## 2021-10-18 PROCEDURE — 85384 FIBRINOGEN ACTIVITY: CPT

## 2021-10-18 PROCEDURE — 82947 ASSAY GLUCOSE BLOOD QUANT: CPT

## 2021-10-18 PROCEDURE — 94761 N-INVAS EAR/PLS OXIMETRY MLT: CPT

## 2021-10-18 PROCEDURE — 2700000000 HC OXYGEN THERAPY PER DAY

## 2021-10-18 PROCEDURE — 6360000002 HC RX W HCPCS: Performed by: THORACIC SURGERY (CARDIOTHORACIC VASCULAR SURGERY)

## 2021-10-18 PROCEDURE — 83735 ASSAY OF MAGNESIUM: CPT

## 2021-10-18 PROCEDURE — 86900 BLOOD TYPING SEROLOGIC ABO: CPT

## 2021-10-18 PROCEDURE — 6360000002 HC RX W HCPCS: Performed by: INTERNAL MEDICINE

## 2021-10-18 PROCEDURE — 82330 ASSAY OF CALCIUM: CPT

## 2021-10-18 PROCEDURE — 86850 RBC ANTIBODY SCREEN: CPT

## 2021-10-18 PROCEDURE — 85300 ANTITHROMBIN III ACTIVITY: CPT

## 2021-10-18 PROCEDURE — 2500000003 HC RX 250 WO HCPCS: Performed by: STUDENT IN AN ORGANIZED HEALTH CARE EDUCATION/TRAINING PROGRAM

## 2021-10-18 PROCEDURE — 85610 PROTHROMBIN TIME: CPT

## 2021-10-18 PROCEDURE — 85576 BLOOD PLATELET AGGREGATION: CPT

## 2021-10-18 PROCEDURE — 80048 BASIC METABOLIC PNL TOTAL CA: CPT

## 2021-10-18 PROCEDURE — 80076 HEPATIC FUNCTION PANEL: CPT

## 2021-10-18 PROCEDURE — 83605 ASSAY OF LACTIC ACID: CPT

## 2021-10-18 PROCEDURE — 99233 SBSQ HOSP IP/OBS HIGH 50: CPT | Performed by: INTERNAL MEDICINE

## 2021-10-18 PROCEDURE — APPSS30 APP SPLIT SHARED TIME 16-30 MINUTES: Performed by: NURSE PRACTITIONER

## 2021-10-18 PROCEDURE — 94003 VENT MGMT INPAT SUBQ DAY: CPT

## 2021-10-18 PROCEDURE — 85730 THROMBOPLASTIN TIME PARTIAL: CPT

## 2021-10-18 PROCEDURE — 33948 ECMO/ECLS DAILY MGMT-VENOUS: CPT

## 2021-10-18 PROCEDURE — P9016 RBC LEUKOCYTES REDUCED: HCPCS

## 2021-10-18 PROCEDURE — 33948 ECMO/ECLS DAILY MGMT-VENOUS: CPT | Performed by: INTERNAL MEDICINE

## 2021-10-18 PROCEDURE — 99291 CRITICAL CARE FIRST HOUR: CPT | Performed by: INTERNAL MEDICINE

## 2021-10-18 PROCEDURE — 85014 HEMATOCRIT: CPT

## 2021-10-18 PROCEDURE — 82803 BLOOD GASES ANY COMBINATION: CPT

## 2021-10-18 PROCEDURE — 83051 HEMOGLOBIN PLASMA: CPT

## 2021-10-18 PROCEDURE — 86901 BLOOD TYPING SEROLOGIC RH(D): CPT

## 2021-10-18 PROCEDURE — 85347 COAGULATION TIME ACTIVATED: CPT

## 2021-10-18 PROCEDURE — 2100000001 HC CVICU R&B

## 2021-10-18 RX ORDER — NALOXONE HYDROCHLORIDE 0.4 MG/ML
0.4 INJECTION, SOLUTION INTRAMUSCULAR; INTRAVENOUS; SUBCUTANEOUS PRN
Status: DISCONTINUED | OUTPATIENT
Start: 2021-10-18 | End: 2021-10-26

## 2021-10-18 RX ORDER — MAGNESIUM SULFATE 1 G/100ML
1000 INJECTION INTRAVENOUS PRN
Status: DISCONTINUED | OUTPATIENT
Start: 2021-10-18 | End: 2021-10-30 | Stop reason: HOSPADM

## 2021-10-18 RX ORDER — ALBUMIN, HUMAN INJ 5% 5 %
25 SOLUTION INTRAVENOUS PRN
Status: DISCONTINUED | OUTPATIENT
Start: 2021-10-18 | End: 2021-10-30 | Stop reason: HOSPADM

## 2021-10-18 RX ORDER — POTASSIUM CHLORIDE 29.8 MG/ML
20 INJECTION INTRAVENOUS PRN
Status: DISCONTINUED | OUTPATIENT
Start: 2021-10-18 | End: 2021-10-30 | Stop reason: HOSPADM

## 2021-10-18 RX ORDER — ALBUMIN (HUMAN) 12.5 G/50ML
25 SOLUTION INTRAVENOUS PRN
Status: DISCONTINUED | OUTPATIENT
Start: 2021-10-18 | End: 2021-10-30 | Stop reason: HOSPADM

## 2021-10-18 RX ADMIN — Medication 100 MG: at 09:41

## 2021-10-18 RX ADMIN — Medication 1000 MCG/HR: at 13:13

## 2021-10-18 RX ADMIN — Medication 200 MCG/HR: at 09:33

## 2021-10-18 RX ADMIN — OXYCODONE HYDROCHLORIDE AND ACETAMINOPHEN 1 TABLET: 5; 325 TABLET ORAL at 09:45

## 2021-10-18 RX ADMIN — Medication 200 MCG/HR: at 03:49

## 2021-10-18 RX ADMIN — SODIUM CHLORIDE, POTASSIUM CHLORIDE, SODIUM LACTATE AND CALCIUM CHLORIDE: 600; 310; 30; 20 INJECTION, SOLUTION INTRAVENOUS at 09:36

## 2021-10-18 RX ADMIN — Medication 30 UNITS: at 18:33

## 2021-10-18 RX ADMIN — HEPARIN SODIUM AND DEXTROSE 17 UNITS/KG/HR: 10000; 5 INJECTION INTRAVENOUS at 00:53

## 2021-10-18 RX ADMIN — DOCUSATE SODIUM 50MG AND SENNOSIDES 8.6MG 2 TABLET: 8.6; 5 TABLET, FILM COATED ORAL at 09:41

## 2021-10-18 RX ADMIN — Medication 10 MG/HR: at 12:29

## 2021-10-18 RX ADMIN — MAGNESIUM SULFATE HEPTAHYDRATE 1000 MG: 1 INJECTION, SOLUTION INTRAVENOUS at 11:33

## 2021-10-18 RX ADMIN — Medication 10 MG/HR: at 22:25

## 2021-10-18 RX ADMIN — OXYCODONE HYDROCHLORIDE AND ACETAMINOPHEN 1 TABLET: 5; 325 TABLET ORAL at 01:42

## 2021-10-18 RX ADMIN — Medication 100 MG: at 20:16

## 2021-10-18 RX ADMIN — Medication 10000 MCG: at 22:21

## 2021-10-18 RX ADMIN — MAGNESIUM SULFATE HEPTAHYDRATE 1000 MG: 1 INJECTION, SOLUTION INTRAVENOUS at 18:38

## 2021-10-18 RX ADMIN — Medication 10 MG/HR: at 02:43

## 2021-10-18 RX ADMIN — OXYCODONE HYDROCHLORIDE AND ACETAMINOPHEN 1 TABLET: 5; 325 TABLET ORAL at 20:16

## 2021-10-18 RX ADMIN — DEXAMETHASONE SODIUM PHOSPHATE 6 MG: 10 INJECTION INTRAMUSCULAR; INTRAVENOUS at 09:45

## 2021-10-18 RX ADMIN — Medication 1 TABLET: at 09:45

## 2021-10-18 RX ADMIN — CISATRACURIUM BESYLATE 2.5 MCG/KG/MIN: 200 INJECTION, SOLUTION INTRAVENOUS at 11:34

## 2021-10-18 RX ADMIN — OXYCODONE HYDROCHLORIDE AND ACETAMINOPHEN 1 TABLET: 5; 325 TABLET ORAL at 05:44

## 2021-10-18 RX ADMIN — HEPARIN SODIUM 1020 UNITS: 1000 INJECTION, SOLUTION INTRAVENOUS; SUBCUTANEOUS at 08:18

## 2021-10-18 RX ADMIN — HEPARIN SODIUM AND DEXTROSE 17 UNITS/KG/HR: 10000; 5 INJECTION INTRAVENOUS at 17:56

## 2021-10-18 ASSESSMENT — PAIN SCALES - GENERAL
PAINLEVEL_OUTOF10: 0
PAINLEVEL_OUTOF10: 9
PAINLEVEL_OUTOF10: 0
PAINLEVEL_OUTOF10: 9
PAINLEVEL_OUTOF10: 0

## 2021-10-18 ASSESSMENT — PULMONARY FUNCTION TESTS
PIF_VALUE: 20
PIF_VALUE: 19
PIF_VALUE: 20
PIF_VALUE: 19
PIF_VALUE: 19
PIF_VALUE: 20
PIF_VALUE: 19

## 2021-10-18 NOTE — PLAN OF CARE
Problem: OXYGENATION/RESPIRATORY FUNCTION  Goal: Patient will maintain patent airway  10/18/2021 0922 by Kevin Espinoza, RCP  Outcome: Ongoing  10/18/2021 0211 by [employer]is [employer], RCP  Outcome: Ongoing  Goal: Patient will achieve/maintain normal respiratory rate/effort  Description: Respiratory rate and effort will be within normal limits for the patient  10/18/2021 0922 by Kevin Espinoza, RCP  Outcome: Ongoing  10/18/2021 0211 by [employer], RCP  Outcome: Ongoing     Problem: MECHANICAL VENTILATION  Goal: Patient will maintain patent airway  10/18/2021 0922 by Kevin Espinoza, RCP  Outcome: Ongoing  10/18/2021 0211 by Sis , RCP  Outcome: Ongoing  Goal: Oral health is maintained or improved  10/18/2021 0922 by Kevin Espinoza, RCP  Outcome: Ongoing  10/18/2021 0211 by [employer], RCP  Outcome: Ongoing  Goal: ET tube will be managed safely  10/18/2021 0922 by Kevin Espinoza, RCP  Outcome: Ongoing  10/18/2021 0211 by Sis [employer], RCP  Outcome: Ongoing  Goal: Ability to express needs and understand communication  10/18/2021 0922 by Kevin Espinoza, RCP  Outcome: Ongoing  10/18/2021 0211 by [employer], RCP  Outcome: Ongoing  Goal: Mobility/activity is maintained at optimum level for patient  10/18/2021 0922 by Kevin Espinoza, RCP  Outcome: Ongoing  10/18/2021 0211 by [employer], RCP  Outcome: Ongoing     Problem: SKIN INTEGRITY  Goal: Skin integrity is maintained or improved  10/18/2021 0922 by Kevin Espinoza, RCP  Outcome: Ongoing  10/18/2021 0211 by Sis [employer], RCP  Outcome: Ongoing

## 2021-10-18 NOTE — PROGRESS NOTES
Comprehensive Nutrition Assessment    Type and Reason for Visit:  Reassess    Nutrition Recommendations/Plan:   -Continue NPO status   -Recommend modifying tube feeding to Vital AF 1.2 (Peptide-Based) @ 50 mL/hr x 24 hrs ~> 1440 kcals, 90 gms protein, 973 mLs water (w/out propofol running)   -Water flushes per MD discretion    -Run TF @ 20 mL/hr x 16 hrs while prone    -Run TF @ 110 mL/s x 8 hrs while supine   -Will continue to monitor EN, labs, weights and wound healing     Nutrition Assessment:  Pt remains NPO, intubated, in droplet plus precaution room and on ECMO. Propofol has now been discontinued. Last BM noted on 10/14. Tube feeding currently held during time of visit for cannulation. Severe malnutrition has now been resolved. No significant wt loss noted over 6 mo per EMR. Will modify tube feed formula and rate d/t discontinuation of propofol. Will monitor. Malnutrition Assessment:  Malnutrition Status:   At risk for malnutrition (Comment)    Context:  Acute Illness     Findings of the 6 clinical characteristics of malnutrition:  Energy Intake:  7 - 50% or less of estimated energy requirements for 5 or more days  Weight Loss:  No significant weight loss     Body Fat Loss:  No significant body fat loss     Muscle Mass Loss:  No significant muscle mass loss    Fluid Accumulation:  1 - Mild Extremities, Generalized   Strength:  Not Performed    Estimated Daily Nutrient Needs:  Energy (kcal):  13-14 ~> 2174-2511 kcals/d; Weight Used for Energy Requirements:  Current     Protein (g):  1.5-2.0 gm/kg ~>  gms/d; Weight Used for Protein Requirements:  Ideal        Fluid (ml/day):  2200 mL fluid/d or per MD; Method Used for Fluid Requirements:   (Ori Louise)      Nutrition Related Findings:  BM 10/14; labs/meds reviewed      Wounds:  Surgical Incision (abdomen)       Current Nutrition Therapies:    Diet NPO  ADULT TUBE FEEDING; Orogastric; Peptide Based High Protein; Continuous; 10; Yes; 10; Q 4 hours; 40; 30; Q 4 hours  Current Tube Feeding (TF) Orders:  · Feeding Route: Orogastric  · Formula: Peptide Based High Protein  · Schedule: Continuous   · Rate: Held   · Water Flushes: per MD  · Goal TF & Flush Orders Provides:   Vital AF 1.2 (Peptide-Based) @ 50 mL/hr x 24 hrs ~> 1440 kcals, 90 gms protein, 973 mLs water (w/out propofol running)    Additional Calorie Sources:   none    Anthropometric Measures:  · Height: 5' 4\" (162.6 cm)  · Current Body Weight: 224 lb (101.6 kg)   · Admission Body Weight: 225 lb (102.1 kg)    · Usual Body Weight: 220 lb (99.8 kg) (Pre-Pregnancy)     · Ideal Body Weight: 120 lbs; % Ideal Body Weight 186.7 %   · BMI: 38.4  · BMI Categories: Obese Class 2 (BMI 35.0 -39.9)       Nutrition Diagnosis:   · Inadequate oral intake related to impaired respiratory function as evidenced by NPO or clear liquid status due to medical condition, intubation, nutrition support - enteral nutrition      Nutrition Interventions:   Food and/or Nutrient Delivery:  Continue NPO, Modify Tube Feeding  Nutrition Education/Counseling:  Education not indicated   Coordination of Nutrition Care:  Continue to monitor while inpatient    Goals: Progress towards goals declining   Intake to meet > 50% of estimated nutrition needs       Nutrition Monitoring and Evaluation:   Food/Nutrient Intake Outcomes:  Enteral Nutrition Intake/Tolerance  Physical Signs/Symptoms Outcomes:  Biochemical Data, Nutrition Focused Physical Findings, Skin, GI Status, Weight, Fluid Status or Edema, Hemodynamic Status     Discharge Planning:     Too soon to determine     Electronically signed by Lasha Bear RD, LD on 10/18/21 at 12:00 PM EDT    Contact: [phone]

## 2021-10-18 NOTE — PLAN OF CARE
Problem: Airway Clearance - Ineffective  Goal: Achieve or maintain patent airway  Outcome: Ongoing     Problem: Gas Exchange - Impaired  Goal: Absence of hypoxia  Outcome: Ongoing  Goal: Promote optimal lung function  Outcome: Ongoing     Problem: Breathing Pattern - Ineffective  Goal: Ability to achieve and maintain a regular respiratory rate  Outcome: Ongoing     Problem:  Body Temperature -  Risk of, Imbalanced  Goal: Ability to maintain a body temperature within defined limits  Outcome: Met This Shift     Problem: Isolation Precautions - Risk of Spread of Infection  Goal: Prevent transmission of infection  Outcome: Met This Shift     Problem: Risk for Fluid Volume Deficit  Goal: Maintain normal heart rhythm  Outcome: Ongoing     Problem: Patient Education: Go to Patient Education Activity  Goal: Patient/Family Education  Outcome: Met This Shift

## 2021-10-18 NOTE — PROGRESS NOTES
PULMONARY & CRITICAL CARE MEDICINE PROGRESS NOTE     Patient:  Moni Wilson  MRN: [id number]  [facility] date: 10/13/2021  Primary Care Physician: Refugio Jeans  Consulting Physician: Stefan Beatty DO  CODE Status: Full Code  LOS: 5     SUBJECTIVE     CHIEF COMPLAINT/REASON FOR INITIAL CONSULT: Acute respiratory failure/COVID-19 pneumonia    BRIEF HOSPITAL COURSE:   The patient is a 27 y.o. female who was 26-week pregnant initially tested positive for COVID-19 on 10/10. She presented to [place] ED on 10/13 with respiratory distress and low home O2 saturation. She was tachypneic and tachycardic. Transferred to [facility] for further management. Initial blood gases showed evidence of combined anion gap and non-anion gap metabolic acidosis, thought to be related to starvation ketosis. During evaluation patient was also noted to have a left lower lobe subsubsegmental PE. She underwent  10/15. Postoperative course complicated by development of worsening respiratory failure and required initiation of mechanical ventilation on 10/15. Due to worsening severe hypoxemic respiratory failure decision was made to put her on VV ECMO. She had placement of 27 F VV ECMO (crescent) cannula in the right atrium on 10/17.     INTERVAL HISTORY:  10/18/21  Patient remains on VV ECMO and mechanical ventilation  Patient was on fentanyl, ketamine and Versed infusion, will switch her to Dilaudid  She is paralyzed with Nimbex  Current vent settings pressure control 20 rate 12, PEEP 10, FiO2 45%  She is on heparin infusion for anticoagulation  Remains hemodynamically stable  Receiving IV Decadron, white count 12.2 today    REVIEW OF SYSTEMS:  Unobtainable from patient due to sedation/mechanical ventilation    OBJECTIVE     ECMO SETTINGS:  PUMP Patient on ECMO: On  Hours on ECMO: 21  Pump Flow (L/min): 4 LPM  Pump Speed (Rotations/min): 2950 RPM  ECMO Mode: V/V  Pump Mode: Cardiohelp   SWEEP GAS Sweep Flow (L/min): 5 LPM   FiO2 Av, Min:77, Max:77     PULSE Pulse  Av.8  Min: 58  Max: 110   RR Resp  Av.7  Min: 12  Max: 14   O2 SAT SpO2  Av.4 %  Min: 90 %  Max: 97 %   OXYGEN DELIVERY No data recorded        SYSTEMIC EXAMINATION:   General appearance - Mechanically ventilated, on ECMO  Mental status - sedated, paralyzed  Eyes - pupils equal and reactive, sclera anicteric  Mouth - mucous membranes moist  Neck - supple, no significant adenopathy, ECMO cannula site has mild oozing  Chest - Breath sounds bilaterally were dimnished to auscultation at bases. There were no wheezes, rhonchi or rales.     Heart - normal rate, regular rhythm, normal S1, S2, no murmurs, rubs, clicks or gallops  Abdomen - soft, nontender, nondistended, no masses or organomegaly  Neurological - DTR's normal and symmetric, motor and sensory grossly normal bilaterally  Extremities - peripheral pulses normal, no pedal edema, no clubbing or cyanosis  Skin - normal coloration and turgor, no rashes, no suspicious skin lesions noted     DATA REVIEW     Medications:  Scheduled Meds:   heparin flush (PF)  3 mL IntraVENous Q12H    heparin flush (PF)  3 mL IntraVENous Q12H    heparin flush (PF)  3 mL IntraVENous Q12H    prenatal vitamin  1 tablet Oral Daily    Tdap-Dtap  0.5 mL IntraMUSCular Prior to discharge    lidocaine 1 % injection  5 mL IntraDERmal Once    sodium chloride flush  5-40 mL IntraVENous 2 times per day    docusate  100 mg Oral BID    sennosides-docusate sodium  2 tablet Oral Daily    insulin lispro  0-12 Units SubCUTAneous Q6H    dexamethasone  6 mg IntraVENous Q24H     Continuous Infusions:   HYDROmorphone      ketamine (KETALAR) infusion for analgosedation 0.2 mg/kg/hr (10/17/21 1826)    lactated ringers 100 mL/hr at 10/18/21 0936    fentaNYL 200 mcg/hr (10/18/21 0933)    sodium chloride      midazolam 10 mg/hr (10/18/21 0243)    cisatracurium (NIMBEX) infusion 2.5 mcg/kg/min (10/18/21 0028)    dextrose      sodium chloride 5 mL/hr at 10/14/21 0700    heparin (PORCINE) Infusion 17 Units/kg/hr (10/18/21 0819)       INPUT/OUTPUT:  In: 1853 [I.V.:1653; NG/GT:200]  Out: 1545 [Urine:1545]  Date 10/18/21 0000 - 10/18/21 2359   Shift 3968-8355 6003-9088 5239-4627 24 Hour Total   INTAKE   I.V.(mL/kg) 837(8.2)   837(8.2)   NG/GT(mL/kg) 100(1)   100(1)   Shift Total(mL/kg) 937(9.2)   937(9.2)   OUTPUT   Urine(mL/kg/hr) 320(0.4) 300  620   Shift Total(mL/kg) 320(3.1) 300(2.9)  620(6.1)   Weight (kg) 102 102 102 102        LABS:  ABGs:   Recent Labs     10/18/21  0030 10/18/21  0416 10/18/21  0557 10/18/21  0811 10/18/21  1014   POCPH 7.405 7.390 7.426 7.410 7.384   POCPCO2 47.5 48.4* 42.7 49.5* 51.7*   POCPO2 63.8* 57.6* 499.0* 105.3 56.9*   POCHCO3 29.8* 29.3* 28.1* 31.3* 30.9*   XIRT0VYZ 92* 89* 100* 98 88*     CBC:   Recent Labs     10/16/21  0454 10/16/21  0454 10/17/21  0413 10/17/21  1601 10/17/21  2232 10/18/21  0433 10/18/21  1029   WBC 9.5   < > 9.0 10.8 10.1 12.4* 12.3*   HGB 8.6*   < > 8.4* 8.0* 7.6* 7.8* 7.4*   HCT 27.0*   < > 25.8* 24.3* 23.2* 24.9* 23.3*   MCV 94.1   < > 94.2 90.3 92.1 94.3 94.3      < > 289 292 310 334 328   LYMPHOPCT 11*  --  20*  --   --   --   --    RBC 2.87*   < > 2.74* 2.69* 2.52* 2.64* 2.47*   MCH 30.0   < > 30.7 29.7 30.2 29.5 30.0   MCHC 31.9   < > 32.6 32.9 32.8 31.3 31.8   RDW 14.3   < > 14.2 13.9 13.9 14.2 14.2    < > = values in this interval not displayed. CRP:   Recent Labs     10/16/21  0454 10/17/21  0413   CRP 30.8* 81.3*     LDH:   No results for input(s): LDH in the last 72 hours.   BMP:   Recent Labs     10/16/21  0454 10/17/21  0413 10/17/21  1601 10/17/21  2232 10/18/21  0433   * 136 139 141 138   K 3.8 3.6* 4.0 3.8 3.8    104 105 107 103   CO2 19* 21 24 25 27   BUN 7 6 6 6 7   CREATININE 0.33* 0.34* 0.24* 0.24* 0.20*   GLUCOSE 120* 92 126* 95 103*   PHOS 3.3 2.9  --   --   --      Liver Function Test:   Recent Labs     10/16/21  0454 10/17/21  0413 10/18/21  0433   PROT multifocal consolidation consistent with pneumonia. Appropriate radiographic positioning of endotracheal tube. XR CHEST PORTABLE   Final Result   The tip of the endotracheal tube is at the origin the right mainstem bronchus   and the tube should be pulled back 2 cm. VL DUP LOWER EXTREMITY VENOUS BILATERAL   Final Result      XR CHEST PORTABLE    (Results Pending)        Echocardiogram:   Results for orders placed during the hospital encounter of 10/13/21    ECHO Complete 2D W Doppler W Color    Narrative  Transthoracic Echocardiography Report (TTE)    Summary  Normal left ventricle size, wall thickness and function with a calculated EF  61%. No segmental wall motion abnormalities seen. Normal right ventricular size and function. No significant valvular regurgitation or stenosis seen. ASSESSMENT AND PLAN     Assessment:    // Acute hypoxic respiratory failure, on VV ECMO/mechanical ventilation  // Acute respiratory distress syndrome  // Bilateral multifocal pneumonia due to COVID 19 infection  // Sepsis due to COVID 19  // Left lower lobe subsegmental pulmonary embolism  // Left pleural effusion/atelectasis  // S/p  section, 26-week pregnancy  // Metabolic acidosis, resolved  // Leukocytosis    Plan:    I personally interviewed/examined the patient; reviewed interval history, interpreted all available radiographic and laboratory data at the time of service.     Patient currently sedated with fentanyl, ketamine and Versed  Will discontinue fentanyl, start on Dilaudid infusion  She is paralyzed with Nimbex  Patient remains hemodynamically stable  Continue mechanical ventilation at \"rest\" settings  Monitor endotracheal secretions  Chest x-ray reviewed, will consider thoracentesis/chest tube placement on the left side  Continue pulmonary toilet, aspiration precautions and bronchodilators  Continue to monitor I/O with a goal of even/negative fluid balance  We will recommend starting tube feeds  Stress ulcer prophylaxis  Currently on heparin infusion for anticoagulation  Continue to monitor CBC, coagulation profile, ACT, TEG  Chemical DVT prophylaxis not required as patient is on systemic anticoagulation  Antimicrobials reviewed; continue to monitor off antimicrobials  Monitor CRP, LDH, AST/ALT, D-Dimer, Ferritin   Glycemic control appropriate  Continue IV Decadron  Skin/wound care reviewed with the nursing staff  Physical/occupational therapy    The patient remains critically ill with illness/injury that acutely impairs one or more vital organ systems, such that there is a high probability of imminent or life threatening deterioration in the patient's condition. Critical care time of 35 minutes was spent (excluding procedures), in coordination of care during bedside rounds and discussion of patient care in detail, and recommendations of the team were adopted in the plan. Necessity of all invasive devices was also confirmed. Sanya Moyer MD  Pulmonary and Critical Care Medicine           10/18/2021, 11:11 AM    This patient was evaluated in the context of the global SARS-CoV-2 (COVID-19) pandemic, which necessitated considerations that the patient either has COVID-19 infection or is at risk of infection with COVID-19. Institutional protocols and algorithms that pertain to the evaluation & management of patients with COVID-19 or those at risk for COVID-19 are in a state of rapid changes based on information released by regulatory bodies including the CDC and federal and state organizations. These policies and algorithms were followed during the patient's care. Please note that this chart was generated using voice recognition Dragon dictation software. Although every effort was made to ensure the accuracy of this automated transcription, some errors in transcription may have occurred.

## 2021-10-18 NOTE — CARE COORDINATION
Transitional Planning  Covid + isolation 3 more days  S/p  10/14 Baby in NICU  Intubated/sedated/paralyzed  Tube feeds  ECMO initiated 10/17/2021

## 2021-10-18 NOTE — PROGRESS NOTES
[facility]  Daily ECMO Progress Note    Patient's Name/Date of Birth: Becky Trujillo / 1990 (70 y.o.)  MRN: [id number]  Admit date: 10/13/2021  CODE Status: Full Code    Date: October 18, 2021     Height: HEIGHTHeight: 5' 4\" (162.6 cm)  Weight: WEIGHTWeight: 224 lb 13.9 oz (102 kg)   Allergies: Vicodin [hydrocodone-acetaminophen]  Blood Type: O+  Cannula Placed By: Dr. Sousa and Dr. Mame Fuentes  Date/Time ECMO Initiated: 10/17/2021 at 1332    ECMO Indication: Respiratory Failure, ARDs     ECMO   Cannulation Sites: Cannulas secured, dsg dry and intact, no drainage or hematoma noted  Arterial Cannula location: Right IJ  Arterial Cannula Size (Fr): 30 F Cresent  Venous Cannula Location: Right IJ  Venous Cannula Size (Fr): 30 F Cresent  Additional Cannula Size and Location: NA  Additional Device(s) Present: NA    ECMO Values  ECMO Pump: Patient on ECMO: On  Hours on ECMO: 14  Pump Flow (L/min): 4.05 LPM  Pump Speed (Rotations/min): 2976 RPM  ECMO Mode: V/V  Pump Mode: Cardiohelp    Sweep Gas: Sweep Flow (L/min): 5 LPM   FiO2 (%): 100 %    Circuit Pressures: Inlet Pressure (Bladder): -79 mmHg  Pre-Membrane Pressure: 157 mmHg  Post-Membrane Pressure: 139 mmHg  Delta P: 18 mmHg  SVO2 (%): 68.9 %  ECMO blood flow temperature: 97.2 °F (36.2 °C)    Data:  CBC: Recent Labs     10/17/21  1601 10/17/21  2232 10/18/21  0433   WBC 10.8 10.1 12.4*   HGB 8.0* 7.6* 7.8*   HCT 24.3* 23.2* 24.9*   MCV 90.3 92.1 94.3    310 334     BMP:   Recent Labs     10/16/21  0454 10/16/21  0454 10/17/21  0413 10/17/21  0413 10/17/21  1601 10/17/21  2232 10/18/21  0433   *   < > 136   < > 139 141 138   K 3.8   < > 3.6*   < > 4.0 3.8 3.8      < > 104   < > 105 107 103   CO2 19*   < > 21   < > 24 25 27   PHOS 3.3  --  2.9  --   --   --   --    BUN 7   < > 6   < > 6 6 7   CREATININE 0.33*   < > 0.34*   < > 0.24* 0.24* 0.20*    < > = values in this interval not displayed.      LFT:   Recent Labs 10/16/21  0454 10/17/21  0413 10/18/21  0433   PROT 4.6* 4.7* 4.9*   LABALBU 2.2* 2.1* 2.2*   ALT 39* 55* 66*   AST 43* 57* 62*   ALKPHOS 120* 106* 105*   BILITOT 0.26* 0.18* 0.19*     Inflammatory Markers:   Recent Labs     10/16/21  0454 10/17/21  0413   CRP 30.8* 81.3*   FERRITIN 200*  --      Cardiac: No results for input(s): CKTOTAL, CKMB, CKMBINDEX, TROPONINI, BNP, PROBNP in the last 72 hours. Invalid input(s): TROPONIN, HSTROP  Lactic Acid: No results for input(s): LACTA in the last 72 hours. Triglycerides:   Recent Labs     10/16/21  0454 10/17/21  1019   TRIG 480* 422*     PT/INR:   Recent Labs     10/17/21  1601 10/17/21  2232 10/18/21  0433   PROTIME 10.3 10.0 10.0   INR 1.0 0.9 0.9     APTT:   Recent Labs     10/17/21  1601 10/17/21  2232 10/18/21  0433   APTT 19.0* 104.2* 73.5*     TEG:   Recent Labs     10/17/21  2232   HEPTHERAPY YES  YES   REACTTIMETEG 40.5*   KINETICSTEG 14.4*   ANGLETEG 17.1*   MAXCLOTTEG 73.6*   [id number] 0.0   EPLTEG 0.0     ABG:   Recent Labs     10/17/21  2028 10/17/21  2203 10/18/21  0030 10/18/21  0416 10/18/21  0557   POCPH 7.387 7.434 7.405 7.390 7.426   POCPCO2 49.0* 43.3 47.5 48.4* 42.7   POCPO2 58.2* 78.3* 63.8* 57.6* 499.0*   POCHCO3 29.4* 29.0* 29.8* 29.3* 28.1*   [id number] 89* 96 92* 89* 100*     VBG:   Recent Labs     10/17/21  1439 10/17/21  1727 10/18/21  0546   PHVEN 7.480* 7.483* 7.376   ZQX7XUH 33.3* 35.9* 52.1*   QDX6ZZG 24.8 26.9 30.5*   [id number] 55* 67 62       I/O:  I/O last 3 completed shifts: In: 2668 [I.V.:2372;  NG/GT:296]  Out: 3595 [Urine:3495; Blood:100]    Scheduled Meds:    heparin flush (PF)  3 mL IntraVENous Q12H    heparin flush (PF)  3 mL IntraVENous Q12H    heparin flush (PF)  3 mL IntraVENous Q12H    prenatal vitamin  1 tablet Oral Daily    Tdap-Dtap  0.5 mL IntraMUSCular Prior to discharge    lidocaine 1 % injection  5 mL IntraDERmal Once    sodium chloride flush  5-40 mL IntraVENous 2 times per day    docusate  100 mg Oral BID  sennosides-docusate sodium  2 tablet Oral Daily    insulin lispro  0-12 Units SubCUTAneous Q6H    dexamethasone  6 mg IntraVENous Q24H     Continuous Infusions:    ketamine (KETALAR) infusion for analgosedation 0.2 mg/kg/hr (10/17/21 1826)    lactated ringers 20 mL/hr at 10/17/21 1430    fentaNYL 200 mcg/hr (10/18/21 0349)    sodium chloride      midazolam 10 mg/hr (10/18/21 0243)    cisatracurium (NIMBEX) infusion 2.5 mcg/kg/min (10/18/21 0028)    dextrose      sodium chloride 5 mL/hr at 10/14/21 0700    heparin (PORCINE) Infusion 16 Units/kg/hr (10/18/21 0612)       Physical Exam  Vital Signs: BP (!) 140/77   Pulse 95   Temp 99 °F (37.2 °C)   Resp 12   Ht 5' 4\" (1.626 m)   Wt 224 lb 13.9 oz (102 kg)   LMP 04/01/2021 (Approximate)   SpO2 93%   Breastfeeding Unknown   BMI 38.60 kg/m²  O2 Flow Rate (L/min): 40 L/min     General: Sedated and on vent  Heart: Normal S1 and S2.  Regular rhythm. No murmurs, gallops, or rubs. Pacing Wires: No   Lungs:  Diminished bilaterally Chest tubes: No  Abdomen: soft, non tender, non distended, BS hypoactive x4  Extremities: 1+ edema    Assessment/Plan:  Continue to monitor cannulation site of right IJ. The above recommendations including medications and orders were discussed and agreed upon with Dr. Nikhil Betts, the attending on service for the cardiothoracic surgery group today. Electronically signed by FADUMO Castillo CNP on 10/18/2021 at 7:21 AM    On this date 10/18/2021 I have spent 25 minutes reviewing previous notes, test results and face to face with the patient discussing the diagnosis and importance of compliance with the treatment plan as well as documenting on the day of the visit. At least 50% of the time documented was spent with the patient to provide counseling and/or coordination of care.     This note was created with the assistance of a speech-recognition program.  Although the intention is to generate a document that actually reflects the content of the visit, no guarantees can be provided that every mistake has been identified and corrected by editing.

## 2021-10-18 NOTE — PROGRESS NOTES
[facility] End of Shift Note:       Cannulation date/time:10/5/2021 1892  ECMO type:VV  Cannula sizes/locations: 30 Fr  Orange in right IJ  Flow ordered at:4-6       Currently flowing at:4       FIO2 at:100%       Sweep at:2950  Delta pressure:18-20  Clot burden:None noted       Oxygenator:       Circuit:  Anticoagulation: Heparin 17u/kg/hr  Products given:        PRBC's: 1 unit       PLT's:       FFP:       Cryo:        Albumin:

## 2021-10-18 NOTE — PROGRESS NOTES
[facility] End of Shift Note:         CannuLation date/time:10/17/21 @1348  ECMO type:  Cannula sizes/locations:30 f CRESENT ON RIGHT  Flow ordered at:       Currently flowing at:3.89       FIO2 at:100       Sweep at:5  Delta pressure:17  Clot burden:NONE       Oxygenator:       Circuit:  Anticoagulation: 16 UNITS PER KG/HR  Products given: NONE       PRBC's:       PLT's:       FFP:       Cryo:        Albumin:

## 2021-10-18 NOTE — PROGRESS NOTES
Notified Dr. Amee Barber of increase in [address] after changing the pts sedation. Orders received to start 5 mg percocet per NG PRN. If pts HR continues to be elevated may increase versed gtt max dose to 15 mg/hr.  Will continue to monitor

## 2021-10-19 ENCOUNTER — APPOINTMENT (OUTPATIENT)
Dept: GENERAL RADIOLOGY | Age: 31
DRG: 003 | End: 2021-10-19
Payer: COMMERCIAL

## 2021-10-19 LAB
ACTIVATED CLOTTING TIME: 138 SEC (ref 79–149)
ACTIVATED CLOTTING TIME: 155 SEC (ref 79–149)
ACTIVATED CLOTTING TIME: 159 SEC (ref 79–149)
ACTIVATED CLOTTING TIME: 163 SEC (ref 79–149)
ACTIVATED CLOTTING TIME: 163 SEC (ref 79–149)
ACTIVATED CLOTTING TIME: 167 SEC (ref 79–149)
ACTIVATED CLOTTING TIME: 179 SEC (ref 79–149)
ALBUMIN SERPL-MCNC: 2.7 G/DL (ref 3.5–5.2)
ALBUMIN SERPL-MCNC: 2.7 G/DL (ref 3.5–5.2)
ALBUMIN/GLOBULIN RATIO: 1.1 (ref 1–2.5)
ALBUMIN/GLOBULIN RATIO: 1.1 (ref 1–2.5)
ALLEN TEST: ABNORMAL
ALP BLD-CCNC: 88 U/L (ref 35–104)
ALP BLD-CCNC: 88 U/L (ref 35–104)
ALT SERPL-CCNC: 45 U/L (ref 5–33)
ALT SERPL-CCNC: 51 U/L (ref 5–33)
ANGLE TEG W HEPARIN: 20.2 DEG (ref 53–72)
ANGLE TEG W HEPARIN: 76.7 DEG (ref 53–72)
ANGLE TEG: 18 DEG (ref 53–72)
ANGLE TEG: 73.5 DEG (ref 53–72)
ANION GAP SERPL CALCULATED.3IONS-SCNC: 10 MMOL/L (ref 9–17)
ANION GAP SERPL CALCULATED.3IONS-SCNC: 9 MMOL/L (ref 9–17)
AST SERPL-CCNC: 28 U/L
AST SERPL-CCNC: 29 U/L
AT-III ACTIVITY: 84 % (ref 83–122)
BILIRUB SERPL-MCNC: 0.22 MG/DL (ref 0.3–1.2)
BILIRUB SERPL-MCNC: 0.26 MG/DL (ref 0.3–1.2)
BILIRUBIN DIRECT: 0.1 MG/DL
BILIRUBIN DIRECT: 0.12 MG/DL
BILIRUBIN, INDIRECT: 0.12 MG/DL (ref 0–1)
BILIRUBIN, INDIRECT: 0.14 MG/DL (ref 0–1)
BUN BLDV-MCNC: 7 MG/DL (ref 6–20)
BUN BLDV-MCNC: 7 MG/DL (ref 6–20)
BUN BLDV-MCNC: 8 MG/DL (ref 6–20)
BUN BLDV-MCNC: 8 MG/DL (ref 6–20)
BUN/CREAT BLD: ABNORMAL (ref 9–20)
CALCIUM IONIZED: 1.14 MMOL/L (ref 1.13–1.33)
CALCIUM IONIZED: 1.14 MMOL/L (ref 1.13–1.33)
CALCIUM IONIZED: 1.17 MMOL/L (ref 1.13–1.33)
CALCIUM IONIZED: 1.17 MMOL/L (ref 1.13–1.33)
CALCIUM SERPL-MCNC: 8.1 MG/DL (ref 8.6–10.4)
CALCIUM SERPL-MCNC: 8.2 MG/DL (ref 8.6–10.4)
CALCIUM SERPL-MCNC: 8.3 MG/DL (ref 8.6–10.4)
CALCIUM SERPL-MCNC: 8.5 MG/DL (ref 8.6–10.4)
CHLORIDE BLD-SCNC: 100 MMOL/L (ref 98–107)
CHLORIDE BLD-SCNC: 103 MMOL/L (ref 98–107)
CHLORIDE BLD-SCNC: 99 MMOL/L (ref 98–107)
CHLORIDE BLD-SCNC: 99 MMOL/L (ref 98–107)
CO2: 28 MMOL/L (ref 20–31)
CO2: 29 MMOL/L (ref 20–31)
CO2: 29 MMOL/L (ref 20–31)
CO2: 30 MMOL/L (ref 20–31)
CREAT SERPL-MCNC: 0.24 MG/DL (ref 0.5–0.9)
CREAT SERPL-MCNC: <0.2 MG/DL (ref 0.5–0.9)
EPL TEG, W/HEP: 0 % (ref 0–15)
EPL TEG, W/HEP: 0 % (ref 0–15)
EPL-TEG: 0 % (ref 0–15)
EPL-TEG: 0 % (ref 0–15)
FIBRINOGEN: 404 MG/DL (ref 140–420)
FIBRINOGEN: 414 MG/DL (ref 140–420)
FIBRINOGEN: 418 MG/DL (ref 140–420)
FIBRINOGEN: 419 MG/DL (ref 140–420)
FIO2: 100
FIO2: ABNORMAL
GFR AFRICAN AMERICAN: >60 ML/MIN
GFR AFRICAN AMERICAN: ABNORMAL ML/MIN
GFR NON-AFRICAN AMERICAN: >60 ML/MIN
GFR NON-AFRICAN AMERICAN: ABNORMAL ML/MIN
GFR SERPL CREATININE-BSD FRML MDRD: ABNORMAL ML/MIN/{1.73_M2}
GLOBULIN: ABNORMAL G/DL (ref 1.5–3.8)
GLOBULIN: ABNORMAL G/DL (ref 1.5–3.8)
GLUCOSE BLD-MCNC: 112 MG/DL (ref 70–99)
GLUCOSE BLD-MCNC: 114 MG/DL (ref 65–105)
GLUCOSE BLD-MCNC: 115 MG/DL (ref 70–99)
GLUCOSE BLD-MCNC: 121 MG/DL (ref 70–99)
GLUCOSE BLD-MCNC: 130 MG/DL (ref 74–100)
GLUCOSE BLD-MCNC: 140 MG/DL (ref 74–100)
GLUCOSE BLD-MCNC: 143 MG/DL (ref 74–100)
GLUCOSE BLD-MCNC: 144 MG/DL (ref 74–100)
GLUCOSE BLD-MCNC: 153 MG/DL (ref 70–99)
GLUCOSE BLD-MCNC: 168 MG/DL (ref 74–100)
HCO3 VENOUS: 31.6 MMOL/L (ref 22–29)
HCT VFR BLD CALC: 24.7 % (ref 36.3–47.1)
HCT VFR BLD CALC: 25.5 % (ref 36.3–47.1)
HCT VFR BLD CALC: 25.8 % (ref 36.3–47.1)
HCT VFR BLD CALC: 26.1 % (ref 36.3–47.1)
HEMOGLOBIN: 8 G/DL (ref 11.9–15.1)
HEMOGLOBIN: 8.1 G/DL (ref 11.9–15.1)
HEPARIN THERAPY: ABNORMAL
HEPARIN THERAPY: ABNORMAL
HEPARIN THERAPY: YES
HEPARIN THERAPY: YES
INR BLD: 0.9
KINETICS TEG W HEPARIN: 0.9 MIN (ref 1–3)
KINETICS TEG W HEPARIN: 6.2 MIN (ref 1–3)
KINETICS TEG: 1.2 MIN (ref 1–3)
KINETICS TEG: 14.8 MIN (ref 1–3)
LACTIC ACID, WHOLE BLOOD: 0.5 MMOL/L (ref 0.7–2.1)
LACTIC ACID, WHOLE BLOOD: 0.8 MMOL/L (ref 0.7–2.1)
LACTIC ACID, WHOLE BLOOD: 0.9 MMOL/L (ref 0.7–2.1)
LACTIC ACID, WHOLE BLOOD: 1.5 MMOL/L (ref 0.7–2.1)
LY30 (LYSIS) TEG: 0 % (ref 0–8)
LY30 (LYSIS) TEG: 0 % (ref 0–8)
LY30(LYSIS) TEG W HEPARIN: 0 % (ref 0–8)
LY30(LYSIS) TEG W HEPARIN: 0 % (ref 0–8)
MA (MAX CLOT) TEG W HEPARIN: 76.6 MM (ref 50–70)
MA (MAX CLOT) TEG W HEPARIN: 84.5 MM (ref 50–70)
MA (MAX CLOT) TEG: 76.1 MM (ref 50–70)
MA (MAX CLOT) TEG: 79.6 MM (ref 50–70)
MAGNESIUM: 1.7 MG/DL (ref 1.6–2.6)
MAGNESIUM: 1.9 MG/DL (ref 1.6–2.6)
MAGNESIUM: 1.9 MG/DL (ref 1.6–2.6)
MAGNESIUM: 2.1 MG/DL (ref 1.6–2.6)
MCH RBC QN AUTO: 28.7 PG (ref 25.2–33.5)
MCH RBC QN AUTO: 28.8 PG (ref 25.2–33.5)
MCH RBC QN AUTO: 29.2 PG (ref 25.2–33.5)
MCH RBC QN AUTO: 29.6 PG (ref 25.2–33.5)
MCHC RBC AUTO-ENTMCNC: 31 G/DL (ref 28.4–34.8)
MCHC RBC AUTO-ENTMCNC: 31 G/DL (ref 28.4–34.8)
MCHC RBC AUTO-ENTMCNC: 31.4 G/DL (ref 28.4–34.8)
MCHC RBC AUTO-ENTMCNC: 32.4 G/DL (ref 28.4–34.8)
MCV RBC AUTO: 91.5 FL (ref 82.6–102.9)
MCV RBC AUTO: 92.6 FL (ref 82.6–102.9)
MCV RBC AUTO: 92.8 FL (ref 82.6–102.9)
MCV RBC AUTO: 93.1 FL (ref 82.6–102.9)
MODE: ABNORMAL
NEGATIVE BASE EXCESS, ART: ABNORMAL (ref 0–2)
NEGATIVE BASE EXCESS, VEN: ABNORMAL (ref 0–2)
NRBC AUTOMATED: 0 PER 100 WBC
NRBC AUTOMATED: 0 PER 100 WBC
NRBC AUTOMATED: 0.2 PER 100 WBC
NRBC AUTOMATED: 0.2 PER 100 WBC
O2 DEVICE/FLOW/%: ABNORMAL
O2 SAT, VEN: 67 % (ref 60–85)
PARTIAL THROMBOPLASTIN TIME: 45.8 SEC (ref 20.5–30.5)
PARTIAL THROMBOPLASTIN TIME: 48.2 SEC (ref 20.5–30.5)
PARTIAL THROMBOPLASTIN TIME: 60.1 SEC (ref 20.5–30.5)
PARTIAL THROMBOPLASTIN TIME: 70.2 SEC (ref 20.5–30.5)
PATIENT TEMP: ABNORMAL
PCO2, VEN: 48.8 MM HG (ref 41–51)
PDW BLD-RTO: 13.5 % (ref 11.8–14.4)
PDW BLD-RTO: 13.7 % (ref 11.8–14.4)
PDW BLD-RTO: 13.7 % (ref 11.8–14.4)
PDW BLD-RTO: 13.9 % (ref 11.8–14.4)
PERFORMING LOCATION: ABNORMAL
PH VENOUS: 7.42 (ref 7.32–7.43)
PLATELET # BLD: 310 K/UL (ref 138–453)
PLATELET # BLD: 310 K/UL (ref 138–453)
PLATELET # BLD: 311 K/UL (ref 138–453)
PLATELET # BLD: 327 K/UL (ref 138–453)
PMV BLD AUTO: 9.3 FL (ref 8.1–13.5)
PMV BLD AUTO: 9.4 FL (ref 8.1–13.5)
PMV BLD AUTO: 9.5 FL (ref 8.1–13.5)
PMV BLD AUTO: 9.9 FL (ref 8.1–13.5)
PO2, VEN: 35 MM HG (ref 30–50)
POC HCO3: 27.5 MMOL/L (ref 21–28)
POC HCO3: 30.5 MMOL/L (ref 21–28)
POC HCO3: 30.5 MMOL/L (ref 21–28)
POC HCO3: 30.7 MMOL/L (ref 21–28)
POC HCO3: 31.1 MMOL/L (ref 21–28)
POC HCO3: 31.2 MMOL/L (ref 21–28)
POC HCO3: 31.7 MMOL/L (ref 21–28)
POC HCO3: 31.8 MMOL/L (ref 21–28)
POC HCO3: 31.9 MMOL/L (ref 21–28)
POC HCO3: 32.6 MMOL/L (ref 21–28)
POC HCO3: 33.1 MMOL/L (ref 21–28)
POC HEMATOCRIT: 17 % (ref 36–46)
POC HEMATOCRIT: 24 % (ref 36–46)
POC HEMOGLOBIN: 5.9 G/DL (ref 12–16)
POC HEMOGLOBIN: 8.1 G/DL (ref 12–16)
POC O2 SATURATION: 100 % (ref 94–98)
POC O2 SATURATION: 100 % (ref 94–98)
POC O2 SATURATION: 67 % (ref 94–98)
POC O2 SATURATION: 88 % (ref 94–98)
POC O2 SATURATION: 89 % (ref 94–98)
POC O2 SATURATION: 93 % (ref 94–98)
POC O2 SATURATION: 93 % (ref 94–98)
POC O2 SATURATION: 94 % (ref 94–98)
POC O2 SATURATION: 96 % (ref 94–98)
POC O2 SATURATION: 96 % (ref 94–98)
POC O2 SATURATION: 97 % (ref 94–98)
POC O2 SATURATION: 98 % (ref 94–98)
POC O2 SATURATION: 98 % (ref 94–98)
POC O2 SATURATION: 99 % (ref 94–98)
POC PCO2 TEMP: ABNORMAL MM HG
POC PCO2: 27.5 MM HG (ref 35–48)
POC PCO2: 39.3 MM HG (ref 35–48)
POC PCO2: 44.1 MM HG (ref 35–48)
POC PCO2: 44.2 MM HG (ref 35–48)
POC PCO2: 44.5 MM HG (ref 35–48)
POC PCO2: 44.7 MM HG (ref 35–48)
POC PCO2: 44.8 MM HG (ref 35–48)
POC PCO2: 45.9 MM HG (ref 35–48)
POC PCO2: 46.1 MM HG (ref 35–48)
POC PCO2: 46.9 MM HG (ref 35–48)
POC PCO2: 47.1 MM HG (ref 35–48)
POC PCO2: 50.4 MM HG (ref 35–48)
POC PCO2: 51.3 MM HG (ref 35–48)
POC PCO2: 53.3 MM HG (ref 35–48)
POC PH TEMP: ABNORMAL
POC PH: 7.38 (ref 7.35–7.45)
POC PH: 7.4 (ref 7.35–7.45)
POC PH: 7.42 (ref 7.35–7.45)
POC PH: 7.44 (ref 7.35–7.45)
POC PH: 7.45 (ref 7.35–7.45)
POC PH: 7.46 (ref 7.35–7.45)
POC PH: 7.65 (ref 7.35–7.45)
POC PO2 TEMP: ABNORMAL MM HG
POC PO2: 101.1 MM HG (ref 83–108)
POC PO2: 126.8 MM HG (ref 83–108)
POC PO2: 33 MM HG (ref 83–108)
POC PO2: 504.4 MM HG (ref 83–108)
POC PO2: 52.6 MM HG (ref 83–108)
POC PO2: 55 MM HG (ref 83–108)
POC PO2: 589.3 MM HG (ref 83–108)
POC PO2: 67.1 MM HG (ref 83–108)
POC PO2: 68.6 MM HG (ref 83–108)
POC PO2: 69.5 MM HG (ref 83–108)
POC PO2: 78.2 MM HG (ref 83–108)
POC PO2: 87.9 MM HG (ref 83–108)
POC PO2: 90.8 MM HG (ref 83–108)
POC PO2: 95 MM HG (ref 83–108)
POSITIVE BASE EXCESS, ART: 3 (ref 0–3)
POSITIVE BASE EXCESS, ART: 5 (ref 0–3)
POSITIVE BASE EXCESS, ART: 5 (ref 0–3)
POSITIVE BASE EXCESS, ART: 6 (ref 0–3)
POSITIVE BASE EXCESS, ART: 7 (ref 0–3)
POSITIVE BASE EXCESS, ART: 8 (ref 0–3)
POSITIVE BASE EXCESS, ART: 8 (ref 0–3)
POSITIVE BASE EXCESS, ART: 9 (ref 0–3)
POSITIVE BASE EXCESS, VEN: 6 (ref 0–3)
POTASSIUM SERPL-SCNC: 4.1 MMOL/L (ref 3.7–5.3)
POTASSIUM SERPL-SCNC: 4.2 MMOL/L (ref 3.7–5.3)
POTASSIUM SERPL-SCNC: 4.4 MMOL/L (ref 3.7–5.3)
POTASSIUM SERPL-SCNC: 4.5 MMOL/L (ref 3.7–5.3)
PROTHROMBIN TIME: 10 SEC (ref 9.1–12.3)
PROTHROMBIN TIME: 9.8 SEC (ref 9.1–12.3)
PROTHROMBIN TIME: 9.9 SEC (ref 9.1–12.3)
PROTHROMBIN TIME: 9.9 SEC (ref 9.1–12.3)
RBC # BLD: 2.7 M/UL (ref 3.95–5.11)
RBC # BLD: 2.74 M/UL (ref 3.95–5.11)
RBC # BLD: 2.78 M/UL (ref 3.95–5.11)
RBC # BLD: 2.82 M/UL (ref 3.95–5.11)
REACTION TIME TEG W HEPARIN: 24.8 MIN (ref 5–10)
REACTION TIME TEG W HEPARIN: 6.1 MIN (ref 5–10)
REACTION TIME TEG: 36.9 MIN (ref 5–10)
REACTION TIME TEG: 6.4 MIN (ref 5–10)
SAMPLE SITE: ABNORMAL
SODIUM BLD-SCNC: 137 MMOL/L (ref 135–144)
SODIUM BLD-SCNC: 138 MMOL/L (ref 135–144)
SODIUM BLD-SCNC: 140 MMOL/L (ref 135–144)
SODIUM BLD-SCNC: 141 MMOL/L (ref 135–144)
TCO2 (CALC), ART: ABNORMAL MMOL/L (ref 22–29)
TEG COMMENT: ABNORMAL
TOTAL CO2, VENOUS: ABNORMAL MMOL/L (ref 23–30)
TOTAL PROTEIN: 5.2 G/DL (ref 6.4–8.3)
TOTAL PROTEIN: 5.2 G/DL (ref 6.4–8.3)
WBC # BLD: 10.1 K/UL (ref 3.5–11.3)
WBC # BLD: 11.2 K/UL (ref 3.5–11.3)
WBC # BLD: 12.9 K/UL (ref 3.5–11.3)
WBC # BLD: 9.6 K/UL (ref 3.5–11.3)

## 2021-10-19 PROCEDURE — 6370000000 HC RX 637 (ALT 250 FOR IP): Performed by: STUDENT IN AN ORGANIZED HEALTH CARE EDUCATION/TRAINING PROGRAM

## 2021-10-19 PROCEDURE — 6360000002 HC RX W HCPCS: Performed by: INTERNAL MEDICINE

## 2021-10-19 PROCEDURE — 85384 FIBRINOGEN ACTIVITY: CPT

## 2021-10-19 PROCEDURE — 94003 VENT MGMT INPAT SUBQ DAY: CPT

## 2021-10-19 PROCEDURE — 83735 ASSAY OF MAGNESIUM: CPT

## 2021-10-19 PROCEDURE — 94761 N-INVAS EAR/PLS OXIMETRY MLT: CPT

## 2021-10-19 PROCEDURE — 6360000002 HC RX W HCPCS: Performed by: THORACIC SURGERY (CARDIOTHORACIC VASCULAR SURGERY)

## 2021-10-19 PROCEDURE — 2100000001 HC CVICU R&B

## 2021-10-19 PROCEDURE — 85300 ANTITHROMBIN III ACTIVITY: CPT

## 2021-10-19 PROCEDURE — 6370000000 HC RX 637 (ALT 250 FOR IP): Performed by: INTERNAL MEDICINE

## 2021-10-19 PROCEDURE — 85730 THROMBOPLASTIN TIME PARTIAL: CPT

## 2021-10-19 PROCEDURE — 33948 ECMO/ECLS DAILY MGMT-VENOUS: CPT | Performed by: INTERNAL MEDICINE

## 2021-10-19 PROCEDURE — 85390 FIBRINOLYSINS SCREEN I&R: CPT

## 2021-10-19 PROCEDURE — 85576 BLOOD PLATELET AGGREGATION: CPT

## 2021-10-19 PROCEDURE — 83051 HEMOGLOBIN PLASMA: CPT

## 2021-10-19 PROCEDURE — 99291 CRITICAL CARE FIRST HOUR: CPT | Performed by: INTERNAL MEDICINE

## 2021-10-19 PROCEDURE — 85347 COAGULATION TIME ACTIVATED: CPT

## 2021-10-19 PROCEDURE — 80048 BASIC METABOLIC PNL TOTAL CA: CPT

## 2021-10-19 PROCEDURE — APPSS30 APP SPLIT SHARED TIME 16-30 MINUTES: Performed by: NURSE PRACTITIONER

## 2021-10-19 PROCEDURE — 2580000003 HC RX 258: Performed by: STUDENT IN AN ORGANIZED HEALTH CARE EDUCATION/TRAINING PROGRAM

## 2021-10-19 PROCEDURE — 6360000002 HC RX W HCPCS: Performed by: STUDENT IN AN ORGANIZED HEALTH CARE EDUCATION/TRAINING PROGRAM

## 2021-10-19 PROCEDURE — 83605 ASSAY OF LACTIC ACID: CPT

## 2021-10-19 PROCEDURE — 86927 PLASMA FRESH FROZEN: CPT

## 2021-10-19 PROCEDURE — 2500000003 HC RX 250 WO HCPCS: Performed by: STUDENT IN AN ORGANIZED HEALTH CARE EDUCATION/TRAINING PROGRAM

## 2021-10-19 PROCEDURE — P9017 PLASMA 1 DONOR FRZ W/IN 8 HR: HCPCS

## 2021-10-19 PROCEDURE — 85610 PROTHROMBIN TIME: CPT

## 2021-10-19 PROCEDURE — P9047 ALBUMIN (HUMAN), 25%, 50ML: HCPCS | Performed by: INTERNAL MEDICINE

## 2021-10-19 PROCEDURE — 82330 ASSAY OF CALCIUM: CPT

## 2021-10-19 PROCEDURE — 82803 BLOOD GASES ANY COMBINATION: CPT

## 2021-10-19 PROCEDURE — 82947 ASSAY GLUCOSE BLOOD QUANT: CPT

## 2021-10-19 PROCEDURE — 33948 ECMO/ECLS DAILY MGMT-VENOUS: CPT

## 2021-10-19 PROCEDURE — 71045 X-RAY EXAM CHEST 1 VIEW: CPT

## 2021-10-19 PROCEDURE — 99233 SBSQ HOSP IP/OBS HIGH 50: CPT | Performed by: INTERNAL MEDICINE

## 2021-10-19 PROCEDURE — 85027 COMPLETE CBC AUTOMATED: CPT

## 2021-10-19 PROCEDURE — 2700000000 HC OXYGEN THERAPY PER DAY

## 2021-10-19 PROCEDURE — 80076 HEPATIC FUNCTION PANEL: CPT

## 2021-10-19 PROCEDURE — 85014 HEMATOCRIT: CPT

## 2021-10-19 RX ORDER — OXYCODONE HYDROCHLORIDE AND ACETAMINOPHEN 5; 325 MG/1; MG/1
2 TABLET ORAL EVERY 4 HOURS PRN
Status: DISCONTINUED | OUTPATIENT
Start: 2021-10-19 | End: 2021-10-30 | Stop reason: HOSPADM

## 2021-10-19 RX ADMIN — MAGNESIUM SULFATE HEPTAHYDRATE 1000 MG: 1 INJECTION, SOLUTION INTRAVENOUS at 06:24

## 2021-10-19 RX ADMIN — OXYCODONE HYDROCHLORIDE AND ACETAMINOPHEN 2 TABLET: 5; 325 TABLET ORAL at 13:55

## 2021-10-19 RX ADMIN — DOCUSATE SODIUM 50MG AND SENNOSIDES 8.6MG 2 TABLET: 8.6; 5 TABLET, FILM COATED ORAL at 10:12

## 2021-10-19 RX ADMIN — Medication 30 UNITS: at 06:28

## 2021-10-19 RX ADMIN — Medication 1 TABLET: at 10:10

## 2021-10-19 RX ADMIN — Medication 100 MG: at 21:50

## 2021-10-19 RX ADMIN — CISATRACURIUM BESYLATE 2.5 MCG/KG/MIN: 200 INJECTION, SOLUTION INTRAVENOUS at 00:17

## 2021-10-19 RX ADMIN — Medication 10 MG/HR: at 06:28

## 2021-10-19 RX ADMIN — Medication 100 MG: at 10:11

## 2021-10-19 RX ADMIN — Medication 30 UNITS: at 17:50

## 2021-10-19 RX ADMIN — ALBUMIN (HUMAN) 25 G: 0.25 INJECTION, SOLUTION INTRAVENOUS at 00:33

## 2021-10-19 RX ADMIN — SODIUM CHLORIDE, PRESERVATIVE FREE: 5 INJECTION INTRAVENOUS at 10:12

## 2021-10-19 RX ADMIN — INSULIN LISPRO 2 UNITS: 100 INJECTION, SOLUTION INTRAVENOUS; SUBCUTANEOUS at 08:50

## 2021-10-19 RX ADMIN — Medication 8 MG/HR: at 14:51

## 2021-10-19 RX ADMIN — Medication 30 UNITS: at 17:43

## 2021-10-19 RX ADMIN — OXYCODONE HYDROCHLORIDE AND ACETAMINOPHEN 1 TABLET: 5; 325 TABLET ORAL at 10:10

## 2021-10-19 RX ADMIN — Medication 30 UNITS: at 17:51

## 2021-10-19 RX ADMIN — Medication 10000 MCG: at 17:59

## 2021-10-19 RX ADMIN — HEPARIN SODIUM 1020 UNITS: 1000 INJECTION, SOLUTION INTRAVENOUS; SUBCUTANEOUS at 22:29

## 2021-10-19 RX ADMIN — Medication 10000 MCG: at 07:56

## 2021-10-19 RX ADMIN — INSULIN LISPRO 2 UNITS: 100 INJECTION, SOLUTION INTRAVENOUS; SUBCUTANEOUS at 15:00

## 2021-10-19 RX ADMIN — DEXAMETHASONE SODIUM PHOSPHATE 6 MG: 10 INJECTION INTRAMUSCULAR; INTRAVENOUS at 10:11

## 2021-10-19 RX ADMIN — MAGNESIUM SULFATE HEPTAHYDRATE 1000 MG: 1 INJECTION, SOLUTION INTRAVENOUS at 22:50

## 2021-10-19 ASSESSMENT — PULMONARY FUNCTION TESTS
PIF_VALUE: 20
PIF_VALUE: 19
PIF_VALUE: 20

## 2021-10-19 ASSESSMENT — PAIN SCALES - GENERAL
PAINLEVEL_OUTOF10: 9

## 2021-10-19 NOTE — PROGRESS NOTES
[facility] - Initial Consult Note COVID 19 Patient  Today's Date and Time: 10/18/2021, 11:11 PM    Impression :   COVID 23 Suspect  COVID 19 Confirmed Infection  Covid tests:  10/10/21: Positive  26 weeks gestation pregnancy  Acute hypoxic respiratory distress  Pulmonary embolism LLL  Metabolic acidosis with euglycemic starvation    Recommendations:   Antibiotic treatment:  Monitor off antibiotics  Covid Rx:  Remdesivir-Out of window  Decadron-Initiated 10/14/21  anticoagulation  DEFER barcitinib and  Actemra due to pregnancy  Intubated 10/14  On ECMO   Baby delivered early 26 weeks,    start on illness 10/1  Stop isolation after 10/20    Medical Decision Making/Summary/Discussion:10/18/2021     Patient admitted with suspected COVID 19 infection  Covid test confirmed positive. Infection Control Recommendations   [place] Precautions  Airborne isolation  Droplet plus Isolation    Antimicrobial Stewardship Recommendations   Off antibiotics    Chief complaint/reason for consultation:   Concern for COVID infection      History of Present Illness:   Leann Dumont is a 27y.o.-year-old  female who was initially admitted on 10/13/2021. Patient seen at the request of Dr. Richard Curiel:    Patient, who is 26 weeks pregnant, initially presented to [facility] ED on 10/10/21 presented through ER with complaints of productive cough with yellow sputum X 9 days & shortness of breath with pleuritic chest pain that started the day before. She ws also experiencing decreased appetite because of loss of taste and smell. Work-up at that time revealed the patient to be Covid positive. She was tachycardic with an SpO2 of 94-98% on room air. CXR showed bilateral ill-defined pulmonary opacities suggesting COVID pneumonia.     After receiving a small fluid bolus and discussing her case with the patient's OBGYN, Dr. Sonido Vang, she was discharged home with instructions to return if her injection  5 mL IntraDERmal Once    sodium chloride flush  5-40 mL IntraVENous 2 times per day    docusate  100 mg Oral BID    sennosides-docusate sodium  2 tablet Oral Daily    insulin lispro  0-12 Units SubCUTAneous Q6H    dexamethasone  6 mg IntraVENous Q24H       Social History:     Social History     Socioeconomic History    Marital status:      Spouse name: Not on file    Number of children: Not on file    Years of education: Not on file    Highest education level: Not on file   Occupational History    Not on file   Tobacco Use    Smoking status: Former Smoker     Types: Cigarettes    Smokeless tobacco: Never Used   Vaping Use    Vaping Use: Former    Substances: Nicotine   Substance and Sexual Activity    Alcohol use: Not Currently    Drug use: Not Currently     Types: Marijuana     Comment: last smoked May 2021    Sexual activity: Yes   Other Topics Concern    Not on file   Social History Narrative    Not on file     Social Determinants of Health     Financial Resource Strain:     Difficulty of Paying Living Expenses:    Food Insecurity:     Worried About Running Out of Food in the Last Year:     [address] in the Last Year:    Transportation Needs:     Lack of Transportation (Medical):      Lack of Transportation (Non-Medical):    Physical Activity:     Days of Exercise per Week:     Minutes of Exercise per Session:    Stress:     Feeling of Stress :    Social Connections:     Frequency of Communication with Friends and Family:     Frequency of Social Gatherings with Friends and Family:     Attends [employer] Services:     Active Member of Clubs or Organizations:     Attends Club or Organization Meetings:     Marital Status:    Intimate Partner Violence:     Fear of Current or Ex-Partner:     Emotionally Abused:     Physically Abused:     Sexually Abused:        Family History:     Family History   Problem Relation Age of Onset    Breast Cancer Maternal Grandmother         older than 48    Heart Attack Paternal Uncle     Stroke Maternal Aunt     Hypertension Father     No Known Problems Paternal Grandfather     Stroke Paternal Grandmother     No Known Problems Maternal Grandfather     Hypertension Mother     No Known Problems Brother     No Known Problems Brother         Allergies:   Vicodin [hydrocodone-acetaminophen]     Review of Systems:     Review of Systems   Unable to perform ROS: Intubated          Physical Examination :     Patient Vitals for the past 8 hrs:   BP Temp Temp src Pulse Resp SpO2   10/18/21 2245 -- -- -- 81 -- 96 %   10/18/21 2230 -- -- -- 77 -- 96 %   10/18/21 2215 -- -- -- 78 -- 96 %   10/18/21 2200 -- 97 °F (36.1 °C) -- 82 -- 95 %   10/18/21 2145 -- -- -- 82 -- 95 %   10/18/21 2130 -- -- -- 87 -- 94 %   10/18/21 2115 -- -- -- 110 -- 93 %   10/18/21 2110 -- -- -- 83 12 94 %   10/18/21 2100 -- -- -- 86 -- 93 %   10/18/21 2045 -- -- -- 98 -- 90 %   10/18/21 2030 -- -- -- 104 -- (!) 89 %   10/18/21 2015 -- -- -- 82 -- 97 %   10/18/21 2000 120/76 97.2 °F (36.2 °C) Axillary 75 12 99 %   10/18/21 1945 -- -- -- 71 -- 99 %   10/18/21 1930 -- -- -- 76 -- 99 %   10/18/21 1915 -- -- -- 72 -- 100 %   10/18/21 1900 -- -- -- 72 -- 100 %   10/18/21 1805 -- -- -- 72 -- 99 %   10/18/21 1553 -- -- -- 75 12 99 %   10/18/21 1540 -- 97.9 °F (36.6 °C) Axillary 78 -- 98 %   10/18/21 1524 113/70 98.2 °F (36.8 °C) Axillary 83 14 98 %     Physical Exam  Constitutional:       General: She is not in acute distress. Appearance: She is ill-appearing. Comments: Intubated sedated   HENT:      Head: Normocephalic and atraumatic. Nose: Nose normal.   Eyes:      General: No scleral icterus. Conjunctiva/sclera: Conjunctivae normal.      Pupils: Pupils are equal, round, and reactive to light. Cardiovascular:      Rate and Rhythm: Normal rate and regular rhythm. Heart sounds: Normal heart sounds. No murmur heard.      Pulmonary:      Breath sounds: No stridor. No rhonchi. Abdominal:      Palpations: There is no mass. Hernia: No hernia is present. Genitourinary:     Comments: Urine bong  Musculoskeletal:         General: No swelling or deformity. Cervical back: Neck supple. No rigidity. Skin:     Coloration: Skin is not jaundiced or pale. Neurological:      Comments: sedated   Psychiatric:      Comments: sedated          Medical Decision Making -Laboratory:   I have independently reviewed/ordered the following labs:    CBC with Differential:   Recent Labs     10/16/21  0454 10/16/21  0454 10/17/21  0413 10/17/21  1601 10/18/21  1611 10/18/21  2200   WBC 9.5   < > 9.0   < > 8.5 12.2*   HGB 8.6*   < > 8.4*   < > 8.8* 9.1*   HCT 27.0*   < > 25.8*   < > 27.4* 29.0*      < > 289   < > 261 326   LYMPHOPCT 11*  --  20*  --   --   --    MONOPCT 3  --  4  --   --   --     < > = values in this interval not displayed. BMP:   Recent Labs     10/18/21  1611 10/18/21  2200    137   K 3.9 4.1    100   CO2 28 29   BUN 6 6   CREATININE <0.20* <0.20*   MG 1.9 2.1     Hepatic Function Panel:   Recent Labs     10/18/21  1611 10/18/21  2200   PROT 4.4* 5.1*   LABALBU 2.1* 2.3*   BILIDIR 0.09 0.13   IBILI 0.07 0.11   BILITOT 0.16* 0.24*   ALKPHOS 87 109*   ALT 60* 65*   AST 45* 45*     No results for input(s): RPR in the last 72 hours. No results for input(s): HIV in the last 72 hours. No results for input(s): BC in the last 72 hours.   Lab Results   Component Value Date    MUCUS NOT REPORTED 10/13/2021    RBC 3.11 10/18/2021    TRICHOMONAS NOT REPORTED 10/13/2021    WBC 12.2 10/18/2021    YEAST NOT REPORTED 10/13/2021    TURBIDITY Clear 10/13/2021     Lab Results   Component Value Date    CREATININE <0.20 10/18/2021    GLUCOSE 127 10/18/2021       Medical Decision Making-Imaging:     Narrative   EXAMINATION:   CTA OF THE CHEST 10/13/2021 2:16 pm       TECHNIQUE:   CTA of the chest was performed after the administration of

## 2021-10-19 NOTE — PROGRESS NOTES
IntraVENous 2 times per day    docusate  100 mg Oral BID    sennosides-docusate sodium  2 tablet Oral Daily    insulin lispro  0-12 Units SubCUTAneous Q6H    dexamethasone  6 mg IntraVENous Q24H     Continuous Infusions:    HYDROmorphone 1,000 mcg/hr (10/18/21 1313)    ketamine (KETALAR) infusion for analgosedation 0.2 mg/kg/hr (10/17/21 1826)    lactated ringers 100 mL/hr at 10/18/21 0936    fentaNYL Stopped (10/18/21 1900)    sodium chloride      midazolam 10 mg/hr (10/19/21 0628)    cisatracurium (NIMBEX) infusion 3.5 mcg/kg/min (10/19/21 0130)    dextrose      sodium chloride 5 mL/hr at 10/14/21 0700    heparin (PORCINE) Infusion 17 Units/kg/hr (10/19/21 0140)       Physical Exam  Vital Signs: BP (!) 140/84   Pulse 62   Temp 97.2 °F (36.2 °C) (Axillary)   Resp 12   Ht 5' 4\" (1.626 m)   Wt 224 lb 13.9 oz (102 kg)   LMP 04/01/2021 (Approximate)   SpO2 98%   Breastfeeding Unknown   BMI 38.60 kg/m²  O2 Flow Rate (L/min): 40 L/min     General: Sedated and on vent  Heart: Normal S1 and S2.  Regular rhythm. No murmurs, gallops, or rubs. Pacing Wires: No   Lungs:  Diminished bilaterally Chest tubes: No  Abdomen: soft, non tender, non distended, BS hypoactive x4  Extremities: 1+ edema    Assessment/Plan:  Continue to monitor cannulation site of right IJ. Pulmonology to perform thoracentesis today at bedside with [address] [facility]. The above recommendations including medications and orders were discussed and agreed upon with Dr. Adina Hay, the attending on service for the cardiothoracic surgery group today. Electronically signed by FADUMO Barton CNP on 10/19/2021 at 7:58 AM    On this date 10/19/2021 I have spent 24 minutes reviewing previous notes, test results and face to face with the patient discussing the diagnosis and importance of compliance with the treatment plan as well as documenting on the day of the visit.  At least 50% of the time documented was spent with the patient to provide counseling and/or coordination of care. This note was created with the assistance of a speech-recognition program.  Although the intention is to generate a document that actually reflects the content of the visit, no guarantees can be provided that every mistake has been identified and corrected by editing.

## 2021-10-19 NOTE — PLAN OF CARE
Problem: OXYGENATION/RESPIRATORY FUNCTION  Goal: Patient will maintain patent airway  10/18/2021 2112 by Enma Marie RCP  Outcome: Ongoing  10/18/2021 1724 by Oscar Gong RN  Outcome: Ongoing  10/18/2021 0922 by Emiliano Soto RCP  Outcome: Ongoing  Goal: Patient will achieve/maintain normal respiratory rate/effort  Description: Respiratory rate and effort will be within normal limits for the patient  10/18/2021 2112 by Enma Marie, JOSE ELIASP  Outcome: Ongoing  10/18/2021 1724 by Oscar Gong RN  Outcome: Ongoing  10/18/2021 0922 by Emiliano Soto RCP  Outcome: Ongoing     Problem: MECHANICAL VENTILATION  Goal: Patient will maintain patent airway  10/18/2021 2112 by JOSE ELIAS SalomonP  Outcome: Ongoing  10/18/2021 1724 by Oscar Gong RN  Outcome: Ongoing  10/18/2021 0922 by Emiliano Soto RCP  Outcome: Ongoing  Goal: Oral health is maintained or improved  10/18/2021 2112 by JOSE ELIAS SalomonP  Outcome: Ongoing  10/18/2021 1724 by Oscar Gong RN  Outcome: Ongoing  10/18/2021 0922 by Emiliano Soto RCP  Outcome: Ongoing  Goal: ET tube will be managed safely  10/18/2021 2112 by JOSE ELIAS SalomonP  Outcome: Ongoing  10/18/2021 1724 by Oscar Gong RN  Outcome: Ongoing  10/18/2021 0922 by Emiliano Soto RCP  Outcome: Ongoing  Goal: Ability to express needs and understand communication  10/18/2021 2112 by Enma Marie RCP  Outcome: Ongoing  10/18/2021 1724 by Oscar Gong RN  Outcome: Ongoing  10/18/2021 0922 by Emiliano Soto RCP  Outcome: Ongoing  Goal: Mobility/activity is maintained at optimum level for patient  10/18/2021 2112 by Enma Marie RCP  Outcome: Ongoing  10/18/2021 1724 by Oscar Gong RN  Outcome: Ongoing  10/18/2021 0922 by Emiliano Soto RCP  Outcome: Ongoing     Problem: SKIN INTEGRITY  Goal: Skin integrity is maintained or improved  10/18/2021 2112 by Enma Marie, [facility]  Outcome: Ongoing  10/18/2021 1724 by Oscar Gong RN  Outcome: Ongoing  10/18/2021 0922 by Wayne Trinh RCP  Outcome: Ongoing

## 2021-10-19 NOTE — CONSULTS
Pt on Car 1, breast pump taken to unit, reviewed initiation phase of breast pump, milk storage, pumping frequency, and RN questions answered. Pumping for patients handout left with RN, instructions on how to pump and clean pump parts. Reviewed frequency of every 2-3 hours for 15 minutes. Encouraged RNs to call lactation for questions or any needs.

## 2021-10-19 NOTE — PROGRESS NOTES
ECMO DAILY ROUNDING NOTE     Patient:  Denisha Lopez  MRN: [id number]  Admit date: 10/13/2021  Primary Care Physician: Monik Choi  Consulting Physician: Jacinto Wells DO  CODE Status: Full Code  LOS: 5    [x] Team present at bedside   [x] Family updated    INDICATION:   Refractory Hypoxemia, ARDS, COVID-19 pneumonia     CANNULATION:  VenoVeno ECMO cannulation on 10/17    TREATMENT GOALS:  DO2 :   VO2:  PUMP  FLOW: 3-5L/min  pH: 7.35-7.45   SvO2: >70%  SaO2:>97%  ACT: 180-200  Hemoglobin: > 7  Fibrinogen >150  Platelets >93  Sedation : RASS -1 to+1    CURRENT ECMO PARAMETERS:  PUMP Patient on ECMO: On  Hours on ECMO: 32  Pump Flow (L/min): 3.97 LPM  Pump Speed (Rotations/min): (S) 3000 RPM  ECMO Mode: V/V  Pump Mode: Cardiohelp   SWEEP GAS Sweep Flow (L/min): 5 LPM   FiO2 (%): 100 %   CIRCUIT PRESSURES Inlet Pressure (Bladder): -74 mmHg  Pre-Membrane Pressure: 174 mmHg  Post-Membrane Pressure: 152 mmHg  Delta P: 22 mmHg  SVO2 (%): 66.2 %  ECMO blood flow temperature: 97.2 °F (36.2 °C)   CIRCUIT CHECK Heat Exchg.  Water Temp Set: 36.2  Heat Exchanger Water Temp Actual: 36.2  Heat Exchanger Water Level: Full  Unused Pigtails Cleared: Done  System Plugged to Red Outlet: Done  Emergency Backup in Use: No  Hand Crank Available: Yes  Backup Unit Blood Avail: Done  Cart Checked and Stocked: Done  Pump Battery Charged: 100 Volts  Pressure Monitors Zeroed: Done  Pressure Limits Checked: Done  Circuit Number: 1  Back Up Circuit: Done  Back Up Console/Motor: Done  Emergency O2 Tank Available: Done  Circuits and Cannulation Sites: Done  High/Low flow alarm set?: Yes  High/Low temp alarm set?: Yes  High/Low venous alarm set?: Yes  Pre-MO alarm limit: 500  Post-MO alarm limit: 400     CURRENT VENTILATOR SETTINGS:  Vent Information  $Ventilation: $Subsequent Day  Skin Assessment: Clean, dry, & intact  Equipment ID: [id number]  Equipment Changed: HME  Vent Type: Servo i  Vent Mode: AC/PC  Vt Ordered: 480 mL  Pressure Ordered: 10  Rate Set: 12 bmp  FiO2 : 40 %  SpO2: 96 %  SpO2/FiO2 ratio: 235  Sensitivity: 3  PEEP/CPAP: 10  I Time/ I Time %: 0.9 s  Humidification Source: HME  Humidification Temp: 33  Humidification Temp Measured: 33  Nitric Oxide/Epoprostenol In Use?: No  Mask Type: Full face mask  Mask Size: Medium     RECENT LABS:  ABG Recent Labs     10/18/21  1014 10/18/21  1207 10/18/21  1612 10/18/21  1813 10/18/21  1955   POCPH 7.384 7.410 7.410 7.595* 7.440   POCPCO2 51.7* 49.3* 49.8* 31.3* 44.8   POCPO2 56.9* 74.6* 109.2* 476.4* 115.5*   POCHCO3 30.9* 31.3* 31.6* 30.4* 30.4*   LDNP1BOK 88* 95 98 100* 99*      VBG Recent Labs     10/17/21  1439 10/17/21  1727 10/18/21  0546 10/18/21  1757   PHVEN 7.480* 7.483* 7.376 7.409   MQA0SRQ 33.3* 35.9* 52.1* 48.3   OBY8RIG 24.8 26.9 30.5* 30.5*   K8HAVJLM 55* 67 62 68      CBC Recent Labs     10/17/21  2232 10/18/21  0433 10/18/21  1029 10/18/21  1611 10/18/21  2200   WBC 10.1 12.4* 12.3* 8.5 12.2*   HGB 7.6* 7.8* 7.4* 8.8* 9.1*   HCT 23.2* 24.9* 23.3* 27.4* 29.0*    334 328 261 326      COAGS Recent Labs     10/16/21  1235 10/16/21  2122 10/17/21  2232 10/18/21  0433 10/18/21  1029 10/18/21  1611 10/18/21  2200   INR  --    < > 0.9 0.9 0.9 0.9 0.9   PROTIME  --    < > 10.0 10.0 10.1 10.0 10.0   APTT 54.3*   < > 104.2* 73.5* 77.1* 62.2* 83.8*   DDIMER 0.53  --   --   --   --   --   --     < > = values in this interval not displayed.       TEG Recent Labs     10/17/21  2232 10/18/21  1029   HEPTHERAPY YES  YES UNKNOWN  UNKNOWN   REACTTIMETEG 40.5* 32.4*   KINETICSTEG 14.4* 11.9*   ANGLETEG 17.1* 19.2*   MAXCLOTTEG 73.6* 71.9*   CX08BWF 0.0 0.0   EPLTEG 0.0 0.0      BMP Recent Labs     10/16/21  0454 10/16/21  0454 10/17/21  0413 10/17/21  1601 10/17/21  2232 10/18/21  0433 10/18/21  1029 10/18/21  1611 10/18/21  2200   *   < > 136   < > 141 138 145* 140 137   K 3.8   < > 3.6*   < > 3.8 3.8 4.1 3.9 4.1      < > 104   < > 107 103 107 106 100   CO2 19*   < > 21   < > 25 27 23 28 29   BUN 7   < > 6   < > 6 7 7 6 6   CREATININE 0.33*   < > 0.34*   < > 0.24* 0.20* 0.24* <0.20* <0.20*   GLUCOSE 120*   < > 92   < > 95 103* 110* 93 127*   MG 2.3   < > 2.0   < > 1.8 1.8 1.8 1.9 2.1   PHOS 3.3  --  2.9  --   --   --   --   --   --     < > = values in this interval not displayed. LFT Recent Labs     10/16/21  0454 10/17/21  0413 10/18/21  0433 10/18/21  1611 10/18/21  2200   PROT 4.6* 4.7* 4.9* 4.4* 5.1*   LABALBU 2.2* 2.1* 2.2* 2.1* 2.3*   ALT 39* 55* 66* 60* 65*   AST 43* 57* 62* 45* 45*   ALKPHOS 120* 106* 105* 87 109*   BILITOT 0.26* 0.18* 0.19* 0.16* 0.24*      INFLAMMATORY MARKERS Recent Labs     10/16/21  0454 10/17/21  0413   CRP 30.8* 81.3*   FERRITIN 200*  --       CARDIAC No results for input(s): CKTOTAL, CKMB, CKMBINDEX, TROPONINI, BNP, PROBNP in the last 72 hours. Invalid input(s): TROPONIN, HSTROP   LACTIC ACID No results for input(s): LACTA in the last 72 hours.    TRIGLYCERIDE Recent Labs     10/16/21  0454 10/17/21  1019   TRIG 480* 422*        ASSESSMENT AND SYSTEM BASED PLAN (MEDICAL DECISION MAKING):    Neurologic                                                  [x] Sedation/paralytic requirement reviewed                              [x] Neurological assessment; patient sedated and paralyzed    Cardiovascular                               [x] Cannula secured, dressing applied, mild oozing                             [x] ECMO Pump flow/pressures reviewed                             [x] Telemetry reviewed                             [x] Hemodynamic parameters stable    Pulmonary                             [x] Blood gases (Patient/Circuit) reviewed                             [x] Sweep/ settings reviewed                             [x] Vent Settings reviewed                             [x] Chest X-ray reviewed, will consider thoracentesis/chest tube placement on the left side    Genitourinary                              [x] Intake/Output reviewed [x] Need for continuous IV fluids assessed                             [x] Need for diuresis/renal replacement therapy reviewed    Gastrointestinal                             [x] GI Prophylaxis reviewed                             [x] Enteral nutrition reviewed, will start tube feeds    Hematology                            [x] Anticoagulation: Heparin                             [x] Reviewed CBC, coagulation profile, ACT, TEG and platelet count                            [x] Transfusion needs reviewed                    Infectious disease                           [x] Reviewed T-max, white count and cultures results                           [x] Antimicrobials reviewed    Endocrine                            [x] Reviewed glycemic control                           [x] Reviewed need for steroids    Others                           [x] Reviewed need for restrains                           [x] Reviewed need for lines/drains/invasive devices                           [x] Skin/Wound care                           [x] Reviewed current Medications list/orders                           [x] Reviewed goals of care                   OVERALL CONDITION:   not changed    Aneesh Kat MD  Pulmonary & Critical care Medicine  10/18/2021

## 2021-10-19 NOTE — PROGRESS NOTES
PULMONARY & CRITICAL CARE MEDICINE PROGRESS NOTE     Patient:  Tyler Hendrickson  MRN: [id number]  [facility] date: 10/13/2021  Primary Care Physician: Odilia Wilder  Consulting Physician: Carol Bullock DO  CODE Status: Full Code  LOS: 6     SUBJECTIVE     CHIEF COMPLAINT/REASON FOR INITIAL CONSULT: Acute respiratory failure/COVID-19 pneumonia    BRIEF HOSPITAL COURSE:   The patient is a 27 y.o. female who was 26-week pregnant initially tested positive for COVID-19 on 10/10. She presented to [place] ED on 10/13 with respiratory distress and low home O2 saturation. She was tachypneic and tachycardic. Transferred to [facility] for further management. Initial blood gases showed evidence of combined anion gap and non-anion gap metabolic acidosis, thought to be related to starvation ketosis. During evaluation patient was also noted to have a left lower lobe subsubsegmental PE. She underwent  10/15. Postoperative course complicated by development of worsening respiratory failure and required initiation of mechanical ventilation on 10/15. Due to worsening severe hypoxemic respiratory failure decision was made to put her on VV ECMO. She had placement of 27 F VV ECMO (crescent) cannula in the right atrium on 10/17.     INTERVAL HISTORY:  10/19/21  Patient remains on VV ECMO and mechanical ventilation  Patient on Dilaudid, ketamine and Versed infusions for sedation  Nimbex to be weaned off  Current vent settings pressure control 20 rate 12, PEEP 10, FiO2 45%  Continue heparin infusion for anticoagulation  Remains hemodynamically stable  Receiving IV Decadron    REVIEW OF SYSTEMS:  Unobtainable from patient due to sedation/mechanical ventilation    OBJECTIVE     ECMO SETTINGS:  PUMP Patient on ECMO: On  Hours on ECMO: 51  Pump Flow (L/min): 4.05 LPM  Pump Speed (Rotations/min): 3070 RPM  ECMO Mode: V/V  Pump Mode: Cardiohelp   SWEEP GAS Sweep Flow (L/min): (S) 3 LPM   FiO2 (%): 100 %   CIRCUIT PRESSURES Inlet Pressure (Bladder): -69 mmHg  Pre-Membrane Pressure: 186 mmHg  Post-Membrane Pressure: 165 mmHg  Delta P: 22 mmHg  SVO2 (%): 61.8 %  ECMO blood flow temperature: 97.3 °F (36.3 °C)  CVP (mmHg): 15 mmHg   CIRCUIT CHECK Heat Exchg.  Water Temp Set: 36.2  Heat Exchanger Water Temp Actual: 36.2  Heat Exchanger Water Level: Full  Unused Pigtails Cleared: Done  System Plugged to Red Outlet: Done  Emergency Backup in Use: No  Hand Crank Available: Yes  Backup Unit Blood Avail: Done  Cart Checked and Stocked: Done  Pump Battery Charged: 100 Volts  Pressure Monitors Zeroed: Done  Pressure Limits Checked: Done  Circuit Number: 1  Back Up Circuit: Done  Back Up Console/Motor: Done  Emergency O2 Tank Available: Done  Circuits and Cannulation Sites: Done  High/Low flow alarm set?: Yes  High/Low temp alarm set?: Yes  High/Low venous alarm set?: Yes  Pre-MO alarm limit: 500  Post-MO alarm limit: 400     VENTILATOR SETTINGS:  Vent Information  $Ventilation: $Subsequent Day  Skin Assessment: Clean, dry, & intact  Equipment ID: [id number]  Equipment Changed: HME  Vent Type: Servo i  Vent Mode: AC/PC  Vt Ordered: 480 mL  Pressure Ordered: 10  Rate Set: 12 bmp  FiO2 : 40 %  SpO2: 98 %  SpO2/FiO2 ratio: 245  Sensitivity: 3  PEEP/CPAP: 10  I Time/ I Time %: 0.9 s  Humidification Source: HME  Humidification Temp: 33  Humidification Temp Measured: 33  Nitric Oxide/Epoprostenol In Use?: No  Mask Type: Full face mask  Mask Size: Medium     PaO2/FiO2 RATIO:  Recent Labs     10/19/21  1740   POCPO2 504.4*      FiO2 : 40 %     VITAL SIGNS:   LAST:  BP (!) 140/84   Pulse 84   Temp 97.5 °F (36.4 °C) (Axillary)   Resp 15   Ht 5' 4\" (1.626 m)   Wt 224 lb 13.9 oz (102 kg)   LMP 2021 (Approximate)   SpO2 98%   Breastfeeding Unknown   BMI 38.60 kg/m²   8-24 HR RANGE:  TEMP Temp  Av.5 °F (36.4 °C)  Min: 97 °F (36.1 °C)  Max: 98.2 °F (79.5 °C)   BP Systolic (63ITE), LPA:658 , Min:120 , SWU:369      Diastolic (03AUA), ZRZ:41, Min:76, Max:84 PULSE Pulse  Av.6  Min: 62  Max: 122   RR Resp  Av.5  Min: 14  Max: 15   O2 SAT SpO2  Av.2 %  Min: 87 %  Max: 98 %   OXYGEN DELIVERY No data recorded        SYSTEMIC EXAMINATION:   General appearance - Mechanically ventilated, on ECMO  Mental status - sedated, paralyzed  Eyes - pupils equal and reactive, sclera anicteric  Mouth - mucous membranes moist  Neck - supple, no significant adenopathy, ECMO cannula site has mild oozing  Chest - Breath sounds bilaterally were dimnished to auscultation at bases. There were no wheezes, rhonchi or rales.     Heart - normal rate, regular rhythm, normal S1, S2, no murmurs, rubs, clicks or gallops  Abdomen - soft, nontender, nondistended, no masses or organomegaly  Neurological - DTR's normal and symmetric, motor and sensory grossly normal bilaterally  Extremities - peripheral pulses normal, no pedal edema, no clubbing or cyanosis  Skin - normal coloration and turgor, no rashes, no suspicious skin lesions noted     DATA REVIEW     Medications:  Scheduled Meds:   heparin flush (PF)  3 mL IntraVENous Q12H    heparin flush (PF)  3 mL IntraVENous Q12H    heparin flush (PF)  3 mL IntraVENous Q12H    prenatal vitamin  1 tablet Oral Daily    Tdap-Dtap  0.5 mL IntraMUSCular Prior to discharge    lidocaine 1 % injection  5 mL IntraDERmal Once    sodium chloride flush  5-40 mL IntraVENous 2 times per day    docusate  100 mg Oral BID    sennosides-docusate sodium  2 tablet Oral Daily    insulin lispro  0-12 Units SubCUTAneous Q6H    dexamethasone  6 mg IntraVENous Q24H     Continuous Infusions:   HYDROmorphone 1,000 mcg/hr (10/18/21 1313)    ketamine (KETALAR) infusion for analgosedation 0.2 mg/kg/hr (10/17/21 1826)    sodium chloride      midazolam 5 mg/hr (10/19/21 1513)    cisatracurium (NIMBEX) infusion Stopped (10/19/21 1425)    dextrose      sodium chloride 5 mL/hr at 10/14/21 0700    heparin (PORCINE) Infusion 17 Units/kg/hr (10/19/21 1734) INPUT/OUTPUT:  In: 869 [I.V.:3; Blood:318; NG/GT:548]  Out: 2050 [Urine:2050]  Date 10/19/21 0000 - 10/19/21 2359   Shift 2559-4266 2267-1856 9091-1710 24 Hour Total   INTAKE   I.V.(mL/kg)   3(0) 3(0)   Blood(mL/kg) 318(3.1)   318(3.1)   NG/GT(mL/kg) 150(1.5)  398(3.9) 548(5.4)   Shift Total(mL/kg) 468(4.6)  401(3.9) 869(8.5)   OUTPUT   Urine(mL/kg/hr) 1150(1.4) 250(0.3) 650 2050   Shift Total(mL/kg) 1150(11.3) 250(2.5) 650(6.4) 2050(20.1)   Weight (kg) 102 102 102 102        LABS:  ABGs:   Recent Labs     10/19/21  1131 10/19/21  1538 10/19/21  1633 10/19/21  1725 10/19/21  1740   POCPH 7.418 7.458* 7.445 7.444 7.448   POCPCO2 51.3* 44.1 44.7 44.5 47.1   POCPO2 68.6* 78.2* 67.1* 95.0 504.4*   POCHCO3 33.1* 31.2* 30.7* 30.5* 32.6*   [id number] 93* 96 94 98 100*     CBC:   Recent Labs     10/17/21  0413 10/17/21  1601 10/18/21  1611 10/18/21  2200 10/19/21  0448 10/19/21  0904 10/19/21  1604   WBC 9.0   < > 8.5 12.2* 10.1 11.2 9.6   HGB 8.4*   < > 8.8* 9.1* 8.0* 8.1* 8.0*   HCT 25.8*   < > 27.4* 29.0* 25.8* 26.1* 25.5*   MCV 94.2   < > 92.3 93.2 92.8 92.6 93.1      < > 261 326 311 310 327   LYMPHOPCT 20*  --   --   --   --   --   --    RBC 2.74*   < > 2.97* 3.11* 2.78* 2.82* 2.74*   MCH 30.7   < > 29.6 29.3 28.8 28.7 29.2   MCHC 32.6   < > 32.1 31.4 31.0 31.0 31.4   RDW 14.2   < > 14.0 14.2 13.9 13.7 13.7    < > = values in this interval not displayed. CRP:   Recent Labs     10/17/21  0413   CRP 81.3*     LDH:   No results for input(s): LDH in the last 72 hours.   BMP:   Recent Labs     10/17/21  0413 10/17/21  1601 10/18/21  1611 10/18/21  2200 10/19/21  0448 10/19/21  0904 10/19/21  1604      < > 140 137 141 138 137   K 3.6*   < > 3.9 4.1 4.4 4.2 4.5      < > 106 100 103 99 99   CO2 21   < > 28 29 29 29 28   BUN 6   < > 6 6 7 7 8   CREATININE 0.34*   < > <0.20* <0.20* <0.20* <0.20* 0.24*   GLUCOSE 92   < > 93 127* 121* 112* 153*   PHOS 2.9  --   --   --   --   --   --     < > = values in this interval not displayed. Liver Function Test:   Recent Labs     10/17/21  0413 10/18/21  0433 10/18/21  1611 10/18/21  2200 10/19/21  0904   PROT 4.7* 4.9* 4.4* 5.1* 5.2*   LABALBU 2.1* 2.2* 2.1* 2.3* 2.7*   ALT 55* 66* 60* 65* 51*   AST 57* 62* 45* 45* 29   ALKPHOS 106* 105* 87 109* 88   BILITOT 0.18* 0.19* 0.16* 0.24* 0.26*     Coagulation Profile:   Recent Labs     10/18/21  1611 10/18/21  2200 10/19/21  0448 10/19/21  0904 10/19/21  1604   INR 0.9 0.9 0.9 0.9 0.9   PROTIME 10.0 10.0 10.0 9.8 9.9   APTT 62.2* 83.8* 60.1* 70.2* 45.8*     D-Dimer:  No results for input(s): DDIMER in the last 72 hours. Ferritin:    No results for input(s): FERRITIN in the last 72 hours. Lactic Acid:  No results for input(s): LACTA in the last 72 hours. Cardiac Enzymes:  No results for input(s): CKTOTAL, CKMB, CKMBINDEX, TROPONINI in the last 72 hours. Invalid input(s): TROPONIN, HSTROP  BNP/ProBNP:   No results for input(s): BNP, PROBNP in the last 72 hours. Triglycerides:  Recent Labs     10/17/21  1019   TRIG 422*        Microbiology:  Urine Culture:  No components found for: CURINE  Blood Culture:  No components found for: CBLOOD, CFUNGUSBL  Sputum Culture:  No components found for: CSPUTUM  No results for input(s): SPECDESC, SPECIAL, CULTURE, STATUS, ORG, CDIFFTOXPCR, CAMPYLOBPCR, SALMONELLAPC, SHIGAPCR, SHIGELLAPCR, MPNEUG, MPNEUM, LACTOQL in the last 72 hours. No results for input(s): SPUTUM, SPECDESC, SPECIAL, CULTURE, STATUS, ORG, CDIFFTOXPCR, MPNEUM, MPNEUG in the last 72 hours. Invalid input(s): LELA, [address], CFUNGUSBL     Pathology:    Radiology Reports:  XR CHEST PORTABLE   Final Result   Similar appearing chest with consolidative airspace disease in the right   lung, and complete opacification of the left chest and right lung apex. Question of slight deeper placement of the endotracheal tube, though the   aly position is somewhat difficult to see on this exam compared to the   prior.   It may lie in the region of approximately 1.8 cm above the aly,   previously measuring closer to 2.7 cm above the aly. This could be   slightly retracted if clinically concerned. XR CHEST PORTABLE   Final Result   1. Recommend retraction of endotracheal tube 1.0 cm.   2. Stable appearance of left-sided hydrothorax. 3. Right lung multifocal marked ill-defined dense consolidation, unchanged. 4. Suspected mild right-sided pleural effusion. 5. Stable positioning ECMO catheter. XR CHEST PORTABLE   Final Result   An ECMO catheter is now in place. There is new complete opacification of the   left hemithorax. Right-sided airspace opacity has significantly increased. A left subclavian catheter has its tip in the midline. The right PICC has   been removed. XR CHEST PORTABLE   Final Result   Stable bilateral lung multifocal consolidation consistent with pneumonia. Appropriate radiographic positioning of endotracheal tube. XR CHEST PORTABLE   Final Result   The tip of the endotracheal tube is at the origin the right mainstem bronchus   and the tube should be pulled back 2 cm. VL DUP LOWER EXTREMITY VENOUS BILATERAL   Final Result      XR CHEST PORTABLE    (Results Pending)   VASCULAR REPORT    (Results Pending)        Echocardiogram:   Results for orders placed during the hospital encounter of 10/13/21    ECHO Complete 2D W Doppler W Color    Narrative  Transthoracic Echocardiography Report (TTE)    Summary  Normal left ventricle size, wall thickness and function with a calculated EF  61%. No segmental wall motion abnormalities seen. Normal right ventricular size and function. No significant valvular regurgitation or stenosis seen.        ASSESSMENT AND PLAN     Assessment:    // Acute hypoxic respiratory failure, on VV ECMO/mechanical ventilation  // Acute respiratory distress syndrome  // Bilateral multifocal pneumonia due to COVID 19 infection  // Sepsis due to COVID 19  // Left lower lobe subsegmental pulmonary embolism  // Left pleural effusion/atelectasis  // S/p  section, 26-week pregnancy  // Metabolic acidosis, resolved  // Leukocytosis    Plan:    I personally interviewed/examined the patient; reviewed interval history, interpreted all available radiographic and laboratory data at the time of service. Patient currently sedated with Dilaudid, ketamine and Versed, wean sedation as tolerated  Discontinue Nimbex  Patient remains hemodynamically stable  Continue mechanical ventilation at \"rest\" settings  Monitor endotracheal secretions  Bedside ultrasound did not show any significant effusion on left side  Continue pulmonary toilet, aspiration precautions and bronchodilators  Continue to monitor I/O with a goal of even/negative fluid balance  Continue tube feeds  Stress ulcer prophylaxis  Continue heparin infusion for anticoagulation  Continue to monitor CBC, coagulation profile, ACT, TEG  Chemical DVT prophylaxis not required as patient is on systemic anticoagulation  Antimicrobials reviewed; continue to monitor off antimicrobials  Monitor CRP, LDH, AST/ALT, D-Dimer, Ferritin   Glycemic control appropriate  Continue IV Decadron  Skin/wound care reviewed with the nursing staff  Physical/occupational therapy    The patient remains critically ill with illness/injury that acutely impairs one or more vital organ systems, such that there is a high probability of imminent or life threatening deterioration in the patient's condition. Critical care time of 35 minutes was spent (excluding procedures), in coordination of care during bedside rounds and discussion of patient care in detail, and recommendations of the team were adopted in the plan. Necessity of all invasive devices was also confirmed.      Maegan Mchugh MD  Pulmonary and Critical Care Medicine           10/19/2021     This patient was evaluated in the context of the global SARS-CoV-2 (COVID-19) pandemic, which necessitated considerations that the patient either has COVID-19 infection or is at risk of infection with COVID-19. Institutional protocols and algorithms that pertain to the evaluation & management of patients with COVID-19 or those at risk for COVID-19 are in a state of rapid changes based on information released by regulatory bodies including the CDC and federal and state organizations. These policies and algorithms were followed during the patient's care. Please note that this chart was generated using voice recognition Dragon dictation software. Although every effort was made to ensure the accuracy of this automated transcription, some errors in transcription may have occurred.

## 2021-10-19 NOTE — PROGRESS NOTES
ECMO end of shift note      Pt. Remains on V/V ECMO with flows of 4.1 ml/min. [address] in right IJ. Circuit has clots no and no precipitate present. No free air present in the circuit. Sweep gas is 3L @ 100%. Heparin gtt currently running at 17u/kg/hr. Labs are Q6. No replacements given this shift.

## 2021-10-19 NOTE — PROGRESS NOTES
Gynecology Progress Note    Date: 10/19/2021  Time: 3:29 AM    Shagufta Olivares 27 y.o. female , POD # 4 s/p PCCS  on 10/15/21    Patient seen and examined. Patient is intubated, sedated, and paralyzed on ECMO. She is urinating adequately via miles catheter. RN reports that patient had scant bleeding overnight with no clots. FiO2 40% and PEEP at 10 on ventilator. Patient received 1 unit RBCs and 1 unit FFP in last 24h. Vitals:  Vitals:    10/18/21 2330 10/18/21 2345 10/19/21 0000 10/19/21 0050   BP:   122/77    Pulse: 68 70 68 68   Resp:   12 12   Temp:   97 °F (36.1 °C)    TempSrc:   Axillary    SpO2: 98% 98% 98% 97%   Weight:       Height:             Intake/Output:   Last Shift: I/O last 3 completed shifts: In: 7040 [I.V.:837; NG/GT:200]  Out: 2120 [Urine:2120]  Current Shift: I/O this shift: In: 50 [NG/GT:50]  Out: 625 [Urine:625]  375 mL out in last 3 hrs      Physical Exam:  General:  Intubated, sedated, paralyzed with ECMO cannulation of R internal jugular vein, L PICC line, and L brachial arterial line  Skin:  Skin color, textur, tugor normal. No rashes or lesions visualized.   HEENT: Normocephalic and atraumatic  Lungs:  Rhonchorous expirations due to ventilator and difficult to auscultate due to ventilator  Heart:  Normal apical impulse, regular rate and rhythm, normal S1 and S2, no S3 or S4, and no murmur noted    Abdomen: soft, non-distended, no rigidity, Prevena in place and functioning with no drainage, no erythema/edema noted, hypoactive bowel sounds  Extremities:  no calf tenderness, non edematous, SCDs on and functioning    Lab:  Recent Results (from the past 12 hour(s))   POC Glucose Fingerstick    Collection Time: 10/18/21  3:53 PM   Result Value Ref Range    POC Glucose 90 65 - 105 mg/dL   CBC    Collection Time: 10/18/21  4:11 PM   Result Value Ref Range    WBC 8.5 3.5 - 11.3 k/uL    RBC 2.97 (L) 3.95 - 5.11 m/uL    Hemoglobin 8.8 (L) 11.9 - 15.1 g/dL    Hematocrit 27.4 (L) 36.3 - 47.1 %    MCV 92.3 82.6 - 102.9 fL    MCH 29.6 25.2 - 33.5 pg    MCHC 32.1 28.4 - 34.8 g/dL    RDW 14.0 11.8 - 14.4 %    Platelets 739 587 - 738 k/uL    MPV 9.2 8.1 - 13.5 fL    NRBC Automated 0.2 (H) 0.0 per 100 WBC   BASIC METABOLIC PANEL    Collection Time: 10/18/21  4:11 PM   Result Value Ref Range    Glucose 93 70 - 99 mg/dL    BUN 6 6 - 20 mg/dL    CREATININE <0.20 (L) 0.50 - 0.90 mg/dL    Bun/Cre Ratio NOT REPORTED 9 - 20    Calcium 7.4 (L) 8.6 - 10.4 mg/dL    Sodium 140 135 - 144 mmol/L    Potassium 3.9 3.7 - 5.3 mmol/L    Chloride 106 98 - 107 mmol/L    CO2 28 20 - 31 mmol/L    Anion Gap 6 (L) 9 - 17 mmol/L    GFR Non- CANNOT BE CALCULATED >60 mL/min    GFR  CANNOT BE CALCULATED >60 mL/min    GFR Comment          GFR Staging NOT REPORTED    MAGNESIUM    Collection Time: 10/18/21  4:11 PM   Result Value Ref Range    Magnesium 1.9 1.6 - 2.6 mg/dL   FIBRINOGEN    Collection Time: 10/18/21  4:11 PM   Result Value Ref Range    Fibrinogen 377 140 - 420 mg/dL   PROTIME-INR    Collection Time: 10/18/21  4:11 PM   Result Value Ref Range    Protime 10.0 9.1 - 12.3 sec    INR 0.9    APTT    Collection Time: 10/18/21  4:11 PM   Result Value Ref Range    PTT 62.2 (H) 20.5 - 30.5 sec   CALCIUM, IONIZED    Collection Time: 10/18/21  4:11 PM   Result Value Ref Range    Calcium, Ion 1.13 1.13 - 1.33 mmol/L   HEPATIC FUNCTION PANEL    Collection Time: 10/18/21  4:11 PM   Result Value Ref Range    Albumin 2.1 (L) 3.5 - 5.2 g/dL    Alkaline Phosphatase 87 35 - 104 U/L    ALT 60 (H) 5 - 33 U/L    AST 45 (H) <32 U/L    Total Bilirubin 0.16 (L) 0.3 - 1.2 mg/dL    Bilirubin, Direct 0.09 <0.31 mg/dL    Bilirubin, Indirect 0.07 0.00 - 1.00 mg/dL    Total Protein 4.4 (L) 6.4 - 8.3 g/dL    Globulin NOT REPORTED 1.5 - 3.8 g/dL    Albumin/Globulin Ratio 0.9 (L) 1.0 - 2.5   Arterial Blood Gas, POC    Collection Time: 10/18/21  4:12 PM   Result Value Ref Range    POC pH 7.410 7.350 - 7.450    POC pCO2 49.8 (H) 35.0 - 48.0 mm Hg    POC PO2 109.2 (H) 83.0 - 108.0 mm Hg    POC HCO3 31.6 (H) 21.0 - 28.0 mmol/L    TCO2 (calc), Art NOT REPORTED 22.0 - 29.0 mmol/L    Negative Base Excess, Art NOT REPORTED 0.0 - 2.0    Positive Base Excess, Art 6 (H) 0.0 - 3.0    POC O2 SAT 98 94.0 - 98.0 %    O2 Device/Flow/% NOT REPORTED     Travis Test NOT REPORTED     Sample Site Arterial Line     Mode NOT REPORTED     FIO2 100.0     Pt Temp NOT REPORTED     POC pH Temp NOT REPORTED     POC pCO2 Temp NOT REPORTED mm Hg    POC pO2 Temp NOT REPORTED mm Hg   Activated clotting time    Collection Time: 10/18/21  4:16 PM   Result Value Ref Range    Activated Clotting Time 151 (H) 79 - 149 sec   Venous Blood Gas, POC    Collection Time: 10/18/21  5:57 PM   Result Value Ref Range    pH, Monico 7.409 7.320 - 7.430    pCO2, Monico 48.3 41.0 - 51.0 mm Hg    pO2, Monico 35.9 30 - 50 mm Hg    HCO3, Venous 30.5 (H) 22.0 - 29.0 mmol/L    Total CO2, Venous NOT REPORTED 23.0 - 30.0 mmol/L    Negative Base Excess, Monico NOT REPORTED 0.0 - 2.0    Positive Base Excess, Monico 5 (H) 0.0 - 3.0    O2 Sat, Monico 68 60.0 - 85.0 %    O2 Device/Flow/% NOT REPORTED     Travis Test NOT REPORTED     Sample Site NOT REPORTED     Mode NOT REPORTED     FIO2 100.0     Pt Temp NOT REPORTED     POC pH Temp NOT REPORTED     POC pCO2 Temp NOT REPORTED mm Hg    POC pO2 Temp NOT REPORTED mm Hg   Activated clotting time    Collection Time: 10/18/21  6:07 PM   Result Value Ref Range    Activated Clotting Time 171 (H) 79 - 149 sec   Arterial Blood Gas, POC    Collection Time: 10/18/21  6:13 PM   Result Value Ref Range    POC pH 7.595 (H) 7.350 - 7.450    POC pCO2 31.3 (L) 35.0 - 48.0 mm Hg    POC PO2 476.4 (H) 83.0 - 108.0 mm Hg    POC HCO3 30.4 (H) 21.0 - 28.0 mmol/L    TCO2 (calc), Art NOT REPORTED 22.0 - 29.0 mmol/L    Negative Base Excess, Art NOT REPORTED 0.0 - 2.0    Positive Base Excess, Art 8 (H) 0.0 - 3.0    POC O2  (H) 94.0 - 98.0 %    O2 Device/Flow/% NOT REPORTED     [employer] NOT REPORTED     Sample Site NOT REPORTED     Mode NOT REPORTED     FIO2 100.0     Pt Temp NOT REPORTED     POC pH Temp NOT REPORTED     POC pCO2 Temp NOT REPORTED mm Hg    POC pO2 Temp NOT REPORTED mm Hg   Arterial Blood Gas, POC    Collection Time: 10/18/21  7:55 PM   Result Value Ref Range    POC pH 7.440 7.350 - 7.450    POC pCO2 44.8 35.0 - 48.0 mm Hg    POC PO2 115.5 (H) 83.0 - 108.0 mm Hg    POC HCO3 30.4 (H) 21.0 - 28.0 mmol/L    TCO2 (calc), Art NOT REPORTED 22.0 - 29.0 mmol/L    Negative Base Excess, Art NOT REPORTED 0.0 - 2.0    Positive Base Excess, Art 6 (H) 0.0 - 3.0    POC O2 SAT 99 (H) 94.0 - 98.0 %    O2 Device/Flow/% Adult Ventilator     Travis Test NOT APPLICABLE     Sample Site Arterial Line     Mode NOT REPORTED     FIO2 40.0     Pt Temp NOT REPORTED     POC pH Temp NOT REPORTED     POC pCO2 Temp NOT REPORTED mm Hg    POC pO2 Temp NOT REPORTED mm Hg   Hemoglobin and hematocrit, blood    Collection Time: 10/18/21  7:55 PM   Result Value Ref Range    POC Hemoglobin 7.1 (L) 12.0 - 16.0 g/dL    POC Hematocrit 21 (L) 36 - 46 %   POCT Glucose    Collection Time: 10/18/21  7:55 PM   Result Value Ref Range    POC Glucose 101 (H) 74 - 100 mg/dL   Activated clotting time    Collection Time: 10/18/21  7:57 PM   Result Value Ref Range    Activated Clotting Time 159 (H) 79 - 149 sec   Activated clotting time    Collection Time: 10/18/21  9:55 PM   Result Value Ref Range    Activated Clotting Time 163 (H) 79 - 149 sec   APTT    Collection Time: 10/18/21 10:00 PM   Result Value Ref Range    PTT 83.8 (H) 20.5 - 30.5 sec   BASIC METABOLIC PANEL    Collection Time: 10/18/21 10:00 PM   Result Value Ref Range    Glucose 127 (H) 70 - 99 mg/dL    BUN 6 6 - 20 mg/dL    CREATININE <0.20 (L) 0.50 - 0.90 mg/dL    Bun/Cre Ratio NOT REPORTED 9 - 20    Calcium 8.0 (L) 8.6 - 10.4 mg/dL    Sodium 137 135 - 144 mmol/L    Potassium 4.1 3.7 - 5.3 mmol/L    Chloride 100 98 - 107 mmol/L    CO2 29 20 - 31 mmol/L    Anion Gap 8 (L) 9 MA (Max Clot) TEG 79.6 (H) 50 - 70 mm    LY30(Lysis) TEG 0.0 0 - 8 %    EPL-TEG 0.0 0.0 - 15.0 %    TEG Comment NOT REPORTED     Performing Location [facility]     Heparin Therapy YES    LACTIC ACID, WHOLE BLOOD    Collection Time: 10/18/21 10:00 PM   Result Value Ref Range    Lactic Acid, Whole Blood 0.7 0.7 - 2.1 mmol/L   MAGNESIUM    Collection Time: 10/18/21 10:00 PM   Result Value Ref Range    Magnesium 2.1 1.6 - 2.6 mg/dL   PROTIME-INR    Collection Time: 10/18/21 10:00 PM   Result Value Ref Range    Protime 10.0 9.1 - 12.3 sec    INR 0.9    Arterial Blood Gas, POC    Collection Time: 10/19/21 12:16 AM   Result Value Ref Range    POC pH 7.398 7.350 - 7.450    POC pCO2 50.4 (H) 35.0 - 48.0 mm Hg    POC PO2 87.9 83.0 - 108.0 mm Hg    POC HCO3 31.1 (H) 21.0 - 28.0 mmol/L    TCO2 (calc), Art NOT REPORTED 22.0 - 29.0 mmol/L    Negative Base Excess, Art NOT REPORTED 0.0 - 2.0    Positive Base Excess, Art 5 (H) 0.0 - 3.0    POC O2 SAT 96 94.0 - 98.0 %    O2 Device/Flow/% NOT REPORTED     Travis Test NOT APPLICABLE     Sample Site Arterial Line     Mode NOT REPORTED     FIO2 NOT REPORTED     Pt Temp NOT REPORTED     POC pH Temp NOT REPORTED     POC pCO2 Temp NOT REPORTED mm Hg    POC pO2 Temp NOT REPORTED mm Hg   POCT Glucose    Collection Time: 10/19/21 12:16 AM   Result Value Ref Range    POC Glucose 130 (H) 74 - 100 mg/dL   Activated clotting time    Collection Time: 10/19/21 12:17 AM   Result Value Ref Range    Activated Clotting Time 163 (H) 79 - 149 sec   PREPARE FRESH FROZEN PLASMA, 1 Units    Collection Time: 10/19/21  2:00 AM   Result Value Ref Range    Unit Number [id number]     Product Code Fresh Plasma     Unit Divison 00     Dispense Status ISSUED     Transfusion Status OK TO TRANSFUSE        Assessment/Plan:  Kt Lloyd 27 y.o. female , HD#6, POD#4 s/p PCCS (under general anesthesia) on 10/15/21 with COVID ARDS, L Lower Lobe Pulmonary Embolism   - Critical care primary   - Ob/Gyn consulted   - ID consulted   - Cardiology consulted    Cardiology              - Cardiology/Cardiothoracic surgery following s/p ECMO cannulation 10/17/21              - BPs stable and non-severe range              - Art line in place of L brachial artery              - L sided PICC line in place              - DVT prophylaxis: Pt currently on heparin gtt              - Echocardiogram on 10/16/21: normal with EF 61%              - EKG 10/13/21: sinus tachycardia               - CXR 10/18/21: ECMO catheter is again seen with distal tip unchanged in position. Recommend retraction of ET tube 1cm. Stable appearance of L sided hydrothroax. R lung multifocal marked ill-defined dense consolidation, unchanged. Suspect mild right seded pleaural effusion.                - LE dopplers without evidence of superficial or deep DVTs in bilateral extremities    - Labs:     - CBC, BMP, PT/INR, PTT, Magnesium, lactic acid, fibrinogen q6h    - Lactate 0.8>0.8>0.48>1.2>0.7    - PTT 40.5>120.0>45.2>57.1>120>63>19>104. 2?73.5>77.1>62.2>83.8    - INR 0.9>1.0>0.9    - D-Dimer 0.83>0.72>2.11>0.53    - Fibrinogen 595>490>429>380>420>382>377>440    - BNP <20    Pulmonology   - ID consuled   - CXR daily ordered   - CXR 10/18/21: ECMO catheter is again seen with distal tip unchanged in position. Recommend retraction of ET tube 1cm. Stable appearance of L sided hydrothroax. R lung multifocal marked ill-defined dense consolidation, unchanged.  Suspect mild right seded pleaural effusion.    - FiO2 40%              - Intubated as of 10/15/21               - Dilaudid/Fentanyl//ketamine/versed/nimbex gtt              - S/p proning, currently supine              - Decadron 6mg IV qd    Neurology              - Currently sedated/paralyzed with Dilaudid/Fentanyl/ketamine/versed/nimbex gtt    Gastrointestinal              - Tube feeds High protein diet              - Liquid Colace ordered      Genitourinary/Obstetrics              - S/p TXA x1, hemabate x1, surgicel powder (intra-operatively)              - Baby in  NICU               - Insorb/Prevena in place with no drainage, will need removed on 10/21/21              - Lozano catheter in place with UOP adequate overnight     Heme/Onc              - Pt currently on heparin gtt              - Hgb 10.8>9.7>9.7>8.6>6.7>7.6>8.8>>>7.1              - Lactate, PT/INR, PTT, Fibrinogen q6h   - Patient received 1u PRBCs (10/18/21) and 1u FFP (10/19/21)     Infectious disease              - COVID positive   - ID consulted and recommending decadron 6mg qd   - Discontinue COVID isolation after 10/20/21 per ID   - Continue to monitor off antibiotics    Endocrine   - POCT glucose q6h   - MDISS ordered   - AG closed at 8   - BHB trended down    Renal    - BHB trended down   - UOP adequate   - BMP q6h    Psych              - Unable to assess as patient is intubated/sedated/paralyzed     Disposition              - Continue care per Critical care team in the ICU            Brian Gray DO  Ob/Gyn Resident   10/19/2021, 3:29 AM      Date: 10/19/2021  Time: 2:57 PM      Patient Name: Leann Dumont  Patient : 1990  Room/Bed: [facility]-  Admission Date/Time: 10/13/2021  8:19 PM        Attending Physician Statement  I have discussed the care of Leann Dumont, including pertinent history and exam findings with the resident. I have reviewed and edited their note in the electronic medical record. The key elements of all parts of the encounter have been performed/reviewed by me . I agree with the assessment, plan and orders as documented by the resident. The level of care submitted represents to the best of my ability the care documented in the medical record today. GC Modifier. This service has been performed in part by a resident under the direction of a teaching physician. Patient continues on ECMO. Stable from post-operative stand point. Appreciate all teams assistance with this patient.  OBGYN will sign off

## 2021-10-19 NOTE — PROGRESS NOTES
ECMO DAILY ROUNDING NOTE     Patient:  Jo-Ann Sandra  MRN: [id number]  Admit date: 10/13/2021  Primary Care Physician: Junior Elias  Consulting Physician: Chantel Bautista DO  CODE Status: Full Code  LOS: 6    [x] Team present at bedside   [x] Family updated    INDICATION:   Refractory Hypoxemia, ARDS, COVID-19 pneumonia     CANNULATION:  VenoVeno ECMO cannulation on 10/17    TREATMENT GOALS:  DO2 :   VO2:  PUMP  FLOW: 3-5L/min  pH: 7.35-7.45   SvO2: >70%  SaO2:>97%  ACT: 180-200  Hemoglobin: > 7  Fibrinogen >150  Platelets >77  Sedation : RASS -1 to+1    CURRENT ECMO PARAMETERS:  PUMP Patient on ECMO: On  Hours on ECMO: 51  Pump Flow (L/min): 4.05 LPM  Pump Speed (Rotations/min): 3070 RPM  ECMO Mode: V/V  Pump Mode: Cardiohelp   SWEEP GAS Sweep Flow (L/min): (S) 3 LPM   FiO2 (%): 100 %   CIRCUIT PRESSURES Inlet Pressure (Bladder): -69 mmHg  Pre-Membrane Pressure: 186 mmHg  Post-Membrane Pressure: 165 mmHg  Delta P: 22 mmHg  SVO2 (%): 61.8 %  ECMO blood flow temperature: 97.3 °F (36.3 °C)  CVP (mmHg): 15 mmHg   CIRCUIT CHECK Heat Exchg.  Water Temp Set: 36.2  Heat Exchanger Water Temp Actual: 36.2  Heat Exchanger Water Level: Full  Unused Pigtails Cleared: Done  System Plugged to Red Outlet: Done  Emergency Backup in Use: No  Hand Crank Available: Yes  Backup Unit Blood Avail: Done  Cart Checked and Stocked: Done  Pump Battery Charged: 100 Volts  Pressure Monitors Zeroed: Done  Pressure Limits Checked: Done  Circuit Number: 1  Back Up Circuit: Done  Back Up Console/Motor: Done  Emergency O2 Tank Available: Done  Circuits and Cannulation Sites: Done  High/Low flow alarm set?: Yes  High/Low temp alarm set?: Yes  High/Low venous alarm set?: Yes  Pre-MO alarm limit: 500  Post-MO alarm limit: 400     CURRENT VENTILATOR SETTINGS:  Vent Information  $Ventilation: $Subsequent Day  Skin Assessment: Clean, dry, & intact  Equipment ID: [id number]  Equipment Changed: HME  Vent Type: Servo i  Vent Mode: AC/PC  Vt Ordered: 480 mL  Pressure Ordered: 10  Rate Set: 12 bmp  FiO2 : 40 %  SpO2: 98 %  SpO2/FiO2 ratio: 245  Sensitivity: 3  PEEP/CPAP: 10  I Time/ I Time %: 0.9 s  Humidification Source: HME  Humidification Temp: 33  Humidification Temp Measured: 33  Nitric Oxide/Epoprostenol In Use?: No  Mask Type: Full face mask  Mask Size: Medium     RECENT LABS:  ABG Recent Labs     10/19/21  1131 10/19/21  1538 10/19/21  1633 10/19/21  1725 10/19/21  1740   POCPH 7.418 7.458* 7.445 7.444 7.448   POCPCO2 51.3* 44.1 44.7 44.5 47.1   POCPO2 68.6* 78.2* 67.1* 95.0 504.4*   POCHCO3 33.1* 31.2* 30.7* 30.5* 32.6*   NJLS0NPJ 93* 96 94 98 100*      VBG Recent Labs     10/17/21  1439 10/17/21  1727 10/18/21  0546 10/18/21  1757 10/19/21  1749   PHVEN 7.480* 7.483* 7.376 7.409 7.419   OAV3WDM 33.3* 35.9* 52.1* 48.3 48.8   ESO7MUC 24.8 26.9 30.5* 30.5* 31.6*   N9YOZTFH 55* 67 62 68 67      CBC Recent Labs     10/18/21  1611 10/18/21  2200 10/19/21  0448 10/19/21  0904 10/19/21  1604   WBC 8.5 12.2* 10.1 11.2 9.6   HGB 8.8* 9.1* 8.0* 8.1* 8.0*   HCT 27.4* 29.0* 25.8* 26.1* 25.5*    326 311 310 327      COAGS Recent Labs     10/18/21  1611 10/18/21  2200 10/19/21  0448 10/19/21  0904 10/19/21  1604   INR 0.9 0.9 0.9 0.9 0.9   PROTIME 10.0 10.0 10.0 9.8 9.9   APTT 62.2* 83.8* 60.1* 70.2* 45.8*      TEG Recent Labs     10/18/21  1029 10/18/21  2200 10/19/21  0904   HEPTHERAPY UNKNOWN  UNKNOWN YES  YES UNKNOWN  UNKNOWN   REACTTIMETEG 32.4* 36.9* 6.4   KINETICSTEG 11.9* 14.8* 1.2   ANGLETEG 19.2* 18.0* 73.5*   MAXCLOTTEG 71.9* 79.6* 76.1*   PY00LRL 0.0 0.0 0.0   EPLTEG 0.0 0.0 0.0      BMP Recent Labs     10/17/21  0413 10/17/21  1601 10/18/21  1611 10/18/21  2200 10/19/21  0448 10/19/21  0904 10/19/21  1604      < > 140 137 141 138 137   K 3.6*   < > 3.9 4.1 4.4 4.2 4.5      < > 106 100 103 99 99   CO2 21   < > 28 29 29 29 28   BUN 6   < > 6 6 7 7 8   CREATININE 0.34*   < > <0.20* <0.20* <0.20* <0.20* 0.24*   GLUCOSE 92   < > 93 127* 121* 112* 153*   MG 2.0   < > 1.9 2.1 1.9 2.1 1.9   PHOS 2.9  --   --   --   --   --   --     < > = values in this interval not displayed. LFT Recent Labs     10/17/21  0413 10/18/21  0433 10/18/21  1611 10/18/21  2200 10/19/21  0904   PROT 4.7* 4.9* 4.4* 5.1* 5.2*   LABALBU 2.1* 2.2* 2.1* 2.3* 2.7*   ALT 55* 66* 60* 65* 51*   AST 57* 62* 45* 45* 29   ALKPHOS 106* 105* 87 109* 88   BILITOT 0.18* 0.19* 0.16* 0.24* 0.26*      INFLAMMATORY MARKERS Recent Labs     10/17/21  0413   CRP 81.3*      CARDIAC No results for input(s): CKTOTAL, CKMB, CKMBINDEX, TROPONINI, BNP, PROBNP in the last 72 hours. Invalid input(s): TROPONIN, HSTROP   LACTIC ACID No results for input(s): LACTA in the last 72 hours.    TRIGLYCERIDE Recent Labs     10/17/21  1019   TRIG 422*        ASSESSMENT AND SYSTEM BASED PLAN (MEDICAL DECISION MAKING):    Neurologic                                                  [x] Sedation/paralytic requirement reviewed, plan to wean off and discontinue Nimbex                              [x] Neurological assessment; patient sedated and paralyzed    Cardiovascular                               [x] Cannula secured, dsg dry and intact, no drainage or hematoma noted                             [x] ECMO Pump flow/pressures reviewed                             [x] Telemetry reviewed                             [x] Hemodynamic parameters stable    Pulmonary                             [x] Blood gases (Patient/Circuit) reviewed                             [x] Sweep/ settings reviewed                             [x] Vent Settings reviewed                             [x] Chest X-ray reviewed, will consider thoracentesis/chest tube placement on the left side    Genitourinary                              [x] Intake/Output reviewed                             [x] Need for continuous IV fluids assessed                             [x] Need for diuresis/renal replacement therapy reviewed    Gastrointestinal [x] GI Prophylaxis reviewed                             [x] Enteral nutrition reviewed, continue tube feeds    Hematology                            [x] Anticoagulation: Heparin                             [x] Reviewed CBC, coagulation profile, ACT, TEG and platelet count                            [x] Transfusion needs reviewed                    Infectious disease                           [x] Reviewed T-max, white count and cultures results                           [x] Antimicrobials reviewed    Endocrine                            [x] Reviewed glycemic control                           [x] Reviewed need for steroids    Others                           [x] Reviewed need for restrains                           [x] Reviewed need for lines/drains/invasive devices                           [x] Skin/Wound care                           [x] Reviewed current Medications list/orders                           [x] Reviewed goals of care                   OVERALL CONDITION:   Stable    Deb Mascorro MD  Pulmonary & Critical care Medicine  10/19/2021

## 2021-10-19 NOTE — PROGRESS NOTES
[facility] - Initial Consult Note COVID 19 Patient  Today's Date and Time: 10/19/2021, 10:39 AM    Impression :   COVID 23 Suspect  COVID 19 Confirmed Infection  Covid tests:  10/10/21: Positive  26 weeks gestation pregnancy  Acute hypoxic respiratory distress  Pulmonary embolism LLL  Metabolic acidosis with euglycemic starvation    Recommendations:   Antibiotic treatment:  Monitor off antibiotics  Covid Rx:  Remdesivir-Out of window  Decadron-Initiated 10/14/21  anticoagulation  DEFER barcitinib and  Actemra due to pregnancy  Intubated 10/14  On ECMO   Baby delivered early 26 weeks,    start on illness 10/1  Stop isolation after 10/20    Medical Decision Making/Summary/Discussion:10/19/2021     Patient admitted with suspected COVID 19 infection  Covid test confirmed positive. Infection Control Recommendations   [place] Precautions  Airborne isolation  Droplet plus Isolation    Antimicrobial Stewardship Recommendations   Off antibiotics    Chief complaint/reason for consultation:   Concern for COVID infection      History of Present Illness:   Shagufta Olivares is a 27y.o.-year-old  female who was initially admitted on 10/13/2021. Patient seen at the request of Dr. Evans Carrier:    Patient, who is 26 weeks pregnant, initially presented to [facility] ED on 10/10/21 presented through ER with complaints of productive cough with yellow sputum X 9 days & shortness of breath with pleuritic chest pain that started the day before. She ws also experiencing decreased appetite because of loss of taste and smell. Work-up at that time revealed the patient to be Covid positive. She was tachycardic with an SpO2 of 94-98% on room air. CXR showed bilateral ill-defined pulmonary opacities suggesting COVID pneumonia.     After receiving a small fluid bolus and discussing her case with the patient's OBGYN, Dr. Clement Patterson, she was discharged home with instructions to return if her symptoms worsen. On 10/13/21, the patient again presented to [facility] ED after a home SpO2 reading was 88%. A CT chest revealed a small, non-obstructing LLL PE for which she was initiated on a heparin gtt. The patient's respiratory status worsened throughout the day and she was life-flighted to [facility] for a higher level of care. The patient was found to be tachypneic and tachycardic upon arrival. She was maintaining her SpO2 >95% on 15 L/min non-re-breather. Heparin gtt was continued and the patient was also ordered celestone for fetal lung maturity. The patient was admitted to the [place] ICU    Interval changes 10/19/21   Patient delivered the baby small weight, at this time in the NICU  She continues on ECMO 100% through a right IJ  Weaning off slowly today hoping to get her to be awake so she could see the baby pH 7.6,  WBC 11 creatinine normal, fibrinogen 418  She remains off antibiotics  Chest x-ray showing widening of the left lung, bronchoscopy is planned by pulmonary  ,  Sputum culture Candida albicans    10/13/21      10/10/21          CAT:  10/13/21    Discussed with patient, RN, CC, IM. I have personally reviewed the past medical history, past surgical history, medications, social history, and family history, and I have updated the database accordingly.   Past Medical History:     Past Medical History:   Diagnosis Date    Anxiety     GERD (gastroesophageal reflux disease)     Migraines     Seasonal allergies        Past Surgical  History:     Past Surgical History:   Procedure Laterality Date     SECTION N/A 10/15/2021     SECTION performed by Hazel Kate MD at [facility]      nerve release, and plantar fascitis      PICC POWERPIC TRIPLE  10/15/2021         MANDIBLE SURGERY      tooth implant    MOUTH SURGERY      WISDOM TOOTH EXTRACTION         Medications:      heparin flush (PF)  3 mL IntraVENous Q12H    heparin flush (PF)  3 mL IntraVENous Q12H    heparin flush (PF)  3 mL IntraVENous Q12H    prenatal vitamin  1 tablet Oral Daily    Tdap-Dtap  0.5 mL IntraMUSCular Prior to discharge    lidocaine 1 % injection  5 mL IntraDERmal Once    sodium chloride flush  5-40 mL IntraVENous 2 times per day    docusate  100 mg Oral BID    sennosides-docusate sodium  2 tablet Oral Daily    insulin lispro  0-12 Units SubCUTAneous Q6H    dexamethasone  6 mg IntraVENous Q24H       Social History:     Social History     Socioeconomic History    Marital status:      Spouse name: Not on file    Number of children: Not on file    Years of education: Not on file    Highest education level: Not on file   Occupational History    Not on file   Tobacco Use    Smoking status: Former Smoker     Types: Cigarettes    Smokeless tobacco: Never Used   Vaping Use    Vaping Use: Former    Substances: Nicotine   Substance and Sexual Activity    Alcohol use: Not Currently    Drug use: Not Currently     Types: Marijuana     Comment: last smoked May 2021    Sexual activity: Yes   Other Topics Concern    Not on file   Social History Narrative    Not on file     Social Determinants of Health     Financial Resource Strain:     Difficulty of Paying Living Expenses:    Food Insecurity:     Worried About Running Out of Food in the Last Year:     Ran Out of Food in the Last Year:    Transportation Needs:     Lack of Transportation (Medical):      Lack of Transportation (Non-Medical):    Physical Activity:     Days of Exercise per Week:     Minutes of Exercise per Session:    Stress:     Feeling of Stress :    Social Connections:     Frequency of Communication with Friends and Family:     Frequency of Social Gatherings with Friends and Family:     Attends [employer] Services:     Active Member of Clubs or Organizations:     Attends Club or Organization Meetings:     Marital Status:    Intimate Partner Violence:     Fear of Current or Ex-Partner:     Emotionally Abused:     Physically Abused:     Sexually Abused:        Family History:     Family History   Problem Relation Age of Onset    Breast Cancer Maternal Grandmother         older than 48    Heart Attack Paternal Uncle     Stroke Maternal Aunt     Hypertension Father     No Known Problems Paternal Grandfather     Stroke Paternal Grandmother     No Known Problems Maternal Grandfather     Hypertension Mother     No Known Problems Brother     No Known Problems Brother         Allergies:   Vicodin [hydrocodone-acetaminophen]     Review of Systems:     Review of Systems   Unable to perform ROS: Intubated          Physical Examination :     Patient Vitals for the past 8 hrs:   BP Temp Temp src Pulse Resp SpO2   10/19/21 0920 -- -- -- 118 -- (!) 87 %   10/19/21 0700 -- 97.5 °F (36.4 °C) Axillary 64 12 97 %   10/19/21 0515 -- -- -- 62 -- 98 %   10/19/21 0500 -- -- -- 78 -- 96 %   10/19/21 0445 -- -- -- 89 -- 92 %   10/19/21 0430 -- -- -- 99 -- 90 %   10/19/21 0415 -- -- -- 105 -- (!) 89 %   10/19/21 0408 -- -- -- 116 -- (!) 88 %   10/19/21 0400 (!) 140/84 97.2 °F (36.2 °C) Axillary 108 12 (!) 87 %   10/19/21 0345 -- -- -- 119 -- --   10/19/21 0315 -- -- -- 86 -- 92 %   10/19/21 0300 -- -- -- 73 -- 95 %   10/19/21 0245 -- -- -- 74 -- 95 %     Physical Exam  Constitutional:       General: She is not in acute distress. Appearance: She is ill-appearing. Comments: Intubated sedated   HENT:      Head: Normocephalic and atraumatic. Nose: Nose normal.   Eyes:      General: No scleral icterus. Conjunctiva/sclera: Conjunctivae normal.      Pupils: Pupils are equal, round, and reactive to light. Cardiovascular:      Rate and Rhythm: Normal rate and regular rhythm. Heart sounds: Normal heart sounds. No murmur heard. Pulmonary:      Breath sounds: No stridor. No rhonchi. Abdominal:      Palpations: There is no mass. Hernia: No hernia is present. Genitourinary:     Comments: Urine bong  Musculoskeletal:         General: No swelling or deformity. Cervical back: Neck supple. No rigidity. Skin:     Coloration: Skin is not jaundiced or pale. Findings: No bruising or erythema. Neurological:      Comments: sedated   Psychiatric:      Comments: sedated          Medical Decision Making -Laboratory:   I have independently reviewed/ordered the following labs:    CBC with Differential:   Recent Labs     10/17/21  0413 10/17/21  1601 10/19/21  0448 10/19/21  0904   WBC 9.0   < > 10.1 11.2   HGB 8.4*   < > 8.0* 8.1*   HCT 25.8*   < > 25.8* 26.1*      < > 311 310   LYMPHOPCT 20*  --   --   --    MONOPCT 4  --   --   --     < > = values in this interval not displayed. BMP:   Recent Labs     10/19/21  0448 10/19/21  0904    138   K 4.4 4.2    99   CO2 29 29   BUN 7 7   CREATININE <0.20* <0.20*   MG 1.9 2.1     Hepatic Function Panel:   Recent Labs     10/18/21  2200 10/19/21  0904   PROT 5.1* 5.2*   LABALBU 2.3* 2.7*   BILIDIR 0.13 0.12   IBILI 0.11 0.14   BILITOT 0.24* 0.26*   ALKPHOS 109* 88   ALT 65* 51*   AST 45* 29     No results for input(s): RPR in the last 72 hours. No results for input(s): HIV in the last 72 hours. No results for input(s): BC in the last 72 hours. Lab Results   Component Value Date    MUCUS NOT REPORTED 10/13/2021    RBC 2.82 10/19/2021    TRICHOMONAS NOT REPORTED 10/13/2021    WBC 11.2 10/19/2021    YEAST NOT REPORTED 10/13/2021    TURBIDITY Clear 10/13/2021     Lab Results   Component Value Date    CREATININE <0.20 10/19/2021    GLUCOSE 112 10/19/2021       Medical Decision Making-Imaging:     Narrative   EXAMINATION:   CTA OF THE CHEST 10/13/2021 2:16 pm       TECHNIQUE:   CTA of the chest was performed after the administration of intravenous   contrast.  Multiplanar reformatted images are provided for review.  MIP   images are provided for review.  Dose modulation, iterative reconstruction,   and/or weight based adjustment of the mA/kV was utilized to reduce the   radiation dose to as low as reasonably achievable.       COMPARISON:   Chest x-rays over the last few days. .       HISTORY:   ORDERING SYSTEM PROVIDED HISTORY: concepcion poole, 32 WEEKS PREGNANT, Spoke with   Radiologist   TECHNOLOGIST PROVIDED HISTORY:   sob tachy, 32 WEEKS PREGNANT, Spoke with Radiologist   Decision Support Exception - unselect if not a suspected or confirmed   emergency medical condition->Emergency Medical Condition (MA)       FINDINGS:   Pulmonary Arteries:  There is a small nonocclusive segmental to subsegmental   embolism noted in the left lower lobe, on and around axial image [id number].  No   other definite emboli are seen.  No central emboli.  No pulmonary arterial   enlargement is identified.       Mediastinum: Mediastinal and hilar lymphadenopathy is identified.  No focal   esophageal thickening is identified.  The thyroid gland appears unremarkable.       Lungs/pleura: Multifocal ground-glass pneumonia with some mild areas of   consolidation.  No pleural effusion is identified.       Upper Abdomen: No acute process seen in the upper abdomen.       Soft Tissues/Bones: No axillary or supraclavicular lymphadenopathy is   identified.  No acute osseous abnormality is identified.           Impression   Single small nonocclusive segmental to subsegmental pulmonary embolism in the   left lower lobe.  No central emboli.  This was discussed with Everardo Villeda   at 2:44 p.m. on 10/13/2021.       Multilobar COVID-19 pneumonia.                 Medical Decision [id number]:       Medical Decision Making-Other:     Note:  Labs, medications, radiologic studies were reviewed with personal review of films  Large amounts of data were reviewed  Discussed with nursing Staff, Discharge planner  Infection Control and Prevention measures reviewed  All prior entries were reviewed  Administer medications as ordered  Prognosis: Guarded  Discharge

## 2021-10-20 ENCOUNTER — APPOINTMENT (OUTPATIENT)
Dept: GENERAL RADIOLOGY | Age: 31
DRG: 003 | End: 2021-10-20
Payer: COMMERCIAL

## 2021-10-20 LAB
ACTIVATED CLOTTING TIME: 155 SEC (ref 79–149)
ACTIVATED CLOTTING TIME: 159 SEC (ref 79–149)
ACTIVATED CLOTTING TIME: 159 SEC (ref 79–149)
ACTIVATED CLOTTING TIME: 163 SEC (ref 79–149)
ACTIVATED CLOTTING TIME: 179 SEC (ref 79–149)
ALBUMIN SERPL-MCNC: 3 G/DL (ref 3.5–5.2)
ALBUMIN SERPL-MCNC: 3.1 G/DL (ref 3.5–5.2)
ALBUMIN/GLOBULIN RATIO: 1.3 (ref 1–2.5)
ALBUMIN/GLOBULIN RATIO: 1.3 (ref 1–2.5)
ALLEN TEST: ABNORMAL
ALP BLD-CCNC: 81 U/L (ref 35–104)
ALP BLD-CCNC: 82 U/L (ref 35–104)
ALT SERPL-CCNC: 38 U/L (ref 5–33)
ALT SERPL-CCNC: 38 U/L (ref 5–33)
ANGLE TEG W HEPARIN: 77.1 DEG (ref 53–72)
ANGLE TEG W HEPARIN: 78.4 DEG (ref 53–72)
ANGLE TEG: 39.1 DEG (ref 53–72)
ANGLE TEG: 52 DEG (ref 53–72)
ANION GAP SERPL CALCULATED.3IONS-SCNC: 10 MMOL/L (ref 9–17)
ANION GAP SERPL CALCULATED.3IONS-SCNC: 8 MMOL/L (ref 9–17)
ANION GAP SERPL CALCULATED.3IONS-SCNC: 9 MMOL/L (ref 9–17)
ANION GAP SERPL CALCULATED.3IONS-SCNC: 9 MMOL/L (ref 9–17)
AST SERPL-CCNC: 25 U/L
AST SERPL-CCNC: 28 U/L
AT-III ACTIVITY: 88 % (ref 83–122)
BILIRUB SERPL-MCNC: 0.4 MG/DL (ref 0.3–1.2)
BILIRUB SERPL-MCNC: 0.41 MG/DL (ref 0.3–1.2)
BILIRUBIN DIRECT: 0.13 MG/DL
BILIRUBIN DIRECT: 0.15 MG/DL
BILIRUBIN, INDIRECT: 0.26 MG/DL (ref 0–1)
BILIRUBIN, INDIRECT: 0.27 MG/DL (ref 0–1)
BLD PROD TYP BPU: NORMAL
BUN BLDV-MCNC: 6 MG/DL (ref 6–20)
BUN BLDV-MCNC: 8 MG/DL (ref 6–20)
BUN BLDV-MCNC: 8 MG/DL (ref 6–20)
BUN BLDV-MCNC: 9 MG/DL (ref 6–20)
BUN/CREAT BLD: ABNORMAL (ref 9–20)
CALCIUM IONIZED: 1.14 MMOL/L (ref 1.13–1.33)
CALCIUM IONIZED: 1.15 MMOL/L (ref 1.13–1.33)
CALCIUM IONIZED: 1.15 MMOL/L (ref 1.13–1.33)
CALCIUM SERPL-MCNC: 8.1 MG/DL (ref 8.6–10.4)
CALCIUM SERPL-MCNC: 8.4 MG/DL (ref 8.6–10.4)
CALCIUM SERPL-MCNC: 8.4 MG/DL (ref 8.6–10.4)
CALCIUM SERPL-MCNC: 8.6 MG/DL (ref 8.6–10.4)
CHLORIDE BLD-SCNC: 100 MMOL/L (ref 98–107)
CHLORIDE BLD-SCNC: 101 MMOL/L (ref 98–107)
CHLORIDE BLD-SCNC: 103 MMOL/L (ref 98–107)
CHLORIDE BLD-SCNC: 97 MMOL/L (ref 98–107)
CO2: 27 MMOL/L (ref 20–31)
CO2: 27 MMOL/L (ref 20–31)
CO2: 28 MMOL/L (ref 20–31)
CO2: 28 MMOL/L (ref 20–31)
CREAT SERPL-MCNC: 0.2 MG/DL (ref 0.5–0.9)
CREAT SERPL-MCNC: <0.2 MG/DL (ref 0.5–0.9)
DISPENSE STATUS BLOOD BANK: NORMAL
EPL TEG, W/HEP: 0 % (ref 0–15)
EPL TEG, W/HEP: 0 % (ref 0–15)
EPL-TEG: 0 % (ref 0–15)
EPL-TEG: 0 % (ref 0–15)
FIBRINOGEN: 369 MG/DL (ref 140–420)
FIBRINOGEN: 371 MG/DL (ref 140–420)
FIBRINOGEN: 396 MG/DL (ref 140–420)
FIO2: 100
FIO2: 4
FIO2: 4
FIO2: 40
FIO2: ABNORMAL
GFR AFRICAN AMERICAN: >60 ML/MIN
GFR AFRICAN AMERICAN: ABNORMAL ML/MIN
GFR NON-AFRICAN AMERICAN: >60 ML/MIN
GFR NON-AFRICAN AMERICAN: ABNORMAL ML/MIN
GFR SERPL CREATININE-BSD FRML MDRD: ABNORMAL ML/MIN/{1.73_M2}
GLOBULIN: ABNORMAL G/DL (ref 1.5–3.8)
GLOBULIN: ABNORMAL G/DL (ref 1.5–3.8)
GLUCOSE BLD-MCNC: 107 MG/DL (ref 70–99)
GLUCOSE BLD-MCNC: 108 MG/DL (ref 74–100)
GLUCOSE BLD-MCNC: 118 MG/DL (ref 74–100)
GLUCOSE BLD-MCNC: 119 MG/DL (ref 70–99)
GLUCOSE BLD-MCNC: 120 MG/DL (ref 74–100)
GLUCOSE BLD-MCNC: 125 MG/DL (ref 70–99)
GLUCOSE BLD-MCNC: 128 MG/DL (ref 74–100)
GLUCOSE BLD-MCNC: 130 MG/DL (ref 70–99)
GLUCOSE BLD-MCNC: 137 MG/DL (ref 74–100)
HCT VFR BLD CALC: 22.3 % (ref 36.3–47.1)
HCT VFR BLD CALC: 22.4 % (ref 36.3–47.1)
HCT VFR BLD CALC: 23.7 % (ref 36.3–47.1)
HCT VFR BLD CALC: 28.5 % (ref 36.3–47.1)
HEMOGLOBIN, FREE, PLASMA: 5 MG/DL (ref 0–9.7)
HEMOGLOBIN: 7.1 G/DL (ref 11.9–15.1)
HEMOGLOBIN: 7.2 G/DL (ref 11.9–15.1)
HEMOGLOBIN: 7.8 G/DL (ref 11.9–15.1)
HEMOGLOBIN: 9.3 G/DL (ref 11.9–15.1)
HEPARIN THERAPY: ABNORMAL
HEPARIN THERAPY: ABNORMAL
HEPARIN THERAPY: YES
HEPARIN THERAPY: YES
INR BLD: 0.9
KINETICS TEG W HEPARIN: 0.8 MIN (ref 1–3)
KINETICS TEG W HEPARIN: 0.8 MIN (ref 1–3)
KINETICS TEG: 3 MIN (ref 1–3)
KINETICS TEG: 5.6 MIN (ref 1–3)
LACTIC ACID, WHOLE BLOOD: 1 MMOL/L (ref 0.7–2.1)
LACTIC ACID, WHOLE BLOOD: 1.2 MMOL/L (ref 0.7–2.1)
LY30 (LYSIS) TEG: 0 % (ref 0–8)
LY30 (LYSIS) TEG: 0 % (ref 0–8)
LY30(LYSIS) TEG W HEPARIN: 0 % (ref 0–8)
LY30(LYSIS) TEG W HEPARIN: 0 % (ref 0–8)
MA (MAX CLOT) TEG W HEPARIN: 75.1 MM (ref 50–70)
MA (MAX CLOT) TEG W HEPARIN: 80.1 MM (ref 50–70)
MA (MAX CLOT) TEG: 78.9 MM (ref 50–70)
MA (MAX CLOT) TEG: 82.5 MM (ref 50–70)
MAGNESIUM: 1.7 MG/DL (ref 1.6–2.6)
MAGNESIUM: 1.7 MG/DL (ref 1.6–2.6)
MAGNESIUM: 1.8 MG/DL (ref 1.6–2.6)
MAGNESIUM: 2.4 MG/DL (ref 1.6–2.6)
MCH RBC QN AUTO: 29.1 PG (ref 25.2–33.5)
MCH RBC QN AUTO: 29.5 PG (ref 25.2–33.5)
MCH RBC QN AUTO: 29.6 PG (ref 25.2–33.5)
MCH RBC QN AUTO: 30 PG (ref 25.2–33.5)
MCHC RBC AUTO-ENTMCNC: 31.8 G/DL (ref 28.4–34.8)
MCHC RBC AUTO-ENTMCNC: 32.1 G/DL (ref 28.4–34.8)
MCHC RBC AUTO-ENTMCNC: 32.6 G/DL (ref 28.4–34.8)
MCHC RBC AUTO-ENTMCNC: 32.9 G/DL (ref 28.4–34.8)
MCV RBC AUTO: 90.5 FL (ref 82.6–102.9)
MCV RBC AUTO: 90.7 FL (ref 82.6–102.9)
MCV RBC AUTO: 91.2 FL (ref 82.6–102.9)
MCV RBC AUTO: 92.9 FL (ref 82.6–102.9)
MODE: ABNORMAL
NEGATIVE BASE EXCESS, ART: ABNORMAL (ref 0–2)
NRBC AUTOMATED: 0.2 PER 100 WBC
O2 DEVICE/FLOW/%: ABNORMAL
PARTIAL THROMBOPLASTIN TIME: 50.7 SEC (ref 20.5–30.5)
PARTIAL THROMBOPLASTIN TIME: 61.5 SEC (ref 20.5–30.5)
PARTIAL THROMBOPLASTIN TIME: 66.1 SEC (ref 20.5–30.5)
PATIENT TEMP: ABNORMAL
PDW BLD-RTO: 13.4 % (ref 11.8–14.4)
PDW BLD-RTO: 13.7 % (ref 11.8–14.4)
PDW BLD-RTO: 13.7 % (ref 11.8–14.4)
PDW BLD-RTO: 13.8 % (ref 11.8–14.4)
PERFORMING LOCATION: ABNORMAL
PLATELET # BLD: 299 K/UL (ref 138–453)
PLATELET # BLD: 303 K/UL (ref 138–453)
PLATELET # BLD: 318 K/UL (ref 138–453)
PLATELET # BLD: 322 K/UL (ref 138–453)
PMV BLD AUTO: 9.2 FL (ref 8.1–13.5)
PMV BLD AUTO: 9.3 FL (ref 8.1–13.5)
POC HCO3: 30.4 MMOL/L (ref 21–28)
POC HCO3: 30.6 MMOL/L (ref 21–28)
POC HCO3: 30.9 MMOL/L (ref 21–28)
POC HCO3: 31.5 MMOL/L (ref 21–28)
POC HCO3: 31.7 MMOL/L (ref 21–28)
POC HCO3: 31.8 MMOL/L (ref 21–28)
POC HCO3: 32.2 MMOL/L (ref 21–28)
POC HCO3: 32.2 MMOL/L (ref 21–28)
POC HCO3: 32.5 MMOL/L (ref 21–28)
POC O2 SATURATION: 100 % (ref 94–98)
POC O2 SATURATION: 100 % (ref 94–98)
POC O2 SATURATION: 68 % (ref 94–98)
POC O2 SATURATION: 95 % (ref 94–98)
POC O2 SATURATION: 95 % (ref 94–98)
POC O2 SATURATION: 98 % (ref 94–98)
POC O2 SATURATION: 98 % (ref 94–98)
POC O2 SATURATION: 99 % (ref 94–98)
POC O2 SATURATION: 99 % (ref 94–98)
POC PCO2 TEMP: ABNORMAL MM HG
POC PCO2: 39.3 MM HG (ref 35–48)
POC PCO2: 42.5 MM HG (ref 35–48)
POC PCO2: 43.4 MM HG (ref 35–48)
POC PCO2: 44.1 MM HG (ref 35–48)
POC PCO2: 45.6 MM HG (ref 35–48)
POC PCO2: 46.6 MM HG (ref 35–48)
POC PCO2: 48 MM HG (ref 35–48)
POC PCO2: 49.6 MM HG (ref 35–48)
POC PCO2: 51.9 MM HG (ref 35–48)
POC PH TEMP: ABNORMAL
POC PH: 7.4 (ref 7.35–7.45)
POC PH: 7.42 (ref 7.35–7.45)
POC PH: 7.43 (ref 7.35–7.45)
POC PH: 7.45 (ref 7.35–7.45)
POC PH: 7.46 (ref 7.35–7.45)
POC PH: 7.46 (ref 7.35–7.45)
POC PH: 7.51 (ref 7.35–7.45)
POC PO2 TEMP: ABNORMAL MM HG
POC PO2: 126.9 MM HG (ref 83–108)
POC PO2: 151.1 MM HG (ref 83–108)
POC PO2: 313.6 MM HG (ref 83–108)
POC PO2: 35.5 MM HG (ref 83–108)
POC PO2: 449.5 MM HG (ref 83–108)
POC PO2: 73.9 MM HG (ref 83–108)
POC PO2: 74 MM HG (ref 83–108)
POC PO2: 86.8 MM HG (ref 83–108)
POC PO2: 99.7 MM HG (ref 83–108)
POSITIVE BASE EXCESS, ART: 6 (ref 0–3)
POSITIVE BASE EXCESS, ART: 7 (ref 0–3)
POSITIVE BASE EXCESS, ART: 8 (ref 0–3)
POSITIVE BASE EXCESS, ART: 8 (ref 0–3)
POTASSIUM SERPL-SCNC: 3.4 MMOL/L (ref 3.7–5.3)
POTASSIUM SERPL-SCNC: 3.9 MMOL/L (ref 3.7–5.3)
POTASSIUM SERPL-SCNC: 4.4 MMOL/L (ref 3.7–5.3)
POTASSIUM SERPL-SCNC: 4.7 MMOL/L (ref 3.7–5.3)
PROTHROMBIN TIME: 10 SEC (ref 9.1–12.3)
PROTHROMBIN TIME: 10.2 SEC (ref 9.1–12.3)
PROTHROMBIN TIME: 9.9 SEC (ref 9.1–12.3)
RBC # BLD: 2.4 M/UL (ref 3.95–5.11)
RBC # BLD: 2.47 M/UL (ref 3.95–5.11)
RBC # BLD: 2.6 M/UL (ref 3.95–5.11)
RBC # BLD: 3.15 M/UL (ref 3.95–5.11)
REACTION TIME TEG W HEPARIN: 4.7 MIN (ref 5–10)
REACTION TIME TEG W HEPARIN: 6.2 MIN (ref 5–10)
REACTION TIME TEG: 15.6 MIN (ref 5–10)
REACTION TIME TEG: 26.6 MIN (ref 5–10)
SAMPLE SITE: ABNORMAL
SODIUM BLD-SCNC: 133 MMOL/L (ref 135–144)
SODIUM BLD-SCNC: 137 MMOL/L (ref 135–144)
SODIUM BLD-SCNC: 138 MMOL/L (ref 135–144)
SODIUM BLD-SCNC: 139 MMOL/L (ref 135–144)
TCO2 (CALC), ART: ABNORMAL MMOL/L (ref 22–29)
TEG COMMENT: ABNORMAL
TOTAL PROTEIN: 5.4 G/DL (ref 6.4–8.3)
TOTAL PROTEIN: 5.5 G/DL (ref 6.4–8.3)
TRANSFUSION STATUS: NORMAL
UNIT DIVISION: 0
UNIT NUMBER: NORMAL
WBC # BLD: 12.2 K/UL (ref 3.5–11.3)
WBC # BLD: 12.5 K/UL (ref 3.5–11.3)
WBC # BLD: 12.8 K/UL (ref 3.5–11.3)
WBC # BLD: 14.2 K/UL (ref 3.5–11.3)

## 2021-10-20 PROCEDURE — 94003 VENT MGMT INPAT SUBQ DAY: CPT

## 2021-10-20 PROCEDURE — 6360000002 HC RX W HCPCS: Performed by: THORACIC SURGERY (CARDIOTHORACIC VASCULAR SURGERY)

## 2021-10-20 PROCEDURE — 6370000000 HC RX 637 (ALT 250 FOR IP): Performed by: STUDENT IN AN ORGANIZED HEALTH CARE EDUCATION/TRAINING PROGRAM

## 2021-10-20 PROCEDURE — 6360000002 HC RX W HCPCS: Performed by: INTERNAL MEDICINE

## 2021-10-20 PROCEDURE — 85300 ANTITHROMBIN III ACTIVITY: CPT

## 2021-10-20 PROCEDURE — 85610 PROTHROMBIN TIME: CPT

## 2021-10-20 PROCEDURE — 85576 BLOOD PLATELET AGGREGATION: CPT

## 2021-10-20 PROCEDURE — 80076 HEPATIC FUNCTION PANEL: CPT

## 2021-10-20 PROCEDURE — 71045 X-RAY EXAM CHEST 1 VIEW: CPT

## 2021-10-20 PROCEDURE — 33948 ECMO/ECLS DAILY MGMT-VENOUS: CPT | Performed by: INTERNAL MEDICINE

## 2021-10-20 PROCEDURE — P9047 ALBUMIN (HUMAN), 25%, 50ML: HCPCS | Performed by: INTERNAL MEDICINE

## 2021-10-20 PROCEDURE — 83051 HEMOGLOBIN PLASMA: CPT

## 2021-10-20 PROCEDURE — 2700000000 HC OXYGEN THERAPY PER DAY

## 2021-10-20 PROCEDURE — 99291 CRITICAL CARE FIRST HOUR: CPT | Performed by: INTERNAL MEDICINE

## 2021-10-20 PROCEDURE — 85390 FIBRINOLYSINS SCREEN I&R: CPT

## 2021-10-20 PROCEDURE — 82947 ASSAY GLUCOSE BLOOD QUANT: CPT

## 2021-10-20 PROCEDURE — 82803 BLOOD GASES ANY COMBINATION: CPT

## 2021-10-20 PROCEDURE — 85730 THROMBOPLASTIN TIME PARTIAL: CPT

## 2021-10-20 PROCEDURE — 94761 N-INVAS EAR/PLS OXIMETRY MLT: CPT

## 2021-10-20 PROCEDURE — 85347 COAGULATION TIME ACTIVATED: CPT

## 2021-10-20 PROCEDURE — 2100000001 HC CVICU R&B

## 2021-10-20 PROCEDURE — 33948 ECMO/ECLS DAILY MGMT-VENOUS: CPT

## 2021-10-20 PROCEDURE — 99232 SBSQ HOSP IP/OBS MODERATE 35: CPT | Performed by: INTERNAL MEDICINE

## 2021-10-20 PROCEDURE — 2580000003 HC RX 258: Performed by: STUDENT IN AN ORGANIZED HEALTH CARE EDUCATION/TRAINING PROGRAM

## 2021-10-20 PROCEDURE — 82330 ASSAY OF CALCIUM: CPT

## 2021-10-20 PROCEDURE — APPSS30 APP SPLIT SHARED TIME 16-30 MINUTES: Performed by: NURSE PRACTITIONER

## 2021-10-20 PROCEDURE — 85384 FIBRINOGEN ACTIVITY: CPT

## 2021-10-20 PROCEDURE — 83605 ASSAY OF LACTIC ACID: CPT

## 2021-10-20 PROCEDURE — P9016 RBC LEUKOCYTES REDUCED: HCPCS

## 2021-10-20 PROCEDURE — 6360000002 HC RX W HCPCS: Performed by: STUDENT IN AN ORGANIZED HEALTH CARE EDUCATION/TRAINING PROGRAM

## 2021-10-20 PROCEDURE — 83735 ASSAY OF MAGNESIUM: CPT

## 2021-10-20 PROCEDURE — 86900 BLOOD TYPING SEROLOGIC ABO: CPT

## 2021-10-20 PROCEDURE — 80048 BASIC METABOLIC PNL TOTAL CA: CPT

## 2021-10-20 PROCEDURE — 85027 COMPLETE CBC AUTOMATED: CPT

## 2021-10-20 PROCEDURE — 6370000000 HC RX 637 (ALT 250 FOR IP): Performed by: INTERNAL MEDICINE

## 2021-10-20 RX ORDER — SODIUM CHLORIDE 9 MG/ML
INJECTION, SOLUTION INTRAVENOUS PRN
Status: DISCONTINUED | OUTPATIENT
Start: 2021-10-20 | End: 2021-10-30 | Stop reason: HOSPADM

## 2021-10-20 RX ADMIN — Medication 100 MG: at 08:31

## 2021-10-20 RX ADMIN — Medication 30 UNITS: at 18:23

## 2021-10-20 RX ADMIN — POTASSIUM CHLORIDE 20 MEQ: 400 INJECTION, SOLUTION INTRAVENOUS at 08:42

## 2021-10-20 RX ADMIN — SODIUM CHLORIDE, PRESERVATIVE FREE 6 ML: 5 INJECTION INTRAVENOUS at 08:32

## 2021-10-20 RX ADMIN — POTASSIUM CHLORIDE 20 MEQ: 400 INJECTION, SOLUTION INTRAVENOUS at 20:49

## 2021-10-20 RX ADMIN — Medication 30 UNITS: at 18:22

## 2021-10-20 RX ADMIN — OXYCODONE HYDROCHLORIDE AND ACETAMINOPHEN 2 TABLET: 5; 325 TABLET ORAL at 08:30

## 2021-10-20 RX ADMIN — Medication 100 MG: at 21:50

## 2021-10-20 RX ADMIN — Medication 30 UNITS: at 06:06

## 2021-10-20 RX ADMIN — MAGNESIUM SULFATE HEPTAHYDRATE 1000 MG: 1 INJECTION, SOLUTION INTRAVENOUS at 17:56

## 2021-10-20 RX ADMIN — DOCUSATE SODIUM 50MG AND SENNOSIDES 8.6MG 2 TABLET: 8.6; 5 TABLET, FILM COATED ORAL at 08:31

## 2021-10-20 RX ADMIN — DEXAMETHASONE SODIUM PHOSPHATE 6 MG: 10 INJECTION INTRAMUSCULAR; INTRAVENOUS at 08:30

## 2021-10-20 RX ADMIN — Medication 6 MG/HR: at 05:14

## 2021-10-20 RX ADMIN — MAGNESIUM SULFATE HEPTAHYDRATE 1000 MG: 1 INJECTION, SOLUTION INTRAVENOUS at 05:13

## 2021-10-20 RX ADMIN — Medication 1 TABLET: at 08:32

## 2021-10-20 RX ADMIN — POTASSIUM CHLORIDE 20 MEQ: 400 INJECTION, SOLUTION INTRAVENOUS at 05:13

## 2021-10-20 RX ADMIN — MAGNESIUM SULFATE HEPTAHYDRATE 1000 MG: 1 INJECTION, SOLUTION INTRAVENOUS at 19:47

## 2021-10-20 RX ADMIN — Medication 30 UNITS: at 06:04

## 2021-10-20 RX ADMIN — SODIUM CHLORIDE, PRESERVATIVE FREE 10 ML: 5 INJECTION INTRAVENOUS at 21:50

## 2021-10-20 RX ADMIN — ALBUMIN (HUMAN) 25 G: 0.25 INJECTION, SOLUTION INTRAVENOUS at 04:59

## 2021-10-20 RX ADMIN — OXYCODONE HYDROCHLORIDE AND ACETAMINOPHEN 2 TABLET: 5; 325 TABLET ORAL at 12:06

## 2021-10-20 RX ADMIN — HEPARIN SODIUM AND DEXTROSE 18.03 UNITS/KG/HR: 10000; 5 INJECTION INTRAVENOUS at 00:15

## 2021-10-20 RX ADMIN — OXYCODONE HYDROCHLORIDE AND ACETAMINOPHEN 2 TABLET: 5; 325 TABLET ORAL at 16:55

## 2021-10-20 ASSESSMENT — PAIN SCALES - GENERAL
PAINLEVEL_OUTOF10: 8
PAINLEVEL_OUTOF10: 9
PAINLEVEL_OUTOF10: 9

## 2021-10-20 ASSESSMENT — PULMONARY FUNCTION TESTS: PIF_VALUE: 20

## 2021-10-20 ASSESSMENT — ENCOUNTER SYMPTOMS
COLOR CHANGE: 0
ABDOMINAL DISTENTION: 0
EYE DISCHARGE: 0
APNEA: 0

## 2021-10-20 NOTE — PLAN OF CARE
Problem: Airway Clearance - Ineffective  Goal: Achieve or maintain patent airway  10/20/2021 1738 by Cristino Costa RN  Outcome: Ongoing  10/20/2021 0828 by Elisha Bonilla RCP  Outcome: Ongoing     Problem: Gas Exchange - Impaired  Goal: Absence of hypoxia  10/20/2021 1738 by Cristino Costa RN  Outcome: Ongoing  10/20/2021 0828 by Elisha Bonilla RCP  Outcome: Ongoing  Goal: Promote optimal lung function  10/20/2021 1738 by Cristino Costa RN  Outcome: Ongoing  10/20/2021 0828 by Elisha Bonilla RCP  Outcome: Ongoing     Problem: Breathing Pattern - Ineffective  Goal: Ability to achieve and maintain a regular respiratory rate  10/20/2021 1738 by Cristino Costa RN  Outcome: Ongoing  10/20/2021 0828 by Elisha Bonilla RCP  Outcome: Ongoing     Problem:  Body Temperature -  Risk of, Imbalanced  Goal: Ability to maintain a body temperature within defined limits  10/20/2021 1738 by Cristino Costa RN  Outcome: Ongoing  10/20/2021 0828 by Elisha Bonilla RCP  Outcome: Ongoing  Goal: Will regain or maintain usual level of consciousness  10/20/2021 1738 by Cristino Costa RN  Outcome: Ongoing  10/20/2021 0828 by Elisha Bonilla RCP  Outcome: Ongoing  Goal: Complications related to the disease process, condition or treatment will be avoided or minimized  10/20/2021 1738 by Cristino Costa RN  Outcome: Ongoing  10/20/2021 0828 by Elisha Bonilla RCP  Outcome: Ongoing

## 2021-10-20 NOTE — PROGRESS NOTES
[facility] - Initial Consult Note COVID 19 Patient  Today's Date and Time: 10/20/2021, 10:28 AM    Impression :   COVID 23 Suspect  COVID 19 Confirmed Infection  Covid tests:  10/10/21: Positive  26 weeks gestation pregnancy  Acute hypoxic respiratory distress  Pulmonary embolism LLL  Metabolic acidosis with euglycemic starvation    Recommendations:   Antibiotic treatment:  Monitor off antibiotics  Covid Rx:  Remdesivir-Out of window  Decadron-Initiated 10/14/21  anticoagulation  DEFER barcitinib and  Actemra due to pregnancy  Intubated 10/14  On ECMO   Baby delivered early 26 weeks,    start on illness 10/1  Stop isolation after 10/20    Medical Decision Making/Summary/Discussion:10/20/2021     Patient admitted with suspected COVID 19 infection  Covid test confirmed positive. Infection Control Recommendations   [facility] Precautions  Airborne isolation  Droplet plus Isolation    Antimicrobial Stewardship Recommendations   Off antibiotics    Chief complaint/reason for consultation:   Concern for COVID infection      History of Present Illness:   Johnathan Boyd is a 27y.o.-year-old  female who was initially admitted on 10/13/2021. Patient seen at the request of Dr. Shruthi Lazo:    Patient, who is 26 weeks pregnant, initially presented to [facility] ED on 10/10/21 presented through ER with complaints of productive cough with yellow sputum X 9 days & shortness of breath with pleuritic chest pain that started the day before. She ws also experiencing decreased appetite because of loss of taste and smell. Work-up at that time revealed the patient to be Covid positive. She was tachycardic with an SpO2 of 94-98% on room air. CXR showed bilateral ill-defined pulmonary opacities suggesting COVID pneumonia.     After receiving a small fluid bolus and discussing her case with the patient's OBGYN, Dr. Mer Chavez, she was discharged home with instructions to return if her symptoms worsen. On 10/13/21, the patient again presented to [facility] ED after a home SpO2 reading was 88%. A CT chest revealed a small, non-obstructing LLL PE for which she was initiated on a heparin gtt. The patient's respiratory status worsened throughout the day and she was life-flighted to [facility] for a higher level of care. The patient was found to be tachypneic and tachycardic upon arrival. She was maintaining her SpO2 >95% on 15 L/min non-re-breather. Heparin gtt was continued and the patient was also ordered celestone for fetal lung maturity. The patient was admitted to the [place] ICU    Interval changes 10/20/21   Patient delivered the baby small weight, at this time in the NICU  She continues on ECMO 100% through a right IJ  Weaning off slowly today hoping to get her to be awake so she could see the baby pH 7.6,  WBC 11 creatinine normal, fibrinogen 418  She remains off antibiotics  Chest x-ray showing widening of the left lung, bronchoscopy is planned by pulmonary  ,  Sputum culture Candida albicans    10/20  She has no fever she is alert extubated, continues to be on the echo, off antibiotics chest x-ray showing improved bilateral infiltrates  Off isolation today  Chest x-ray showed reopening of both lungs with improved aeration      10/20 cxr            CAT:  10/13/21    . I have personally reviewed the past medical history, past surgical history, medications, social history, and family history, and I have updated the database accordingly.   Past Medical History:     Past Medical History:   Diagnosis Date    Anxiety     GERD (gastroesophageal reflux disease)     Migraines     Seasonal allergies        Past Surgical  History:     Past Surgical History:   Procedure Laterality Date     SECTION N/A 10/15/2021     [facility]      nerve release, and plantar fascitis      PICC POWERPIC TRIPLE 10/15/2021         MANDIBLE SURGERY      tooth implant    MOUTH SURGERY      WISDOM TOOTH EXTRACTION         Medications:      heparin flush (PF)  3 mL IntraVENous Q12H    heparin flush (PF)  3 mL IntraVENous Q12H    heparin flush (PF)  3 mL IntraVENous Q12H    prenatal vitamin  1 tablet Oral Daily    Tdap-Dtap  0.5 mL IntraMUSCular Prior to discharge    lidocaine 1 % injection  5 mL IntraDERmal Once    sodium chloride flush  5-40 mL IntraVENous 2 times per day    docusate  100 mg Oral BID    sennosides-docusate sodium  2 tablet Oral Daily    insulin lispro  0-12 Units SubCUTAneous Q6H    dexamethasone  6 mg IntraVENous Q24H       Social History:     Social History     Socioeconomic History    Marital status:      Spouse name: Not on file    Number of children: Not on file    Years of education: Not on file    Highest education level: Not on file   Occupational History    Not on file   Tobacco Use    Smoking status: Former Smoker     Types: Cigarettes    Smokeless tobacco: Never Used   Vaping Use    Vaping Use: Former    Substances: Nicotine   Substance and Sexual Activity    Alcohol use: Not Currently    Drug use: Not Currently     Types: Marijuana     Comment: last smoked May 2021    Sexual activity: Yes   Other Topics Concern    Not on file   Social History Narrative    Not on file     Social Determinants of Health     Financial Resource Strain:     Difficulty of Paying Living Expenses:    Food Insecurity:     Worried About Running Out of Food in the Last Year:     Ran Out of Food in the Last Year:    Transportation Needs:     Lack of Transportation (Medical):      Lack of Transportation (Non-Medical):    Physical Activity:     Days of Exercise per Week:     Minutes of Exercise per Session:    Stress:     Feeling of Stress :    Social Connections:     Frequency of Communication with Friends and Family:     Frequency of Social Gatherings with Friends and Family:     Attends [employer] Services:     Active Member of Clubs or Organizations:     Attends Club or Organization Meetings:     Marital Status:    Intimate Partner Violence:     Fear of Current or Ex-Partner:     Emotionally Abused:     Physically Abused:     Sexually Abused:        Family History:     Family History   Problem Relation Age of Onset    Breast Cancer Maternal Grandmother         older than 48    Heart Attack Paternal Uncle     Stroke Maternal Aunt     Hypertension Father     No Known Problems Paternal Grandfather     Stroke Paternal Grandmother     No Known Problems Maternal Grandfather     Hypertension Mother     No Known Problems Brother     No Known Problems Brother         Allergies:   Vicodin [hydrocodone-acetaminophen]     Review of Systems:     Review of Systems   Constitutional: Positive for activity change. HENT: Negative for congestion. Eyes: Negative for discharge. Respiratory: Negative for apnea. Cardiovascular: Negative for chest pain. Gastrointestinal: Negative for abdominal distention. Endocrine: Negative for heat intolerance. Genitourinary: Negative for dysuria. Musculoskeletal: Negative for arthralgias. Skin: Negative for color change. Allergic/Immunologic: Positive for immunocompromised state. Neurological: Negative for dizziness. Hematological: Negative for adenopathy. Psychiatric/Behavioral: Negative for agitation.           Physical Examination :     Patient Vitals for the past 8 hrs:   Temp Temp src Pulse Resp SpO2   10/20/21 0820 -- -- 107 20 97 %   10/20/21 0630 -- -- 92 -- --   10/20/21 0625 -- -- 94 -- --   10/20/21 0620 -- -- 101 -- 100 %   10/20/21 0615 -- -- 101 -- 100 %   10/20/21 0610 -- -- 117 -- 100 %   10/20/21 0605 -- -- 99 -- --   10/20/21 0600 97.2 °F (36.2 °C) -- 115 -- 100 %   10/20/21 0555 -- -- 92 -- --   10/20/21 0550 -- -- 92 -- --   10/20/21 0545 97.2 °F (36.2 °C) CORE 93 -- 100 %   10/20/21 0541 -- -- 93 -- --   10/20/21 0526 -- -- 98 -- 100 %   10/20/21 0500 -- -- 100 -- 100 %   10/20/21 0400 97.2 °F (36.2 °C) -- 96 -- 100 %   10/20/21 0300 -- -- 75 -- 100 %     Physical Exam  Constitutional:       General: She is not in acute distress. Appearance: Normal appearance. She is ill-appearing. Comments: Intubated sedated   HENT:      Head: Normocephalic and atraumatic. Nose: Nose normal.   Eyes:      General: No scleral icterus. Conjunctiva/sclera: Conjunctivae normal.      Pupils: Pupils are equal, round, and reactive to light. Cardiovascular:      Rate and Rhythm: Normal rate and regular rhythm. Heart sounds: Normal heart sounds. No murmur heard. Pulmonary:      Breath sounds: No stridor. No rhonchi. Abdominal:      Palpations: Abdomen is soft. There is no mass. Hernia: No hernia is present. Genitourinary:     Comments: Urine bong  Musculoskeletal:         General: No swelling, tenderness or deformity. Cervical back: Neck supple. No rigidity. Skin:     Coloration: Skin is not jaundiced or pale. Findings: No bruising or erythema. Neurological:      General: No focal deficit present. Mental Status: She is oriented to person, place, and time.    Psychiatric:         Mood and Affect: Mood normal.         Behavior: Behavior normal.          Medical Decision Making -Laboratory:   I have independently reviewed/ordered the following labs:    CBC with Differential:   Recent Labs     10/19/21  2150 10/20/21  0348   WBC 12.9* 12.2*   HGB 8.0* 7.1*   HCT 24.7* 22.3*    318     BMP:   Recent Labs     10/19/21  2150 10/20/21  0348    139   K 4.1 3.4*    103   CO2 30 28   BUN 8 8   CREATININE <0.20* <0.20*   MG 1.7 1.7     Hepatic Function Panel:   Recent Labs     10/19/21  0904 10/19/21  2150   PROT 5.2* 5.2*   LABALBU 2.7* 2.7*   BILIDIR 0.12 0.10   IBILI 0.14 0.12   BILITOT 0.26* 0.22*   ALKPHOS 88 88   ALT 51* 45*   AST 29 28     No results for input(s): RPR in the last 72 hours. No results for input(s): HIV in the last 72 hours. No results for input(s): BC in the last 72 hours. Lab Results   Component Value Date    MUCUS NOT REPORTED 10/13/2021    RBC 2.40 10/20/2021    TRICHOMONAS NOT REPORTED 10/13/2021    WBC 12.2 10/20/2021    YEAST NOT REPORTED 10/13/2021    TURBIDITY Clear 10/13/2021     Lab Results   Component Value Date    CREATININE <0.20 10/20/2021    GLUCOSE 107 10/20/2021       Medical Decision Making-Imaging:     Narrative   EXAMINATION:   CTA OF THE CHEST 10/13/2021 2:16 pm       TECHNIQUE:   CTA of the chest was performed after the administration of intravenous   contrast.  Multiplanar reformatted images are provided for review.  MIP   images are provided for review. Dose modulation, iterative reconstruction,   and/or weight based adjustment of the mA/kV was utilized to reduce the   radiation dose to as low as reasonably achievable.       COMPARISON:   Chest x-rays over the last few days. .       HISTORY:   ORDERING SYSTEM PROVIDED HISTORY: sob, tachy, 32 WEEKS PREGNANT, Spoke with   Radiologist   TECHNOLOGIST PROVIDED HISTORY:   sob, tachy, 32 WEEKS PREGNANT, Spoke with Radiologist   Decision Support Exception - unselect if not a suspected or confirmed   emergency medical condition->Emergency Medical Condition (MA)       FINDINGS:   Pulmonary Arteries:  There is a small nonocclusive segmental to subsegmental   embolism noted in the left lower lobe, on and around axial image [id number].  No   other definite emboli are seen.  No central emboli.  No pulmonary arterial   enlargement is identified.       Mediastinum: Mediastinal and hilar lymphadenopathy is identified.  No focal   esophageal thickening is identified.  The thyroid gland appears unremarkable.       Lungs/pleura: Multifocal ground-glass pneumonia with some mild areas of   consolidation.  No pleural effusion is identified.       Upper Abdomen: No acute process seen in the upper abdomen.       Soft

## 2021-10-20 NOTE — PROGRESS NOTES
Order obtained for extubation. SpO2 of 98% on 40% FiO2. Patient extubated and placed on 6 liters/min via nasal cannula. Post extubation SpO2 is 98% with HR  93 bpm and RR 20 breaths/min. Patient had moderate cough that was productive of yellow sputum. Extubation Well tolerated by patient. .   Breath Sounds: Clear/Diminished    COREY DOMINGUEZ RCP   9:45 AM

## 2021-10-20 NOTE — PROGRESS NOTES
ECMO end of shift note        Pt. Remains on V/V ECMO with flows of 3  ml/min. [address] in right IJ. Circuit has clots no and no precipitate present. No free air present in the circuit. Sweep gas is 0L @ 21%. Heparin gtt currently running at 18u/kg/hr. Labs are Q6. No replacements given this shift.

## 2021-10-20 NOTE — PLAN OF CARE
Problem: OXYGENATION/RESPIRATORY FUNCTION  Goal: Patient will maintain patent airway  Outcome: Ongoing  Goal: Patient will achieve/maintain normal respiratory rate/effort  Description: Respiratory rate and effort will be within normal limits for the patient  Outcome: Ongoing     Problem: MECHANICAL VENTILATION  Goal: Patient will maintain patent airway  Outcome: Ongoing  Goal: Oral health is maintained or improved  Outcome: Ongoing  Goal: ET tube will be managed safely  Outcome: Ongoing  Goal: Ability to express needs and understand communication  Outcome: Ongoing  Goal: Mobility/activity is maintained at optimum level for patient  Outcome: Ongoing     Problem: SKIN INTEGRITY  Goal: Skin integrity is maintained or improved  10/20/2021 0828 by Mannie Duckworth RCP  Outcome: Ongoing  10/20/2021 0547 by Crystal Sepulveda RN  Outcome: Ongoing

## 2021-10-20 NOTE — PROGRESS NOTES
Pt up in chair-luis well-multiple staff in room-urine slightly pink after move  1430  and mother in law at bedside-pt  updated  [id number] pt back to bed-pt luis chair 4 hours and 45 minutes

## 2021-10-20 NOTE — PROGRESS NOTES
PULMONARY & CRITICAL CARE MEDICINE PROGRESS NOTE     Patient:  Freda Bose  MRN: [id number]  [facility] date: 10/13/2021  Primary Care Physician: Larue Renee  Consulting Physician: Ceci Hackett DO  CODE Status: Full Code  LOS: 7     SUBJECTIVE     CHIEF COMPLAINT/REASON FOR INITIAL CONSULT: Acute respiratory failure/COVID-19 pneumonia    BRIEF HOSPITAL COURSE:   The patient is a 27 y.o. female who was 26-week pregnant initially tested positive for COVID-19 on 10/10. She presented to [facility] ED on 10/13 with respiratory distress and low home O2 saturation. She was tachypneic and tachycardic. Transferred to [facility]'s for further management. Initial blood gases showed evidence of combined anion gap and non-anion gap metabolic acidosis, thought to be related to starvation ketosis. During evaluation patient was also noted to have a left lower lobe subsubsegmental PE. She underwent  10/15. Postoperative course complicated by development of worsening respiratory failure and required initiation of mechanical ventilation on 10/15. Due to worsening severe hypoxemic respiratory failure decision was made to put her on VV ECMO. She had placement of 27 F VV ECMO (crescent) cannula in the right atrium on 10/17.     INTERVAL HISTORY:  10/20/21  Patient remained on VV ECMO and mechanical ventilation overnight  This morning ECMO support was discontinued  Patient tolerated spontaneous awakening/breathing trial  She extubated without any immediate complications  She is saturating well on nasal cannula  Continue heparin infusion for anticoagulation  Remains hemodynamically stable  Receiving IV Decadron    REVIEW OF SYSTEMS:  Unobtainable from patient due to sedation/mechanical ventilation    OBJECTIVE     ECMO SETTINGS:  PUMP Patient on ECMO: On  Hours on ECMO: 71  Pump Flow (L/min): 3.07 LPM  Pump Speed (Rotations/min): 2500 RPM  ECMO Mode: V/V  Pump Mode: Cardiohelp   SWEEP GAS Sweep Flow (L/min): 0 LPM   FiO2 (%): 21 %   CIRCUIT PRESSURES Inlet Pressure (Bladder): -62 mmHg  Pre-Membrane Pressure: 110 mmHg  Post-Membrane Pressure: 99 mmHg  Delta P: 14 mmHg  SVO2 (%): 56.2 %  ECMO blood flow temperature: 97.2 °F (36.2 °C)  CVP (mmHg): 1 mmHg   CIRCUIT CHECK Heat Exchg.  Water Temp Set: 36.2  Heat Exchanger Water Temp Actual: 36.2  Heat Exchanger Water Level: Full  Unused Pigtails Cleared: Done  System Plugged to Red Outlet: Done  Emergency Backup in Use: No  Hand Crank Available: Yes  Backup Unit Blood Avail: Done  Cart Checked and Stocked: Done  Pump Battery Charged: 100 Volts  Pressure Monitors Zeroed: Done  Pressure Limits Checked: Done  Circuit Number: 1  Back Up Circuit: Done  Back Up Console/Motor: Done  Emergency O2 Tank Available: Done  Circuits and Cannulation Sites: Done  High/Low flow alarm set?: Yes  High/Low temp alarm set?: Yes  High/Low venous alarm set?: Yes  Pre-MO alarm limit: 500  Post-MO alarm limit: 400     VENTILATOR SETTINGS:  Vent Information  $Ventilation: (S) Off Vent  Skin Assessment: Clean, dry, & intact  Equipment ID: [id number]  Equipment Changed: HME  Vent Type: Servo i  Vent Mode: PCV+  Vt Ordered: 480 mL  Pressure Ordered: 10  Rate Set: 12 bmp  FiO2 : 40 %  SpO2: 99 %  SpO2/FiO2 ratio: 242.5  Sensitivity: 3  PEEP/CPAP: 10  I Time/ I Time %: 0.9 s  Humidification Source: HME  Humidification Temp: 33  Humidification Temp Measured: 33  Nitric Oxide/Epoprostenol In Use?: No  Mask Type: Full face mask  Mask Size: Medium     PaO2/FiO2 RATIO:  Recent Labs     10/20/21  1137   POCPO2 99.7      FiO2 : 40 %     VITAL SIGNS:   LAST:  /75   Pulse 103   Temp 97.2 °F (36.2 °C) (Axillary)   Resp 20   Ht 5' 4\" (1.626 m)   Wt 224 lb 13.9 oz (102 kg)   LMP 2021 (Approximate)   SpO2 99%   Breastfeeding Unknown   BMI 38.60 kg/m²   8-24 HR RANGE:  TEMP Temp  Av.3 °F (36.3 °C)  Min: 97.2 °F (36.2 °C)  Max: 98.2 °F (43.2 °C)   BP Systolic (53ELP), EGS:330 , Min:128 , NDF:408      Diastolic (24hrs), Av, Min:75, Max:75     PULSE Pulse  Av.7  Min: 71  Max: 117   RR Resp  Av.7  Min: 12  Max: 20   O2 SAT SpO2  Av.8 %  Min: 97 %  Max: 100 %   OXYGEN DELIVERY No data recorded        SYSTEMIC EXAMINATION:   General appearance -comfortable, no acute distress  Mental status -lethargic  Eyes - pupils equal and reactive, sclera anicteric  Mouth - mucous membranes moist  Neck - supple, no significant adenopathy, ECMO cannula site has mild oozing  Chest - Breath sounds bilaterally were dimnished to auscultation at bases. There were no wheezes, rhonchi or rales.     Heart - normal rate, regular rhythm, normal S1, S2, no murmurs, rubs, clicks or gallops  Abdomen - soft, nontender, nondistended, no masses or organomegaly  Neurological - DTR's normal and symmetric, motor and sensory grossly normal bilaterally  Extremities - peripheral pulses normal, no pedal edema, no clubbing or cyanosis  Skin - normal coloration and turgor, no rashes, no suspicious skin lesions noted     DATA REVIEW     Medications:  Scheduled Meds:   heparin flush (PF)  3 mL IntraVENous Q12H    heparin flush (PF)  3 mL IntraVENous Q12H    heparin flush (PF)  3 mL IntraVENous Q12H    prenatal vitamin  1 tablet Oral Daily    Tdap-Dtap  0.5 mL IntraMUSCular Prior to discharge    lidocaine 1 % injection  5 mL IntraDERmal Once    sodium chloride flush  5-40 mL IntraVENous 2 times per day    docusate  100 mg Oral BID    sennosides-docusate sodium  2 tablet Oral Daily    insulin lispro  0-12 Units SubCUTAneous Q6H    dexamethasone  6 mg IntraVENous Q24H     Continuous Infusions:   sodium chloride      HYDROmorphone 1,000 mcg/hr (10/18/21 1313)    ketamine (KETALAR) infusion for analgosedation 0.2 mg/kg/hr (10/17/21 1826)    sodium chloride      midazolam 4 mg/hr (10/20/21 0842)    cisatracurium (NIMBEX) infusion Stopped (10/19/21 1425)    dextrose      sodium chloride 5 mL/hr at 10/14/21 0700    heparin (PORCINE) Infusion 18.034 Units/kg/hr (10/20/21 0015)       INPUT/OUTPUT:  In: 2633 [I.V.:1453; NG/GT:1180]  Out: 3100 [Urine:3100]  Date 10/20/21 0000 - 10/20/21 2359   Shift 8465-1907 6016-2224 2138-1678 24 Hour Total   INTAKE   I.V.(mL/kg) 492(4.8)   492(4.8)   NG/GT(mL/kg) 344(3.4)   344(3.4)   Shift Total(mL/kg) 836(8.2)   836(8.2)   OUTPUT   Urine(mL/kg/hr) 1400(1.7) 200  1600   Emesis/NG output(mL/kg) 0(0)   0(0)   Shift Total(mL/kg) 1400(13.7) 200(2)  1600(15.7)   Weight (kg) 102 102 102 102        LABS:  ABGs:   Recent Labs     10/20/21  0537 10/20/21  0545 10/20/21  0559 10/20/21  0812 10/20/21  1137   POCPH 7.451* 7.420 7.400 7.456* 7.453*   POCPCO2 45.6 49.6* 51.9* 43.4 44.1   POCPO2 313.6* 35.5* 449.5* 73.9* 99.7   POCHCO3 31.8* 32.2* 32.2* 30.6* 30.9*   OHYD4XYE 100* 68* 100* 95 98     CBC:   Recent Labs     10/19/21  0904 10/19/21  1604 10/19/21  2150 10/20/21  0348 10/20/21  1036   WBC 11.2 9.6 12.9* 12.2* 12.8*   HGB 8.1* 8.0* 8.0* 7.1* 7.8*   HCT 26.1* 25.5* 24.7* 22.3* 23.7*   MCV 92.6 93.1 91.5 92.9 91.2    327 310 318 303   RBC 2.82* 2.74* 2.70* 2.40* 2.60*   MCH 28.7 29.2 29.6 29.6 30.0   MCHC 31.0 31.4 32.4 31.8 32.9   RDW 13.7 13.7 13.5 13.4 13.7     CRP:   No results for input(s): CRP in the last 72 hours. LDH:   No results for input(s): LDH in the last 72 hours.   BMP:   Recent Labs     10/19/21  0904 10/19/21  1604 10/19/21  2150 10/20/21  0348 10/20/21  1036    137 140 139 138   K 4.2 4.5 4.1 3.4* 4.4   CL 99 99 100 103 100   CO2 29 28 30 28 28   BUN 7 8 8 8 9   CREATININE <0.20* 0.24* <0.20* <0.20* <0.20*   GLUCOSE 112* 153* 115* 107* 119*     Liver Function Test:   Recent Labs     10/18/21  1611 10/18/21  2200 10/19/21  0904 10/19/21  2150 10/20/21  1036   PROT 4.4* 5.1* 5.2* 5.2* 5.4*   LABALBU 2.1* 2.3* 2.7* 2.7* 3.0*   ALT 60* 65* 51* 45* 38*   AST 45* 45* 29 28 28   ALKPHOS 87 109* 88 88 82   BILITOT 0.16* 0.24* 0.26* 0.22* 0.40     Coagulation Profile:   Recent Labs 10/19/21  0904 10/19/21  1604 10/19/21  2150 10/20/21  0348 10/20/21  1036   INR 0.9 0.9 0.9 0.9 0.9   PROTIME 9.8 9.9 9.9 10.2 10.0   APTT 70.2* 45.8* 48.2* 61.5* 50.7*     D-Dimer:  No results for input(s): DDIMER in the last 72 hours. Ferritin:    No results for input(s): FERRITIN in the last 72 hours. Lactic Acid:  No results for input(s): LACTA in the last 72 hours. Cardiac Enzymes:  No results for input(s): CKTOTAL, CKMB, CKMBINDEX, TROPONINI in the last 72 hours. Invalid input(s): TROPONIN, HSTROP  BNP/ProBNP:   No results for input(s): BNP, PROBNP in the last 72 hours. Triglycerides:  No results for input(s): TRIG in the last 72 hours. Microbiology:  Urine Culture:  No components found for: CURINE  Blood Culture:  No components found for: CBLOOD, CFUNGUSBL  Sputum Culture:  No components found for: CSPUTUM  No results for input(s): SPECDESC, SPECIAL, CULTURE, STATUS, ORG, CDIFFTOXPCR, CAMPYLOBPCR, SALMONELLAPC, SHIGAPCR, SHIGELLAPCR, MPNEUG, MPNEUM, LACTOQL in the last 72 hours. No results for input(s): SPUTUM, SPECDESC, SPECIAL, CULTURE, STATUS, ORG, CDIFFTOXPCR, MPNEUM, MPNEUG in the last 72 hours. Invalid input(s): CURINE, CBLOOD, CFUNGUSBL     Pathology:    Radiology Reports:  XR CHEST PORTABLE   Final Result   Overall improved appearing chest with better aeration of both lungs. The ET   tube tip lies approximately 2.5 cm above the aly. XR CHEST PORTABLE   Final Result   Similar appearing chest with consolidative airspace disease in the right   lung, and complete opacification of the left chest and right lung apex. Question of slight deeper placement of the endotracheal tube, though the   aly position is somewhat difficult to see on this exam compared to the   prior. It may lie in the region of approximately 1.8 cm above the aly,   previously measuring closer to 2.7 cm above the aly. This could be   slightly retracted if clinically concerned.          XR CHEST PORTABLE   Final Result   1. Recommend retraction of endotracheal tube 1.0 cm.   2. Stable appearance of left-sided hydrothorax. 3. Right lung multifocal marked ill-defined dense consolidation, unchanged. 4. Suspected mild right-sided pleural effusion. 5. Stable positioning ECMO catheter. XR CHEST PORTABLE   Final Result   An ECMO catheter is now in place. There is new complete opacification of the   left hemithorax. Right-sided airspace opacity has significantly increased. A left subclavian catheter has its tip in the midline. The right PICC has   been removed. XR CHEST PORTABLE   Final Result   Stable bilateral lung multifocal consolidation consistent with pneumonia. Appropriate radiographic positioning of endotracheal tube. XR CHEST PORTABLE   Final Result   The tip of the endotracheal tube is at the origin the right mainstem bronchus   and the tube should be pulled back 2 cm. VL DUP LOWER EXTREMITY VENOUS BILATERAL   Final Result      VASCULAR REPORT    (Results Pending)   XR CHEST PORTABLE    (Results Pending)        Echocardiogram:   Results for orders placed during the hospital encounter of 10/13/21    ECHO Complete 2D W Doppler W Color    Narrative  Transthoracic Echocardiography Report (TTE)    Summary  Normal left ventricle size, wall thickness and function with a calculated EF  61%. No segmental wall motion abnormalities seen. Normal right ventricular size and function. No significant valvular regurgitation or stenosis seen.        ASSESSMENT AND PLAN     Assessment:    // Acute hypoxic respiratory failure, on VV ECMO/mechanical ventilation  // Acute respiratory distress syndrome  // Bilateral multifocal pneumonia due to COVID 19 infection  // Sepsis due to COVID 19  // Left lower lobe subsegmental pulmonary embolism  // Left pleural effusion/atelectasis  // S/p  section, 26-week pregnancy  // Metabolic acidosis, resolved  // Leukocytosis    Plan:    I personally interviewed/examined the patient; reviewed interval history, interpreted all available radiographic and laboratory data at the time of service. Patient was on VV ECMO and mechanical ventilation overnight  This morning ECMO support was discontinued  Patient tolerated spontaneous awakening/breathing trial, extubated without any immediate complications  She is currently saturating well on nasal cannula  Continue supplemental oxygen to keep oxygen saturation greater than 92%  We will plan to decannulate her in 24 to 48 hours if she continues to be stable  Encourage incentive spirometry  Continue pulmonary toilet, aspiration precautions   Patient remains hemodynamically stable  Continue to monitor I/O with a goal of even/negative fluid balance  Passed bedside swallow eval, will advance diet  Stress ulcer prophylaxis  Continue heparin infusion for anticoagulation  Continue to monitor CBC, coagulation profile, ACT, TEG  Chemical DVT prophylaxis not required as patient is on systemic anticoagulation  Antimicrobials reviewed; continue to monitor off antimicrobials  Monitor CRP, LDH, AST/ALT, D-Dimer, Ferritin   Glycemic control appropriate  Continue IV Decadron  Skin/wound care reviewed with the nursing staff  Physical/occupational therapy    The patient remains critically ill with illness/injury that acutely impairs one or more vital organ systems, such that there is a high probability of imminent or life threatening deterioration in the patient's condition. Critical care time of 35 minutes was spent (excluding procedures), in coordination of care during bedside rounds and discussion of patient care in detail, and recommendations of the team were adopted in the plan. Necessity of all invasive devices was also confirmed.      Anibal Vang MD  Pulmonary and Critical Care Medicine           10/20/2021     This patient was evaluated in the context of the [facility] SARS-CoV-2 (COVID-19) pandemic, which necessitated considerations that the patient either has COVID-19 infection or is at risk of infection with COVID-19. Institutional protocols and algorithms that pertain to the evaluation & management of patients with COVID-19 or those at risk for COVID-19 are in a state of rapid changes based on information released by regulatory bodies including the CDC and federal and state organizations. These policies and algorithms were followed during the patient's care. Please note that this chart was generated using voice recognition Dragon dictation software. Although every effort was made to ensure the accuracy of this automated transcription, some errors in transcription may have occurred.

## 2021-10-20 NOTE — PROGRESS NOTES
E[facility]o End of Shift Note:       Cannulation date/time:10/15/2021 @1332  ECMO type:V-V  Cannula sizes/locations: 30Fr [place] to the RIJ  Flow ordered at:4-6       Currently flowing at:4.2       FIO2 at:100       Sweep at:1  Delta pressure:22  Clot burden: No clots, precipitate noted in the oxygenator or circuit       Oxygenator:       Circuit:  Anticoagulation: Heparin running at 18u/kg/hr  Products given:        PRBC's:1       PLT's:0       FFP:0       Cryo:0       Albumin:1    Cilley test performed which resulted in a PAO2 of 313

## 2021-10-20 NOTE — PROGRESS NOTES
[facility]  Daily ECMO Progress Note    Patient's Name/Date of Birth: Denisha Lopez / 1990 (89 y.o.)  MRN: [id number]  Admit date: 10/13/2021  CODE Status: Full Code    Date: October 20, 2021     Height: HEIGHTHeight: 5' 4\" (162.6 cm)  Weight: WEIGHTWeight: 224 lb 13.9 oz (102 kg)   Allergies: Vicodin [hydrocodone-acetaminophen]  Blood Type: O+  Cannula Placed By: Dr. Steve Brock and Dr. Lavon Bravo  Date/Time ECMO Initiated: 10/17/2021 at 1332    ECMO Indication: Respiratory Failure, ARDs     ECMO   Cannulation Sites: Cannulas secured, dsg dry and intact, no drainage or hematoma noted  Arterial Cannula location: Right IJ  Arterial Cannula Size (Fr): 30 F Cresent  Venous Cannula Location: Right IJ  Venous Cannula Size (Fr): 30 F Cresent  Additional Cannula Size and Location: NA  Additional Device(s) Present: NA    ECMO Values  ECMO Pump: Patient on ECMO: On  Hours on ECMO: 63  Pump Flow (L/min): 4.2 LPM  Pump Speed (Rotations/min): 3070 RPM  ECMO Mode: V/V  Pump Mode: Cardiohelp    Sweep Gas: Sweep Flow (L/min): 1 LPM   FiO2 (%): 100 %    Circuit Pressures: Inlet Pressure (Bladder): -83 mmHg  Pre-Membrane Pressure: 180 mmHg  Post-Membrane Pressure: 157 mmHg  Delta P: 22 mmHg  SVO2 (%): 70.5 %  ECMO blood flow temperature: 97.3 °F (36.3 °C)  CVP (mmHg): 1 mmHg    Data:  CBC:   Recent Labs     10/19/21  1604 10/19/21  2150 10/20/21  0348   WBC 9.6 12.9* 12.2*   HGB 8.0* 8.0* 7.1*   HCT 25.5* 24.7* 22.3*   MCV 93.1 91.5 92.9    310 318     BMP:   Recent Labs     10/19/21  1604 10/19/21  2150 10/20/21  0348    140 139   K 4.5 4.1 3.4*   CL 99 100 103   CO2 28 30 28   BUN 8 8 8   CREATININE 0.24* <0.20* <0.20*     LFT:   Recent Labs     10/18/21  0433 10/18/21  1611 10/18/21  2200 10/19/21  0904 10/19/21  2150   PROT 4.9* 4.4* 5.1* 5.2* 5.2*   LABALBU 2.2* 2.1* 2.3* 2.7* 2.7*   ALT 66* 60* 65* 51* 45*   AST 62* 45* 45* 29 28   ALKPHOS 105* 87 109* 88 88   BILITOT 0.19* 0.16* 0.24* 0.26* 0.22*     Inflammatory Markers:   No results for input(s): CRP, FERRITIN, LDH in the last 72 hours. Invalid input(s):  ESR  Cardiac: No results for input(s): CKTOTAL, CKMB, CKMBINDEX, TROPONINI, BNP, PROBNP in the last 72 hours. Invalid input(s): TROPONIN, HSTROP  Lactic Acid: No results for input(s): LACTA in the last 72 hours. Triglycerides:   Recent Labs     10/17/21  1019   TRIG 422*     PT/INR:   Recent Labs     10/19/21  1604 10/19/21  2150 10/20/21  0348   PROTIME 9.9 9.9 10.2   INR 0.9 0.9 0.9     APTT:   Recent Labs     10/19/21  1604 10/19/21  2150 10/20/21  0348   APTT 45.8* 48.2* 61.5*     TEG:   Recent Labs     10/18/21  2200 10/19/21  0904 10/19/21  2150   HEPTHERAPY YES  YES UNKNOWN  UNKNOWN YES  YES   REACTTIMETEG 36.9* 6.4 15.6*   KINETICSTEG 14.8* 1.2 3.0   ANGLETEG 18.0* 73.5* 52.0*   MAXCLOTTEG 79.6* 76.1* 78.9*   YW55PPG 0.0 0.0 0.0   EPLTEG 0.0 0.0 0.0     ABG:   Recent Labs     10/20/21  0024 10/20/21  0348 10/20/21  0537 10/20/21  0545 10/20/21  0559   POCPH 7.452* 7.427 7.451* 7.420 7.400   POCPCO2 46.6 48.0 45.6 49.6* 51.9*   POCPO2 126.9* 151.1* 313.6* 35.5* 449.5*   POCHCO3 32.5* 31.7* 31.8* 32.2* 32.2*   QDFF6OOP 99* 99* 100* 68* 100*     VBG:   Recent Labs     10/17/21  1439 10/17/21  1727 10/18/21  0546 10/18/21  1757 10/19/21  1749   PHVEN 7.480* 7.483* 7.376 7.409 7.419   ZWQ1AXL 33.3* 35.9* 52.1* 48.3 48.8   LJS2XIL 24.8 26.9 30.5* 30.5* 31.6*   Z0XPULSA 55* 67 62 68 67       I/O:  I/O last 3 completed shifts:   In: 2265 [I.V.:961; Blood:318; NG/GT:986]  Out: 2650 [Urine:2650]    Scheduled Meds:    heparin flush (PF)  3 mL IntraVENous Q12H    heparin flush (PF)  3 mL IntraVENous Q12H    heparin flush (PF)  3 mL IntraVENous Q12H    prenatal vitamin  1 tablet Oral Daily    Tdap-Dtap  0.5 mL IntraMUSCular Prior to discharge    lidocaine 1 % injection  5 mL IntraDERmal Once    sodium chloride flush  5-40 mL IntraVENous 2 times per day    docusate  100 mg Oral BID    sennosides-docusate sodium  2 tablet Oral Daily    insulin lispro  0-12 Units SubCUTAneous Q6H    dexamethasone  6 mg IntraVENous Q24H     Continuous Infusions:    sodium chloride      HYDROmorphone 1,000 mcg/hr (10/18/21 1313)    ketamine (KETALAR) infusion for analgosedation 0.2 mg/kg/hr (10/17/21 1826)    sodium chloride      midazolam 6 mg/hr (10/20/21 0514)    cisatracurium (NIMBEX) infusion Stopped (10/19/21 1425)    dextrose      sodium chloride 5 mL/hr at 10/14/21 0700    heparin (PORCINE) Infusion 18.034 Units/kg/hr (10/20/21 0015)       Physical Exam  Vital Signs: /75   Pulse 92   Temp 97.2 °F (36.2 °C)   Resp 18   Ht 5' 4\" (1.626 m)   Wt 224 lb 13.9 oz (102 kg)   LMP 04/01/2021 (Approximate)   SpO2 100%   Breastfeeding Unknown   BMI 38.60 kg/m²  O2 Flow Rate (L/min): 40 L/min     General: Sedated and on vent  Heart: Normal S1 and S2.  Regular rhythm. No murmurs, gallops, or rubs. Pacing Wires: No   Lungs:  Diminished bilaterally Chest tubes: No  Abdomen: soft, non tender, non distended, BS hypoactive x4  Extremities: 1+ edema    Assessment/Plan:  Continue to monitor cannulation site of right IJ- slightly oozy  Lactation nurse consulted to assist staff/patient    The above recommendations including medications and orders were discussed and agreed upon with Dr. Chase Coker, the attending on service for the cardiothoracic surgery group today. Electronically signed by FADUMO Pemberton CNP on 10/20/2021 at 6:51 AM    On this date 10/20/2021 I have spent 21 minutes reviewing previous notes, test results and face to face with the patient discussing the diagnosis and importance of compliance with the treatment plan as well as documenting on the day of the visit. At least 50% of the time documented was spent with the patient to provide counseling and/or coordination of care.     This note was created with the assistance of a speech-recognition program.  Although the intention is to generate a document that actually reflects the content of the visit, no guarantees can be provided that every mistake has been identified and corrected by editing.

## 2021-10-20 NOTE — PLAN OF CARE
Problem: MECHANICAL VENTILATION  Goal: Patient will maintain patent airway  10/20/2021 0951 by Charissa Valdez RCP  Outcome: Completed     Problem: MECHANICAL VENTILATION  Goal: Oral health is maintained or improved  10/20/2021 0951 by Charissa Valdez RCP  Outcome: Completed     Problem: MECHANICAL VENTILATION  Goal: ET tube will be managed safely  10/20/2021 0951 by Charissa Valdez RCP  Outcome: Completed     Problem: MECHANICAL VENTILATION  Goal: Ability to express needs and understand communication  10/20/2021 0951 by Charissa Valdez RCP  Outcome: Completed     Problem: MECHANICAL VENTILATION  Goal: Mobility/activity is maintained at optimum level for patient  10/20/2021 0951 by Charissa Valdez RCP  Outcome: Completed     Problem: SKIN INTEGRITY  Goal: Skin integrity is maintained or improved  10/20/2021 0951 by Charissa Valdez RCP  Outcome: Completed

## 2021-10-20 NOTE — PLAN OF CARE
Problem: Isolation Precautions - Risk of Spread of Infection  Goal: Prevent transmission of infection  10/19/2021 1810 by Adriel Esqueda RN  Outcome: Ongoing     Problem: Nutrition Deficits  Goal: Optimize nutritional status  10/19/2021 1810 by Adriel Esqueda RN  Outcome: Ongoing     Problem: Risk for Fluid Volume Deficit  Goal: Maintain normal heart rhythm  10/19/2021 1810 by Adriel Esqueda RN  Outcome: Ongoing  Goal: Maintain absence of muscle cramping  10/19/2021 1810 by Adriel Esqueda RN  Outcome: Ongoing  Goal: Maintain normal serum potassium, sodium, calcium, phosphorus, and pH  10/19/2021 1810 by Adriel Esqueda RN  Outcome: Ongoing     Problem: Loneliness or Risk for Loneliness  Goal: Demonstrate positive use of time alone when socialization is not possible  10/20/2021 0529 by Phuong Vogt RN  Outcome: Ongoing  10/19/2021 1810 by Adriel Esqueda RN  Outcome: Ongoing     Problem: Fatigue  Goal: Verbalize increase energy and improved vitality  10/20/2021 0529 by Phuong Vogt RN  Outcome: Ongoing  10/19/2021 1810 by Adriel Esqueda RN  Outcome: Ongoing     Problem: Skin Integrity:  Goal: Will show no infection signs and symptoms  Description: Will show no infection signs and symptoms  10/20/2021 0529 by Phuong Vogt RN  Outcome: Ongoing  10/19/2021 1810 by Adriel Esqueda RN  Outcome: Ongoing  Goal: Absence of new skin breakdown  Description: Absence of new skin breakdown  10/20/2021 0529 by Phuong Vogt RN  Outcome: Ongoing  10/19/2021 1810 by Adriel Esqueda RN  Outcome: Ongoing     Problem: Non-Violent Restraints  Goal: Removal from restraints as soon as assessed to be safe  Outcome: Ongoing  Goal: No harm/injury to patient while restraints in use  Outcome: Ongoing  Goal: Patient's dignity will be maintained  Outcome: Ongoing     Problem: SKIN INTEGRITY  Goal: Skin integrity is maintained or improved  10/20/2021 0529 by Phuong Vogt RN  Outcome: Ongoing  10/19/2021 1810 by Adriel Esqueda RN  Outcome: Ongoing  10/19/2021 1609 by Pablo De La Cruz RCP  Outcome: Ongoing

## 2021-10-21 ENCOUNTER — APPOINTMENT (OUTPATIENT)
Dept: GENERAL RADIOLOGY | Age: 31
DRG: 003 | End: 2021-10-21
Payer: COMMERCIAL

## 2021-10-21 LAB
ACTIVATED CLOTTING TIME: 151 SEC (ref 79–149)
ACTIVATED CLOTTING TIME: 163 SEC (ref 79–149)
ACTIVATED CLOTTING TIME: 167 SEC (ref 79–149)
ACTIVATED CLOTTING TIME: 171 SEC (ref 79–149)
ALBUMIN SERPL-MCNC: 2.8 G/DL (ref 3.5–5.2)
ALBUMIN SERPL-MCNC: 3.1 G/DL (ref 3.5–5.2)
ALBUMIN/GLOBULIN RATIO: 1.2 (ref 1–2.5)
ALBUMIN/GLOBULIN RATIO: 1.3 (ref 1–2.5)
ALLEN TEST: ABNORMAL
ALP BLD-CCNC: 68 U/L (ref 35–104)
ALP BLD-CCNC: 70 U/L (ref 35–104)
ALT SERPL-CCNC: 27 U/L (ref 5–33)
ALT SERPL-CCNC: 33 U/L (ref 5–33)
ANGLE TEG W HEPARIN: 68.3 DEG (ref 53–72)
ANGLE TEG W HEPARIN: 73 DEG (ref 53–72)
ANGLE TEG: 13.3 DEG (ref 53–72)
ANGLE TEG: 48.4 DEG (ref 53–72)
ANION GAP SERPL CALCULATED.3IONS-SCNC: 12 MMOL/L (ref 9–17)
ANION GAP SERPL CALCULATED.3IONS-SCNC: 12 MMOL/L (ref 9–17)
ANION GAP SERPL CALCULATED.3IONS-SCNC: 13 MMOL/L (ref 9–17)
ANION GAP SERPL CALCULATED.3IONS-SCNC: 9 MMOL/L (ref 9–17)
AST SERPL-CCNC: 16 U/L
AST SERPL-CCNC: 21 U/L
AT-III ACTIVITY: 84 % (ref 83–122)
BILIRUB SERPL-MCNC: 0.46 MG/DL (ref 0.3–1.2)
BILIRUB SERPL-MCNC: 0.76 MG/DL (ref 0.3–1.2)
BILIRUBIN DIRECT: 0.15 MG/DL
BILIRUBIN DIRECT: 0.27 MG/DL
BILIRUBIN, INDIRECT: 0.31 MG/DL (ref 0–1)
BILIRUBIN, INDIRECT: 0.49 MG/DL (ref 0–1)
BUN BLDV-MCNC: 6 MG/DL (ref 6–20)
BUN BLDV-MCNC: 7 MG/DL (ref 6–20)
BUN BLDV-MCNC: 7 MG/DL (ref 6–20)
BUN BLDV-MCNC: 9 MG/DL (ref 6–20)
BUN/CREAT BLD: ABNORMAL (ref 9–20)
CALCIUM IONIZED: 1.11 MMOL/L (ref 1.13–1.33)
CALCIUM IONIZED: 1.12 MMOL/L (ref 1.13–1.33)
CALCIUM IONIZED: 1.13 MMOL/L (ref 1.13–1.33)
CALCIUM IONIZED: 1.14 MMOL/L (ref 1.13–1.33)
CALCIUM IONIZED: 1.14 MMOL/L (ref 1.13–1.33)
CALCIUM SERPL-MCNC: 8.2 MG/DL (ref 8.6–10.4)
CALCIUM SERPL-MCNC: 8.2 MG/DL (ref 8.6–10.4)
CALCIUM SERPL-MCNC: 8.3 MG/DL (ref 8.6–10.4)
CALCIUM SERPL-MCNC: 8.4 MG/DL (ref 8.6–10.4)
CHLORIDE BLD-SCNC: 101 MMOL/L (ref 98–107)
CHLORIDE BLD-SCNC: 102 MMOL/L (ref 98–107)
CHLORIDE BLD-SCNC: 102 MMOL/L (ref 98–107)
CHLORIDE BLD-SCNC: 99 MMOL/L (ref 98–107)
CO2: 20 MMOL/L (ref 20–31)
CO2: 23 MMOL/L (ref 20–31)
CO2: 24 MMOL/L (ref 20–31)
CO2: 26 MMOL/L (ref 20–31)
CREAT SERPL-MCNC: 0.2 MG/DL (ref 0.5–0.9)
CREAT SERPL-MCNC: 0.21 MG/DL (ref 0.5–0.9)
CREAT SERPL-MCNC: 0.24 MG/DL (ref 0.5–0.9)
CREAT SERPL-MCNC: 0.29 MG/DL (ref 0.5–0.9)
EPL TEG, W/HEP: 0 % (ref 0–15)
EPL TEG, W/HEP: 0 % (ref 0–15)
EPL-TEG: 0 % (ref 0–15)
EPL-TEG: 0 % (ref 0–15)
FIBRINOGEN: 432 MG/DL (ref 140–420)
FIBRINOGEN: 446 MG/DL (ref 140–420)
FIBRINOGEN: 469 MG/DL (ref 140–420)
FIBRINOGEN: 471 MG/DL (ref 140–420)
FIBRINOGEN: 472 MG/DL (ref 140–420)
FIO2: 21
FIO2: ABNORMAL
GFR AFRICAN AMERICAN: >60 ML/MIN
GFR NON-AFRICAN AMERICAN: >60 ML/MIN
GFR SERPL CREATININE-BSD FRML MDRD: ABNORMAL ML/MIN/{1.73_M2}
GLOBULIN: ABNORMAL G/DL (ref 1.5–3.8)
GLOBULIN: ABNORMAL G/DL (ref 1.5–3.8)
GLUCOSE BLD-MCNC: 106 MG/DL (ref 70–99)
GLUCOSE BLD-MCNC: 109 MG/DL (ref 74–100)
GLUCOSE BLD-MCNC: 110 MG/DL (ref 70–99)
GLUCOSE BLD-MCNC: 119 MG/DL (ref 74–100)
GLUCOSE BLD-MCNC: 138 MG/DL (ref 70–99)
GLUCOSE BLD-MCNC: 141 MG/DL (ref 74–100)
GLUCOSE BLD-MCNC: 149 MG/DL (ref 70–99)
GLUCOSE BLD-MCNC: 160 MG/DL (ref 74–100)
HCO3 VENOUS: 27.9 MMOL/L (ref 22–29)
HCT VFR BLD CALC: 22.1 % (ref 36.3–47.1)
HCT VFR BLD CALC: 23.7 % (ref 36.3–47.1)
HCT VFR BLD CALC: 24.2 % (ref 36.3–47.1)
HCT VFR BLD CALC: 24.6 % (ref 36.3–47.1)
HEMOGLOBIN, FREE, PLASMA: 1.4 MG/DL (ref 0–9.7)
HEMOGLOBIN, FREE, PLASMA: 2.3 MG/DL (ref 0–9.7)
HEMOGLOBIN: 7.2 G/DL (ref 11.9–15.1)
HEMOGLOBIN: 7.8 G/DL (ref 11.9–15.1)
HEMOGLOBIN: 7.9 G/DL (ref 11.9–15.1)
HEMOGLOBIN: 8.1 G/DL (ref 11.9–15.1)
HEPARIN THERAPY: YES
INR BLD: 0.9
INR BLD: 1
KINETICS TEG W HEPARIN: 1.2 MIN (ref 1–3)
KINETICS TEG W HEPARIN: 1.6 MIN (ref 1–3)
KINETICS TEG: 10.4 MIN (ref 1–3)
KINETICS TEG: 3.8 MIN (ref 1–3)
LACTIC ACID, WHOLE BLOOD: 0.8 MMOL/L (ref 0.7–2.1)
LACTIC ACID, WHOLE BLOOD: 1.6 MMOL/L (ref 0.7–2.1)
LY30 (LYSIS) TEG: 0 % (ref 0–8)
LY30 (LYSIS) TEG: 0 % (ref 0–8)
LY30(LYSIS) TEG W HEPARIN: 0 % (ref 0–8)
LY30(LYSIS) TEG W HEPARIN: 0 % (ref 0–8)
MA (MAX CLOT) TEG W HEPARIN: 73.7 MM (ref 50–70)
MA (MAX CLOT) TEG W HEPARIN: 79 MM (ref 50–70)
MA (MAX CLOT) TEG: 70.6 MM (ref 50–70)
MA (MAX CLOT) TEG: 73 MM (ref 50–70)
MAGNESIUM: 1.6 MG/DL (ref 1.6–2.6)
MAGNESIUM: 1.8 MG/DL (ref 1.6–2.6)
MAGNESIUM: 1.8 MG/DL (ref 1.6–2.6)
MAGNESIUM: 2 MG/DL (ref 1.6–2.6)
MCH RBC QN AUTO: 29 PG (ref 25.2–33.5)
MCH RBC QN AUTO: 29.4 PG (ref 25.2–33.5)
MCH RBC QN AUTO: 29.4 PG (ref 25.2–33.5)
MCH RBC QN AUTO: 30.2 PG (ref 25.2–33.5)
MCHC RBC AUTO-ENTMCNC: 32.6 G/DL (ref 28.4–34.8)
MCHC RBC AUTO-ENTMCNC: 32.6 G/DL (ref 28.4–34.8)
MCHC RBC AUTO-ENTMCNC: 32.9 G/DL (ref 28.4–34.8)
MCHC RBC AUTO-ENTMCNC: 32.9 G/DL (ref 28.4–34.8)
MCV RBC AUTO: 88.2 FL (ref 82.6–102.9)
MCV RBC AUTO: 90 FL (ref 82.6–102.9)
MCV RBC AUTO: 90.2 FL (ref 82.6–102.9)
MCV RBC AUTO: 91.9 FL (ref 82.6–102.9)
MODE: ABNORMAL
NEGATIVE BASE EXCESS, ART: ABNORMAL (ref 0–2)
NEGATIVE BASE EXCESS, VEN: ABNORMAL (ref 0–2)
NRBC AUTOMATED: 0.2 PER 100 WBC
NRBC AUTOMATED: 0.3 PER 100 WBC
O2 DEVICE/FLOW/%: ABNORMAL
O2 SAT, VEN: 53 % (ref 60–85)
PARTIAL THROMBOPLASTIN TIME: 41.5 SEC (ref 20.5–30.5)
PARTIAL THROMBOPLASTIN TIME: 59.2 SEC (ref 20.5–30.5)
PARTIAL THROMBOPLASTIN TIME: 59.5 SEC (ref 20.5–30.5)
PARTIAL THROMBOPLASTIN TIME: 65.3 SEC (ref 20.5–30.5)
PARTIAL THROMBOPLASTIN TIME: 96.5 SEC (ref 20.5–30.5)
PATIENT TEMP: ABNORMAL
PCO2, VEN: 41.8 MM HG (ref 41–51)
PDW BLD-RTO: 13.7 % (ref 11.8–14.4)
PDW BLD-RTO: 14.6 % (ref 11.8–14.4)
PDW BLD-RTO: 14.6 % (ref 11.8–14.4)
PDW BLD-RTO: 14.7 % (ref 11.8–14.4)
PERFORMING LOCATION: ABNORMAL
PH VENOUS: 7.43 (ref 7.32–7.43)
PLATELET # BLD: 301 K/UL (ref 138–453)
PLATELET # BLD: 318 K/UL (ref 138–453)
PLATELET # BLD: 320 K/UL (ref 138–453)
PLATELET # BLD: 324 K/UL (ref 138–453)
PMV BLD AUTO: 9.1 FL (ref 8.1–13.5)
PMV BLD AUTO: 9.1 FL (ref 8.1–13.5)
PMV BLD AUTO: 9.2 FL (ref 8.1–13.5)
PMV BLD AUTO: 9.2 FL (ref 8.1–13.5)
PO2, VEN: 27.5 MM HG (ref 30–50)
POC HCO3: 24.8 MMOL/L (ref 21–28)
POC HCO3: 27.8 MMOL/L (ref 21–28)
POC HCO3: 29.2 MMOL/L (ref 21–28)
POC HCO3: 29.8 MMOL/L (ref 21–28)
POC HCO3: 29.9 MMOL/L (ref 21–28)
POC HEMATOCRIT: 21 % (ref 36–46)
POC HEMOGLOBIN: 7.2 G/DL (ref 12–16)
POC O2 SATURATION: 94 % (ref 94–98)
POC O2 SATURATION: 95 % (ref 94–98)
POC O2 SATURATION: 96 % (ref 94–98)
POC PCO2 TEMP: ABNORMAL MM HG
POC PCO2: 31.4 MM HG (ref 35–48)
POC PCO2: 31.5 MM HG (ref 35–48)
POC PCO2: 34.7 MM HG (ref 35–48)
POC PCO2: 35.7 MM HG (ref 35–48)
POC PCO2: 35.9 MM HG (ref 35–48)
POC PH TEMP: ABNORMAL
POC PH: 7.5 (ref 7.35–7.45)
POC PH: 7.53 (ref 7.35–7.45)
POC PH: 7.55 (ref 7.35–7.45)
POC PO2 TEMP: ABNORMAL MM HG
POC PO2: 58.5 MM HG (ref 83–108)
POC PO2: 62.2 MM HG (ref 83–108)
POC PO2: 63.4 MM HG (ref 83–108)
POC PO2: 64.9 MM HG (ref 83–108)
POC PO2: 72.6 MM HG (ref 83–108)
POSITIVE BASE EXCESS, ART: 2 (ref 0–3)
POSITIVE BASE EXCESS, ART: 5 (ref 0–3)
POSITIVE BASE EXCESS, ART: 6 (ref 0–3)
POSITIVE BASE EXCESS, ART: 6 (ref 0–3)
POSITIVE BASE EXCESS, ART: 7 (ref 0–3)
POSITIVE BASE EXCESS, VEN: 3 (ref 0–3)
POTASSIUM SERPL-SCNC: 4.3 MMOL/L (ref 3.7–5.3)
POTASSIUM SERPL-SCNC: 4.5 MMOL/L (ref 3.7–5.3)
POTASSIUM SERPL-SCNC: 4.6 MMOL/L (ref 3.7–5.3)
POTASSIUM SERPL-SCNC: 4.8 MMOL/L (ref 3.7–5.3)
PROTHROMBIN TIME: 10.2 SEC (ref 9.1–12.3)
PROTHROMBIN TIME: 10.3 SEC (ref 9.1–12.3)
PROTHROMBIN TIME: 10.3 SEC (ref 9.1–12.3)
PROTHROMBIN TIME: 10.4 SEC (ref 9.1–12.3)
PROTHROMBIN TIME: 10.6 SEC (ref 9.1–12.3)
RBC # BLD: 2.45 M/UL (ref 3.95–5.11)
RBC # BLD: 2.58 M/UL (ref 3.95–5.11)
RBC # BLD: 2.69 M/UL (ref 3.95–5.11)
RBC # BLD: 2.79 M/UL (ref 3.95–5.11)
REACTION TIME TEG W HEPARIN: 5.9 MIN (ref 5–10)
REACTION TIME TEG W HEPARIN: 6.6 MIN (ref 5–10)
REACTION TIME TEG: 20.9 MIN (ref 5–10)
REACTION TIME TEG: 30.4 MIN (ref 5–10)
SAMPLE SITE: ABNORMAL
SODIUM BLD-SCNC: 134 MMOL/L (ref 135–144)
SODIUM BLD-SCNC: 135 MMOL/L (ref 135–144)
SODIUM BLD-SCNC: 137 MMOL/L (ref 135–144)
SODIUM BLD-SCNC: 137 MMOL/L (ref 135–144)
TCO2 (CALC), ART: ABNORMAL MMOL/L (ref 22–29)
TEG COMMENT: ABNORMAL
TOTAL CO2, VENOUS: ABNORMAL MMOL/L (ref 23–30)
TOTAL PROTEIN: 5.2 G/DL (ref 6.4–8.3)
TOTAL PROTEIN: 5.4 G/DL (ref 6.4–8.3)
TSH SERPL DL<=0.05 MIU/L-ACNC: 1.51 MIU/L (ref 0.3–5)
WBC # BLD: 13.6 K/UL (ref 3.5–11.3)
WBC # BLD: 15.1 K/UL (ref 3.5–11.3)
WBC # BLD: 15.3 K/UL (ref 3.5–11.3)
WBC # BLD: 31.3 K/UL (ref 3.5–11.3)

## 2021-10-21 PROCEDURE — 84443 ASSAY THYROID STIM HORMONE: CPT

## 2021-10-21 PROCEDURE — 83605 ASSAY OF LACTIC ACID: CPT

## 2021-10-21 PROCEDURE — 93005 ELECTROCARDIOGRAM TRACING: CPT | Performed by: STUDENT IN AN ORGANIZED HEALTH CARE EDUCATION/TRAINING PROGRAM

## 2021-10-21 PROCEDURE — 82803 BLOOD GASES ANY COMBINATION: CPT

## 2021-10-21 PROCEDURE — 2100000001 HC CVICU R&B

## 2021-10-21 PROCEDURE — 85347 COAGULATION TIME ACTIVATED: CPT

## 2021-10-21 PROCEDURE — 85027 COMPLETE CBC AUTOMATED: CPT

## 2021-10-21 PROCEDURE — 6370000000 HC RX 637 (ALT 250 FOR IP): Performed by: STUDENT IN AN ORGANIZED HEALTH CARE EDUCATION/TRAINING PROGRAM

## 2021-10-21 PROCEDURE — 82947 ASSAY GLUCOSE BLOOD QUANT: CPT

## 2021-10-21 PROCEDURE — P9041 ALBUMIN (HUMAN),5%, 50ML: HCPCS | Performed by: INTERNAL MEDICINE

## 2021-10-21 PROCEDURE — 6360000002 HC RX W HCPCS: Performed by: STUDENT IN AN ORGANIZED HEALTH CARE EDUCATION/TRAINING PROGRAM

## 2021-10-21 PROCEDURE — 99233 SBSQ HOSP IP/OBS HIGH 50: CPT | Performed by: INTERNAL MEDICINE

## 2021-10-21 PROCEDURE — 87205 SMEAR GRAM STAIN: CPT

## 2021-10-21 PROCEDURE — 86920 COMPATIBILITY TEST SPIN: CPT

## 2021-10-21 PROCEDURE — 80048 BASIC METABOLIC PNL TOTAL CA: CPT

## 2021-10-21 PROCEDURE — 86900 BLOOD TYPING SEROLOGIC ABO: CPT

## 2021-10-21 PROCEDURE — 6360000002 HC RX W HCPCS: Performed by: INTERNAL MEDICINE

## 2021-10-21 PROCEDURE — 87186 SC STD MICRODIL/AGAR DIL: CPT

## 2021-10-21 PROCEDURE — 85014 HEMATOCRIT: CPT

## 2021-10-21 PROCEDURE — 86901 BLOOD TYPING SEROLOGIC RH(D): CPT

## 2021-10-21 PROCEDURE — 87040 BLOOD CULTURE FOR BACTERIA: CPT

## 2021-10-21 PROCEDURE — 2580000003 HC RX 258: Performed by: STUDENT IN AN ORGANIZED HEALTH CARE EDUCATION/TRAINING PROGRAM

## 2021-10-21 PROCEDURE — 33948 ECMO/ECLS DAILY MGMT-VENOUS: CPT | Performed by: INTERNAL MEDICINE

## 2021-10-21 PROCEDURE — 85730 THROMBOPLASTIN TIME PARTIAL: CPT

## 2021-10-21 PROCEDURE — 80076 HEPATIC FUNCTION PANEL: CPT

## 2021-10-21 PROCEDURE — 2580000003 HC RX 258: Performed by: INTERNAL MEDICINE

## 2021-10-21 PROCEDURE — 85610 PROTHROMBIN TIME: CPT

## 2021-10-21 PROCEDURE — 36415 COLL VENOUS BLD VENIPUNCTURE: CPT

## 2021-10-21 PROCEDURE — 85300 ANTITHROMBIN III ACTIVITY: CPT

## 2021-10-21 PROCEDURE — 6370000000 HC RX 637 (ALT 250 FOR IP): Performed by: INTERNAL MEDICINE

## 2021-10-21 PROCEDURE — 6360000002 HC RX W HCPCS: Performed by: THORACIC SURGERY (CARDIOTHORACIC VASCULAR SURGERY)

## 2021-10-21 PROCEDURE — APPSS30 APP SPLIT SHARED TIME 16-30 MINUTES: Performed by: NURSE PRACTITIONER

## 2021-10-21 PROCEDURE — 2500000003 HC RX 250 WO HCPCS: Performed by: INTERNAL MEDICINE

## 2021-10-21 PROCEDURE — 99291 CRITICAL CARE FIRST HOUR: CPT | Performed by: INTERNAL MEDICINE

## 2021-10-21 PROCEDURE — 83735 ASSAY OF MAGNESIUM: CPT

## 2021-10-21 PROCEDURE — 86850 RBC ANTIBODY SCREEN: CPT

## 2021-10-21 PROCEDURE — 71045 X-RAY EXAM CHEST 1 VIEW: CPT

## 2021-10-21 PROCEDURE — 87150 DNA/RNA AMPLIFIED PROBE: CPT

## 2021-10-21 PROCEDURE — 33948 ECMO/ECLS DAILY MGMT-VENOUS: CPT

## 2021-10-21 PROCEDURE — 82330 ASSAY OF CALCIUM: CPT

## 2021-10-21 PROCEDURE — 85390 FIBRINOLYSINS SCREEN I&R: CPT

## 2021-10-21 PROCEDURE — P9016 RBC LEUKOCYTES REDUCED: HCPCS

## 2021-10-21 PROCEDURE — 85576 BLOOD PLATELET AGGREGATION: CPT

## 2021-10-21 PROCEDURE — 86403 PARTICLE AGGLUT ANTBDY SCRN: CPT

## 2021-10-21 PROCEDURE — 85384 FIBRINOGEN ACTIVITY: CPT

## 2021-10-21 PROCEDURE — 36430 TRANSFUSION BLD/BLD COMPNT: CPT

## 2021-10-21 RX ORDER — SERTRALINE HYDROCHLORIDE 25 MG/1
25 TABLET, FILM COATED ORAL DAILY
Status: DISCONTINUED | OUTPATIENT
Start: 2021-10-21 | End: 2021-10-22

## 2021-10-21 RX ORDER — ESMOLOL HYDROCHLORIDE 10 MG/ML
50-300 INJECTION, SOLUTION INTRAVENOUS CONTINUOUS
Status: DISCONTINUED | OUTPATIENT
Start: 2021-10-21 | End: 2021-10-26

## 2021-10-21 RX ORDER — METOPROLOL TARTRATE 5 MG/5ML
5 INJECTION INTRAVENOUS EVERY 6 HOURS PRN
Status: DISCONTINUED | OUTPATIENT
Start: 2021-10-21 | End: 2021-10-30 | Stop reason: HOSPADM

## 2021-10-21 RX ORDER — METOPROLOL TARTRATE 5 MG/5ML
5 INJECTION INTRAVENOUS ONCE
Status: COMPLETED | OUTPATIENT
Start: 2021-10-21 | End: 2021-10-21

## 2021-10-21 RX ORDER — METOPROLOL TARTRATE 5 MG/5ML
5 INJECTION INTRAVENOUS EVERY 6 HOURS
Status: DISCONTINUED | OUTPATIENT
Start: 2021-10-21 | End: 2021-10-21

## 2021-10-21 RX ADMIN — Medication 1 TABLET: at 09:50

## 2021-10-21 RX ADMIN — METOPROLOL TARTRATE 5 MG: 1 INJECTION, SOLUTION INTRAVENOUS at 21:01

## 2021-10-21 RX ADMIN — DEXAMETHASONE SODIUM PHOSPHATE 6 MG: 10 INJECTION INTRAMUSCULAR; INTRAVENOUS at 09:50

## 2021-10-21 RX ADMIN — OXYCODONE HYDROCHLORIDE AND ACETAMINOPHEN 2 TABLET: 5; 325 TABLET ORAL at 22:03

## 2021-10-21 RX ADMIN — DOCUSATE SODIUM 50MG AND SENNOSIDES 8.6MG 2 TABLET: 8.6; 5 TABLET, FILM COATED ORAL at 09:50

## 2021-10-21 RX ADMIN — ALBUMIN (HUMAN) 25 G: 12.5 INJECTION, SOLUTION INTRAVENOUS at 14:45

## 2021-10-21 RX ADMIN — METOPROLOL TARTRATE 5 MG: 1 INJECTION, SOLUTION INTRAVENOUS at 19:12

## 2021-10-21 RX ADMIN — CEFEPIME HYDROCHLORIDE 2000 MG: 2 INJECTION, POWDER, FOR SOLUTION INTRAVENOUS at 20:06

## 2021-10-21 RX ADMIN — HEPARIN SODIUM AND DEXTROSE 18.03 UNITS/KG/HR: 10000; 5 INJECTION INTRAVENOUS at 04:47

## 2021-10-21 RX ADMIN — OXYCODONE HYDROCHLORIDE AND ACETAMINOPHEN 1 TABLET: 5; 325 TABLET ORAL at 04:02

## 2021-10-21 RX ADMIN — HEPARIN SODIUM AND DEXTROSE 18 UNITS/KG/HR: 10000; 5 INJECTION INTRAVENOUS at 17:26

## 2021-10-21 RX ADMIN — Medication 100 MG: at 20:14

## 2021-10-21 RX ADMIN — Medication 100 MG: at 09:50

## 2021-10-21 RX ADMIN — OXYCODONE HYDROCHLORIDE AND ACETAMINOPHEN 1 TABLET: 5; 325 TABLET ORAL at 13:59

## 2021-10-21 RX ADMIN — ESMOLOL HYDROCHLORIDE 25 MCG/KG/MIN: 10 INJECTION INTRAVENOUS at 22:03

## 2021-10-21 RX ADMIN — SERTRALINE 25 MG: 25 TABLET, FILM COATED ORAL at 20:06

## 2021-10-21 RX ADMIN — VANCOMYCIN HYDROCHLORIDE 2500 MG: 1 INJECTION, POWDER, LYOPHILIZED, FOR SOLUTION INTRAVENOUS at 20:52

## 2021-10-21 RX ADMIN — SODIUM CHLORIDE, PRESERVATIVE FREE 10 ML: 5 INJECTION INTRAVENOUS at 09:50

## 2021-10-21 RX ADMIN — OXYCODONE HYDROCHLORIDE AND ACETAMINOPHEN 2 TABLET: 5; 325 TABLET ORAL at 18:09

## 2021-10-21 ASSESSMENT — PAIN DESCRIPTION - DESCRIPTORS
DESCRIPTORS: DULL
DESCRIPTORS: CRAMPING

## 2021-10-21 ASSESSMENT — PAIN DESCRIPTION - PAIN TYPE
TYPE: ACUTE PAIN
TYPE: ACUTE PAIN

## 2021-10-21 ASSESSMENT — ENCOUNTER SYMPTOMS
VOMITING: 0
CONSTIPATION: 0
COUGH: 0
SORE THROAT: 0
NAUSEA: 0
ABDOMINAL PAIN: 0
SHORTNESS OF BREATH: 1
DIARRHEA: 0
TROUBLE SWALLOWING: 0
WHEEZING: 0

## 2021-10-21 ASSESSMENT — PAIN SCALES - GENERAL
PAINLEVEL_OUTOF10: 2
PAINLEVEL_OUTOF10: 7
PAINLEVEL_OUTOF10: 0
PAINLEVEL_OUTOF10: 0
PAINLEVEL_OUTOF10: 5
PAINLEVEL_OUTOF10: 4
PAINLEVEL_OUTOF10: 0
PAINLEVEL_OUTOF10: 9

## 2021-10-21 ASSESSMENT — PAIN DESCRIPTION - FREQUENCY
FREQUENCY: CONTINUOUS
FREQUENCY: INTERMITTENT

## 2021-10-21 ASSESSMENT — PAIN DESCRIPTION - LOCATION
LOCATION: ABDOMEN
LOCATION: ABDOMEN;INCISION

## 2021-10-21 ASSESSMENT — PAIN DESCRIPTION - ORIENTATION
ORIENTATION: LOWER
ORIENTATION: LOWER

## 2021-10-21 NOTE — PROGRESS NOTES
[facility] End of Shift Note:       Cannulation date/time: 10/17/2021 at 1332  ECMO type: VV  Cannula sizes/locations: [place] R IJ 30 Fr  Flow ordered at:       Currently flowing at: 3.2       FIO2 at: 21       Sweep at: 0  Delta pressure: 16  Clot burden:       Oxygenator: none       Circuit: none  Anticoagulation: Heparin Infusion at 18units/kg/hr for a PTT 50-70  Products given:        PRBC's: 1       PLT's: 0       FFP: 0       Cryo: 0       Albumin: 1  Turn heparin infusion off at 0000 tonight, in preparation for decannulation tomorrow morning with DiBardino.

## 2021-10-21 NOTE — PROGRESS NOTES
Pharmacy Note  Vancomycin Consult    Vandana Mercer is a 27 y.o. female started on Vancomycin for sepsis; consult received from Dr. Jovanny Daly to manage therapy. Also receiving the following antibiotics: cefepime. Patient Active Problem List   Diagnosis    Pneumonia due to COVID-19 virus    26 weeks gestation of pregnancy    Tachycardia    Tachypnea    Anxiety    Pulmonary embolism ([facility] [address]    Marginal insertion of umbilical cord affecting management of mother    Starvation ketoacidosis    PCCS 10/15/21 M Apg 1/1/1 Wt 2#3    Acute respiratory distress syndrome (ARDS) due to severe acute respiratory syndrome coronavirus 2 (SARS-CoV-2) ([facility])    Immunosuppressed status ([facility])     Allergies:  Vicodin [hydrocodone-acetaminophen]     Temp max: 97.7    Recent Labs     10/21/21  1021 10/21/21  1548   BUN 7 7   CREATININE 0.21* 0.24*   WBC 15.1* 15.3*       Intake/Output Summary (Last 24 hours) at 10/21/2021 1910  Last data filed at 10/21/2021 1800  Gross per 24 hour   Intake 3123 ml   Output 3870 ml   Net -747 ml     Culture Date      Source                       Results  See micro    Ht Readings from Last 1 Encounters:   10/21/21 5' 4\" (1.626 m)        Wt Readings from Last 1 Encounters:   10/17/21 224 lb 13.9 oz (102 kg)       Body mass index is 38.6 kg/m². Estimated Creatinine Clearance: 391 mL/min (A) (based on SCr of 0.24 mg/dL (L)). Goal Trough Level: 15 mcg/mL    Assessment/Plan:  Will initiate Vancomycin with a one time loading dose of 2500 mg x1, followed by 1250 mg IV every 8 hours. Timing of trough level will be determined based on culture results, renal function, and clinical response. Thank you for the consult. Will continue to follow.     Howard Shaikh PharmD, BCPS 10/21/2021 7:20 PM

## 2021-10-21 NOTE — PROGRESS NOTES
Consulted for tachycardia. EKG shows narrow complex tachycardia likely sinus tachycardia. Hemodynamically stable for now.   - possibly 2/2 to significant anemia and possible sepsis  - primary team to evaluate and treat causes such as hypovolemia/pain/anxiety/hypoxia/embolism  - check thyroid function  - agree with iv prn lopressor    Dianelys Yu MD  Cardiology

## 2021-10-21 NOTE — PROGRESS NOTES
ECMO DAILY ROUNDING NOTE     Patient:  Ilda Castellanos  MRN: [id number]  Admit date: 10/13/2021  Primary Care Physician: Negin Ramires  Consulting Physician: Reyna García DO  CODE Status: Full Code  LOS: 7    [x] Team present at bedside   [x] Family updated    INDICATION:   Refractory Hypoxemia, ARDS, COVID-19 pneumonia     CANNULATION:  [facility] ECMO cannulation on 10/17    TREATMENT GOALS:  DO2 :   VO2:  PUMP  FLOW: 3-5L/min  pH: 7.35-7.45   SvO2: >70%  SaO2:>97%  ACT: 180-200   Hemoglobin: > 7  Fibrinogen >150  Platelets >72  Sedation : RASS -1 to+1    CURRENT ECMO PARAMETERS:  PUMP Patient on ECMO: On  Hours on ECMO: 75  Pump Flow (L/min): 3.05 LPM  Pump Speed (Rotations/min): 2350 RPM  ECMO Mode: V/V  Pump Mode: Cardiohelp   SWEEP GAS Sweep Flow (L/min): 0 LPM   FiO2 (%): 21 %   CIRCUIT PRESSURES Inlet Pressure (Bladder): -48 mmHg  Pre-Membrane Pressure: 102 mmHg  Post-Membrane Pressure: 88 mmHg  Delta P: 15 mmHg  SVO2 (%): 50.5 %  ECMO blood flow temperature: 97.9 °F (36.6 °C)  CVP (mmHg): 1 mmHg   CIRCUIT CHECK Heat Exchg.  Water Temp Set: 36.2  Heat Exchanger Water Temp Actual: 36.2  Heat Exchanger Water Level: Full  Unused Pigtails Cleared: Done  System Plugged to Red Outlet: Done  Emergency Backup in Use: No  Hand Crank Available: Yes  Backup Unit Blood Avail: Done  Cart Checked and Stocked: Done  Pump Battery Charged: 100 Volts  Pressure Monitors Zeroed: Done  Pressure Limits Checked: Done  Circuit Number: 1  Back Up Circuit: Done  Back Up Console/Motor: Done  Emergency O2 Tank Available: Done  Circuits and Cannulation Sites: Done  High/Low flow alarm set?: Yes  High/Low temp alarm set?: Yes  High/Low venous alarm set?: Yes  Pre-MO alarm limit: 500  Post-MO alarm limit: 400     CURRENT VENTILATOR SETTINGS:  Vent Information  $Ventilation: (S) Off Vent  Skin Assessment: Clean, dry, & intact  Equipment ID: [id number]  Equipment Changed: HME  Vent Type: Servo i  Vent Mode: PCV+  Vt Ordered: 480 mL  Pressure Ordered: 10  Rate Set: 12 bmp  FiO2 : 40 %  SpO2: 99 %  SpO2/FiO2 ratio: 242.5  Sensitivity: 3  PEEP/CPAP: 10  I Time/ I Time %: 0.9 s  Humidification Source: HME  Humidification Temp: 33  Humidification Temp Measured: 33  Nitric Oxide/Epoprostenol In Use?: No  Mask Type: Full face mask  Mask Size: Medium     RECENT LABS:  ABG Recent Labs     10/20/21  0545 10/20/21  0559 10/20/21  0812 10/20/21  1137 10/20/21  1526   POCPH 7.420 7.400 7.456* 7.453* 7.463*   POCPCO2 49.6* 51.9* 43.4 44.1 42.5   POCPO2 35.5* 449.5* 73.9* 99.7 74.0*   POCHCO3 32.2* 32.2* 30.6* 30.9* 30.4*   FWNB2NXC 68* 100* 95 98 95      VBG Recent Labs     10/18/21  0546 10/18/21  1757 10/19/21  1749   PHVEN 7.376 7.409 7.419   VAI7KGL 52.1* 48.3 48.8   XRW5DGX 30.5* 30.5* 31.6*   R5JWNCSR 62 68 67      CBC Recent Labs     10/19/21  1604 10/19/21  2150 10/20/21  0348 10/20/21  1036 10/20/21  1538   WBC 9.6 12.9* 12.2* 12.8* 12.5*   HGB 8.0* 8.0* 7.1* 7.8* 9.3*   HCT 25.5* 24.7* 22.3* 23.7* 28.5*    310 318 303 299      COAGS Recent Labs     10/19/21  1604 10/19/21  2150 10/20/21  0348 10/20/21  1036 10/20/21  1538   INR 0.9 0.9 0.9 0.9 0.9   PROTIME 9.9 9.9 10.2 10.0 9.9   APTT 45.8* 48.2* 61.5* 50.7* 66.1*      TEG Recent Labs     10/19/21  0904 10/19/21  2150 10/20/21  1036   HEPTHERAPY UNKNOWN  UNKNOWN YES  YES UNKNOWN  UNKNOWN   REACTTIMETEG 6.4 15.6* 26.6*   KINETICSTEG 1.2 3.0 5.6*   ANGLETEG 73.5* 52.0* 39.1*   MAXCLOTTEG 76.1* 78.9* 82.5*   XT86JUY 0.0 0.0 0.0   EPLTEG 0.0 0.0 0.0      BMP Recent Labs     10/19/21  1604 10/19/21  2150 10/20/21  0348 10/20/21  1036 10/20/21  1538    140 139 138 133*   K 4.5 4.1 3.4* 4.4 3.9   CL 99 100 103 100 97*   CO2 28 30 28 28 27   BUN 8 8 8 9 8   CREATININE 0.24* <0.20* <0.20* <0.20* <0.20*   GLUCOSE 153* 115* 107* 119* 130*   MG 1.9 1.7 1.7 1.8 1.7      LFT Recent Labs     10/18/21  1611 10/18/21  2200 10/19/21  0904 10/19/21  2150 10/20/21  1036   PROT 4.4* 5.1* 5.2* 5.2* 5.4*   LABALBU 2.1* 2.3* 2.7* 2.7* 3.0*   ALT 60* 65* 51* 45* 38*   AST 45* 45* 29 28 28   ALKPHOS 87 109* 88 88 82   BILITOT 0.16* 0.24* 0.26* 0.22* 0.40      INFLAMMATORY MARKERS No results for input(s): CRP, FERRITIN, LDH in the last 72 hours. Invalid input(s):  ESR   CARDIAC No results for input(s): CKTOTAL, CKMB, CKMBINDEX, TROPONINI, BNP, PROBNP in the last 72 hours. Invalid input(s): TROPONIN, HSTROP   LACTIC ACID No results for input(s): LACTA in the last 72 hours. TRIGLYCERIDE No results for input(s): TRIG in the last 72 hours.      ASSESSMENT AND SYSTEM BASED PLAN (MEDICAL DECISION MAKING):    Neurologic                                                  [x] Sedation/paralytic requirement reviewed, patient off sedation                                                  [x] Neurological assessment; awake, following commands    Cardiovascular                               [x] Cannula secured, dsg dry and intact, no drainage or hematoma noted                             [x] ECMO Pump flow/pressures reviewed, on 3 L/min                             [x] Telemetry reviewed                             [x] Hemodynamic parameters stable    Pulmonary                             [x] Blood gases (Patient/Circuit) reviewed                             [x] Sweep/ settings reviewed, on FiO2 21%, sweep 0                             [x] Vent Settings; patient extubated, currently on nasal cannula                             [x] Chest X-ray reviewed    Genitourinary                              [x] Intake/Output reviewed                             [x] Need for continuous IV fluids assessed                             [x] Need for diuresis/renal replacement therapy reviewed    Gastrointestinal                             [x] GI Prophylaxis reviewed                             [x] Enteral nutrition reviewed, passed bedside swallow eval, advance diet    Hematology                            [x] Anticoagulation: Heparin                             [x] Reviewed CBC, coagulation profile, ACT, TEG and platelet count                            [x] Transfusion needs reviewed                    Infectious disease                           [x] Reviewed T-max, white count and cultures results                           [x] Antimicrobials reviewed    Endocrine                            [x] Reviewed glycemic control, continue Decadron for 10 days                           [x] Reviewed need for steroids    Others                           [x] Reviewed need for restrains                           [x] Reviewed need for lines/drains/invasive devices                           [x] Skin/Wound care                           [x] Reviewed current Medications list/orders                           [x] Reviewed goals of care                   OVERALL CONDITION:   Stable    Ghazala Velez MD  Pulmonary & Critical care Medicine  10/20/2021

## 2021-10-21 NOTE — PROGRESS NOTES
[facility]  Daily ECMO Progress Note    Patient's Name/Date of Birth: Moni Wilson / 1990 (8 y.o.)  MRN: [id number]  Admit date: 10/13/2021  CODE Status: Full Code    Date: October 21, 2021     Height: HEIGHTHeight: 5' 4\" (162.6 cm)  Weight: WEIGHTWeight: 224 lb 13.9 oz (102 kg)   Allergies: Vicodin [hydrocodone-acetaminophen]  Blood Type: O+  Cannula Placed By: Dr. Titi Sow and Dr. Miki Person  Date/Time ECMO Initiated: 10/17/2021 at 1332    ECMO Indication: Respiratory Failure, ARDs     ECMO   Cannulation Sites: Cannulas secured, dsg dry and intact, no drainage or hematoma noted  Arterial Cannula location: Right IJ  Arterial Cannula Size (Fr): 30 F Cresent  Venous Cannula Location: Right IJ  Venous Cannula Size (Fr): 30 F Cresent  Additional Cannula Size and Location: NA  Additional Device(s) Present: NA    ECMO Values  ECMO Pump: Patient on ECMO: On  Hours on ECMO: 87  Pump Flow (L/min): 3.1 LPM  Pump Speed (Rotations/min): 2350 RPM  ECMO Mode: V/V  Pump Mode: Cardiohelp    Sweep Gas: Sweep Flow (L/min): 0 LPM   FiO2 (%): 21 %    Circuit Pressures: Inlet Pressure (Bladder): -49 mmHg  Pre-Membrane Pressure: 104 mmHg  Post-Membrane Pressure: 89 mmHg  Delta P: 15 mmHg  SVO2 (%): 52.2 %  ECMO blood flow temperature: 98.6 °F (37 °C)  CVP (mmHg): 1 mmHg    Data:  CBC:   Recent Labs     10/20/21  1538 10/20/21  2253 10/21/21  0403   WBC 12.5* 14.2* 13.6*   HGB 9.3* 7.2* 7.8*   HCT 28.5* 22.4* 23.7*   MCV 90.5 90.7 91.9    322 324     BMP:   Recent Labs     10/20/21  1538 10/20/21  2253 10/21/21  0403   * 137 137   K 3.9 4.7 4.5   CL 97* 101 102   CO2 27 27 26   BUN 8 6 6   CREATININE <0.20* 0.20* 0.20*     LFT:   Recent Labs     10/18/21  2200 10/19/21  0904 10/19/21  2150 10/20/21  1036 10/20/21  2253   PROT 5.1* 5.2* 5.2* 5.4* 5.5*   LABALBU 2.3* 2.7* 2.7* 3.0* 3.1*   ALT 65* 51* 45* 38* 38*   AST 45* 29 28 28 25   ALKPHOS 109* 88 88 82 81   BILITOT 0.24* 0.26* 0.22* 0. 40 0.41     Inflammatory Markers:   No results for input(s): CRP, FERRITIN, LDH in the last 72 hours. Invalid input(s):  ESR  Cardiac: No results for input(s): CKTOTAL, CKMB, CKMBINDEX, TROPONINI, BNP, PROBNP in the last 72 hours. Invalid input(s): TROPONIN, HSTROP  Lactic Acid: No results for input(s): LACTA in the last 72 hours. Triglycerides:   No results for input(s): TRIG in the last 72 hours. PT/INR:   Recent Labs     10/20/21  1538 10/20/21  2253 10/21/21  0403   PROTIME 9.9 10.2 10.3   INR 0.9 0.9 1.0     APTT:   Recent Labs     10/20/21  1538 10/20/21  2253 10/21/21  0403   APTT 66.1* 41.5* 96.5*     TEG:   Recent Labs     10/19/21  2150 10/20/21  1036 10/20/21  2253   HEPTHERAPY YES  YES UNKNOWN  UNKNOWN YES  YES   REACTTIMETEG 15.6* 26.6* 30.4*   KINETICSTEG 3.0 5.6* 10.4*   ANGLETEG 52.0* 39.1* 13.3*   MAXCLOTTEG 78.9* 82.5* 70.6*   LI43UMH 0.0 0.0 0.0   EPLTEG 0.0 0.0 0.0     ABG:   Recent Labs     10/20/21  1137 10/20/21  1526 10/20/21  2243 10/21/21  0357 10/21/21  0654   POCPH 7.453* 7.463* 7.512* 7.526* 7.533*   POCPCO2 44.1 42.5 39.3 35.9 34.7*   POCPO2 99.7 74.0* 86.8 64.9* 72.6*   POCHCO3 30.9* 30.4* 31.5* 29.8* 29.2*   RYZU5HAS 98 95 98 95 96     VBG:   Recent Labs     10/18/21  1757 10/19/21  1749 10/21/21  0640   PHVEN 7.409 7.419 7.432*   FXX8YOX 48.3 48.8 41.8   YBM2DVI 30.5* 31.6* 27.9   N3GBHDRK 68 67 53*       I/O:  I/O last 3 completed shifts: In: 3323.5 [P.O.:100;  I.V.:2151; Blood:1072.5]  Out: 3450 [Urine:3570]    Scheduled Meds:    heparin flush (PF)  3 mL IntraVENous Q12H    heparin flush (PF)  3 mL IntraVENous Q12H    heparin flush (PF)  3 mL IntraVENous Q12H    prenatal vitamin  1 tablet Oral Daily    Tdap-Dtap  0.5 mL IntraMUSCular Prior to discharge    lidocaine 1 % injection  5 mL IntraDERmal Once    sodium chloride flush  5-40 mL IntraVENous 2 times per day    docusate  100 mg Oral BID    sennosides-docusate sodium  2 tablet Oral Daily    insulin lispro 0-12 Units SubCUTAneous Q6H    dexamethasone  6 mg IntraVENous Q24H     Continuous Infusions:    sodium chloride      HYDROmorphone 1,000 mcg/hr (10/18/21 1313)    ketamine (KETALAR) infusion for analgosedation 0.2 mg/kg/hr (10/17/21 1826)    sodium chloride      midazolam 4 mg/hr (10/20/21 0842)    dextrose      sodium chloride 5 mL/hr at 10/14/21 0700    heparin (PORCINE) Infusion 18.034 Units/kg/hr (10/21/21 0447)       Physical Exam  Vital Signs: /75   Pulse 109   Temp 98.6 °F (37 °C)   Resp 18   Ht 5' 4\" (1.626 m)   Wt 224 lb 13.9 oz (102 kg)   LMP 04/01/2021 (Approximate)   SpO2 97%   Breastfeeding Unknown   BMI 38.60 kg/m²  O2 Flow Rate (L/min): 40 L/min     General: Intact  Heart: Normal S1 and S2.  Regular rhythm. No murmurs, gallops, or rubs. Pacing Wires: No   Lungs:  Diminished bilaterally Chest tubes: No  Abdomen: soft, non tender, non distended, BS hypoactive x4  Extremities: 1+ edema    Assessment/Plan:  Continue to monitor cannulation site of right IJ- slightly oozy  Patient extubated yesterday. Denies any pain or shortness of breath. May consider decannulation later today. The above recommendations including medications and orders were discussed and agreed upon with Dr. Adina Hay, the attending on service for the cardiothoracic surgery group today. Electronically signed by FADUMO Barton CNP on 10/21/2021 at 7:22 AM    On this date 10/21/2021 I have spent 23 minutes reviewing previous notes, test results and face to face with the patient discussing the diagnosis and importance of compliance with the treatment plan as well as documenting on the day of the visit. At least 50% of the time documented was spent with the patient to provide counseling and/or coordination of care.     This note was created with the assistance of a speech-recognition program.  Although the intention is to generate a document that actually reflects the content of the visit, no guarantees can be provided that every mistake has been identified and corrected by editing.

## 2021-10-21 NOTE — PROGRESS NOTES
PULMONARY & CRITICAL CARE MEDICINE PROGRESS NOTE     Patient:  Shagufta Olivares  MRN: [id number]  6 [facility] date: 10/13/2021  Primary Care Physician: Chela Longoria  Consulting Physician: Toño Larson DO  CODE Status: Full Code  LOS: 8     SUBJECTIVE     CHIEF COMPLAINT/REASON FOR INITIAL CONSULT: Acute respiratory failure/COVID-19 pneumonia    BRIEF HOSPITAL COURSE:   The patient is a 27 y.o. female who was 26-week pregnant initially tested positive for COVID-19 on 10/10. She presented to [place] ED on 10/13 with respiratory distress and low home O2 saturation. She was tachypneic and tachycardic. Transferred to [facility] for further management. Initial blood gases showed evidence of combined anion gap and non-anion gap metabolic acidosis, thought to be related to starvation ketosis. During evaluation patient was also noted to have a left lower lobe subsubsegmental PE. She underwent  10/15. Postoperative course complicated by development of worsening respiratory failure and required initiation of mechanical ventilation on 10/15. Due to worsening severe hypoxemic respiratory failure decision was made to put her on VV ECMO. She had placement of 27 F VV ECMO (crescent) cannula in the right atrium on 10/17. INTERVAL HISTORY:  10/21/21  Patient remains off VV ECMO  She is on nasal cannula  Continue heparin infusion for anticoagulation  Plan for decannulation tomorrow  Remains hemodynamically stable  Receiving IV Decadron    REVIEW OF SYSTEMS:  Review of Systems   Constitutional: Negative for appetite change, chills, fever and unexpected weight change. HENT: Negative for congestion, postnasal drip, sore throat and trouble swallowing. Eyes: Negative for visual disturbance. Respiratory: Positive for shortness of breath. Negative for cough and wheezing. Cardiovascular: Negative for chest pain, palpitations and leg swelling.    Gastrointestinal: Negative for abdominal pain, constipation, diarrhea, nausea and vomiting. Genitourinary: Negative for difficulty urinating, dysuria and frequency. Musculoskeletal: Negative for arthralgias and joint swelling. Skin: Negative for rash. Allergic/Immunologic: Negative for immunocompromised state. Neurological: Positive for weakness. Negative for dizziness, speech difficulty and headaches. Hematological: Negative for adenopathy. Psychiatric/Behavioral: Negative for behavioral problems and sleep disturbance. OBJECTIVE     ECMO SETTINGS:  PUMP Patient on ECMO: On  Hours on ECMO: 96  Pump Flow (L/min): 3.22 LPM  Pump Speed (Rotations/min): 2350 RPM  ECMO Mode: V/V  Pump Mode: Cardiohelp   SWEEP GAS Sweep Flow (L/min): 0 LPM   FiO2 (%): 21 %   CIRCUIT PRESSURES Inlet Pressure (Bladder): -45 mmHg  Pre-Membrane Pressure: 108 mmHg  Post-Membrane Pressure: 91 mmHg  Delta P: 17 mmHg  SVO2 (%): 45.6 %  ECMO blood flow temperature: 99 °F (37.2 °C)  CVP (mmHg): 0 mmHg   CIRCUIT CHECK Heat Exchg.  Water Temp Set: 37  Heat Exchanger Water Temp Actual: 37  Heat Exchanger Water Level: Full  Unused Pigtails Cleared: Done  System Plugged to Red Outlet: Done  Emergency Backup in Use: No  Hand Crank Available: Yes  Backup Unit Blood Avail: Done  Cart Checked and Stocked: Done  Pump Battery Charged: 100 Volts  Pressure Monitors Zeroed:  (done upon initiation of ecmo )  Pressure Limits Checked: Done  Circuit Number: 2  Back Up Circuit: Done (pump 1)  Back Up Console/Motor: Done (pump 1)  Emergency O2 Tank Available: Done  Circuits and Cannulation Sites: Done  High/Low flow alarm set?: Yes  High/Low temp alarm set?: Yes  High/Low venous alarm set?: Yes  Pre-MO alarm limit: 250  Post-MO alarm limit: 250     VENTILATOR SETTINGS:  Vent Information  $Ventilation: (S) Off Vent  Skin Assessment: Clean, dry, & intact  Equipment ID: [id number]  Equipment Changed: HME  Vent Type: Servo i  Vent Mode: PCV+  Vt Ordered: 480 mL  Pressure Ordered: 10  Rate Set: 12 bmp  FiO2 : 40 %  SpO2: 94 %  SpO2/FiO2 ratio: 242.5  Sensitivity: 3  PEEP/CPAP: 10  I Time/ I Time %: 0.9 s  Humidification Source: HME  Humidification Temp: 33  Humidification Temp Measured: 33  Nitric Oxide/Epoprostenol In Use?: No  Mask Type: Full face mask  Mask Size: Medium     PaO2/FiO2 RATIO:  Recent Labs     10/21/21  1546   POCPO2 58.5*      FiO2 : 40 %     VITAL SIGNS:   LAST:  /68   Pulse 133   Temp 98.4 °F (36.9 °C) (Oral)   Resp 19   Ht 5' 4\" (1.626 m)   Wt 224 lb 13.9 oz (102 kg)   LMP 2021 (Approximate)   SpO2 94%   Breastfeeding Unknown   BMI 38.60 kg/m²   8-24 HR RANGE:  TEMP Temp  Av.4 °F (37.4 °C)  Min: 97.3 °F (36.3 °C)  Max: 122 °F (50 °C)   BP Systolic (00EHB), YMS:513 , Min:117 , JARVIS:433      Diastolic (03CLQ), FEV:35, Min:64, Max:68     PULSE Pulse  Av.3  Min: 91  Max: 133   RR Resp  Av  Min: 19  Max: 19   O2 SAT SpO2  Av.4 %  Min: 91 %  Max: 96 %   OXYGEN DELIVERY O2 Flow Rate (L/min)  Av L/min  Min: 2 L/min  Max: 2 L/min        SYSTEMIC EXAMINATION:   General appearance -comfortable, no acute distress  Mental status -awake and alert  Eyes - pupils equal and reactive, sclera anicteric  Mouth - mucous membranes moist  Neck - supple, no significant adenopathy, ECMO cannula site has mild oozing  Chest - Breath sounds bilaterally were dimnished to auscultation at bases. There were no wheezes, rhonchi or rales.     Heart - normal rate, regular rhythm, normal S1, S2, no murmurs, rubs, clicks or gallops  Abdomen - soft, nontender, nondistended, no masses or organomegaly  Neurological - DTR's normal and symmetric, motor and sensory grossly normal bilaterally  Extremities - peripheral pulses normal, no pedal edema, no clubbing or cyanosis  Skin - normal coloration and turgor, no rashes, no suspicious skin lesions noted     DATA REVIEW     Medications:  Scheduled Meds:   heparin flush (PF)  3 mL IntraVENous Q12H    heparin flush (PF)  3 mL IntraVENous Q12H    heparin flush (PF) 3 mL IntraVENous Q12H    prenatal vitamin  1 tablet Oral Daily    Tdap-Dtap  0.5 mL IntraMUSCular Prior to discharge    lidocaine 1 % injection  5 mL IntraDERmal Once    sodium chloride flush  5-40 mL IntraVENous 2 times per day    docusate  100 mg Oral BID    sennosides-docusate sodium  2 tablet Oral Daily    insulin lispro  0-12 Units SubCUTAneous Q6H    dexamethasone  6 mg IntraVENous Q24H     Continuous Infusions:   sodium chloride      HYDROmorphone 1,000 mcg/hr (10/18/21 1313)    ketamine (KETALAR) infusion for analgosedation 0.2 mg/kg/hr (10/17/21 1826)    sodium chloride      midazolam 4 mg/hr (10/20/21 0842)    dextrose      sodium chloride 5 mL/hr at 10/14/21 0700    heparin (PORCINE) Infusion 18.034 Units/kg/hr (10/21/21 0447)       INPUT/OUTPUT:  In: 1971 [P.O.:160; I.V.:1236; Blood:575]  Out: 0943 [TCGCO:5449]  Date 10/21/21 0000 - 10/21/21 2359   Shift 3806-3535 5139-6758 4916-4076 24 Hour Total   INTAKE   P.O.(mL/kg/hr) 100(0.1) 60(0.1)  160   I. V.(mL/kg) 1054(10.3) 182(1.8)  0624(51.9)   Blood(mL/kg) 575(5.6)   575(5.6)   Shift Total(mL/kg) 7405(09) 242(2.4)  3541(73.3)   OUTPUT   Urine(mL/kg/hr) 975(1.2) 720(0.9)  1695   Shift Total(mL/kg) 975(9.6) 720(7.1)  1695(16.6)   Weight (kg) 102 102 102 102        LABS:  ABGs:   Recent Labs     10/20/21  2243 10/21/21  0357 10/21/21  0654 10/21/21  1021 10/21/21  1546   POCPH 7.512* 7.526* 7.533* 7.532* 7.554*   POCPCO2 39.3 35.9 34.7* 35.7 31.5*   POCPO2 86.8 64.9* 72.6* 62.2* 58.5*   POCHCO3 31.5* 29.8* 29.2* 29.9* 27.8   IUFD9OTU 98 95 96 94 94     CBC:   Recent Labs     10/20/21  1538 10/20/21  2253 10/21/21  0403 10/21/21  1021 10/21/21  1548   WBC 12.5* 14.2* 13.6* 15.1* 15.3*   HGB 9.3* 7.2* 7.8* 8.1* 7.2*   HCT 28.5* 22.4* 23.7* 24.6* 22.1*   MCV 90.5 90.7 91.9 88.2 90.2    322 324 301 318   RBC 3.15* 2.47* 2.58* 2.79* 2.45*   MCH 29.5 29.1 30.2 29.0 29.4   MCHC 32.6 32.1 32.9 32.9 32.6   RDW 13.7 13.8 13.7 14.6* 14.7*     CRP: No results for input(s): CRP in the last 72 hours. LDH:   No results for input(s): LDH in the last 72 hours. BMP:   Recent Labs     10/20/21  1538 10/20/21  2253 10/21/21  0403 10/21/21  1021 10/21/21  1548   * 137 137 137 134*   K 3.9 4.7 4.5 4.3 4.6   CL 97* 101 102 101 99   CO2 27 27 26 24 23   BUN 8 6 6 7 7   CREATININE <0.20* 0.20* 0.20* 0.21* 0.24*   GLUCOSE 130* 125* 110* 106* 138*     Liver Function Test:   Recent Labs     10/19/21  0904 10/19/21  2150 10/20/21  1036 10/20/21  2253 10/21/21  1021   PROT 5.2* 5.2* 5.4* 5.5* 5.2*   LABALBU 2.7* 2.7* 3.0* 3.1* 2.8*   ALT 51* 45* 38* 38* 33   AST 29 28 28 25 21   ALKPHOS 88 88 82 81 70   BILITOT 0.26* 0.22* 0.40 0.41 0.46     Coagulation Profile:   Recent Labs     10/20/21  1036 10/20/21  1538 10/20/21  2253 10/21/21  0403 10/21/21  1021   INR 0.9 0.9 0.9 1.0 1.0   PROTIME 10.0 9.9 10.2 10.3 10.3   APTT 50.7* 66.1* 41.5* 96.5* 59.2*     D-Dimer:  No results for input(s): DDIMER in the last 72 hours. Ferritin:    No results for input(s): FERRITIN in the last 72 hours. Lactic Acid:  No results for input(s): LACTA in the last 72 hours. Cardiac Enzymes:  No results for input(s): CKTOTAL, CKMB, CKMBINDEX, TROPONINI in the last 72 hours. Invalid input(s): TROPONIN, HSTROP  BNP/ProBNP:   No results for input(s): BNP, PROBNP in the last 72 hours. Triglycerides:  No results for input(s): TRIG in the last 72 hours. Microbiology:  Urine Culture:  No components found for: CURINE  Blood Culture:  No components found for: CBLOOD, CFUNGUSBL  Sputum Culture:  No components found for: CSPUTUM  No results for input(s): SPECDESC, SPECIAL, CULTURE, STATUS, ORG, CDIFFTOXPCR, CAMPYLOBPCR, SALMONELLAPC, SHIGAPCR, SHIGELLAPCR, MPNEUG, MPNEUM, LACTOQL in the last 72 hours. No results for input(s): SPUTUM, SPECDESC, SPECIAL, CULTURE, STATUS, ORG, CDIFFTOXPCR, MPNEUM, MPNEUG in the last 72 hours.     Invalid input(s): Isabel Garza, CFUNGUSBL Pathology:    Radiology Reports:  XR CHEST PORTABLE   Final Result   Increased infiltrates seen in the right lung base and left suprahilar region,   otherwise similar appearing multifocal infiltrates. XR CHEST PORTABLE   Final Result   1. ECMO device and left central venous catheter are unchanged. Endotracheal   and enteric tubes are no longer visualized. 2. Diffuse bilateral pulmonary opacities and small left effusion. XR CHEST PORTABLE   Final Result   Overall improved appearing chest with better aeration of both lungs. The ET   tube tip lies approximately 2.5 cm above the aly. XR CHEST PORTABLE   Final Result   Similar appearing chest with consolidative airspace disease in the right   lung, and complete opacification of the left chest and right lung apex. Question of slight deeper placement of the endotracheal tube, though the   aly position is somewhat difficult to see on this exam compared to the   prior. It may lie in the region of approximately 1.8 cm above the aly,   previously measuring closer to 2.7 cm above the aly. This could be   slightly retracted if clinically concerned. XR CHEST PORTABLE   Final Result   1. Recommend retraction of endotracheal tube 1.0 cm.   2. Stable appearance of left-sided hydrothorax. 3. Right lung multifocal marked ill-defined dense consolidation, unchanged. 4. Suspected mild right-sided pleural effusion. 5. Stable positioning ECMO catheter. XR CHEST PORTABLE   Final Result   An ECMO catheter is now in place. There is new complete opacification of the   left hemithorax. Right-sided airspace opacity has significantly increased. A left subclavian catheter has its tip in the midline. The right PICC has   been removed. XR CHEST PORTABLE   Final Result   Stable bilateral lung multifocal consolidation consistent with pneumonia. Appropriate radiographic positioning of endotracheal tube. XR CHEST PORTABLE   Final Result   The tip of the endotracheal tube is at the origin the right mainstem bronchus   and the tube should be pulled back 2 cm. VL DUP LOWER EXTREMITY VENOUS BILATERAL   Final Result      VASCULAR REPORT    (Results Pending)   XR CHEST PORTABLE    (Results Pending)        Echocardiogram:   Results for orders placed during the hospital encounter of 10/13/21    ECHO Complete 2D W Doppler W Color    Narrative  Transthoracic Echocardiography Report (TTE)    Summary  Normal left ventricle size, wall thickness and function with a calculated EF  61%. No segmental wall motion abnormalities seen. Normal right ventricular size and function. No significant valvular regurgitation or stenosis seen. ASSESSMENT AND PLAN     Assessment:    // Acute hypoxic respiratory failure, on nasal cannula  // Acute respiratory distress syndrome  // Bilateral multifocal pneumonia due to COVID 19 infection  // Sepsis due to COVID 19  // Left lower lobe subsegmental pulmonary embolism  // Left pleural effusion/atelectasis  // S/p  section, 26-week pregnancy  // Metabolic acidosis, resolved  // Leukocytosis    Plan:    I personally interviewed/examined the patient; reviewed interval history, interpreted all available radiographic and laboratory data at the time of service.     Patient remains off ECMO since yesterday morning, planned for decannulation tomorrow  She is currently saturating well on nasal cannula at 2 L/min  Continue supplemental oxygen to keep oxygen saturation greater than 92%  We will plan to decannulate her in 24 to 48 hours if she continues to be stable  Encourage incentive spirometry  Continue pulmonary toilet, aspiration precautions   Patient remains hemodynamically stable  Continue to monitor I/O with a goal of even/negative fluid balance  Tolerating oral diet  Stress ulcer prophylaxis  Continue heparin infusion for anticoagulation  Continue to monitor CBC, coagulation profile, ACT, TEG  Chemical DVT prophylaxis not required as patient is on systemic anticoagulation  Antimicrobials reviewed; continue to monitor off antimicrobials  Monitor CRP, LDH, AST/ALT, D-Dimer, Ferritin   Glycemic control appropriate  Continue IV Decadron  Skin/wound care reviewed with the nursing staff  Physical/occupational therapy    Critical care time of 35 minutes was spent (excluding procedures), in coordination of care during bedside rounds and discussion of patient care in detail, and recommendations of the team were adopted in the plan. Necessity of all invasive devices was also confirmed. Sudeep Peoples MD  Pulmonary and Critical Care Medicine           10/21/2021     This patient was evaluated in the context of the global SARS-CoV-2 (COVID-19) pandemic, which necessitated considerations that the patient either has COVID-19 infection or is at risk of infection with COVID-19. Institutional protocols and algorithms that pertain to the evaluation & management of patients with COVID-19 or those at risk for COVID-19 are in a state of rapid changes based on information released by regulatory bodies including the CDC and federal and state organizations. These policies and algorithms were followed during the patient's care. Please note that this chart was generated using voice recognition Dragon dictation software. Although every effort was made to ensure the accuracy of this automated transcription, some errors in transcription may have occurred.

## 2021-10-21 NOTE — PROGRESS NOTES
ECMO DAILY ROUNDING NOTE     Patient:  Omid Hidalgo  MRN: [id number]  Admit date: 10/13/2021  Primary Care Physician: Aure Elizabeth  Consulting Physician: Mingo Tracy DO  CODE Status: Full Code  LOS: 8    [x] Team present at bedside   [] Family updated    INDICATION:   Refractory Hypoxemia, ARDS, COVID-19 pneumonia     CANNULATION:  VenoVeno ECMO cannulation on 10/17    TREATMENT GOALS:  PUMP  FLOW: 3-5L/min  pH: 7.35-7.45   SvO2: >70%  SaO2:>97%  ACT: 180-200   Hemoglobin: > 7  Fibrinogen >150  Platelets >10  Sedation : RASS -1 to+1    CURRENT ECMO PARAMETERS:  PUMP Patient on ECMO: On  Hours on ECMO: 96  Pump Flow (L/min): 3.22 LPM  Pump Speed (Rotations/min): 2350 RPM  ECMO Mode: V/V  Pump Mode: Cardiohelp   SWEEP GAS Sweep Flow (L/min): 0 LPM   FiO2 (%): 21 %   CIRCUIT PRESSURES Inlet Pressure (Bladder): -45 mmHg  Pre-Membrane Pressure: 108 mmHg  Post-Membrane Pressure: 91 mmHg  Delta P: 17 mmHg  SVO2 (%): 45.6 %  ECMO blood flow temperature: 99 °F (37.2 °C)  CVP (mmHg): 0 mmHg   CIRCUIT CHECK Heat Exchg.  Water Temp Set: 37  Heat Exchanger Water Temp Actual: 37  Heat Exchanger Water Level: Full  Unused Pigtails Cleared: Done  System Plugged to Red Outlet: Done  Emergency Backup in Use: No  Hand Crank Available: Yes  Backup Unit Blood Avail: Done  Cart Checked and Stocked: Done  Pump Battery Charged: 100 Volts  Pressure Monitors Zeroed:  (done upon initiation of ecmo )  Pressure Limits Checked: Done  Circuit Number: 2  Back Up Circuit: Done (pump 1)  Back Up Console/Motor: Done (pump 1)  Emergency O2 Tank Available: Done  Circuits and Cannulation Sites: Done  High/Low flow alarm set?: Yes  High/Low temp alarm set?: Yes  High/Low venous alarm set?: Yes  Pre-MO alarm limit: 250  Post-MO alarm limit: 250     CURRENT VENTILATOR SETTINGS:  Vent Information  $Ventilation: (S) Off Vent  Skin Assessment: Clean, dry, & intact  Equipment ID: [id number]  Equipment Changed: HME  Vent Type: Servo i  Vent Mode: PCV+  Vt Ordered: 480 mL  Pressure Ordered: 10  Rate Set: 12 bmp  FiO2 : 40 %  SpO2: 94 %  SpO2/FiO2 ratio: 242.5  Sensitivity: 3  PEEP/CPAP: 10  I Time/ I Time %: 0.9 s  Humidification Source: HME  Humidification Temp: 33  Humidification Temp Measured: 33  Nitric Oxide/Epoprostenol In Use?: No  Mask Type: Full face mask  Mask Size: Medium     RECENT LABS:  ABG Recent Labs     10/20/21  2243 10/21/21  0357 10/21/21  0654 10/21/21  1021 10/21/21  1546   POCPH 7.512* 7.526* 7.533* 7.532* 7.554*   POCPCO2 39.3 35.9 34.7* 35.7 31.5*   POCPO2 86.8 64.9* 72.6* 62.2* 58.5*   POCHCO3 31.5* 29.8* 29.2* 29.9* 27.8   HDQR7SJD 98 95 96 94 94      VBG Recent Labs     10/18/21  1757 10/19/21  1749 10/21/21  0640   PHVEN 7.409 7.419 7.432*   VWY6YSV 48.3 48.8 41.8   IAZ6YYZ 30.5* 31.6* 27.9   A0ZCLYIX 68 67 53*      CBC Recent Labs     10/20/21  1538 10/20/21  2253 10/21/21  0403 10/21/21  1021 10/21/21  1548   WBC 12.5* 14.2* 13.6* 15.1* 15.3*   HGB 9.3* 7.2* 7.8* 8.1* 7.2*   HCT 28.5* 22.4* 23.7* 24.6* 22.1*    322 324 301 318      COAGS Recent Labs     10/20/21  1036 10/20/21  1538 10/20/21  2253 10/21/21  0403 10/21/21  1021   INR 0.9 0.9 0.9 1.0 1.0   PROTIME 10.0 9.9 10.2 10.3 10.3   APTT 50.7* 66.1* 41.5* 96.5* 59.2*      TEG Recent Labs     10/20/21  1036 10/20/21  2253 10/21/21  1021   HEPTHERAPY UNKNOWN  UNKNOWN YES  YES YES  YES   REACTTIMETEG 26.6* 30.4* 20.9*   KINETICSTEG 5.6* 10.4* 3.8*   ANGLETEG 39.1* 13.3* 48.4*   MAXCLOTTEG 82.5* 70.6* 73.0*   [id number] 0.0 0.0 0.0   EPLTEG 0.0 0.0 0.0      BMP Recent Labs     10/20/21  1538 10/20/21  2253 10/21/21  0403 10/21/21  1021 10/21/21  1548   * 137 137 137 134*   K 3.9 4.7 4.5 4.3 4.6   CL 97* 101 102 101 99   CO2 27 27 26 24 23   BUN 8 6 6 7 7   CREATININE <0.20* 0.20* 0.20* 0.21* 0.24*   GLUCOSE 130* 125* 110* 106* 138*   MG 1.7 2.4 2.0 1.8 1.8      LFT Recent Labs     10/19/21  0904 10/19/21  2150 10/20/21  1036 10/20/21  2253 10/21/21  1021   PROT 5. 2* 5.2* 5.4* 5.5* 5.2*   LABALBU 2.7* 2.7* 3.0* 3.1* 2.8*   ALT 51* 45* 38* 38* 33   AST 29 28 28 25 21   ALKPHOS 88 88 82 81 70   BILITOT 0.26* 0.22* 0.40 0.41 0.46      INFLAMMATORY MARKERS No results for input(s): CRP, FERRITIN, LDH in the last 72 hours. Invalid input(s):  ESR   CARDIAC No results for input(s): CKTOTAL, CKMB, CKMBINDEX, TROPONINI, BNP, PROBNP in the last 72 hours. Invalid input(s): TROPONIN, HSTROP   LACTIC ACID No results for input(s): LACTA in the last 72 hours. TRIGLYCERIDE No results for input(s): TRIG in the last 72 hours.      ASSESSMENT AND SYSTEM BASED PLAN (MEDICAL DECISION MAKING):    Neurologic                                                  [x] Sedation/paralytic requirement reviewed, not on any sedation                                                  [x] Neurological assessment; awake, following commands     Cardiovascular                               [x] Cannula secured, dsg dry and intact, no drainage or hematoma noted                             [x] ECMO Pump flow/pressures reviewed, on 3 L/min                             [x] Telemetry reviewed                             [x] Hemodynamic parameters stable    Pulmonary                             [x] Blood gases (Patient/Circuit) reviewed                             [x] Sweep/ settings reviewed, on FiO2 21%, sweep 0, plan for decannulation tomorrow                             [x] Vent Settings; patient extubated, currently on nasal cannula                             [x] Chest X-ray reviewed    Genitourinary                              [x] Intake/Output reviewed                             [x] Need for continuous IV fluids assessed                             [x] Need for diuresis/renal replacement therapy reviewed    Gastrointestinal                             [x] GI Prophylaxis reviewed                             [x] Enteral nutrition reviewed, passed bedside swallow eval, advance diet    Hematology [x] Anticoagulation; continue heparin infusion                            [x] Reviewed CBC, coagulation profile, ACT, TEG and platelet count                            [x] Transfusion needs reviewed                    Infectious disease                           [x] Reviewed T-max, white count and cultures results                           [x] Antimicrobials reviewed    Endocrine                            [x] Reviewed glycemic control, continue Decadron for 10 days                           [x] Reviewed need for steroids    Others                           [x] Reviewed need for restrains                           [x] Reviewed need for lines/drains/invasive devices                           [x] Skin/Wound care                           [x] Reviewed current Medications list/orders                           [x] Reviewed goals of care                   OVERALL CONDITION:   Stable    Sanya Moyer MD  Pulmonary & Critical care Medicine  10/21/2021

## 2021-10-21 NOTE — PROGRESS NOTES
Ecmo End of Shift Note:       Cannulation date/time: 10-17-21 at 1332 on ecmo in cath lab with Dr. Anamika Butcher and Dr. Kelton Omalley  ECMO type:V-V   Cannula sizes/locations: Rt. IJ 30 Fr.  [place] cannula  Flow ordered at: 4-6 liters       Currently flowing at: 3.1       FIO2 at: 21       Sweep at: 0  Delta pressure: 15  Clot burden:       Oxygenator: none        Circuit: none  Anticoagulation:  Heparin gtt at 18 u/kg  Products given:        PRBC's: 2 units       PLT's: 0       FFP: 0       Cryo: 0       Albumin: 0

## 2021-10-21 NOTE — PROGRESS NOTES
Spoke with dr Harshad Orona about increased pulse and WBC. Explained all the ecmo protocols that have been followed today. He has ordered blood cultures and advised to continue to monitor pt.

## 2021-10-21 NOTE — PLAN OF CARE
Problem: Loneliness or Risk for Loneliness  Goal: Demonstrate positive use of time alone when socialization is not possible  10/21/2021 0449 by Kushal Humphries RN  Outcome: Ongoing  10/20/2021 1738 by Oscar Gong RN  Outcome: Ongoing     Problem: Fatigue  Goal: Verbalize increase energy and improved vitality  10/21/2021 0449 by Kushal Humphries RN  Outcome: Ongoing  10/20/2021 1738 by Oscar Gong RN  Outcome: Ongoing     Problem: Skin Integrity:  Goal: Will show no infection signs and symptoms  Description: Will show no infection signs and symptoms  10/21/2021 0449 by Kushal Humphries RN  Outcome: Ongoing  10/20/2021 1738 by Oscar Gong RN  Outcome: Ongoing  Goal: Absence of new skin breakdown  Description: Absence of new skin breakdown  10/21/2021 0449 by Kushal Humphries RN  Outcome: Ongoing  10/20/2021 1738 by Oscar Gong RN  Outcome: Ongoing     Problem: Non-Violent Restraints  Goal: Removal from restraints as soon as assessed to be safe  10/21/2021 0449 by Kushal Humphries RN  Outcome: Completed  10/20/2021 1738 by Oscar Gong RN  Outcome: Ongoing  Goal: No harm/injury to patient while restraints in use  10/21/2021 0449 by Kushal Humphries RN  Outcome: Completed  10/20/2021 1738 by Oscar Gong RN  Outcome: Ongoing  Goal: Patient's dignity will be maintained  10/21/2021 0449 by Kushal Humphries RN  Outcome: Completed  10/20/2021 1738 by Oscar Gong RN  Outcome: Ongoing

## 2021-10-21 NOTE — PROGRESS NOTES
Comprehensive Nutrition Assessment    Type and Reason for Visit:  Reassess    Nutrition Recommendations/Plan:   -Continue regular diet   -Pt declines all nutritional supplements at this time   -Will continue to monitor po intake, weights and wound healing     Nutrition Assessment:  Pt is now extubated and started on a PO diet. ECMO was discontinued yesterday. Pt stated that her appetite has been poor and has been consuming minimal amounts of PO (1-25%). Pt declined all nutritional supplements at this time. Staff to encourage adequate po/fluid intake. No significant wt loss noted since admission. Will continue to monitor. Malnutrition Assessment:  Malnutrition Status: At risk for malnutrition (Comment)    Context:  Acute Illness     Findings of the 6 clinical characteristics of malnutrition:  Energy Intake:  7 - 50% or less of estimated energy requirements for 5 or more days  Weight Loss:  No significant weight loss     Body Fat Loss:  No significant body fat loss     Muscle Mass Loss:  No significant muscle mass loss    Fluid Accumulation:  1 - Mild Extremities, Generalized   Strength:  Not Performed    Estimated Daily Nutrient Needs:  Energy (kcal):  1.2-1.3 ~> 4461-0821 kcals/d; Weight Used for Energy Requirements:  Current     Protein (g):  1.2-1.4 gm/kg ~> 66-77 gms/d; Weight Used for Protein Requirements:  Ideal        Fluid (ml/day):  2200 mL fluid/d or per MD; Method Used for Fluid Requirements:   (Ori Louise)      Nutrition Related Findings:  BM 10/14; labs/meds reviewed      Wounds:  Surgical Incision (abdomen)       Current Nutrition Therapies:    ADULT DIET;  Regular    Anthropometric Measures:  · Height: 5' 4\" (162.6 cm)  · Current Body Weight: 224 lb (101.6 kg)   · Admission Body Weight: 225 lb (102.1 kg)    · Usual Body Weight: 220 lb (99.8 kg) (Pre-Pregnancy)     · Ideal Body Weight: 120 lbs; % Ideal Body Weight 186.7 %   · BMI: 38.4  · BMI Categories: Obese Class 2 (BMI 35.0 -39.9) Nutrition Diagnosis:   · Inadequate oral intake related to  (recent extubation) as evidenced by intake 0-25%      Nutrition Interventions:   Food and/or Nutrient Delivery:  Continue Current Diet  Nutrition Education/Counseling:  Education not indicated   Coordination of Nutrition Care:  Continue to monitor while inpatient    Goals: Progressing   Intake to meet > 50% of estimated nutrition needs       Nutrition Monitoring and Evaluation:   Food/Nutrient Intake Outcomes:  Food and Nutrient Intake  Physical Signs/Symptoms Outcomes:  Biochemical Data, Nutrition Focused Physical Findings, Skin, Weight, GI Status, Fluid Status or Edema     Discharge Planning:     Too soon to determine     Electronically signed by Leopoldo Side, RD, LD on 10/21/21 at 1:20 PM EDT    Contact: [phone]

## 2021-10-21 NOTE — LACTATION NOTE
Extra supplies taken to ICU for pumping milk. Spoke to [facility] and reviewed breast pump operation (initiation phase) and expected output. Reviewed pump frequency and length of time to pump. Expressed milk taken to NICU. Encouraged RN to call lactation for needs.

## 2021-10-21 NOTE — PROGRESS NOTES
Heart rate has slowly been increasing throughout day. HR started in 110s. She has been up and down, but in the last hour has maintained about 160. Dr Jessica Earl and OB resident notified. Blood cultures ordered, ECG done, Percocet admin per orders. Awaiting further recommendations from attending. ..

## 2021-10-22 ENCOUNTER — APPOINTMENT (OUTPATIENT)
Dept: GENERAL RADIOLOGY | Age: 31
DRG: 003 | End: 2021-10-22
Payer: COMMERCIAL

## 2021-10-22 ENCOUNTER — APPOINTMENT (OUTPATIENT)
Dept: CT IMAGING | Age: 31
DRG: 003 | End: 2021-10-22
Payer: COMMERCIAL

## 2021-10-22 LAB
ABO/RH: NORMAL
ACTIVATED CLOTTING TIME: 142 SEC (ref 79–149)
ACTIVATED CLOTTING TIME: 70 SEC (ref 79–149)
ALBUMIN SERPL-MCNC: 3 G/DL (ref 3.5–5.2)
ALBUMIN/GLOBULIN RATIO: 1.3 (ref 1–2.5)
ALLEN TEST: ABNORMAL
ALLEN TEST: ABNORMAL
ALP BLD-CCNC: 74 U/L (ref 35–104)
ALT SERPL-CCNC: 25 U/L (ref 5–33)
ANION GAP SERPL CALCULATED.3IONS-SCNC: 11 MMOL/L (ref 9–17)
ANION GAP SERPL CALCULATED.3IONS-SCNC: 12 MMOL/L (ref 9–17)
ANTIBODY SCREEN: NEGATIVE
ARM BAND NUMBER: NORMAL
AST SERPL-CCNC: 20 U/L
BILIRUB SERPL-MCNC: 1.16 MG/DL (ref 0.3–1.2)
BILIRUBIN DIRECT: 0.5 MG/DL
BILIRUBIN, INDIRECT: 0.66 MG/DL (ref 0–1)
BLD PROD TYP BPU: NORMAL
BUN BLDV-MCNC: 11 MG/DL (ref 6–20)
BUN BLDV-MCNC: 11 MG/DL (ref 6–20)
BUN/CREAT BLD: ABNORMAL (ref 9–20)
BUN/CREAT BLD: ABNORMAL (ref 9–20)
CALCIUM IONIZED: 1.09 MMOL/L (ref 1.13–1.33)
CALCIUM IONIZED: 1.14 MMOL/L (ref 1.13–1.33)
CALCIUM SERPL-MCNC: 8.1 MG/DL (ref 8.6–10.4)
CALCIUM SERPL-MCNC: 8.1 MG/DL (ref 8.6–10.4)
CHLORIDE BLD-SCNC: 97 MMOL/L (ref 98–107)
CHLORIDE BLD-SCNC: 99 MMOL/L (ref 98–107)
CO2: 20 MMOL/L (ref 20–31)
CO2: 21 MMOL/L (ref 20–31)
CREAT SERPL-MCNC: 0.23 MG/DL (ref 0.5–0.9)
CREAT SERPL-MCNC: 0.24 MG/DL (ref 0.5–0.9)
CROSSMATCH RESULT: NORMAL
DISPENSE STATUS BLOOD BANK: NORMAL
EKG ATRIAL RATE: 134 BPM
EKG ATRIAL RATE: 171 BPM
EKG P AXIS: 30 DEGREES
EKG P AXIS: 33 DEGREES
EKG P-R INTERVAL: 120 MS
EKG P-R INTERVAL: 130 MS
EKG Q-T INTERVAL: 218 MS
EKG Q-T INTERVAL: 294 MS
EKG QRS DURATION: 66 MS
EKG QRS DURATION: 80 MS
EKG QTC CALCULATION (BAZETT): 367 MS
EKG QTC CALCULATION (BAZETT): 439 MS
EKG R AXIS: 24 DEGREES
EKG R AXIS: 48 DEGREES
EKG T AXIS: 22 DEGREES
EKG T AXIS: 28 DEGREES
EKG VENTRICULAR RATE: 134 BPM
EKG VENTRICULAR RATE: 171 BPM
EXPIRATION DATE: NORMAL
FIBRINOGEN: 481 MG/DL (ref 140–420)
FIBRINOGEN: 524 MG/DL (ref 140–420)
FIO2: ABNORMAL
FIO2: ABNORMAL
GFR AFRICAN AMERICAN: >60 ML/MIN
GFR AFRICAN AMERICAN: >60 ML/MIN
GFR NON-AFRICAN AMERICAN: >60 ML/MIN
GFR NON-AFRICAN AMERICAN: >60 ML/MIN
GFR SERPL CREATININE-BSD FRML MDRD: ABNORMAL ML/MIN/{1.73_M2}
GLOBULIN: ABNORMAL G/DL (ref 1.5–3.8)
GLUCOSE BLD-MCNC: 104 MG/DL (ref 65–105)
GLUCOSE BLD-MCNC: 132 MG/DL (ref 70–99)
GLUCOSE BLD-MCNC: 143 MG/DL (ref 70–99)
GLUCOSE BLD-MCNC: 143 MG/DL (ref 74–100)
GLUCOSE BLD-MCNC: 154 MG/DL (ref 74–100)
GLUCOSE BLD-MCNC: 86 MG/DL (ref 65–105)
HCT VFR BLD CALC: 19.8 % (ref 36.3–47.1)
HCT VFR BLD CALC: 26.3 % (ref 36.3–47.1)
HEMOGLOBIN: 6.5 G/DL (ref 11.9–15.1)
HEMOGLOBIN: 8.6 G/DL (ref 11.9–15.1)
INR BLD: 1
INR BLD: 1
LACTIC ACID, WHOLE BLOOD: 1 MMOL/L (ref 0.7–2.1)
LACTIC ACID, WHOLE BLOOD: 1 MMOL/L (ref 0.7–2.1)
MAGNESIUM: 1.8 MG/DL (ref 1.6–2.6)
MAGNESIUM: 2 MG/DL (ref 1.6–2.6)
MCH RBC QN AUTO: 28.5 PG (ref 25.2–33.5)
MCH RBC QN AUTO: 29 PG (ref 25.2–33.5)
MCHC RBC AUTO-ENTMCNC: 32.7 G/DL (ref 28.4–34.8)
MCHC RBC AUTO-ENTMCNC: 32.8 G/DL (ref 28.4–34.8)
MCV RBC AUTO: 87.1 FL (ref 82.6–102.9)
MCV RBC AUTO: 88.4 FL (ref 82.6–102.9)
MODE: ABNORMAL
MODE: ABNORMAL
NEGATIVE BASE EXCESS, ART: 1 (ref 0–2)
NEGATIVE BASE EXCESS, ART: ABNORMAL (ref 0–2)
NRBC AUTOMATED: 0.3 PER 100 WBC
NRBC AUTOMATED: 0.4 PER 100 WBC
O2 DEVICE/FLOW/%: ABNORMAL
O2 DEVICE/FLOW/%: ABNORMAL
PARTIAL THROMBOPLASTIN TIME: 27.3 SEC (ref 20.5–30.5)
PARTIAL THROMBOPLASTIN TIME: 82.6 SEC (ref 20.5–30.5)
PATIENT TEMP: ABNORMAL
PATIENT TEMP: ABNORMAL
PDW BLD-RTO: 14.4 % (ref 11.8–14.4)
PDW BLD-RTO: 14.5 % (ref 11.8–14.4)
PLATELET # BLD: 142 K/UL (ref 138–453)
PLATELET # BLD: 239 K/UL (ref 138–453)
PMV BLD AUTO: 9.2 FL (ref 8.1–13.5)
PMV BLD AUTO: 9.4 FL (ref 8.1–13.5)
POC HCO3: 22 MMOL/L (ref 21–28)
POC HCO3: 22.8 MMOL/L (ref 21–28)
POC O2 SATURATION: 90 % (ref 94–98)
POC O2 SATURATION: 98 % (ref 94–98)
POC PCO2 TEMP: ABNORMAL MM HG
POC PCO2 TEMP: ABNORMAL MM HG
POC PCO2: 27.7 MM HG (ref 35–48)
POC PCO2: 30.6 MM HG (ref 35–48)
POC PH TEMP: ABNORMAL
POC PH TEMP: ABNORMAL
POC PH: 7.47 (ref 7.35–7.45)
POC PH: 7.52 (ref 7.35–7.45)
POC PO2 TEMP: ABNORMAL MM HG
POC PO2 TEMP: ABNORMAL MM HG
POC PO2: 51.5 MM HG (ref 83–108)
POC PO2: 91.8 MM HG (ref 83–108)
POSITIVE BASE EXCESS, ART: 0 (ref 0–3)
POSITIVE BASE EXCESS, ART: ABNORMAL (ref 0–3)
POTASSIUM SERPL-SCNC: 4.4 MMOL/L (ref 3.7–5.3)
POTASSIUM SERPL-SCNC: 4.6 MMOL/L (ref 3.7–5.3)
PROTHROMBIN TIME: 10.9 SEC (ref 9.1–12.3)
PROTHROMBIN TIME: 11.1 SEC (ref 9.1–12.3)
RBC # BLD: 2.24 M/UL (ref 3.95–5.11)
RBC # BLD: 3.02 M/UL (ref 3.95–5.11)
SAMPLE SITE: ABNORMAL
SAMPLE SITE: ABNORMAL
SODIUM BLD-SCNC: 129 MMOL/L (ref 135–144)
SODIUM BLD-SCNC: 131 MMOL/L (ref 135–144)
TCO2 (CALC), ART: ABNORMAL MMOL/L (ref 22–29)
TCO2 (CALC), ART: ABNORMAL MMOL/L (ref 22–29)
TOTAL PROTEIN: 5.3 G/DL (ref 6.4–8.3)
TRANSFUSION STATUS: NORMAL
UNIT DIVISION: 0
UNIT NUMBER: NORMAL
VANCOMYCIN TROUGH DATE LAST DOSE: NORMAL
VANCOMYCIN TROUGH DOSE AMOUNT: NORMAL
VANCOMYCIN TROUGH TIME LAST DOSE: NORMAL
VANCOMYCIN TROUGH: 11.9 UG/ML (ref 10–20)
WBC # BLD: 24.2 K/UL (ref 3.5–11.3)
WBC # BLD: 27.7 K/UL (ref 3.5–11.3)

## 2021-10-22 PROCEDURE — 6370000000 HC RX 637 (ALT 250 FOR IP): Performed by: STUDENT IN AN ORGANIZED HEALTH CARE EDUCATION/TRAINING PROGRAM

## 2021-10-22 PROCEDURE — 82803 BLOOD GASES ANY COMBINATION: CPT

## 2021-10-22 PROCEDURE — 2580000003 HC RX 258: Performed by: STUDENT IN AN ORGANIZED HEALTH CARE EDUCATION/TRAINING PROGRAM

## 2021-10-22 PROCEDURE — 6360000002 HC RX W HCPCS: Performed by: STUDENT IN AN ORGANIZED HEALTH CARE EDUCATION/TRAINING PROGRAM

## 2021-10-22 PROCEDURE — 83735 ASSAY OF MAGNESIUM: CPT

## 2021-10-22 PROCEDURE — 2500000003 HC RX 250 WO HCPCS: Performed by: INTERNAL MEDICINE

## 2021-10-22 PROCEDURE — 71260 CT THORAX DX C+: CPT

## 2021-10-22 PROCEDURE — 2580000003 HC RX 258: Performed by: INTERNAL MEDICINE

## 2021-10-22 PROCEDURE — 80048 BASIC METABOLIC PNL TOTAL CA: CPT

## 2021-10-22 PROCEDURE — 85384 FIBRINOGEN ACTIVITY: CPT

## 2021-10-22 PROCEDURE — 2000000000 HC ICU R&B

## 2021-10-22 PROCEDURE — 6360000002 HC RX W HCPCS: Performed by: INTERNAL MEDICINE

## 2021-10-22 PROCEDURE — 85730 THROMBOPLASTIN TIME PARTIAL: CPT

## 2021-10-22 PROCEDURE — 80076 HEPATIC FUNCTION PANEL: CPT

## 2021-10-22 PROCEDURE — 99233 SBSQ HOSP IP/OBS HIGH 50: CPT | Performed by: INTERNAL MEDICINE

## 2021-10-22 PROCEDURE — 33948 ECMO/ECLS DAILY MGMT-VENOUS: CPT

## 2021-10-22 PROCEDURE — 85347 COAGULATION TIME ACTIVATED: CPT

## 2021-10-22 PROCEDURE — 86900 BLOOD TYPING SEROLOGIC ABO: CPT

## 2021-10-22 PROCEDURE — 76937 US GUIDE VASCULAR ACCESS: CPT

## 2021-10-22 PROCEDURE — 80202 ASSAY OF VANCOMYCIN: CPT

## 2021-10-22 PROCEDURE — 85027 COMPLETE CBC AUTOMATED: CPT

## 2021-10-22 PROCEDURE — 99291 CRITICAL CARE FIRST HOUR: CPT | Performed by: INTERNAL MEDICINE

## 2021-10-22 PROCEDURE — 71045 X-RAY EXAM CHEST 1 VIEW: CPT

## 2021-10-22 PROCEDURE — 93005 ELECTROCARDIOGRAM TRACING: CPT | Performed by: INTERNAL MEDICINE

## 2021-10-22 PROCEDURE — APPSS30 APP SPLIT SHARED TIME 16-30 MINUTES: Performed by: NURSE PRACTITIONER

## 2021-10-22 PROCEDURE — 33966 ECMO/ECLS RMVL PRPH CANNULA: CPT

## 2021-10-22 PROCEDURE — 6360000002 HC RX W HCPCS

## 2021-10-22 PROCEDURE — 6360000004 HC RX CONTRAST MEDICATION: Performed by: INTERNAL MEDICINE

## 2021-10-22 PROCEDURE — 82330 ASSAY OF CALCIUM: CPT

## 2021-10-22 PROCEDURE — 03PYX3Z REMOVAL OF INFUSION DEVICE FROM UPPER ARTERY, EXTERNAL APPROACH: ICD-10-PCS | Performed by: THORACIC SURGERY (CARDIOTHORACIC VASCULAR SURGERY)

## 2021-10-22 PROCEDURE — 83605 ASSAY OF LACTIC ACID: CPT

## 2021-10-22 PROCEDURE — 82947 ASSAY GLUCOSE BLOOD QUANT: CPT

## 2021-10-22 PROCEDURE — 85610 PROTHROMBIN TIME: CPT

## 2021-10-22 PROCEDURE — 2500000003 HC RX 250 WO HCPCS: Performed by: STUDENT IN AN ORGANIZED HEALTH CARE EDUCATION/TRAINING PROGRAM

## 2021-10-22 PROCEDURE — P9016 RBC LEUKOCYTES REDUCED: HCPCS

## 2021-10-22 PROCEDURE — 2500000003 HC RX 250 WO HCPCS: Performed by: THORACIC SURGERY (CARDIOTHORACIC VASCULAR SURGERY)

## 2021-10-22 RX ORDER — 0.9 % SODIUM CHLORIDE 0.9 %
500 INTRAVENOUS SOLUTION INTRAVENOUS ONCE
Status: COMPLETED | OUTPATIENT
Start: 2021-10-22 | End: 2021-10-22

## 2021-10-22 RX ORDER — MIDAZOLAM HYDROCHLORIDE 1 MG/ML
INJECTION INTRAMUSCULAR; INTRAVENOUS
Status: COMPLETED
Start: 2021-10-22 | End: 2021-10-22

## 2021-10-22 RX ORDER — BUPIVACAINE HYDROCHLORIDE 5 MG/ML
30 INJECTION, SOLUTION EPIDURAL; INTRACAUDAL ONCE
Status: COMPLETED | OUTPATIENT
Start: 2021-10-22 | End: 2021-10-22

## 2021-10-22 RX ORDER — SODIUM CHLORIDE 9 MG/ML
INJECTION, SOLUTION INTRAVENOUS ONCE
Status: COMPLETED | OUTPATIENT
Start: 2021-10-22 | End: 2021-10-22

## 2021-10-22 RX ORDER — PROMETHAZINE HYDROCHLORIDE 25 MG/ML
6.25 INJECTION, SOLUTION INTRAMUSCULAR; INTRAVENOUS EVERY 6 HOURS PRN
Status: DISCONTINUED | OUTPATIENT
Start: 2021-10-22 | End: 2021-10-30 | Stop reason: HOSPADM

## 2021-10-22 RX ORDER — FENTANYL CITRATE 50 UG/ML
100 INJECTION, SOLUTION INTRAMUSCULAR; INTRAVENOUS ONCE
Status: COMPLETED | OUTPATIENT
Start: 2021-10-22 | End: 2021-10-22

## 2021-10-22 RX ORDER — FENTANYL CITRATE 50 UG/ML
INJECTION, SOLUTION INTRAMUSCULAR; INTRAVENOUS
Status: COMPLETED
Start: 2021-10-22 | End: 2021-10-22

## 2021-10-22 RX ORDER — MIDAZOLAM HYDROCHLORIDE 2 MG/2ML
1 INJECTION, SOLUTION INTRAMUSCULAR; INTRAVENOUS ONCE
Status: COMPLETED | OUTPATIENT
Start: 2021-10-22 | End: 2021-10-22

## 2021-10-22 RX ORDER — DEXTROSE AND SODIUM CHLORIDE 5; .9 G/100ML; G/100ML
INJECTION, SOLUTION INTRAVENOUS CONTINUOUS
Status: DISCONTINUED | OUTPATIENT
Start: 2021-10-22 | End: 2021-10-26

## 2021-10-22 RX ADMIN — MIDAZOLAM HYDROCHLORIDE 1 MG: 2 INJECTION, SOLUTION INTRAMUSCULAR; INTRAVENOUS at 10:45

## 2021-10-22 RX ADMIN — Medication 1250 MG: at 12:27

## 2021-10-22 RX ADMIN — DIPHENHYDRAMINE HYDROCHLORIDE 25 MG: 50 INJECTION, SOLUTION INTRAMUSCULAR; INTRAVENOUS at 22:06

## 2021-10-22 RX ADMIN — DEXAMETHASONE SODIUM PHOSPHATE 6 MG: 10 INJECTION INTRAMUSCULAR; INTRAVENOUS at 08:11

## 2021-10-22 RX ADMIN — ESMOLOL HYDROCHLORIDE 75 MCG/KG/MIN: 10 INJECTION INTRAVENOUS at 03:21

## 2021-10-22 RX ADMIN — SODIUM CHLORIDE, PRESERVATIVE FREE 10 ML: 5 INJECTION INTRAVENOUS at 20:48

## 2021-10-22 RX ADMIN — FENTANYL CITRATE 50 MCG: 50 INJECTION, SOLUTION INTRAMUSCULAR; INTRAVENOUS at 10:45

## 2021-10-22 RX ADMIN — IOPAMIDOL 75 ML: 755 INJECTION, SOLUTION INTRAVENOUS at 16:26

## 2021-10-22 RX ADMIN — Medication 1250 MG: at 03:35

## 2021-10-22 RX ADMIN — ONDANSETRON 4 MG: 2 INJECTION INTRAMUSCULAR; INTRAVENOUS at 15:48

## 2021-10-22 RX ADMIN — METOPROLOL TARTRATE 12.5 MG: 25 TABLET ORAL at 10:25

## 2021-10-22 RX ADMIN — SODIUM CHLORIDE: 9 INJECTION, SOLUTION INTRAVENOUS at 15:31

## 2021-10-22 RX ADMIN — DEXTROSE AND SODIUM CHLORIDE: 5; 900 INJECTION, SOLUTION INTRAVENOUS at 20:32

## 2021-10-22 RX ADMIN — DIPHENHYDRAMINE HYDROCHLORIDE 25 MG: 50 INJECTION, SOLUTION INTRAMUSCULAR; INTRAVENOUS at 04:01

## 2021-10-22 RX ADMIN — Medication 1 TABLET: at 08:10

## 2021-10-22 RX ADMIN — Medication 100 MG: at 08:11

## 2021-10-22 RX ADMIN — PROMETHAZINE HYDROCHLORIDE 6.25 MG: 25 INJECTION INTRAMUSCULAR; INTRAVENOUS at 19:18

## 2021-10-22 RX ADMIN — FENTANYL CITRATE 50 MCG: 50 INJECTION INTRAMUSCULAR; INTRAVENOUS at 10:45

## 2021-10-22 RX ADMIN — ESMOLOL HYDROCHLORIDE 25 MCG/KG/MIN: 10 INJECTION INTRAVENOUS at 09:41

## 2021-10-22 RX ADMIN — Medication 1250 MG: at 20:42

## 2021-10-22 RX ADMIN — SODIUM CHLORIDE 500 ML: 0.9 INJECTION, SOLUTION INTRAVENOUS at 15:06

## 2021-10-22 RX ADMIN — METOPROLOL TARTRATE 12.5 MG: 25 TABLET ORAL at 20:47

## 2021-10-22 RX ADMIN — MAGNESIUM SULFATE HEPTAHYDRATE 1000 MG: 1 INJECTION, SOLUTION INTRAVENOUS at 20:33

## 2021-10-22 RX ADMIN — BUPIVACAINE HYDROCHLORIDE 150 MG: 5 INJECTION, SOLUTION EPIDURAL; INTRACAUDAL; PERINEURAL at 11:48

## 2021-10-22 RX ADMIN — PROMETHAZINE HYDROCHLORIDE 6.25 MG: 25 INJECTION INTRAMUSCULAR; INTRAVENOUS at 23:22

## 2021-10-22 RX ADMIN — MIDAZOLAM HYDROCHLORIDE 1 MG: 1 INJECTION, SOLUTION INTRAMUSCULAR; INTRAVENOUS at 10:45

## 2021-10-22 RX ADMIN — DOCUSATE SODIUM 50MG AND SENNOSIDES 8.6MG 2 TABLET: 8.6; 5 TABLET, FILM COATED ORAL at 08:10

## 2021-10-22 RX ADMIN — CEFEPIME HYDROCHLORIDE 2000 MG: 2 INJECTION, POWDER, FOR SOLUTION INTRAVENOUS at 08:10

## 2021-10-22 RX ADMIN — MAGNESIUM SULFATE HEPTAHYDRATE 1000 MG: 1 INJECTION, SOLUTION INTRAVENOUS at 00:10

## 2021-10-22 ASSESSMENT — PAIN SCALES - GENERAL
PAINLEVEL_OUTOF10: 3
PAINLEVEL_OUTOF10: 0
PAINLEVEL_OUTOF10: 0

## 2021-10-22 ASSESSMENT — ENCOUNTER SYMPTOMS
APNEA: 0
TROUBLE SWALLOWING: 0
SORE THROAT: 0
WHEEZING: 0
ABDOMINAL PAIN: 0
EYE PAIN: 0
ABDOMINAL DISTENTION: 0
DIARRHEA: 0
EYE DISCHARGE: 0
COUGH: 0
NAUSEA: 0
SHORTNESS OF BREATH: 1
CONSTIPATION: 0
VOMITING: 0
COLOR CHANGE: 0

## 2021-10-22 NOTE — PLAN OF CARE
Problem: Airway Clearance - Ineffective  Goal: Achieve or maintain patent airway  Outcome: Ongoing     Problem: Gas Exchange - Impaired  Goal: Absence of hypoxia  Outcome: Ongoing  Goal: Promote optimal lung function  Outcome: Ongoing     Problem: Breathing Pattern - Ineffective  Goal: Ability to achieve and maintain a regular respiratory rate  Outcome: Ongoing     Problem:  Body Temperature -  Risk of, Imbalanced  Goal: Ability to maintain a body temperature within defined limits  10/22/2021 1945 by Heidi Rolon RN  Outcome: Ongoing  Goal: Will regain or maintain usual level of consciousness  10/22/2021 1945 by Heidi Rolon RN  Outcome: Ongoing  Goal: Complications related to the disease process, condition or treatment will be avoided or minimized  10/22/2021 1945 by Heidi Rolon RN  Outcome: Ongoing    Problem: Isolation Precautions - Risk of Spread of Infection  Goal: Prevent transmission of infection  Outcome: Ongoing     Problem: Nutrition Deficits  Goal: Optimize nutritional status  Outcome: Ongoing     Problem: Risk for Fluid Volume Deficit  Goal: Maintain normal heart rhythm  Outcome: Ongoing  Goal: Maintain absence of muscle cramping  Outcome: Ongoing  Goal: Maintain normal serum potassium, sodium, calcium, phosphorus, and pH  Outcome: Ongoing     Problem: Loneliness or Risk for Loneliness  Goal: Demonstrate positive use of time alone when socialization is not possible  Outcome: Ongoing     Problem: Fatigue  Goal: Verbalize increase energy and improved vitality  Outcome: Ongoing     Problem: Patient Education: Go to Patient Education Activity  Goal: Patient/Family Education  Outcome: Ongoing     Problem: Nutrition  Goal: Optimal nutrition therapy  Outcome: Ongoing     Problem: Skin Integrity:  Goal: Will show no infection signs and symptoms  Description: Will show no infection signs and symptoms  Outcome: Ongoing  Goal: Absence of new skin breakdown  Description: Absence of new skin breakdown  10/22/2021 1945 by Hoda Greene RN  Outcome: Ongoing    Problem: OXYGENATION/RESPIRATORY FUNCTION  Goal: Patient will maintain patent airway  Outcome: Ongoing  Goal: Patient will achieve/maintain normal respiratory rate/effort  Description: Respiratory rate and effort will be within normal limits for the patient  Outcome: Ongoing     Problem: Anxiety:  Goal: Level of anxiety will decrease  Description: Level of anxiety will decrease  10/22/2021 1945 by Hoda Greene RN  Outcome: Ongoing

## 2021-10-22 NOTE — PROGRESS NOTES
Notified dr. Nolvia Valdez that patients heart rate still elevated and bp back to normal orders to resume esmolol aislinn Shine RN

## 2021-10-22 NOTE — CONSULTS
Attestation signed by      Attending Physician Statement:    I have discussed the care of  Kwadwo Barton , including pertinent history and exam findings, with the Cardiology fellow/resident. I have seen and examined the patient and the key elements of all parts of the encounter have been performed by me. I agree with the assessment, plan and orders as documented by the fellow/resident, after I modified exam findings and plan of treatments, and the final version is my approved version of the assessment. Additional Comments:   Sinus tachy- HR improved to 110 on esmolol drip  - add lopressor, wean esmolol  ARDS PNA COVID s/p VV Ecmo with decannulation  Preserved LVEF    D.w pt and nursing in detail. Eula Kemp DO, [facility] [place], [address], [address], [place] [address] [facility]  [facility]. [employer]  [phone]            [facility]    Consult / H&P               Today's Date: 10/22/2021  Patient Name: Kwadwo Barton  Date of admission: 10/13/2021  8:19 PM  Patient's age: 27 y.o., 1990  Admission Dx: 26 weeks gestation of pregnancy [Z3A.26]    Reason for Consult:  Cardiac evaluation    Requesting Physician: Claudia Ndiaye DO    CHIEF COMPLAINT:    History Obtained From:  patient    HISTORY OF PRESENT ILLNESS:    59-year-old female was admitted secondary to Covid pneumonia. Patient was 28 weeks pregnant. Was intubated. Patient went into respiratory failure requiring ECMO for support. ECMO was decannulated today. Cardiology was consulted because of episodes of tachycardia. Had run of SVT yesterday. Patient was started on esmolol drip. EKG showed sinus tachycardia without significant ST-T wave abnormality. TSH normal.    Past Medical History:   has a past medical history of Anxiety, GERD (gastroesophageal reflux disease), Migraines, and Seasonal allergies. Past Surgical History:   has a past surgical history that includes [place] tooth extraction; Mandible surgery;  Foot surgery; injection 30 Units, 3 mL, IntraVENous, Q12H  heparin (porcine) injection 1,020 Units, 10 Units/kg, IntraVENous, PRN  heparin (porcine) injection 2,040 Units, 20 Units/kg, IntraVENous, PRN  oxyCODONE-acetaminophen (PERCOCET) 5-325 MG per tablet 1 tablet, 1 tablet, Per NG tube, Q4H PRN  sodium chloride flush 0.9 % injection 10 mL, 10 mL, IntraVENous, PRN  ondansetron (ZOFRAN) injection 4 mg, 4 mg, IntraVENous, Q6H PRN  diphenhydrAMINE (BENADRYL) injection 25 mg, 25 mg, IntraVENous, Q6H PRN  simethicone (MYLICON) chewable tablet 80 mg, 80 mg, Oral, Q6H PRN  polyethylene glycol (GLYCOLAX) packet 17 g, 17 g, Oral, Daily PRN  magnesium hydroxide (MILK OF MAGNESIA) 400 MG/5ML suspension 30 mL, 30 mL, Oral, Daily PRN  bisacodyl (DULCOLAX) suppository 10 mg, 10 mg, Rectal, Daily PRN  Lanolin Hydrous OINT, , Topical, Q1H PRN  prenatal vitamin plus iron 29-1 MG tablet 1 tablet, 1 tablet, Oral, Daily  Tetanus-Diphth-Acell Pertussis (BOOSTRIX) injection 0.5 mL, 0.5 mL, IntraMUSCular, Prior to discharge  LORazepam (ATIVAN) injection 2 mg, 2 mg, IntraVENous, Q5 Min PRN  lidocaine 1 % injection 5 mL, 5 mL, IntraDERmal, Once  sodium chloride flush 0.9 % injection 5-40 mL, 5-40 mL, IntraVENous, 2 times per day  sodium chloride flush 0.9 % injection 5-40 mL, 5-40 mL, IntraVENous, PRN  0.9 % sodium chloride infusion, 25 mL, IntraVENous, PRN  docusate (COLACE) 50 MG/5ML liquid 100 mg, 100 mg, Oral, BID  midazolam (VERSED) 1 mg/mL in D5W infusion, 1-15 mg/hr, IntraVENous, Continuous  sennosides-docusate sodium (SENOKOT-S) 8.6-50 MG tablet 2 tablet, 2 tablet, Oral, Daily  insulin lispro (HUMALOG) injection vial 0-12 Units, 0-12 Units, SubCUTAneous, Q6H  glucose (GLUTOSE) 40 % oral gel 15 g, 15 g, Oral, PRN  dextrose 50 % IV solution, 12.5 g, IntraVENous, PRN  glucagon (rDNA) injection 1 mg, 1 mg, IntraMUSCular, PRN  dextrose 5 % solution, 100 mL/hr, IntraVENous, PRN  0.9 % sodium chloride infusion, , IntraVENous, Continuous  heparin 25,000 units in dextrose 5% 250 mL (premix) infusion, 5-30 Units/kg/hr, IntraVENous, Continuous  ondansetron (ZOFRAN-ODT) disintegrating tablet 4 mg, 4 mg, Oral, Q8H PRN **OR** ondansetron (ZOFRAN) injection 4 mg, 4 mg, IntraVENous, Q6H PRN  acetaminophen (TYLENOL) tablet 650 mg, 650 mg, Oral, Q4H PRN    Allergies:  Vicodin [hydrocodone-acetaminophen]    Social History:   reports that she has quit smoking. Her smoking use included cigarettes. She has never used smokeless tobacco. She reports previous alcohol use. She reports previous drug use. Drug: Marijuana. Family History: family history includes Breast Cancer in her maternal grandmother; Heart Attack in her paternal uncle; Hypertension in her father and mother; No Known Problems in her brother, brother, maternal grandfather, and paternal grandfather; Stroke in her maternal aunt and paternal grandmother. No h/o sudden cardiac death. No for premature CAD    REVIEW OF SYSTEMS:    Constitutional: there has been no unanticipated weight loss. There's been No change in energy level, No change in activity level. Eyes: No visual changes or diplopia. No scleral icterus. ENT: No Headaches  Cardiovascular: No cardiac history  Respiratory: No previous pulmonary problems, No cough  Gastrointestinal: No abdominal pain. No change in bowel or bladder habits. Genitourinary: No dysuria, trouble voiding, or hematuria. Musculoskeletal:  No gait disturbance, No weakness or joint complaints. Integumentary: No rash or pruritis. Neurological: No headache, diplopia, change in muscle strength, numbness or tingling. No change in gait, balance, coordination, mood, affect, memory, mentation, behavior.         PHYSICAL EXAM:      /68   Pulse 113   Temp 98.2 °F (36.8 °C)   Resp 19   Ht 5' 4\" (1.626 m)   Wt 224 lb 13.9 oz (102 kg)   LMP 04/01/2021 (Approximate)   SpO2 93%   Breastfeeding Unknown   BMI 38.60 kg/m²    Constitutional and General Appearance: alert, cooperative, no distress and appears stated age  [de-identified]: PERRL, no cervical lymphadenopathy. No masses palpable. Normal oral mucosa  Respiratory:  Normal excursion and expansion without use of accessory muscles  Resp Auscultation: Good respiratory effort. No for increased work of breathing. On auscultation: clear to auscultation bilaterally  Cardiovascular:  Tachycardia   Abdomen:   No masses or tenderness  Bowel sounds present  Extremities:   No Cyanosis or Clubbing   Lower extremity edema: No   Skin: Warm and dry  Neurological:  Alert and oriented. Moves all extremities well  No abnormalities of mood, affect, memory, mentation, or behavior are noted      Labs:     CBC:   Recent Labs     10/22/21  0414 10/22/21  0958   WBC 27.7* 24.2*   HGB 6.5* 8.6*   HCT 19.8* 26.3*    142     BMP:   Recent Labs     10/22/21  0414 10/22/21  0958   * 129*   K 4.6 4.4   CO2 21 20   BUN 11 11   CREATININE 0.24* 0.23*   LABGLOM >60 >60   GLUCOSE 143* 132*     BNP: No results for input(s): BNP in the last 72 hours. PT/INR:   Recent Labs     10/22/21  0414 10/22/21  0958   PROTIME 11.1 10.9   INR 1.0 1.0     APTT:  Recent Labs     10/22/21  0414 10/22/21  0958   APTT 82.6* 27.3     CARDIAC ENZYMES:No results for input(s): CKTOTAL, CKMB, CKMBINDEX, TROPONINI in the last 72 hours.   FASTING LIPID PANEL:  Lab Results   Component Value Date    TRIG 422 10/17/2021     LIVER PROFILE:  Recent Labs     10/21/21  2214 10/22/21  0958   AST 16 20   ALT 27 25   LABALBU 3.1* 3.0*       IMPRESSION:    Patient Active Problem List   Diagnosis    Pneumonia due to COVID-19 virus    26 weeks gestation of pregnancy    Tachycardia    Tachypnea    Anxiety    [facility]ism ([facility]    Marginal insertion of umbilical cord affecting management of mother    Starvation ketoacidosis    PCCS 10/15/21 M Apg 1/1/1 Wt 2#3    Acute respiratory distress syndrome (ARDS) due to severe acute respiratory syndrome coronavirus 2 (SARS-CoV-2) ([facility]  Immunosuppressed status ([facility]       Assessment:    1. Sinus tachycardia   2  Runs of SVT  3  ARDS 2/2 to COVID requiring VV ECMO - Decannulation on 10/22/2021  4 Small PE in Left lower lobe on CTA 10/13/2021  5 Post partum   6 Leucocytosis     RECOMMENDATIONS:  Patient was on esmolol gtt. Will try to wean off esmolol gtt. Add lopressor   TSH normal  4    R/o infectious etiology     Discussed with patient and Nurse.     Electronically signed by Caitlin Santizo MD on 10/22/2021 at 2:39 PM    [facility]

## 2021-10-22 NOTE — PROGRESS NOTES
Dr. Linda Thompson updated on 's and last lopressor was given @ 1915 with ordered PRN Q6. Orders for 5mg lopressor given now with EKG recheck when HR improves.

## 2021-10-22 NOTE — PROGRESS NOTES
[facility] - Initial Consult Note COVID 19 Patient  Today's Date and Time: 10/22/2021, 12:51 AM    Impression :   COVID 23 Suspect  COVID 19 Confirmed Infection  Covid tests:  10/10/21: Positive  26 weeks gestation pregnancy  Acute hypoxic respiratory distress  Pulmonary embolism LLL  Metabolic acidosis with euglycemic starvation    Recommendations:   Antibiotic treatment:  Monitor off antibiotics  Covid Rx:  Remdesivir-Out of window  Decadron-Initiated 10/14/21  anticoagulation  DEFER barcitinib and  Actemra due to pregnancy  Intubated 10/14 -extubated 10/21  On ECMO   Baby delivered early 26 weeks,  10/21-acute leukocytosis, blood cultures and Vanco cefepime started    start on illness 10/1  Stop isolation after 10/20    Medical Decision Making/Summary/Discussion:10/22/2021     Patient admitted with suspected COVID 19 infection  Covid test confirmed positive. Infection Control Recommendations   [place] Precautions  Airborne isolation  Droplet plus Isolation    Antimicrobial Stewardship Recommendations   Off antibiotics    Chief complaint/reason for consultation:   Concern for COVID infection      History of Present Illness:   Vera Horton is a 27y.o.-year-old  female who was initially admitted on 10/13/2021. Patient seen at the request of Dr. Tonie Baron:    Patient, who is 26 weeks pregnant, initially presented to [facility] ED on 10/10/21 presented through ER with complaints of productive cough with yellow sputum X 9 days & shortness of breath with pleuritic chest pain that started the day before. She ws also experiencing decreased appetite because of loss of taste and smell. Work-up at that time revealed the patient to be Covid positive. She was tachycardic with an SpO2 of 94-98% on room air. CXR showed bilateral ill-defined pulmonary opacities suggesting COVID pneumonia.     After receiving a small fluid bolus and discussing her case with the patient's OBGYN, Dr. Vinny Oh, she was discharged home with instructions to return if her symptoms worsen. On 10/13/21, the patient again presented to [facility] ED after a home SpO2 reading was 88%. A CT chest revealed a small, non-obstructing LLL PE for which she was initiated on a heparin gtt. The patient's respiratory status worsened throughout the day and she was life-flighted to [facility] for a higher level of care. The patient was found to be tachypneic and tachycardic upon arrival. She was maintaining her SpO2 >95% on 15 L/min non-re-breather. Heparin gtt was continued and the patient was also ordered celestone for fetal lung maturity. The patient was admitted to the [facility] ICU    Interval changes  Patient delivered the baby small weight, at this time in the NICU  She continues on ECMO 100% through a right IJ  Weaning off slowly today hoping to get her to be awake so she could see the baby pH 7.6,  WBC 11 creatinine normal, fibrinogen 418  She remains off antibiotics  Chest x-ray showing widening of the left lung, bronchoscopy is planned by pulmonary  ,  Sputum culture Candida albicans    10/20  She has no fever she is alert extubated, continues to be on the echo, off antibiotics chest x-ray showing improved bilateral infiltrates  Off isolation today  Chest x-ray showed reopening of both lungs with improved aeration    10/21  Patient extubated doing well appropriate oriented, abdominal pain very mild, continues on the ECMO but not relying on it. Has been facing the baby in the NICU  Labs reviewed  White count up to 31  Blood culture 10/21 pending negative  Sputum culture 10/16 normal conrad      10/20 cxr            CAT:  10/13/21    . I have personally reviewed the past medical history, past surgical history, medications, social history, and family history, and I have updated the database accordingly.   Past Medical History:     Past Medical History:   Diagnosis Date    Anxiety  GERD (gastroesophageal reflux disease)     Migraines     Seasonal allergies        Past Surgical  History:     Past Surgical History:   Procedure Laterality Date     SECTION N/A 10/15/2021     SECTION performed by Chiqui Oneil MD at [place] [address].      nerve release, and plantar fascitis    HC  PICC POWERPIC TRIPLE  10/15/2021         MANDIBLE SURGERY      tooth implant    MOUTH SURGERY      WISDOM TOOTH EXTRACTION         Medications:      sertraline  25 mg Oral Daily    cefepime  2,000 mg IntraVENous Q12H    vancomycin (VANCOCIN) intermittent dosing (placeholder)   Other RX Placeholder    vancomycin  1,250 mg IntraVENous Q8H    heparin flush (PF)  3 mL IntraVENous Q12H    heparin flush (PF)  3 mL IntraVENous Q12H    heparin flush (PF)  3 mL IntraVENous Q12H    prenatal vitamin  1 tablet Oral Daily    Tdap-Dtap  0.5 mL IntraMUSCular Prior to discharge    lidocaine 1 % injection  5 mL IntraDERmal Once    sodium chloride flush  5-40 mL IntraVENous 2 times per day    docusate  100 mg Oral BID    sennosides-docusate sodium  2 tablet Oral Daily    insulin lispro  0-12 Units SubCUTAneous Q6H    dexamethasone  6 mg IntraVENous Q24H       Social History:     Social History     Socioeconomic History    Marital status:      Spouse name: Not on file    Number of children: Not on file    Years of education: Not on file    Highest education level: Not on file   Occupational History    Not on file   Tobacco Use    Smoking status: Former Smoker     Types: Cigarettes    Smokeless tobacco: Never Used   Vaping Use    Vaping Use: Former    Substances: Nicotine   Substance and Sexual Activity    Alcohol use: Not Currently    Drug use: Not Currently     Types: Marijuana     Comment: last smoked May 2021    Sexual activity: Yes   Other Topics Concern    Not on file   Social History Narrative    Not on file     Social Determinants of Health     Financial Psychiatric/Behavioral: Negative for agitation. Physical Examination :     Patient Vitals for the past 8 hrs:   Pulse SpO2   10/21/21 2345 106 --   10/21/21 2330 105 98 %   10/21/21 2315 104 --   10/21/21 2300 102 --   10/21/21 2245 107 --   10/21/21 2230 112 --   10/21/21 2215 141 --   10/21/21 2200 161 94 %   10/21/21 2145 157 94 %   10/21/21 2130 157 94 %   10/21/21 2115 155 --   10/21/21 2100 147 92 %   10/21/21 2045 134 96 %   10/21/21 2030 162 96 %   10/21/21 2015 153 96 %   10/21/21 2000 146 97 %   10/21/21 1945 141 96 %   10/21/21 1930 120 94 %   10/21/21 1915 161 96 %   10/21/21 1900 167 96 %   10/21/21 1845 171 96 %   10/21/21 1830 165 99 %   10/21/21 1815 138 97 %   10/21/21 1800 144 98 %   10/21/21 1745 153 97 %   10/21/21 1730 136 97 %   10/21/21 1715 139 95 %   10/21/21 1700 146 --     Physical Exam  Constitutional:       General: She is not in acute distress. Appearance: Normal appearance. She is ill-appearing. Comments: Intubated sedated   HENT:      Head: Normocephalic and atraumatic. Nose: Nose normal.   Eyes:      General: No scleral icterus. Conjunctiva/sclera: Conjunctivae normal.      Pupils: Pupils are equal, round, and reactive to light. Cardiovascular:      Rate and Rhythm: Normal rate and regular rhythm. Heart sounds: Normal heart sounds. No murmur heard. Pulmonary:      Breath sounds: No stridor. No rhonchi. Abdominal:      Palpations: Abdomen is soft. There is no mass. Hernia: No hernia is present. Genitourinary:     Comments: Urine bong  Musculoskeletal:         General: No swelling, tenderness or deformity. Cervical back: Neck supple. No rigidity or tenderness. Skin:     Coloration: Skin is not jaundiced or pale. Findings: No bruising or erythema. Neurological:      General: No focal deficit present. Mental Status: She is alert and oriented to person, place, and time.    Psychiatric:         Mood and Affect: Mood normal.         Behavior: Behavior normal.          Medical Decision Making -Laboratory:   I have independently reviewed/ordered the following labs:    CBC with Differential:   Recent Labs     10/21/21  1548 10/21/21  2214   WBC 15.3* 31.3*   HGB 7.2* 7.9*   HCT 22.1* 24.2*    320     BMP:   Recent Labs     10/21/21  1548 10/21/21  2214   * 135   K 4.6 4.8   CL 99 102   CO2 23 20   BUN 7 9   CREATININE 0.24* 0.29*   MG 1.8 1.6     Hepatic Function Panel:   Recent Labs     10/21/21  1021 10/21/21  2214   PROT 5.2* 5.4*   LABALBU 2.8* 3.1*   BILIDIR 0.15 0.27   IBILI 0.31 0.49   BILITOT 0.46 0.76   ALKPHOS 70 68   ALT 33 27   AST 21 16     No results for input(s): RPR in the last 72 hours. No results for input(s): HIV in the last 72 hours. No results for input(s): BC in the last 72 hours. Lab Results   Component Value Date    MUCUS NOT REPORTED 10/13/2021    RBC 2.69 10/21/2021    TRICHOMONAS NOT REPORTED 10/13/2021    WBC 31.3 10/21/2021    YEAST NOT REPORTED 10/13/2021    TURBIDITY Clear 10/13/2021     Lab Results   Component Value Date    CREATININE 0.29 10/21/2021    GLUCOSE 149 10/21/2021       Medical Decision Making-Imaging:     Narrative   EXAMINATION:   CTA OF THE CHEST 10/13/2021 2:16 pm       TECHNIQUE:   CTA of the chest was performed after the administration of intravenous   contrast.  Multiplanar reformatted images are provided for review.  MIP   images are provided for review. Dose modulation, iterative reconstruction,   and/or weight based adjustment of the mA/kV was utilized to reduce the   radiation dose to as low as reasonably achievable.       COMPARISON:   Chest x-rays over the last few days. .       HISTORY:   ORDERING SYSTEM PROVIDED HISTORY: sob, tachy, 32 WEEKS PREGNANT, Spoke with   Radiologist   TECHNOLOGIST PROVIDED HISTORY:   sob, tachy, 32 WEEKS PREGNANT, Spoke with Radiologist   Decision Support Exception - unselect if not a suspected or confirmed   emergency medical condition->Emergency Medical Condition (MA)       FINDINGS:   Pulmonary Arteries: There is a small nonocclusive segmental to subsegmental   embolism noted in the left lower lobe, on and around axial image [id number].  No   other definite emboli are seen.  No central emboli.  No pulmonary arterial   enlargement is identified.       Mediastinum: Mediastinal and hilar lymphadenopathy is identified.  No focal   esophageal thickening is identified.  The thyroid gland appears unremarkable.       Lungs/pleura: Multifocal ground-glass pneumonia with some mild areas of   consolidation.  No pleural effusion is identified.       Upper Abdomen: No acute process seen in the upper abdomen.       Soft Tissues/Bones: No axillary or supraclavicular lymphadenopathy is   identified.  No acute osseous abnormality is identified.           Impression   Single small nonocclusive segmental to subsegmental pulmonary embolism in the   left lower lobe.  No central emboli.  This was discussed with Nathalie Corey   at 2:44 p.m. on 10/13/2021.       Multilobar COVID-19 pneumonia.                 Medical Decision Yizzso-Exhgdqcy-Eselx:       Medical Decision Making-Other:     Note:  Labs, medications, radiologic studies were reviewed with personal review of films  Large amounts of data were reviewed  Discussed with nursing Staff, Discharge planner  Infection Control and Prevention measures reviewed  All prior entries were reviewed  Administer medications as ordered  Prognosis: Guarded  Discharge planning reviewed  Follow up as outpatient. Thank you for allowing us to participate in the care of this patient. Please call with questions.     Edison Randall MD  Office: [phone]

## 2021-10-22 NOTE — PROGRESS NOTES
0. 46 0.76     Inflammatory Markers:   No results for input(s): CRP, FERRITIN, LDH in the last 72 hours. Invalid input(s):  ESR  Cardiac: No results for input(s): CKTOTAL, CKMB, CKMBINDEX, TROPONINI, BNP, PROBNP in the last 72 hours. Invalid input(s): TROPONIN, HSTROP  Lactic Acid: No results for input(s): LACTA in the last 72 hours. Triglycerides:   No results for input(s): TRIG in the last 72 hours. PT/INR:   Recent Labs     10/21/21  1548 10/21/21  2214 10/22/21  0414   PROTIME 10.4 10.6 11.1   INR 1.0 1.0 1.0     APTT:   Recent Labs     10/21/21  1548 10/21/21  2214 10/22/21  0414   APTT 65.3* 59.5* 82.6*     TEG:   Recent Labs     10/20/21  1036 10/20/21  2253 10/21/21  1021   HEPTHERAPY UNKNOWN  UNKNOWN YES  YES YES  YES   REACTTIMETEG 26.6* 30.4* 20.9*   KINETICSTEG 5.6* 10.4* 3.8*   ANGLETEG 39.1* 13.3* 48.4*   MAXCLOTTEG 82.5* 70.6* 73.0*   CY91JJL 0.0 0.0 0.0   EPLTEG 0.0 0.0 0.0     ABG:   Recent Labs     10/21/21  0654 10/21/21  1021 10/21/21  1546 10/21/21  2214 10/22/21  0414   POCPH 7.533* 7.532* 7.554* 7.505* 7.466*   POCPCO2 34.7* 35.7 31.5* 31.4* 30.6*   POCPO2 72.6* 62.2* 58.5* 63.4* 91.8   POCHCO3 29.2* 29.9* 27.8 24.8 22.0   NWAC8HGK 96 94 94 94 98     VBG:   Recent Labs     10/19/21  1749 10/21/21  0640   PHVEN 7.419 7.432*   UQP0XCU 48.8 41.8   EZH9JAV 31.6* 27.9   [id number] 67 53*       I/O:  I/O last 3 completed shifts:   In: 2517.2 [P.O.:160; I.V.:1291; Blood:1066.2]  Out: 2325 [Urine:2325]    Scheduled Meds:    sertraline  25 mg Oral Daily    cefepime  2,000 mg IntraVENous Q12H    vancomycin (VANCOCIN) intermittent dosing (placeholder)   Other RX Placeholder    vancomycin  1,250 mg IntraVENous Q8H    heparin flush (PF)  3 mL IntraVENous Q12H    heparin flush (PF)  3 mL IntraVENous Q12H    heparin flush (PF)  3 mL IntraVENous Q12H    prenatal vitamin  1 tablet Oral Daily    Tdap-Dtap  0.5 mL IntraMUSCular Prior to discharge    lidocaine 1 % injection  5 mL IntraDERmal Once  sodium chloride flush  5-40 mL IntraVENous 2 times per day    docusate  100 mg Oral BID    sennosides-docusate sodium  2 tablet Oral Daily    insulin lispro  0-12 Units SubCUTAneous Q6H    dexamethasone  6 mg IntraVENous Q24H     Continuous Infusions:    esmolol 75 mcg/kg/min (10/22/21 0321)    sodium chloride      HYDROmorphone Stopped (10/21/21 1900)    ketamine (KETALAR) infusion for analgosedation Stopped (10/21/21 1900)    sodium chloride      midazolam Stopped (10/21/21 1900)    dextrose      sodium chloride 5 mL/hr at 10/14/21 0700    heparin (PORCINE) Infusion 18 Units/kg/hr (10/21/21 1726)       Physical Exam  Vital Signs: /68   Pulse 109   Temp 98.6 °F (37 °C)   Resp 19   Ht 5' 4\" (1.626 m)   Wt 224 lb 13.9 oz (102 kg)   LMP 04/01/2021 (Approximate)   SpO2 97%   Breastfeeding Unknown   BMI 38.60 kg/m²  O2 Flow Rate (L/min): 2 L/min     General: Intact  Heart: Normal S1 and S2.  Regular rhythm. No murmurs, gallops, or rubs. Pacing Wires: No   Lungs:  Diminished bilaterally Chest tubes: No  Abdomen: soft, non tender, non distended, BS hypoactive x4  Extremities: 1+ edema    Assessment/Plan:  Planned for decannulation this morning. The above recommendations including medications and orders were discussed and agreed upon with Dr. Madelyne Holter, the attending on service for the cardiothoracic surgery group today. Electronically signed by FADUMO Lopez CNP on 10/22/2021 at 5:46 AM    On this date 10/22/2021 I have spent 21 minutes reviewing previous notes, test results and face to face with the patient discussing the diagnosis and importance of compliance with the treatment plan as well as documenting on the day of the visit. At least 50% of the time documented was spent with the patient to provide counseling and/or coordination of care.     This note was created with the assistance of a speech-recognition program.  Although the intention is to generate a document that actually reflects the content of the visit, no guarantees can be provided that every mistake has been identified and corrected by editing.

## 2021-10-22 NOTE — PROGRESS NOTES
Obstetric/Gynecology Resident Interval Note    Patient seen and examined. She is alert and oriented. She has no postoperative or obstetric complaints at this time. Discussed removal of prevena dressing which she was amenable to. Dressing removed without difficulty. Incision is clean/dry/intact. Discussed with the nurse effort to keep the area clean and dry. Abdomen is soft. No vaginal bleeding noted on pad. Also received notification earlier from nursing staff that patient was depressed. Discussed with the patient if she has signs/symptoms of depression and she denied these. Also discussed that she has been through multiple traumatic experiences and that although postpartum depression is very real that she made be experiencing psychological insult following the traumatic events. She again declined any signs of depression or PTSD. She did states she had a history of depression and was previously on medications however, could not recall the name of the medication and declines need for medications at this time. Discussed this with the nursing staff who states the patient has mostly been refusing to talk or interact with them and has also been refusing to [address]. This is very different from the interaction I had with the patient. Discussed that OB will remain available for needs and can be contacted at any time. Recommend Psych consult due to extensive concerns from nursing staff and traumatic events surrounding this hospital admission with emergent intubation and delivery of a 26 week fetus and patient requiring ECMO and inability to see her infant.         Dipika Huizar DO  OB/GYN Resident, PGY3  [facility]  10/22/2021, 4:58 AM

## 2021-10-22 NOTE — PROGRESS NOTES
ECMO END/DECANULATION NOTE:     Patient off ECMO at 1110, decanulated bedside by Dr. Shruthi Villeda, ECMO specialist and bedside RN. Rt. IJ canulation site sutured and manual pressure held, pt tolerated procedure.

## 2021-10-22 NOTE — LACTATION NOTE
Spoke with Anika's nurse who reports they are still helping her pump every 3 hours. Last pump they obtained 5-6 mls and have been taking the milk to NICU for baby. Additional supplies given.

## 2021-10-22 NOTE — PROGRESS NOTES
Notified dr. Jonny Long of patients increased heart rate of 130s-140s and low bp orders for 500 ns bolus.  Will continue to monitor    Bj Jolly RN

## 2021-10-22 NOTE — BRIEF OP NOTE
Brief Postoperative Note      Patient: Pilo Hansen  YOB: 1990  MRN: [id number]    Date of Procedure: 10/22/21    Pre-Op Diagnosis: ECMO support s/p wean trial    Post-Op Diagnosis: Same       PROCEDURE: ECMO DECANNULATION    SURGEON: REESE    Assistant:  * No surgical staff found *    Anesthesia: General    Estimated Blood Loss (mL): N/A    Complications: None    Specimens:   * No specimens in log *    Implants:  * No implants in log *      Drains:   Urethral Catheter Double-lumen 16 fr (Active)   Catheter Indications Need for fluid volume management of the critically ill patient in a critical care setting 10/22/21 0730   Securement Device Date Changed 10/20/21 10/22/21 0730   Site Assessment No urethral drainage 10/22/21 0730   Urine Color Yellow 10/22/21 0730   Urine Appearance Clear 10/22/21 0730   Output (mL) 100 mL 10/22/21 0800       [REMOVED] NG/OG/NJ/NE Tube Orogastric Right mouth (Removed)   Surrounding Skin Dry; Intact 10/20/21 0400   Securement device Yes 10/20/21 0400   Status Other (Comment) 10/20/21 0400   Placement Verified by X-Ray (Initial) 10/20/21 0400   NG/OG/NJ/NE External Measurement (cm) 59 cm 10/19/21 0400   Drainage Appearance Tan 10/20/21 0400   Tube Feeding Other Tube Feeding (must specify product in comment) 10/19/21 0400   Tube Feeding Status Continuous 10/20/21 0400   Rate/Schedule 40 mL/hr 10/20/21 0400   Tube Feeding Intake (mL) 344 ml 10/20/21 0526   Free Water Flush (mL) 60 mL 10/19/21 1800   Residual Volume (ml) 30 ml 10/20/21 0400   Output (mL) 0 ml 10/20/21 0400       Findings: 20 Amharic CRESENT removed without incident after termination of ECMO support    Electronically signed by Ina Lopez MD on 10/22/2021 at 11:21 AM

## 2021-10-22 NOTE — PROGRESS NOTES
at that time revealed the patient to be Covid positive. She was tachycardic with an SpO2 of 94-98% on room air. CXR showed bilateral ill-defined pulmonary opacities suggesting COVID pneumonia. After receiving a small fluid bolus and discussing her case with the patient's OBGYN, Dr. Christel Brooke, she was discharged home with instructions to return if her symptoms worsen. On 10/13/21, the patient again presented to [facility] ED after a home SpO2 reading was 88%. A CT chest revealed a small, non-obstructing LLL PE for which she was initiated on a heparin gtt. The patient's respiratory status worsened throughout the day and she was life-flighted to [facility] for a higher level of care. The patient was found to be tachypneic and tachycardic upon arrival. She was maintaining her SpO2 >95% on 15 L/min non-re-breather. Heparin gtt was continued and the patient was also ordered celestone for fetal lung maturity. The patient was admitted to the [facility] ICU    Interval changes  Patient delivered the baby small weight, at this time in the NICU  She continues on ECMO 100% through a right IJ  Weaning off slowly today hoping to get her to be awake so she could see the baby pH 7.6,  WBC 11 creatinine normal, fibrinogen 418  She remains off antibiotics  Chest x-ray showing widening of the left lung, bronchoscopy is planned by pulmonary  ,  Sputum culture Candida albicans    10/20  She has no fever she is alert extubated, continues to be on the echo, off antibiotics chest x-ray showing improved bilateral infiltrates  Off isolation today  Chest x-ray showed reopening of both lungs with improved aeration    10/21  Patient extubated doing well appropriate oriented, abdominal pain very mild, continues on the ECMO but not relying on it.   Has been facing the baby in the NICU  Labs reviewed  White count up to 31  Blood culture 10/21 pending negative  Sputum culture 10/16 normal conrad    10/22  BC 10/20 SA x 2, seems file    Years of education: Not on file    Highest education level: Not on file   Occupational History    Not on file   Tobacco Use    Smoking status: Former Smoker     Types: Cigarettes    Smokeless tobacco: Never Used   Vaping Use    Vaping Use: Former    Substances: Nicotine   Substance and Sexual Activity    Alcohol use: Not Currently    Drug use: Not Currently     Types: Marijuana     Comment: last smoked May 2021    Sexual activity: Yes   Other Topics Concern    Not on file   Social History Narrative    Not on file     Social Determinants of Health     Financial Resource Strain:     Difficulty of Paying Living Expenses:    Food Insecurity:     Worried About Running Out of Food in the Last Year:     [address] in the Last Year:    Transportation Needs:     Lack of Transportation (Medical):      Lack of Transportation (Non-Medical):    Physical Activity:     Days of Exercise per Week:     Minutes of Exercise per Session:    Stress:     Feeling of Stress :    Social Connections:     Frequency of Communication with Friends and Family:     Frequency of Social Gatherings with Friends and Family:     Attends Taoism Services:     Active Member of Clubs or Organizations:     Attends Club or Organization Meetings:     Marital Status:    Intimate Partner Violence:     Fear of Current or Ex-Partner:     Emotionally Abused:     Physically Abused:     Sexually Abused:        Family History:     Family History   Problem Relation Age of Onset    Breast Cancer Maternal Grandmother         older than 48    Heart Attack Paternal Uncle     Stroke Maternal Aunt     Hypertension Father     No Known Problems Paternal Grandfather     Stroke Paternal Grandmother     No Known Problems Maternal Grandfather     Hypertension Mother     No Known Problems Brother     No Known Problems Brother         Allergies:   Vicodin [hydrocodone-acetaminophen]     Review of Systems:     Review of Systems Constitutional: Positive for activity change. HENT: Negative for congestion. Eyes: Negative for pain and discharge. Respiratory: Negative for apnea. Cardiovascular: Negative for chest pain. Gastrointestinal: Negative for abdominal distention. Endocrine: Negative for heat intolerance and polydipsia. Genitourinary: Negative for dysuria and flank pain. Musculoskeletal: Negative for arthralgias. Skin: Negative for color change. Allergic/Immunologic: Positive for immunocompromised state. Neurological: Negative for dizziness and seizures. Hematological: Negative for adenopathy. Psychiatric/Behavioral: Negative for agitation. Physical Examination :     Patient Vitals for the past 8 hrs:   Temp Temp src Pulse SpO2   10/22/21 1030 -- -- 113 93 %   10/22/21 1015 -- -- 117 92 %   10/22/21 1000 98.2 °F (36.8 °C) -- 111 92 %   10/22/21 0945 -- -- 116 93 %   10/22/21 0930 -- -- 112 91 %   10/22/21 0915 -- -- 116 92 %   10/22/21 0900 -- -- 116 90 %   10/22/21 0845 -- -- 116 91 %   10/22/21 0830 -- -- 113 (!) 86 %   10/22/21 0815 -- -- 94 (!) 86 %   10/22/21 0800 97.9 °F (36.6 °C) -- 108 95 %   10/22/21 0756 97.2 °F (36.2 °C) Oral 107 96 %   10/22/21 0745 -- -- 107 96 %   10/22/21 0730 -- -- 106 94 %   10/22/21 0715 -- -- 105 91 %   10/22/21 0700 -- -- 110 (!) 89 %   10/22/21 0645 -- -- 107 94 %   10/22/21 0630 -- -- 108 96 %   10/22/21 0615 -- -- 109 (!) 79 %   10/22/21 0605 -- -- 106 (!) 86 %   10/22/21 0600 -- -- 106 91 %   10/22/21 0555 -- -- 107 (!) 89 %   10/22/21 0550 -- -- 107 100 %   10/22/21 0545 -- -- 107 95 %   10/22/21 0540 -- -- 107 98 %   10/22/21 0535 -- -- 108 95 %   10/22/21 0530 -- -- 107 92 %   10/22/21 0525 -- -- 110 95 %   10/22/21 0500 -- -- 109 97 %   10/22/21 0400 98.6 °F (37 °C) -- 109 --     Physical Exam  Constitutional:       General: She is not in acute distress. Appearance: Normal appearance. She is ill-appearing.       Comments: Intubated sedated   HENT: Head: Normocephalic and atraumatic. Nose: Nose normal.   Eyes:      General: No scleral icterus. Conjunctiva/sclera: Conjunctivae normal.      Pupils: Pupils are equal, round, and reactive to light. Cardiovascular:      Rate and Rhythm: Normal rate and regular rhythm. Heart sounds: Normal heart sounds. No murmur heard. Pulmonary:      Effort: No respiratory distress. Breath sounds: No stridor. No rhonchi. Abdominal:      Palpations: Abdomen is soft. There is no mass. Hernia: No hernia is present. Genitourinary:     Comments: Urine bong  Musculoskeletal:         General: No swelling, tenderness, deformity or signs of injury. Cervical back: Neck supple. No rigidity or tenderness. Skin:     Coloration: Skin is not jaundiced or pale. Findings: No bruising or erythema. Neurological:      General: No focal deficit present. Mental Status: She is alert and oriented to person, place, and time. Psychiatric:         Mood and Affect: Mood normal.         Behavior: Behavior normal.          Medical Decision Making -Laboratory:   I have independently reviewed/ordered the following labs:    CBC with Differential:   Recent Labs     10/22/21  0414 10/22/21  0958   WBC 27.7* 24.2*   HGB 6.5* 8.6*   HCT 19.8* 26.3*    142     BMP:   Recent Labs     10/22/21  0414 10/22/21  0958   * 129*   K 4.6 4.4   CL 99 97*   CO2 21 20   BUN 11 11   CREATININE 0.24* 0.23*   MG 2.0 1.8     Hepatic Function Panel:   Recent Labs     10/21/21  2214 10/22/21  0958   PROT 5.4* 5.3*   LABALBU 3.1* 3.0*   BILIDIR 0.27 0.50*   IBILI 0.49 0.66   BILITOT 0.76 1.16   ALKPHOS 68 74   ALT 27 25   AST 16 20     No results for input(s): RPR in the last 72 hours. No results for input(s): HIV in the last 72 hours. No results for input(s): BC in the last 72 hours.   Lab Results   Component Value Date    MUCUS NOT REPORTED 10/13/2021    RBC 3.02 10/22/2021    TRICHOMONAS NOT REPORTED 10/13/2021 WBC 24.2 10/22/2021    YEAST NOT REPORTED 10/13/2021    TURBIDITY Clear 10/13/2021     Lab Results   Component Value Date    CREATININE 0.23 10/22/2021    GLUCOSE 132 10/22/2021       Medical Decision Making-Imaging:     Narrative   EXAMINATION:   CTA OF THE CHEST 10/13/2021 2:16 pm       TECHNIQUE:   CTA of the chest was performed after the administration of intravenous   contrast.  Multiplanar reformatted images are provided for review.  MIP   images are provided for review. Dose modulation, iterative reconstruction,   and/or weight based adjustment of the mA/kV was utilized to reduce the   radiation dose to as low as reasonably achievable.       COMPARISON:   Chest x-rays over the last few days. .       HISTORY:   ORDERING SYSTEM PROVIDED HISTORY: sob, tachy, 32 WEEKS PREGNANT, Spoke with   Radiologist   TECHNOLOGIST PROVIDED HISTORY:   sob, tachy, 32 WEEKS PREGNANT, Spoke with Radiologist   Decision Support Exception - unselect if not a suspected or confirmed   emergency medical condition->Emergency Medical Condition (MA)       FINDINGS:   Pulmonary Arteries:  There is a small nonocclusive segmental to subsegmental   embolism noted in the left lower lobe, on and around axial image [id number].  No   other definite emboli are seen.  No central emboli.  No pulmonary arterial   enlargement is identified.       Mediastinum: Mediastinal and hilar lymphadenopathy is identified.  No focal   esophageal thickening is identified.  The thyroid gland appears unremarkable.       Lungs/pleura: Multifocal ground-glass pneumonia with some mild areas of   consolidation.  No pleural effusion is identified.       Upper Abdomen: No acute process seen in the upper abdomen.       Soft Tissues/Bones: No axillary or supraclavicular lymphadenopathy is   identified.  No acute osseous abnormality is identified.           Impression   Single small nonocclusive segmental to subsegmental pulmonary embolism in the   left lower lobe.  No central emboli.  This was discussed with Coral Pelayo   at 2:44 p.m. on 10/13/2021.       Multilobar COVID-19 pneumonia.                 Medical Decision [id number]:       Medical Decision Making-Other:     Note:  Labs, medications, radiologic studies were reviewed with personal review of films  Large amounts of data were reviewed  Discussed with nursing Staff, Discharge planner  Infection Control and Prevention measures reviewed  All prior entries were reviewed  Administer medications as ordered  Prognosis: Guarded  Discharge planning reviewed  Follow up as outpatient. Thank you for allowing us to participate in the care of this patient. Please call with questions.     Aaron Mckinley MD  Office: [phone]

## 2021-10-23 ENCOUNTER — APPOINTMENT (OUTPATIENT)
Dept: GENERAL RADIOLOGY | Age: 31
DRG: 003 | End: 2021-10-23
Payer: COMMERCIAL

## 2021-10-23 LAB
ANION GAP SERPL CALCULATED.3IONS-SCNC: 12 MMOL/L (ref 9–17)
BUN BLDV-MCNC: 7 MG/DL (ref 6–20)
BUN/CREAT BLD: ABNORMAL (ref 9–20)
CALCIUM IONIZED: 1.12 MMOL/L (ref 1.13–1.33)
CALCIUM SERPL-MCNC: 8 MG/DL (ref 8.6–10.4)
CHLORIDE BLD-SCNC: 102 MMOL/L (ref 98–107)
CO2: 21 MMOL/L (ref 20–31)
CREAT SERPL-MCNC: 0.26 MG/DL (ref 0.5–0.9)
CULTURE: ABNORMAL
GFR AFRICAN AMERICAN: >60 ML/MIN
GFR NON-AFRICAN AMERICAN: >60 ML/MIN
GFR SERPL CREATININE-BSD FRML MDRD: ABNORMAL ML/MIN/{1.73_M2}
GFR SERPL CREATININE-BSD FRML MDRD: ABNORMAL ML/MIN/{1.73_M2}
GLUCOSE BLD-MCNC: 100 MG/DL (ref 65–105)
GLUCOSE BLD-MCNC: 109 MG/DL (ref 65–105)
GLUCOSE BLD-MCNC: 119 MG/DL (ref 70–99)
HCT VFR BLD CALC: 19.4 % (ref 36.3–47.1)
HCT VFR BLD CALC: 23.2 % (ref 36.3–47.1)
HEMOGLOBIN: 6.1 G/DL (ref 11.9–15.1)
HEMOGLOBIN: 7.5 G/DL (ref 11.9–15.1)
INR BLD: 1
Lab: ABNORMAL
Lab: ABNORMAL
MAGNESIUM: 1.9 MG/DL (ref 1.6–2.6)
MCH RBC QN AUTO: 28.1 PG (ref 25.2–33.5)
MCHC RBC AUTO-ENTMCNC: 31.4 G/DL (ref 28.4–34.8)
MCV RBC AUTO: 89.4 FL (ref 82.6–102.9)
NRBC AUTOMATED: 0.4 PER 100 WBC
PDW BLD-RTO: 15.1 % (ref 11.8–14.4)
PLATELET # BLD: 128 K/UL (ref 138–453)
PMV BLD AUTO: 9.5 FL (ref 8.1–13.5)
POTASSIUM SERPL-SCNC: 3.6 MMOL/L (ref 3.7–5.3)
PROTHROMBIN TIME: 10.7 SEC (ref 9.1–12.3)
RBC # BLD: 2.17 M/UL (ref 3.95–5.11)
SODIUM BLD-SCNC: 135 MMOL/L (ref 135–144)
SPECIMEN DESCRIPTION: ABNORMAL
SPECIMEN DESCRIPTION: ABNORMAL
WBC # BLD: 11.6 K/UL (ref 3.5–11.3)

## 2021-10-23 PROCEDURE — 83735 ASSAY OF MAGNESIUM: CPT

## 2021-10-23 PROCEDURE — 99291 CRITICAL CARE FIRST HOUR: CPT | Performed by: INTERNAL MEDICINE

## 2021-10-23 PROCEDURE — 85610 PROTHROMBIN TIME: CPT

## 2021-10-23 PROCEDURE — 82947 ASSAY GLUCOSE BLOOD QUANT: CPT

## 2021-10-23 PROCEDURE — 6370000000 HC RX 637 (ALT 250 FOR IP): Performed by: INTERNAL MEDICINE

## 2021-10-23 PROCEDURE — 85014 HEMATOCRIT: CPT

## 2021-10-23 PROCEDURE — 82330 ASSAY OF CALCIUM: CPT

## 2021-10-23 PROCEDURE — 85018 HEMOGLOBIN: CPT

## 2021-10-23 PROCEDURE — 2580000003 HC RX 258: Performed by: STUDENT IN AN ORGANIZED HEALTH CARE EDUCATION/TRAINING PROGRAM

## 2021-10-23 PROCEDURE — 99233 SBSQ HOSP IP/OBS HIGH 50: CPT | Performed by: INTERNAL MEDICINE

## 2021-10-23 PROCEDURE — 2580000003 HC RX 258: Performed by: INTERNAL MEDICINE

## 2021-10-23 PROCEDURE — 85027 COMPLETE CBC AUTOMATED: CPT

## 2021-10-23 PROCEDURE — 6360000002 HC RX W HCPCS: Performed by: INTERNAL MEDICINE

## 2021-10-23 PROCEDURE — 74018 RADEX ABDOMEN 1 VIEW: CPT

## 2021-10-23 PROCEDURE — 6370000000 HC RX 637 (ALT 250 FOR IP): Performed by: STUDENT IN AN ORGANIZED HEALTH CARE EDUCATION/TRAINING PROGRAM

## 2021-10-23 PROCEDURE — 86900 BLOOD TYPING SEROLOGIC ABO: CPT

## 2021-10-23 PROCEDURE — 2000000000 HC ICU R&B

## 2021-10-23 PROCEDURE — 87040 BLOOD CULTURE FOR BACTERIA: CPT

## 2021-10-23 PROCEDURE — 36415 COLL VENOUS BLD VENIPUNCTURE: CPT

## 2021-10-23 PROCEDURE — P9016 RBC LEUKOCYTES REDUCED: HCPCS

## 2021-10-23 PROCEDURE — 71045 X-RAY EXAM CHEST 1 VIEW: CPT

## 2021-10-23 PROCEDURE — 80048 BASIC METABOLIC PNL TOTAL CA: CPT

## 2021-10-23 RX ORDER — LACTOBACILLUS RHAMNOSUS GG 10B CELL
1 CAPSULE ORAL
Status: DISCONTINUED | OUTPATIENT
Start: 2021-10-23 | End: 2021-10-30 | Stop reason: HOSPADM

## 2021-10-23 RX ORDER — METOCLOPRAMIDE HYDROCHLORIDE 5 MG/ML
5 INJECTION INTRAMUSCULAR; INTRAVENOUS EVERY 6 HOURS PRN
Status: DISCONTINUED | OUTPATIENT
Start: 2021-10-23 | End: 2021-10-30 | Stop reason: HOSPADM

## 2021-10-23 RX ADMIN — METOPROLOL TARTRATE 25 MG: 25 TABLET ORAL at 13:33

## 2021-10-23 RX ADMIN — Medication 1 TABLET: at 08:47

## 2021-10-23 RX ADMIN — APIXABAN 5 MG: 5 TABLET, FILM COATED ORAL at 08:47

## 2021-10-23 RX ADMIN — SODIUM CHLORIDE, PRESERVATIVE FREE 10 ML: 5 INJECTION INTRAVENOUS at 22:11

## 2021-10-23 RX ADMIN — Medication 1250 MG: at 11:41

## 2021-10-23 RX ADMIN — METOPROLOL TARTRATE 12.5 MG: 25 TABLET ORAL at 08:47

## 2021-10-23 RX ADMIN — Medication 1250 MG: at 22:10

## 2021-10-23 RX ADMIN — APIXABAN 5 MG: 5 TABLET, FILM COATED ORAL at 02:02

## 2021-10-23 RX ADMIN — Medication 1250 MG: at 04:29

## 2021-10-23 RX ADMIN — METOCLOPRAMIDE 5 MG: 5 INJECTION, SOLUTION INTRAMUSCULAR; INTRAVENOUS at 21:11

## 2021-10-23 RX ADMIN — METOPROLOL TARTRATE 25 MG: 25 TABLET ORAL at 21:11

## 2021-10-23 RX ADMIN — METOCLOPRAMIDE 5 MG: 5 INJECTION, SOLUTION INTRAMUSCULAR; INTRAVENOUS at 11:41

## 2021-10-23 RX ADMIN — Medication 1 CAPSULE: at 21:23

## 2021-10-23 ASSESSMENT — ENCOUNTER SYMPTOMS
EYE PAIN: 0
NAUSEA: 0
WHEEZING: 0
VOMITING: 0
COUGH: 0
COLOR CHANGE: 0
ABDOMINAL PAIN: 0
SORE THROAT: 0
EYE REDNESS: 0
EYE DISCHARGE: 0
DIARRHEA: 1
DIARRHEA: 0
APNEA: 0
SHORTNESS OF BREATH: 1
ABDOMINAL DISTENTION: 0
TROUBLE SWALLOWING: 0
CONSTIPATION: 0

## 2021-10-23 NOTE — PROGRESS NOTES
PULMONARY & CRITICAL CARE MEDICINE PROGRESS NOTE     Patient:  Garret Fontanez  MRN: [id number]  [address] [facility] date: 10/13/2021  Primary Care Physician: Geovany Bella  Consulting Physician: Kwaku Pelayo DO  CODE Status: Full Code  LOS: 10     SUBJECTIVE     CHIEF COMPLAINT/REASON FOR INITIAL CONSULT: Acute respiratory failure/COVID-19 pneumonia    BRIEF HOSPITAL COURSE:   The patient is a 27 y.o. female who was 26-week pregnant initially tested positive for COVID-19 on 10/10. She presented to [place] ED on 10/13 with respiratory distress and low home O2 saturation. She was tachypneic and tachycardic. Transferred to [facility] for further management. Initial blood gases showed evidence of combined anion gap and non-anion gap metabolic acidosis, thought to be related to starvation ketosis. During evaluation patient was also noted to have a left lower lobe subsubsegmental PE. She underwent  10/15. Postoperative course complicated by development of worsening respiratory failure and required initiation of mechanical ventilation on 10/15. Due to worsening severe hypoxemic respiratory failure decision was made to put her on VV ECMO. She had placement of 27 F VV ECMO (crescent) cannula in the right atrium on 10/17. INTERVAL HISTORY:  10/23/21  Patient remained off VV ECMO, decannulated on 10/22/2021  She denies any pain or anxiety  Patient continues to have nausea with vomiting  Vomiting mucoid material  CT chest ordered to rule out PE on 10/22/2021 did not show any pulmonary rhythm  Completed Decadron    REVIEW OF SYSTEMS:  Review of Systems   Constitutional: Negative for appetite change, chills, fever and unexpected weight change. HENT: Negative for congestion, postnasal drip, sore throat and trouble swallowing. Eyes: Negative for visual disturbance. Respiratory: Positive for shortness of breath. Negative for cough and wheezing. Cardiovascular: Negative for chest pain, palpitations and leg swelling. edema, no clubbing or cyanosis  Skin - normal coloration and turgor, no rashes, no suspicious skin lesions noted     DATA REVIEW     Medications:  Scheduled Meds:   metoprolol tartrate  12.5 mg Oral BID    apixaban  5 mg Oral BID    vancomycin (VANCOCIN) intermittent dosing (placeholder)   Other RX Placeholder    vancomycin  1,250 mg IntraVENous Q8H    prenatal vitamin  1 tablet Oral Daily    Tdap-Dtap  0.5 mL IntraMUSCular Prior to discharge    lidocaine 1 % injection  5 mL IntraDERmal Once    sodium chloride flush  5-40 mL IntraVENous 2 times per day    docusate  100 mg Oral BID    sennosides-docusate sodium  2 tablet Oral Daily    insulin lispro  0-12 Units SubCUTAneous Q6H     Continuous Infusions:   dextrose 5 % and 0.9 % NaCl 75 mL/hr at 10/22/21 2032    esmolol 25 mcg/kg/min (10/23/21 0447)    sodium chloride      HYDROmorphone Stopped (10/21/21 1900)    ketamine (KETALAR) infusion for analgosedation Stopped (10/21/21 1900)    sodium chloride      midazolam Stopped (10/21/21 1900)    dextrose      sodium chloride 5 mL/hr at 10/14/21 0700       INPUT/OUTPUT:  In: 6074 [P.O.:350; I.V.:3135; Other:30]  Out: 7737 [Urine:2645]  Date 10/23/21 0000 - 10/23/21 2359   Shift 5016-7633 1952-2138 0427-3392 24 Hour Total   INTAKE   P.O.(mL/kg/hr) 150(0.2)   150   I. V.(mL/kg) 1121(11)   1121(10.5)   Other(mL/kg) 30(0.3)   30(0.3)   Shift Total(mL/kg) 1519(65.9)   1472(20.0)   OUTPUT   Urine(mL/kg/hr) 1160(1.4) 400  1560   Shift Total(mL/kg) 1160(11.4) 400(3.7)  1560(14.6)   Weight (kg) 102 107 107 107        LABS:  ABGs:   Recent Labs     10/21/21  1021 10/21/21  1546 10/21/21  2214 10/22/21  0414 10/22/21  1006   POCPH 7.532* 7.554* 7.505* 7.466* 7.523*   POCPCO2 35.7 31.5* 31.4* 30.6* 27.7*   POCPO2 62.2* 58.5* 63.4* 91.8 51.5*   POCHCO3 29.9* 27.8 24.8 22.0 22.8   RWAQ1VGR 94 94 94 98 90*     CBC:   Recent Labs     10/21/21  1548 10/21/21  2214 10/22/21  0414 10/22/21  0958 10/23/21  0632   WBC 15.3* 31.3* 27.7* 24.2* 11.6*   HGB 7.2* 7.9* 6.5* 8.6* 6.1*   HCT 22.1* 24.2* 19.8* 26.3* 19.4*   MCV 90.2 90.0 88.4 87.1 89.4    320 239 142 128*   RBC 2.45* 2.69* 2.24* 3.02* 2.17*   MCH 29.4 29.4 29.0 28.5 28.1   MCHC 32.6 32.6 32.8 32.7 31.4   RDW 14.7* 14.6* 14.5* 14.4 15.1*     CRP:   No results for input(s): CRP in the last 72 hours. LDH:   No results for input(s): LDH in the last 72 hours. BMP:   Recent Labs     10/21/21  1548 10/21/21  2214 10/22/21  0414 10/22/21  0958 10/23/21  0632   * 135 131* 129* 135   K 4.6 4.8 4.6 4.4 3.6*   CL 99 102 99 97* 102   CO2 23 20 21 20 21   BUN 7 9 11 11 7   CREATININE 0.24* 0.29* 0.24* 0.23* 0.26*   GLUCOSE 138* 149* 143* 132* 119*     Liver Function Test:   Recent Labs     10/20/21  1036 10/20/21  2253 10/21/21  1021 10/21/21  2214 10/22/21  0958   PROT 5.4* 5.5* 5.2* 5.4* 5.3*   LABALBU 3.0* 3.1* 2.8* 3.1* 3.0*   ALT 38* 38* 33 27 25   AST 28 25 21 16 20   ALKPHOS 82 81 70 68 74   BILITOT 0.40 0.41 0.46 0.76 1.16     Coagulation Profile:   Recent Labs     10/21/21  1021 10/21/21  1021 10/21/21  1548 10/21/21  2214 10/22/21  0414 10/22/21  0958 10/23/21  0632   INR 1.0   < > 1.0 1.0 1.0 1.0 1.0   PROTIME 10.3   < > 10.4 10.6 11.1 10.9 10.7   APTT 59.2*  --  65.3* 59.5* 82.6* 27.3  --     < > = values in this interval not displayed. D-Dimer:  No results for input(s): DDIMER in the last 72 hours. Ferritin:    No results for input(s): FERRITIN in the last 72 hours. Lactic Acid:  No results for input(s): LACTA in the last 72 hours. Cardiac Enzymes:  No results for input(s): CKTOTAL, CKMB, CKMBINDEX, TROPONINI in the last 72 hours. Invalid input(s): TROPONIN, HSTROP  BNP/ProBNP:   No results for input(s): BNP, PROBNP in the last 72 hours. Triglycerides:  No results for input(s): TRIG in the last 72 hours. Microbiology:  Urine Culture:  No components found for: CURINE  Blood Culture:  No components found for: CBLOOD, CFUNGUSBL  Sputum Culture:   No components found for: CSPUTUM  Recent Labs     10/23/21  0451   SPECDESC . BLOOD   SPECIAL RT AC 2 ML   CULTURE NO GROWTH 2 HOURS     Recent Labs     10/21/21  1859 10/23/21  0451   SPECDESC . BLOOD  . BLOOD . BLOOD   SPECIAL NOT REPORTED   RT AC 10 ML RT AC 2 ML   CULTURE POSITIVE Blood Culture Results called to and read back by: Marylee Copas K. ON 10/22/21 AT 0750*  DIRECT GRAM STAIN FROM BOTTLE: GRAM POSITIVE COCCI IN CLUSTERS  Staphylococcus aureus Detected: mecA/C and MREJ Not Detected Methodology- Polymerase Chain Reaction (PCR)*  STAPHYLOCOCCUS AUREUS Susceptibility to follow. *  POSITIVE Blood Culture Results called to and read back by: UMM De La Rosa. ON 10/22/21 AT 0750*  DIRECT GRAM STAIN FROM BOTTLE: GRAM POSITIVE COCCI IN CLUSTERS NO GROWTH 2 HOURS        Pathology:    Radiology Reports:  XR CHEST PORTABLE   Final Result   Poor expiratory afford. Left subclavian central line remains in place. Resolving diffuse interstitial left retrocardiac opacification. CT CHEST PULMONARY EMBOLISM W CONTRAST   Final Result   This study is nondiagnostic for pulmonary embolism. There is excessive   respiratory motion artifact degrading image resolution. There is not   significant contrast within the pulmonary artery system to adequately assess   for pulmonary embolus. Recommend either repeat exam with improved bolus timing, and decreased   patient respiratory motion or VQ scan. Improving patchy bilateral infiltrates compatible with improvement of COVID   pneumonia. Interval development of moderate ascites         XR CHEST PORTABLE   Final Result   Stable support lines      Stable multifocal infiltrates         XR CHEST PORTABLE   Final Result   Increased infiltrates seen in the right lung base and left suprahilar region,   otherwise similar appearing multifocal infiltrates. XR CHEST PORTABLE   Final Result   1. ECMO device and left central venous catheter are unchanged.   Endotracheal   and enteric tubes are no longer visualized. 2. Diffuse bilateral pulmonary opacities and small left effusion. XR CHEST PORTABLE   Final Result   Overall improved appearing chest with better aeration of both lungs. The ET   tube tip lies approximately 2.5 cm above the aly. XR CHEST PORTABLE   Final Result   Similar appearing chest with consolidative airspace disease in the right   lung, and complete opacification of the left chest and right lung apex. Question of slight deeper placement of the endotracheal tube, though the   aly position is somewhat difficult to see on this exam compared to the   prior. It may lie in the region of approximately 1.8 cm above the aly,   previously measuring closer to 2.7 cm above the aly. This could be   slightly retracted if clinically concerned. XR CHEST PORTABLE   Final Result   1. Recommend retraction of endotracheal tube 1.0 cm.   2. Stable appearance of left-sided hydrothorax. 3. Right lung multifocal marked ill-defined dense consolidation, unchanged. 4. Suspected mild right-sided pleural effusion. 5. Stable positioning ECMO catheter. XR CHEST PORTABLE   Final Result   An ECMO catheter is now in place. There is new complete opacification of the   left hemithorax. Right-sided airspace opacity has significantly increased. A left subclavian catheter has its tip in the midline. The right PICC has   been removed. XR CHEST PORTABLE   Final Result   Stable bilateral lung multifocal consolidation consistent with pneumonia. Appropriate radiographic positioning of endotracheal tube. XR CHEST PORTABLE   Final Result   The tip of the endotracheal tube is at the origin the right mainstem bronchus   and the tube should be pulled back 2 cm.          VL DUP LOWER EXTREMITY VENOUS BILATERAL   Final Result      VASCULAR REPORT    (Results Pending)   XR CHEST PORTABLE    (Results Pending)        Echocardiogram: Results for orders placed during the hospital encounter of 10/13/21    ECHO Complete 2D W Doppler W Color    Narrative  Transthoracic Echocardiography Report (TTE)    Summary  Normal left ventricle size, wall thickness and function with a calculated EF  61%. No segmental wall motion abnormalities seen. Normal right ventricular size and function. No significant valvular regurgitation or stenosis seen. ASSESSMENT AND PLAN     Assessment:    // Acute hypoxic respiratory failure, on nasal cannula  // Acute respiratory distress syndrome, s/p VV ECMO and mechanical ventilation  // Bilateral multifocal pneumonia due to COVID 19 infection  // Sepsis due to COVID 19  // Sinus tachycardia  // Staph bacteremia  // Left lower lobe subsegmental pulmonary embolism  // Left pleural effusion/atelectasis  // S/p  section, 26-week pregnancy  // Metabolic acidosis, resolved  // Leukocytosis, significantly better  //Very mild hypokalemia, continue to monitor  //Anemia, better with transfusion. No source that is evident. Continue to monitor  Plan:    I personally interviewed/examined the patient; reviewed interval history, interpreted all available radiographic and laboratory data at the time of service.     Patient remains off ECMO, decannulated this morning  Patient came off of the esmolol drip  Cardiology following patient  Has poor overall intake, ordered IV fluid bolus  Added Reglan  Abdominal x-ray ordered  She is saturating well on nasal cannula  Continue supplemental oxygen to keep oxygen saturation greater than 92%  Encourage incentive spirometry  Continue pulmonary toilet, aspiration precautions   Continue to monitor I/O with a goal of even fluid balance  Tolerating oral diet  Stress ulcer prophylaxis  On heparin infusion, will switch to Eliquis  Continue to monitor CBC, coagulation profile  Change Eliquis to Lovenox as the patient is vomiting  Antimicrobials reviewed; on empiric vancomycin and cefepime  Monitor CRP, LDH, AST/ALT, D-Dimer, Ferritin   Glycemic control appropriate  Completed Decadron  Skin/wound care reviewed with the nursing staff  Physical/occupational therapy    Critical care time of 35 minutes was spent (excluding procedures), in coordination of care during bedside rounds and discussion of patient care in detail, and recommendations of the team were adopted in the plan. Necessity of all invasive devices was also confirmed. Lobo Alexander MD  Pulmonary and Critical Care Medicine           10/23/2021     This patient was evaluated in the context of the global SARS-CoV-2 (COVID-19) pandemic, which necessitated considerations that the patient either has COVID-19 infection or is at risk of infection with COVID-19. Institutional protocols and algorithms that pertain to the evaluation & management of patients with COVID-19 or those at risk for COVID-19 are in a state of rapid changes based on information released by regulatory bodies including the CDC and federal and state organizations. These policies and algorithms were followed during the patient's care. Please note that this chart was generated using voice recognition Dragon dictation software. Although every effort was made to ensure the accuracy of this automated transcription, some errors in transcription may have occurred.

## 2021-10-23 NOTE — PROGRESS NOTES
PULMONARY & CRITICAL CARE MEDICINE PROGRESS NOTE     Patient:  Nancie Shelley  MRN: [id number]  [facility] date: 10/13/2021  Primary Care Physician: Jaclyn Carnes  Consulting Physician: Edgardo Sanchez DO  CODE Status: Full Code  LOS: 9     SUBJECTIVE     CHIEF COMPLAINT/REASON FOR INITIAL CONSULT: Acute respiratory failure/COVID-19 pneumonia    BRIEF HOSPITAL COURSE:   The patient is a 27 y.o. female who was 26-week pregnant initially tested positive for COVID-19 on 10/10. She presented to [place] ED on 10/13 with respiratory distress and low home O2 saturation. She was tachypneic and tachycardic. Transferred to [facility] for further management. Initial blood gases showed evidence of combined anion gap and non-anion gap metabolic acidosis, thought to be related to starvation ketosis. During evaluation patient was also noted to have a left lower lobe subsubsegmental PE. She underwent  10/15. Postoperative course complicated by development of worsening respiratory failure and required initiation of mechanical ventilation on 10/15. Due to worsening severe hypoxemic respiratory failure decision was made to put her on VV ECMO. She had placement of 27 F VV ECMO (crescent) cannula in the right atrium on 10/17. INTERVAL HISTORY:  10/22/21  Patient remained off VV ECMO, decannulated this morning  With noted to be in sinus tachycardia placed last night  Cultures and empiric antibiotics were ordered  Culture growing staph  He denies any pain or anxiety  Ordered IV fluid bolus  CT chest ordered to rule out PE, she had been on anticoagulation with heparin  Receiving IV Decadron    REVIEW OF SYSTEMS:  Review of Systems   Constitutional: Negative for appetite change, chills, fever and unexpected weight change. HENT: Negative for congestion, postnasal drip, sore throat and trouble swallowing. Eyes: Negative for visual disturbance. Respiratory: Positive for shortness of breath. Negative for cough and wheezing. Cardiovascular: Negative for chest pain, palpitations and leg swelling. Gastrointestinal: Negative for abdominal pain, constipation, diarrhea, nausea and vomiting. Genitourinary: Negative for difficulty urinating, dysuria and frequency. Musculoskeletal: Negative for arthralgias and joint swelling. Skin: Negative for rash. Allergic/Immunologic: Negative for immunocompromised state. Neurological: Positive for weakness. Negative for dizziness, speech difficulty and headaches. Hematological: Negative for adenopathy. Psychiatric/Behavioral: Negative for behavioral problems and sleep disturbance.      OBJECTIVE     VENTILATOR SETTINGS:  Vent Information  $Ventilation: (S) Off Vent  Skin Assessment: Clean, dry, & intact  Equipment ID: [id number]  Equipment Changed: HME  Vent Type: Servo i  Vent Mode: PCV+  Vt Ordered: 480 mL  Pressure Ordered: 10  Rate Set: 12 bmp  FiO2 : 40 %  SpO2: 91 %  SpO2/FiO2 ratio: 242.5  Sensitivity: 3  PEEP/CPAP: 10  I Time/ I Time %: 0.9 s  Humidification Source: HME  Humidification Temp: 33  Humidification Temp Measured: 33  Nitric Oxide/Epoprostenol In Use?: No  Mask Type: Full face mask  Mask Size: Medium     PaO2/FiO2 RATIO:  Recent Labs     10/22/21  1006   POCPO2 51.5*      FiO2 : 40 %     VITAL SIGNS:   LAST:  /62   Pulse 113   Temp 98.8 °F (37.1 °C) (Oral)   Resp 19   Ht 5' 4\" (1.626 m)   Wt 224 lb 13.9 oz (102 kg)   LMP 2021 (Approximate)   SpO2 91%   Breastfeeding Unknown   BMI 38.60 kg/m²   8-24 HR RANGE:  TEMP Temp  Av.3 °F (36.8 °C)  Min: 97.2 °F (36.2 °C)  Max: 99 °F (71.5 °C)   BP Systolic (73RPI), OXN:661 , Min:108 , TFN:704      Diastolic (05FLK), JGE:40, Min:62, Max:62     PULSE Pulse  Av.7  Min: 94  Max: 146   RR No data recorded   O2 SAT SpO2  Av.6 %  Min: 91 %  Max: 95 %   OXYGEN DELIVERY O2 Flow Rate (L/min)  Av L/min  Min: 2 L/min  Max: 2 L/min        SYSTEMIC EXAMINATION:   General appearance -comfortable, no acute distress  Mental status -awake and alert  Eyes - pupils equal and reactive, sclera anicteric  Mouth - mucous membranes moist  Neck - supple, no significant adenopathy, ECMO cannula site has mild oozing  Chest - Breath sounds bilaterally were dimnished to auscultation at bases. There were no wheezes, rhonchi or rales. Heart -tachycardic, regular rhythm, normal S1, S2, no murmurs, rubs, clicks or gallops  Abdomen - soft, nontender, nondistended, no masses or organomegaly  Neurological - DTR's normal and symmetric, motor and sensory grossly normal bilaterally  Extremities - peripheral pulses normal, no pedal edema, no clubbing or cyanosis  Skin - normal coloration and turgor, no rashes, no suspicious skin lesions noted     DATA REVIEW     Medications:  Scheduled Meds:   metoprolol tartrate  12.5 mg Oral BID    vancomycin (VANCOCIN) intermittent dosing (placeholder)   Other RX Placeholder    vancomycin  1,250 mg IntraVENous Q8H    prenatal vitamin  1 tablet Oral Daily    Tdap-Dtap  0.5 mL IntraMUSCular Prior to discharge    lidocaine 1 % injection  5 mL IntraDERmal Once    sodium chloride flush  5-40 mL IntraVENous 2 times per day    docusate  100 mg Oral BID    sennosides-docusate sodium  2 tablet Oral Daily    insulin lispro  0-12 Units SubCUTAneous Q6H     Continuous Infusions:   dextrose 5 % and 0.9 % NaCl 75 mL/hr at 10/22/21 2032    esmolol Stopped (10/22/21 1410)    sodium chloride      HYDROmorphone Stopped (10/21/21 1900)    ketamine (KETALAR) infusion for analgosedation Stopped (10/21/21 1900)    sodium chloride      midazolam Stopped (10/21/21 1900)    dextrose      sodium chloride 5 mL/hr at 10/14/21 0700    heparin (PORCINE) Infusion Stopped (10/22/21 0410)       INPUT/OUTPUT:  In: 6670 [P.O.:200; I.V.:4017; Blood:580]  Out: 9772 [Urine:1020]  Date 10/22/21 0000 - 10/22/21 2359   Shift 3925-6759 7092-9014 3683-9305 24 Hour Total   INTAKE   P.O.(mL/kg/hr)   200 200   I. V.(mL/kg) 4556(80.1)  0983(39.5) 2514(41.1)   Blood(mL/kg) 242.5(2.4) 337. 5(3.3)  580(5.7)   Shift Total(mL/kg) 2245.5(22) 337. 5(3.3) 0729(56.2) 6508(07)   OUTPUT   Urine(mL/kg/hr) 395(0.5) 100(0.1) 525 1020   Shift Total(mL/kg) 395(3.9) 100(1) 525(5.1) 1020(10)   Weight (kg) 102 102 102 102        LABS:  ABGs:   Recent Labs     10/21/21  1021 10/21/21  1546 10/21/21  2214 10/22/21  0414 10/22/21  1006   POCPH 7.532* 7.554* 7.505* 7.466* 7.523*   POCPCO2 35.7 31.5* 31.4* 30.6* 27.7*   POCPO2 62.2* 58.5* 63.4* 91.8 51.5*   POCHCO3 29.9* 27.8 24.8 22.0 22.8   HMEA7EIR 94 94 94 98 90*     CBC:   Recent Labs     10/21/21  1021 10/21/21  1548 10/21/21  2214 10/22/21  0414 10/22/21  0958   WBC 15.1* 15.3* 31.3* 27.7* 24.2*   HGB 8.1* 7.2* 7.9* 6.5* 8.6*   HCT 24.6* 22.1* 24.2* 19.8* 26.3*   MCV 88.2 90.2 90.0 88.4 87.1    318 320 239 142   RBC 2.79* 2.45* 2.69* 2.24* 3.02*   MCH 29.0 29.4 29.4 29.0 28.5   MCHC 32.9 32.6 32.6 32.8 32.7   RDW 14.6* 14.7* 14.6* 14.5* 14.4     CRP:   No results for input(s): CRP in the last 72 hours. LDH:   No results for input(s): LDH in the last 72 hours.   BMP:   Recent Labs     10/21/21  1021 10/21/21  1548 10/21/21  2214 10/22/21  0414 10/22/21  0958    134* 135 131* 129*   K 4.3 4.6 4.8 4.6 4.4    99 102 99 97*   CO2 24 23 20 21 20   BUN 7 7 9 11 11   CREATININE 0.21* 0.24* 0.29* 0.24* 0.23*   GLUCOSE 106* 138* 149* 143* 132*     Liver Function Test:   Recent Labs     10/20/21  1036 10/20/21  2253 10/21/21  1021 10/21/21  2214 10/22/21  0958   PROT 5.4* 5.5* 5.2* 5.4* 5.3*   LABALBU 3.0* 3.1* 2.8* 3.1* 3.0*   ALT 38* 38* 33 27 25   AST 28 25 21 16 20   ALKPHOS 82 81 70 68 74   BILITOT 0.40 0.41 0.46 0.76 1.16     Coagulation Profile:   Recent Labs     10/21/21  1021 10/21/21  1548 10/21/21  2214 10/22/21  0414 10/22/21  0958   INR 1.0 1.0 1.0 1.0 1.0   PROTIME 10.3 10.4 10.6 11.1 10.9   APTT 59.2* 65.3* 59.5* 82.6* 27.3     D-Dimer:  No results for input(s): DDIMER in the last 72 hours. Ferritin:    No results for input(s): FERRITIN in the last 72 hours. Lactic Acid:  No results for input(s): LACTA in the last 72 hours. Cardiac Enzymes:  No results for input(s): CKTOTAL, CKMB, CKMBINDEX, TROPONINI in the last 72 hours. Invalid input(s): TROPONIN, HSTROP  BNP/ProBNP:   No results for input(s): BNP, PROBNP in the last 72 hours. Triglycerides:  No results for input(s): TRIG in the last 72 hours. Microbiology:  Urine Culture:  No components found for: CURINE  Blood Culture:  No components found for: CBLOOD, CFUNGUSBL  Sputum Culture:  No components found for: CSPUTUM  Recent Labs     10/21/21  1859   SPECDESC . BLOOD  . BLOOD   SPECIAL NOT REPORTED   RT AC 10 ML   CULTURE POSITIVE Blood Culture Results called to and read back by: Zbigniew De La Rosa. ON 10/22/21 AT 0750*  DIRECT GRAM STAIN FROM BOTTLE: GRAM POSITIVE COCCI IN CLUSTERS  Staphylococcus aureus Detected: mecA/C and MREJ Not Detected Methodology- Polymerase Chain Reaction (PCR)*  STAPHYLOCOCCUS AUREUS Susceptibility to follow. *  POSITIVE Blood Culture Results called to and read back by: UMM De La Rosa. ON 10/22/21 AT 0750*  DIRECT GRAM STAIN FROM BOTTLE: GRAM POSITIVE COCCI IN CLUSTERS     Recent Labs     10/21/21  1859   [facility] . BLOOD  . BLOOD   SPECIAL NOT REPORTED   RT AC 10 ML   CULTURE POSITIVE Blood Culture Results called to and read back by: Zbigniew De La Rosa. ON 10/22/21 AT 0750*  DIRECT GRAM STAIN FROM BOTTLE: GRAM POSITIVE COCCI IN CLUSTERS  Staphylococcus aureus Detected: mecA/C and MREJ Not Detected Methodology- Polymerase Chain Reaction (PCR)*  STAPHYLOCOCCUS AUREUS Susceptibility to follow. *  POSITIVE Blood Culture Results called to and read back by: UMM De La Rosa. ON 10/22/21 AT 0750*  DIRECT GRAM STAIN FROM BOTTLE: GRAM POSITIVE COCCI IN CLUSTERS        Pathology:    Radiology Reports:  CT CHEST PULMONARY EMBOLISM W CONTRAST   Final Result   This study is nondiagnostic for pulmonary embolism.  There is excessive respiratory motion artifact degrading image resolution. There is not   significant contrast within the pulmonary artery system to adequately assess   for pulmonary embolus. Recommend either repeat exam with improved bolus timing, and decreased   patient respiratory motion or VQ scan. Improving patchy bilateral infiltrates compatible with improvement of COVID   pneumonia. Interval development of moderate ascites         XR CHEST PORTABLE   Final Result   Stable support lines      Stable multifocal infiltrates         XR CHEST PORTABLE   Final Result   Increased infiltrates seen in the right lung base and left suprahilar region,   otherwise similar appearing multifocal infiltrates. XR CHEST PORTABLE   Final Result   1. ECMO device and left central venous catheter are unchanged. Endotracheal   and enteric tubes are no longer visualized. 2. Diffuse bilateral pulmonary opacities and small left effusion. XR CHEST PORTABLE   Final Result   Overall improved appearing chest with better aeration of both lungs. The ET   tube tip lies approximately 2.5 cm above the aly. XR CHEST PORTABLE   Final Result   Similar appearing chest with consolidative airspace disease in the right   lung, and complete opacification of the left chest and right lung apex. Question of slight deeper placement of the endotracheal tube, though the   aly position is somewhat difficult to see on this exam compared to the   prior. It may lie in the region of approximately 1.8 cm above the aly,   previously measuring closer to 2.7 cm above the aly. This could be   slightly retracted if clinically concerned. XR CHEST PORTABLE   Final Result   1. Recommend retraction of endotracheal tube 1.0 cm.   2. Stable appearance of left-sided hydrothorax. 3. Right lung multifocal marked ill-defined dense consolidation, unchanged. 4. Suspected mild right-sided pleural effusion.    5. Stable positioning ECMO catheter. XR CHEST PORTABLE   Final Result   An ECMO catheter is now in place. There is new complete opacification of the   left hemithorax. Right-sided airspace opacity has significantly increased. A left subclavian catheter has its tip in the midline. The right PICC has   been removed. XR CHEST PORTABLE   Final Result   Stable bilateral lung multifocal consolidation consistent with pneumonia. Appropriate radiographic positioning of endotracheal tube. XR CHEST PORTABLE   Final Result   The tip of the endotracheal tube is at the origin the right mainstem bronchus   and the tube should be pulled back 2 cm. VL DUP LOWER EXTREMITY VENOUS BILATERAL   Final Result      VASCULAR REPORT    (Results Pending)   XR CHEST PORTABLE    (Results Pending)        Echocardiogram:   Results for orders placed during the hospital encounter of 10/13/21    ECHO Complete 2D W Doppler W Color    Narrative  Transthoracic Echocardiography Report (TTE)    Summary  Normal left ventricle size, wall thickness and function with a calculated EF  61%. No segmental wall motion abnormalities seen. Normal right ventricular size and function. No significant valvular regurgitation or stenosis seen. ASSESSMENT AND PLAN     Assessment:    // Acute hypoxic respiratory failure, on nasal cannula  // Acute respiratory distress syndrome, s/p VV ECMO and mechanical ventilation  // Bilateral multifocal pneumonia due to COVID 19 infection  // Sepsis due to COVID 19  // Sinus tachycardia  // Staph bacteremia  // Left lower lobe subsegmental pulmonary embolism  // Left pleural effusion/atelectasis  // S/p  section, 26-week pregnancy  // Metabolic acidosis, resolved  // Leukocytosis    Plan:    I personally interviewed/examined the patient; reviewed interval history, interpreted all available radiographic and laboratory data at the time of service.     Patient remains off ECMO, decannulated this morning  Patient developed tachycardia overnight, started on esmolol drip  Cardiology consulted  Has poor overall intake, ordered IV fluid bolus  She is saturating well on nasal cannula  Continue supplemental oxygen to keep oxygen saturation greater than 92%  Encourage incentive spirometry  Continue pulmonary toilet, aspiration precautions   Continue to monitor I/O with a goal of even fluid balance  Tolerating oral diet  Stress ulcer prophylaxis  On heparin infusion, will switch to Eliquis  Continue to monitor CBC, coagulation profile  Chemical DVT prophylaxis not required as patient is on systemic anticoagulation  Antimicrobials reviewed; on empiric vancomycin and cefepime  Monitor CRP, LDH, AST/ALT, D-Dimer, Ferritin   Glycemic control appropriate  Continue IV Decadron  Skin/wound care reviewed with the nursing staff  Physical/occupational therapy    Critical care time of 35 minutes was spent (excluding procedures), in coordination of care during bedside rounds and discussion of patient care in detail, and recommendations of the team were adopted in the plan. Necessity of all invasive devices was also confirmed. Vicente Cormier MD  Pulmonary and Critical Care Medicine           10/22/2021     This patient was evaluated in the context of the global SARS-CoV-2 (COVID-19) pandemic, which necessitated considerations that the patient either has COVID-19 infection or is at risk of infection with COVID-19. Institutional protocols and algorithms that pertain to the evaluation & management of patients with COVID-19 or those at risk for COVID-19 are in a state of rapid changes based on information released by regulatory bodies including the CDC and federal and state organizations. These policies and algorithms were followed during the patient's care. Please note that this chart was generated using voice recognition Dragon dictation software.   Although every effort was made to ensure the accuracy of this automated transcription, some errors in transcription may have occurred.

## 2021-10-23 NOTE — PLAN OF CARE
Patient's cannulation site is healing appropriately. Will sign off at this time. Contact CT surgery for any further needs.

## 2021-10-23 NOTE — PROGRESS NOTES
[facility])  Occupational Therapy Not Seen Note    DATE: 10/23/2021    NAME: Jennifer Browning  MRN: [id number]   : 1990      Patient not seen this date for Occupational Therapy due to:    Blood Transfusion: Hgb 6.1; await redraw      Next Scheduled Treatment: Ck in pm or 10/24    Electronically signed by Denise Fagan OT on 10/23/2021 at 1:01 PM

## 2021-10-23 NOTE — PROGRESS NOTES
perfectserved dr. Edmar Valente about patients hemoglobin of 6.1 orders for 1 unit of prbc and recheck at 9.     Kiah Alexander RN

## 2021-10-23 NOTE — PROGRESS NOTES
[facility]   Progress Note                   Date:   10/23/2021  Patient name: Karina Aaron  Date of admission:  10/13/2021  8:19 PM  MRN:   [id number]  YOB: 1990  PCP: Debra Robles    Reason for Admission: covid 19     Subjective:       Hemoglobin dropped overnight for which patient is being transfused 1 unit of PRBC. Off VV ECMO since yesterday. Eliquis on hold. Medications:   Scheduled Meds:   metoprolol tartrate  12.5 mg Oral BID    apixaban  5 mg Oral BID    vancomycin (VANCOCIN) intermittent dosing (placeholder)   Other RX Placeholder    vancomycin  1,250 mg IntraVENous Q8H    prenatal vitamin  1 tablet Oral Daily    Tdap-Dtap  0.5 mL IntraMUSCular Prior to discharge    lidocaine 1 % injection  5 mL IntraDERmal Once    sodium chloride flush  5-40 mL IntraVENous 2 times per day    docusate  100 mg Oral BID    sennosides-docusate sodium  2 tablet Oral Daily    insulin lispro  0-12 Units SubCUTAneous Q6H     Continuous Infusions:   dextrose 5 % and 0.9 % NaCl 75 mL/hr at 10/22/21 2032    esmolol 25 mcg/kg/min (10/23/21 0447)    sodium chloride      HYDROmorphone Stopped (10/21/21 1900)    ketamine (KETALAR) infusion for analgosedation Stopped (10/21/21 1900)    sodium chloride      midazolam Stopped (10/21/21 1900)    dextrose      sodium chloride 5 mL/hr at 10/14/21 0700     CBC:   Recent Labs     10/22/21  0414 10/22/21  0958 10/23/21  0632   WBC 27.7* 24.2* 11.6*   HGB 6.5* 8.6* 6.1*    142 128*     BMP:    Recent Labs     10/21/21  2214 10/22/21  0414 10/22/21  0958    131* 129*   K 4.8 4.6 4.4    99 97*   CO2 20 21 20   BUN 9 11 11   CREATININE 0.29* 0.24* 0.23*   GLUCOSE 149* 143* 132*     Hepatic:   Recent Labs     10/21/21  1021 10/21/21  2214 10/22/21  0958   AST 21 16 20   ALT 33 27 25   BILITOT 0.46 0.76 1.16   ALKPHOS 70 68 74     Troponin: No results for input(s): TROPONINI in the last 72 hours.   BNP: No results for input(s): BNP in the last 72 hours. Lipids: No results for input(s): CHOL, HDL in the last 72 hours. Invalid input(s): LDLCALCU  INR:   Recent Labs     10/22/21  0414 10/22/21  0958 10/23/21  0632   INR 1.0 1.0 1.0       Objective:   Vitals: /62   Pulse 101   Temp 99.8 °F (37.7 °C)   Resp 28   Ht 5' 4\" (1.626 m)   Wt 224 lb 13.9 oz (102 kg)   LMP 04/01/2021 (Approximate)   SpO2 96%   Breastfeeding Unknown   BMI 38.60 kg/m²   General appearance: alert and cooperative with exam  HEENT: Head: Normocephalic, no lesions, without obvious abnormality. Neck: no adenopathy, no carotid bruit, no JVD, supple, symmetrical, trachea midline and thyroid not enlarged, symmetric, no tenderness/mass/nodules  Lungs: clear to auscultation bilaterally  Heart: regular rate and rhythm, S1, S2 normal, no murmur, click, rub or gallop  Abdomen: soft, non-tender; bowel sounds normal; no masses,  no organomegaly  Extremities: extremities normal, atraumatic, no cyanosis or edema  Neurologic: Mental status: Alert, oriented, thought content appropriate    GUILLERMO 10/13/2021:  Summary  GUILLERMO was performed to guide ECMO cannulation position. Overall normal LV size and systolic function with estimated EF 55%  No significant valvular regurgitation seen. No intracardiac shunt via color doppler  Final cannula position showed the flow jet directed towards the tricuspid  valve.       IMPRESSION:    1. Sinus tachycardia with intermittent runs of SVT likely secondary to acute stress state currently on esmolol drip with plans to wean off. Added on p.o. beta-blocker. 2.  ARDS 2/2 to COVID requiring VV ECMO - Decannulation on 10/22/2021  3. Small PE in Left lower lobe on CTA 10/13/2021. Eliquis on hold due to hemoglobin drop. 4. Anemia requirinng 1 unit of PRBC. 5. Leucocytosis which has been improving. Managed by primary.     RECOMMENDATIONS:  Wean off Esmolol. Continue B Blocker, Metoprolol increased to 25 TID.    Will follow. Sadia Aceves MD       Cardiovascular Fellow PGY-4  10/23/2021, 7:32 AM       Attending Physician Statement  I have discussed the care of Moni Wilson, including pertinent history and exam findings,  with the cardiology fellow/resident. I have seen and examined the patient and the key elements of all parts of the encounter have been performed by me.   I agree with the assessment, plan and orders as documented by the resident with additional recommendations as below:     Patient is now off VV ECMO Right IJ crescent cannula   No hematoma at IJ site   Awake and oriented in no distress  BP stable   Sinus tachycardia secondary to severe anemia and residual hypoxia  Increase lopressor to 25 mg tid and wean off esmolol  PRBC transfusion to keep Hb > 8   Repeat limited echo Monday to check for any RV dysfunction       Heminder meet Miki Person MD, [facility]

## 2021-10-23 NOTE — PROGRESS NOTES
Physical Therapy        Physical Therapy Cancel Note      DATE: 10/23/2021    NAME: Jennifer Browning  MRN: [id number]   : 1990      Patient not seen this date for Physical Therapy due to:    Blood Transfusion: hemoglobin 6.1, will check back this afternoon as time permits or on .       Electronically signed by Kt Akers PT on 10/23/2021 at 2:22 PM

## 2021-10-24 ENCOUNTER — APPOINTMENT (OUTPATIENT)
Dept: GENERAL RADIOLOGY | Age: 31
DRG: 003 | End: 2021-10-24
Payer: COMMERCIAL

## 2021-10-24 LAB
ANION GAP SERPL CALCULATED.3IONS-SCNC: 11 MMOL/L (ref 9–17)
BUN BLDV-MCNC: 5 MG/DL (ref 6–20)
BUN/CREAT BLD: ABNORMAL (ref 9–20)
CALCIUM IONIZED: 1.12 MMOL/L (ref 1.13–1.33)
CALCIUM SERPL-MCNC: 7.8 MG/DL (ref 8.6–10.4)
CHLORIDE BLD-SCNC: 105 MMOL/L (ref 98–107)
CO2: 19 MMOL/L (ref 20–31)
CREAT SERPL-MCNC: 0.23 MG/DL (ref 0.5–0.9)
GFR AFRICAN AMERICAN: >60 ML/MIN
GFR NON-AFRICAN AMERICAN: >60 ML/MIN
GFR SERPL CREATININE-BSD FRML MDRD: ABNORMAL ML/MIN/{1.73_M2}
GFR SERPL CREATININE-BSD FRML MDRD: ABNORMAL ML/MIN/{1.73_M2}
GLUCOSE BLD-MCNC: 106 MG/DL (ref 70–99)
GLUCOSE BLD-MCNC: 92 MG/DL (ref 65–105)
HCT VFR BLD CALC: 23.2 % (ref 36.3–47.1)
HEMOGLOBIN: 7.1 G/DL (ref 11.9–15.1)
INR BLD: 1.1
MAGNESIUM: 2.1 MG/DL (ref 1.6–2.6)
MCH RBC QN AUTO: 28.6 PG (ref 25.2–33.5)
MCHC RBC AUTO-ENTMCNC: 30.6 G/DL (ref 28.4–34.8)
MCV RBC AUTO: 93.5 FL (ref 82.6–102.9)
NRBC AUTOMATED: 0.5 PER 100 WBC
PDW BLD-RTO: 15.7 % (ref 11.8–14.4)
PLATELET # BLD: 159 K/UL (ref 138–453)
PMV BLD AUTO: 9.3 FL (ref 8.1–13.5)
POTASSIUM SERPL-SCNC: 3.8 MMOL/L (ref 3.7–5.3)
PROTHROMBIN TIME: 11.5 SEC (ref 9.1–12.3)
RBC # BLD: 2.48 M/UL (ref 3.95–5.11)
SODIUM BLD-SCNC: 135 MMOL/L (ref 135–144)
WBC # BLD: 12 K/UL (ref 3.5–11.3)

## 2021-10-24 PROCEDURE — 2580000003 HC RX 258: Performed by: INTERNAL MEDICINE

## 2021-10-24 PROCEDURE — 6370000000 HC RX 637 (ALT 250 FOR IP): Performed by: INTERNAL MEDICINE

## 2021-10-24 PROCEDURE — 6370000000 HC RX 637 (ALT 250 FOR IP): Performed by: STUDENT IN AN ORGANIZED HEALTH CARE EDUCATION/TRAINING PROGRAM

## 2021-10-24 PROCEDURE — 85027 COMPLETE CBC AUTOMATED: CPT

## 2021-10-24 PROCEDURE — 97535 SELF CARE MNGMENT TRAINING: CPT

## 2021-10-24 PROCEDURE — 87205 SMEAR GRAM STAIN: CPT

## 2021-10-24 PROCEDURE — P9016 RBC LEUKOCYTES REDUCED: HCPCS

## 2021-10-24 PROCEDURE — 97110 THERAPEUTIC EXERCISES: CPT

## 2021-10-24 PROCEDURE — 97162 PT EVAL MOD COMPLEX 30 MIN: CPT

## 2021-10-24 PROCEDURE — 82330 ASSAY OF CALCIUM: CPT

## 2021-10-24 PROCEDURE — 2000000000 HC ICU R&B

## 2021-10-24 PROCEDURE — 71045 X-RAY EXAM CHEST 1 VIEW: CPT

## 2021-10-24 PROCEDURE — 87150 DNA/RNA AMPLIFIED PROBE: CPT

## 2021-10-24 PROCEDURE — 6360000002 HC RX W HCPCS: Performed by: INTERNAL MEDICINE

## 2021-10-24 PROCEDURE — 97530 THERAPEUTIC ACTIVITIES: CPT

## 2021-10-24 PROCEDURE — 85610 PROTHROMBIN TIME: CPT

## 2021-10-24 PROCEDURE — 2580000003 HC RX 258: Performed by: STUDENT IN AN ORGANIZED HEALTH CARE EDUCATION/TRAINING PROGRAM

## 2021-10-24 PROCEDURE — 87040 BLOOD CULTURE FOR BACTERIA: CPT

## 2021-10-24 PROCEDURE — 99233 SBSQ HOSP IP/OBS HIGH 50: CPT | Performed by: INTERNAL MEDICINE

## 2021-10-24 PROCEDURE — 83735 ASSAY OF MAGNESIUM: CPT

## 2021-10-24 PROCEDURE — 36415 COLL VENOUS BLD VENIPUNCTURE: CPT

## 2021-10-24 PROCEDURE — 87186 SC STD MICRODIL/AGAR DIL: CPT

## 2021-10-24 PROCEDURE — 80048 BASIC METABOLIC PNL TOTAL CA: CPT

## 2021-10-24 PROCEDURE — 82947 ASSAY GLUCOSE BLOOD QUANT: CPT

## 2021-10-24 PROCEDURE — 86900 BLOOD TYPING SEROLOGIC ABO: CPT

## 2021-10-24 PROCEDURE — 97166 OT EVAL MOD COMPLEX 45 MIN: CPT

## 2021-10-24 RX ORDER — POTASSIUM CHLORIDE 20 MEQ/1
40 TABLET, EXTENDED RELEASE ORAL PRN
Status: DISCONTINUED | OUTPATIENT
Start: 2021-10-24 | End: 2021-10-30 | Stop reason: HOSPADM

## 2021-10-24 RX ORDER — POTASSIUM BICARBONATE 25 MEQ/1
25 TABLET, EFFERVESCENT ORAL PRN
Status: DISCONTINUED | OUTPATIENT
Start: 2021-10-24 | End: 2021-10-30 | Stop reason: HOSPADM

## 2021-10-24 RX ORDER — POTASSIUM CHLORIDE 7.45 MG/ML
10 INJECTION INTRAVENOUS PRN
Status: DISCONTINUED | OUTPATIENT
Start: 2021-10-24 | End: 2021-10-30 | Stop reason: HOSPADM

## 2021-10-24 RX ORDER — POTASSIUM CHLORIDE 20 MEQ/1
40 TABLET, EXTENDED RELEASE ORAL ONCE
Status: COMPLETED | OUTPATIENT
Start: 2021-10-24 | End: 2021-10-24

## 2021-10-24 RX ADMIN — Medication 1 TABLET: at 08:44

## 2021-10-24 RX ADMIN — METOPROLOL TARTRATE 25 MG: 25 TABLET ORAL at 08:43

## 2021-10-24 RX ADMIN — Medication 1 CAPSULE: at 08:44

## 2021-10-24 RX ADMIN — Medication 1250 MG: at 19:58

## 2021-10-24 RX ADMIN — Medication 1250 MG: at 11:03

## 2021-10-24 RX ADMIN — METOPROLOL TARTRATE 25 MG: 25 TABLET ORAL at 19:56

## 2021-10-24 RX ADMIN — ENOXAPARIN SODIUM 105 MG: 120 INJECTION SUBCUTANEOUS at 19:56

## 2021-10-24 RX ADMIN — ENOXAPARIN SODIUM 105 MG: 120 INJECTION SUBCUTANEOUS at 08:45

## 2021-10-24 RX ADMIN — ENOXAPARIN SODIUM 105 MG: 120 INJECTION SUBCUTANEOUS at 02:48

## 2021-10-24 RX ADMIN — SODIUM CHLORIDE, PRESERVATIVE FREE 10 ML: 5 INJECTION INTRAVENOUS at 19:57

## 2021-10-24 RX ADMIN — METOPROLOL TARTRATE 25 MG: 25 TABLET ORAL at 16:22

## 2021-10-24 RX ADMIN — POTASSIUM CHLORIDE 40 MEQ: 1500 TABLET, EXTENDED RELEASE ORAL at 02:48

## 2021-10-24 RX ADMIN — Medication 1250 MG: at 02:48

## 2021-10-24 RX ADMIN — METOCLOPRAMIDE 5 MG: 5 INJECTION, SOLUTION INTRAMUSCULAR; INTRAVENOUS at 08:43

## 2021-10-24 ASSESSMENT — ENCOUNTER SYMPTOMS
NAUSEA: 0
SHORTNESS OF BREATH: 1
SORE THROAT: 0
ABDOMINAL PAIN: 0
COUGH: 0
VOMITING: 0
CONSTIPATION: 0
DIARRHEA: 0
WHEEZING: 0
TROUBLE SWALLOWING: 0

## 2021-10-24 ASSESSMENT — PAIN SCALES - GENERAL: PAINLEVEL_OUTOF10: 0

## 2021-10-24 NOTE — PROGRESS NOTES
Occupational Therapy   Occupational Therapy Initial Assessment  Date: 10/24/2021   Patient Name: Garret Fontanez  MRN: [id number]     : 1990    Date of Service: 10/24/2021  Chief Complaint   Patient presents with    Other     covid positive        Discharge Recommendations: Pt would require 24 hr supervision to return home safely. Equipment recommendations listed below are based on what the patient would need if they were able to return to prior living arrangements at the time of discharge. Patient would benefit from continued therapy after discharge  OT Equipment Recommendations  Equipment Needed: Yes  Mobility Devices: Albino Hawks; ADL Assistive Devices  Walker: Rolling  ADL Assistive Devices: Toileting - Raised Toilet Seat with arms    Assessment   Performance deficits / Impairments: Decreased functional mobility ; Decreased endurance;Decreased ADL status; Decreased balance;Decreased high-level IADLs;Decreased strength  Prognosis: Good  Decision Making: Medium Complexity  OT Education: OT Role;Plan of Care;Energy Conservation; ADL Adaptive Strategies; Family Education  Patient Education: EC/WS techniques-Good return from pt and family  REQUIRES OT FOLLOW UP: Yes  Activity Tolerance  Activity Tolerance: Patient Tolerated treatment well;Patient limited by fatigue  Safety Devices  Safety Devices in place: Yes  Type of devices: Call light within reach;Gait belt;Left in chair;Nurse notified; Patient at risk for falls  Restraints  Initially in place: No         Patient Diagnosis(es): The encounter diagnosis was 26 weeks gestation of pregnancy. has a past medical history of Anxiety, GERD (gastroesophageal reflux disease), Migraines, and Seasonal allergies. has a past surgical history that includes [place] tooth extraction; Mandible surgery; Foot surgery; Mouth surgery; hc  picc powerpic triple (10/15/2021); and  section (N/A, 10/15/2021). Restrictions  Restrictions/Precautions  Restrictions/Precautions: General Precautions, Surgical Protocols, Fall Risk, Up as Tolerated  Required Braces or Orthoses?: Yes  Required Braces or Orthoses  Other: Abdominal Binder  Position Activity Restriction  Other position/activity restrictions: 3L O2 entire session, Neg for PE on 10/22, Amb pt, 7.1 Hgb    Subjective   General  Patient assessed for rehabilitation services?: Yes  Family / Caregiver Present: Yes (Parents)  Diagnosis: recent covid,  on 10/14, on ECMO, sinus tachycardia  Patient Currently in Pain: Denies  Pain Assessment  Pain Assessment: 0-10  Pain Level: 0  Vital Signs  Temp: 98.1 °F (36.7 °C)  Temp Source: Oral  Patient Currently in Pain: Denies  Social/Functional History  Social/Functional History  Lives With: Spouse  Type of Home: House  Home Layout: One level  Home Access: Stairs to enter without rails  Entrance Stairs - Number of Steps: 3  Bathroom Shower/Tub: Tub/Shower unit  Bathroom Toilet: Standard  Bathroom Equipment: Grab bars in shower, Shower chair  Home Equipment:  (none)  ADL Assistance: Independent  Homemaking Assistance: Independent  Homemaking Responsibilities: Yes  Ambulation Assistance: Independent  Transfer Assistance: Independent  Active : Yes  Mode of Transportation: Car  Occupation: Full time employment  Type of occupation: teaches freshman [employer]  Leisure & Hobbies: charu, string art     Objective   Vision: Impaired  Vision Exceptions: Wears glasses at all times  Hearing: Within functional limits    Orientation  Overall Orientation Status: Within Functional Limits  Observation/Palpation  Posture: Fair  Balance  Sitting Balance: Modified independent   Standing Balance: Supervision  Standing Balance  Time: 17 min  Activity: Pt stood chairside, sinkside, func mob to/from bathroom  Functional Mobility  Functional - Mobility Device: Rolling Walker  Activity: To/from bathroom  Assist Level: Supervision  Functional Mobility Comments: RW improved balance, some fatigue noted, CTA  ADL  Feeding: Independent  Grooming: Supervision; Increased time to complete  UE Bathing: Supervision; Increased time to complete  LE Bathing: Stand by assistance; Increased time to complete  UE Dressing: Supervision; Increased time to complete  LE Dressing: Contact guard assistance; Increased time to complete  Toileting: Contact guard assistance; Increased time to complete  Additional Comments: Pt fatigues after standing hair/oral care tasks requiring seated rest break, able to doff/don sock sitting in recliner with good figure-4 technique used, writer washed dried blood from pt's neck, pt on 3L O2 entire session  Tone RUE  RUE Tone: Normotonic  Tone LUE  LUE Tone: Normotonic  Coordination  Movements Are Fluid And Coordinated: Yes     Bed mobility  Supine to Sit: Unable to assess  Sit to Supine: Unable to assess  Scooting: Modified independent  Comment: Pt in recliner upon arrival/exit with legs elevated and call light in reach, pt educated on and demo'd good hand placement this date  Transfers  Sit to stand: Contact guard assistance  Stand to sit: Stand by assistance     Cognition  Overall Cognitive Status: WFL       Sensation  Overall Sensation Status: WFL        LUE AROM (degrees)  LUE AROM : WFL  RUE AROM (degrees)  RUE AROM : WFL  LUE Strength  Gross LUE Strength: WFL  L Shoulder Flex: 4+/5  L Elbow Flex: 4+/5  L Elbow Ext: 4+/5  L Hand General: 4+/5  RUE Strength  Gross RUE Strength: WFL  R Shoulder Flex: 4+/5  R Elbow Flex: 4+/5  R Elbow Ext: 4+/5  R Hand General: 4+/5      Plan   Plan  Times per week: 3-5x           AM-PAC Score        AM-PAC Inpatient Daily Activity Raw Score: 19 (10/24/21 1719)  AM-PAC Inpatient ADL T-Scale Score : 40.22 (10/24/21 1719)  ADL Inpatient CMS 0-100% Score: 42.8 (10/24/21 1719)  ADL Inpatient CMS G-Code Modifier : CK (10/24/21 1719)    Goals  Short term goals  Time Frame for Short term goals: Pt will by discharge  Short term goal 1: demo/identify 2 EC/WS techniques with 1 vc only during func activity  Short term goal 2: demo ADL UB/LB dressing/bathing activity with mod I and increased time using AD PRN  Short term goal 3: demo mod I for all bed mob/transfers using RW and railings PRN  Short term goal 4: demo BUE strength of 5/5 grossly for use in ADL performance  Short term goal 5: demo good safety awareness during func mob around room using LRD PRN and mod I     Therapy Time   Individual Concurrent Group Co-treatment   Time In 1430         Time Out 1519         Minutes 49         Timed Code Treatment Minutes: [address], OTR/L

## 2021-10-24 NOTE — FLOWSHEET NOTE
10/24/21 0240   Stool Assessment   Incontinence Yes   Stool Appearance Loose   Stool Color Green   Stool Amount Large   Stool Source Rectum   Last BM (including prior to admit) 10/24/21   Unmeasured Output   Stool Occurrence 1

## 2021-10-24 NOTE — PROGRESS NOTES
fluid, denies vaginal bleeding. Pt underwent  10/14; intubated 10/15; she was also on VV ECMO which was discontinued Friday 10/22. She was extubated 10/20/21.       Discharge Recommendations:  Further therapy recommended at discharge. PT Equipment Recommendations  Equipment Needed: No    Assessment    Pt cooperative, motivated, unsteady gait without device, improved with rwalker. She states her  will be working from home and able to assist with their new son once he (the baby) is able to come home. She hopes to be [address] home at time of DC. Body structures, Functions, Activity limitations: Decreased functional mobility ; Decreased strength;Decreased endurance;Decreased balance  Prognosis: Good  Decision Making: High Complexity  PT Education: Goals;PT Role;Plan of Care  REQUIRES PT FOLLOW UP: Yes  Activity Tolerance  Activity Tolerance: Patient limited by fatigue;Patient limited by endurance       Patient Diagnosis(es): The encounter diagnosis was 26 weeks gestation of pregnancy. has a past medical history of Anxiety, GERD (gastroesophageal reflux disease), Migraines, and Seasonal allergies. has a past surgical history that includes [place] tooth extraction; Mandible surgery; Foot surgery; Mouth surgery; hc  picc powerpic triple (10/15/2021); and  section (N/A, 10/15/2021).     Restrictions  Restrictions/Precautions  Restrictions/Precautions: General Precautions, Surgical Protocols, Fall Risk, Up as Tolerated (out of COVID isolation since10/20)  Required Braces or Orthoses?: Yes  Required Braces or Orthoses  Other: Abdominal Binder  Position Activity Restriction  Other position/activity restrictions:  (pt had  10/14)  Vision/Hearing  Vision: Impaired  Vision Exceptions: Wears glasses at all times  Hearing: Within functional limits     Subjective  General  Patient assessed for rehabilitation services?: Yes  Response To Previous Treatment: Not applicable  Family / Caregiver Present: No  Follows Commands: Within Functional Limits  Pain Screening  Patient Currently in Pain: Denies  Vital Signs  Patient Currently in Pain: Denies       Orientation  Orientation  Overall Orientation Status: Within Normal Limits  Social/Functional History  Social/Functional History  Lives With: Spouse  Type of Home: House  Home Layout: One level  Home Access: Stairs to enter without rails  Entrance Stairs - Number of Steps: 3  ADL Assistance: Independent  Ambulation Assistance: Independent  Transfer Assistance: Independent  Active : Yes  Mode of Transportation: Car  Occupation: Full time employment  Type of occupation: teaches freshman algebra    Objective     Observation/Palpation  Posture: Fair    PROM RLE (degrees)  RLE PROM: WFL  PROM LLE (degrees)  LLE PROM: WFL  PROM RUE (degrees)  RUE PROM: WFL  PROM LUE (degrees)  LUE PROM: WFL  Strength RLE  Strength RLE: Exception--hip grossly 2+/5; knee distal 4-/5  Strength LLE  Strength LLE: Exception--hip grossly 2+/5; knee distal 3+/5  Strength RUE  Strength RUE: Exception--shoulder grossly 3-/5; elbow distal 4-/5  Strength LUE  Strength LUE: Exception--shoulder grossly 2+/5; elbow distal 3+/5    Tone RLE  RLE Tone: Normotonic  Tone LLE  LLE Tone: Normotonic  Sensation  Overall Sensation Status: WFL (denies N/T)  Bed mobility  Comment: pt up in chair upon PT arrival and retired to chair at end of PT session; assess in subsequent PT sessions  Transfers  Sit to Stand: Minimal Assistance  Stand to sit: Minimal Assistance  Bed to Chair: Minimal assistance  Stand Pivot Transfers: Minimal Assistance  Ambulation  Ambulation?: Yes  More Ambulation?: Yes  Ambulation 1  Surface: level tile  Device: No Device  Other Apparatus: O2  Assistance: Minimal assistance  Quality of Gait: pt moves slowly, cautiously, reaching for objects to steady herself  Gait Deviations: Slow Lou; Increased SALLY; Decreased step length;Decreased step height;Decreased arm swing;Decreased head and trunk rotation;Deviated path  Ambulation 2  Surface - 2: level tile  Device 2: Rolling Walker  Other Apparatus 2: O2  Assistance 2: Stand by assistance  Quality of Gait 2: mildly flexed posture; improved step length, improved balance  Gait Deviations: Slow Lou;Decreased arm swing;Decreased head and trunk rotation  Distance: 130'x1  Stairs/Curb  Stairs?: No     Balance  Posture: Fair  Sitting - Static: Good  Sitting - Dynamic: Good  Standing - Static: Fair  Standing - Dynamic: Fair;-  Other exercises  Other exercises 1: AROM BUEs seated x 10 reps: hand grasp/release, elbow flexion, extension.   AAROM B shoulder flexion  Other exercises 2: AROM BLEs seated x 10 reps: ankle pumps, LAQs; AAROM B hip flexion seated x 10  Other exercises 3: use of IS--not quite able to achieve 750 ccs with cues; instructed proper use and frequency of IS     Plan   Plan  Times per week: 5-6 visits weekly  Times per day: Daily  Current Treatment Recommendations: Strengthening, ROM, Balance Training, Functional Mobility Training, Transfer Training, Endurance Training, Gait Training, Stair training, Home Exercise Program, Safety Education & Training, Patient/Caregiver Education & Training, Equipment Evaluation, Education, & procurement, Positioning  Safety Devices  Type of devices: Call light within reach, Gait belt, Patient at risk for falls, Left in chair, Nurse notified  Restraints  Initially in place: No    AM-PAC Score  AM-PAC Inpatient Mobility Raw Score : 17 (10/24/21 1239)  AM-PAC Inpatient T-Scale Score : 42.13 (10/24/21 1239)  Mobility Inpatient CMS 0-100% Score: 50.57 (10/24/21 1239)  Mobility Inpatient CMS G-Code Modifier : CK (10/24/21 1239)          Goals  Short term goals  Time Frame for Short term goals: 12 visits  Short term goal 1: independent bed mobility, HOB flat, no use of bed rails  Short term goal 2: independent transfers  Short term goal 3: independent gait without device x 200'  Short term goal 4: stair ambulation x 3 steps without HR, CG+1  Short term goal 5: improve strength B hips/shoulders to 4-/5 throughout  Patient Goals   Patient goals : go home, be able to get around independently       Therapy Time   Individual Concurrent Group Co-treatment   Time In [address] [place] [address]         Time Out 1208         Minutes [address] [place] [address]

## 2021-10-24 NOTE — PROGRESS NOTES
[facility] - Initial Consult Note COVID 19 Patient  Today's Date and Time: 10/23/2021, 8:35 PM    Impression :   COVID 23 Suspect  COVID 19 Confirmed Infection  Covid tests:  10/10/21: Positive  26 weeks gestation pregnancy  Acute hypoxic respiratory distress  Pulmonary embolism LLL  Metabolic acidosis with euglycemic starvation  10/21 staph aureus sepsis -  Bandemia 10/21  Intubated 10/14 -extubated 10/21  Recommendations:   Antibiotic treatment:  Monitor off antibiotics  Covid Rx:  Remdesivir-Out of window  Decadron-Initiated 10/14/21 - stop ad day 9 10/22 due to SA sepsis  anticoagulation  DEFERED barcitinib and  Actemra due to pregnancy  On ECMO   Baby delivered early 26 weeks,  10/21-acute leukocytosis, blood cultures and Vanco cefepime started  10/22 stop cefepime, keep vanco pend ID of the SA- stop decadron day 9, pulled the left IJ as well  Repeat blood cultures daily, till negative x3 days, get 2D echo look for vegetation  10/23 diarrhea - probiotics - watch resolution    start on illness 10/1  Stop isolation after 10/20    Medical Decision Making/Summary/Discussion:10/23/2021     Patient admitted with suspected COVID 19 infection  Covid test confirmed positive. Infection Control Recommendations   [place] Precautions  Airborne isolation  Droplet plus Isolation    Antimicrobial Stewardship Recommendations   Off antibiotics    Chief complaint/reason for consultation:   Concern for COVID infection      History of Present Illness:   Corey Rachel is a 27y.o.-year-old  female who was initially admitted on 10/13/2021. Patient seen at the request of Dr. Savage Peers:    Patient, who is 26 weeks pregnant, initially presented to [facility] ED on 10/10/21 presented through ER with complaints of productive cough with yellow sputum X 9 days & shortness of breath with pleuritic chest pain that started the day before.  She ws also experiencing decreased appetite 10/16 normal conrad    10/22  BC 10/20 SA x 2, seems to be MRSA  ECMO line out today 10/22  WBC better 24  On vanco - stop cefepime  Stop decadron day 9  Alert appropriate responsive, abdomen soft nontender and  closed    10/23  No fever  Alert - ox 3 and still a little SOB but ion 3 L o2  abd soft non tender  Mild moisture rash folds  BC 10/21+ MRSA x 2  BC 10/23 still pend neg    Still has left IJ and planned opn getting out this pm  Lozano out today    diarrhea x 3 today - started on probiotic -expect it to improve since cefepime is stopped      10/20 cxr            CAT:  10/13/21    . I have personally reviewed the past medical history, past surgical history, medications, social history, and family history, and I have updated the database accordingly.   Past Medical History:     Past Medical History:   Diagnosis Date    Anxiety     GERD (gastroesophageal reflux disease)     Migraines     Seasonal allergies        Past Surgical  History:     Past Surgical History:   Procedure Laterality Date     SECTION N/A 10/15/2021     SECTION performed by Parker Triana MD at [address] [place] [address] Rd      nerve release, and plantar fascitis    HC  PICC POWERPIC TRIPLE  10/15/2021         MANDIBLE SURGERY      tooth implant    MOUTH SURGERY      WISDOM TOOTH EXTRACTION         Medications:      metoprolol tartrate  25 mg Oral TID    enoxaparin  1 mg/kg SubCUTAneous BID    lactobacillus  1 capsule Oral Daily with breakfast    vancomycin (VANCOCIN) intermittent dosing (placeholder)   Other RX Placeholder    vancomycin  1,250 mg IntraVENous Q8H    prenatal vitamin  1 tablet Oral Daily    Tdap-Dtap  0.5 mL IntraMUSCular Prior to discharge    lidocaine 1 % injection  5 mL IntraDERmal Once    sodium chloride flush  5-40 mL IntraVENous 2 times per day    docusate  100 mg Oral BID    sennosides-docusate sodium  2 tablet Oral Daily    insulin lispro  0-12 Units SubCUTAneous Q6H Social History:     Social History     Socioeconomic History    Marital status:      Spouse name: Not on file    Number of children: Not on file    Years of education: Not on file    Highest education level: Not on file   Occupational History    Not on file   Tobacco Use    Smoking status: Former Smoker     Types: Cigarettes    Smokeless tobacco: Never Used   Vaping Use    Vaping Use: Former    Substances: Nicotine   Substance and Sexual Activity    Alcohol use: Not Currently    Drug use: Not Currently     Types: Marijuana     Comment: last smoked May 2021    Sexual activity: Yes   Other Topics Concern    Not on file   Social History Narrative    Not on file     Social Determinants of Health     Financial Resource Strain:     Difficulty of Paying Living Expenses:    Food Insecurity:     Worried About [address] in the Last Year:     [address] in the Last Year:    Transportation Needs:     Lack of Transportation (Medical):      Lack of Transportation (Non-Medical):    Physical Activity:     Days of Exercise per Week:     Minutes of Exercise per Session:    Stress:     Feeling of Stress :    Social Connections:     Frequency of Communication with Friends and Family:     Frequency of Social Gatherings with Friends and Family:     Attends Confucianism Services:     Active Member of Clubs or Organizations:     Attends Club or Organization Meetings:     Marital Status:    Intimate Partner Violence:     Fear of Current or Ex-Partner:     Emotionally Abused:     Physically Abused:     Sexually Abused:        Family History:     Family History   Problem Relation Age of Onset    Breast Cancer Maternal Grandmother         older than 48    Heart Attack Paternal Uncle     Stroke Maternal Aunt     Hypertension Father     No Known Problems Paternal Grandfather     Stroke Paternal Grandmother     No Known Problems Maternal Grandfather     Hypertension Mother     No Known Problems Brother     No Known Problems Brother         Allergies:   Vicodin [hydrocodone-acetaminophen]     Review of Systems:     Review of Systems   Constitutional: Positive for activity change. HENT: Negative for congestion. Eyes: Negative for pain, discharge and redness. Respiratory: Negative for apnea. Cardiovascular: Negative for chest pain. Gastrointestinal: Positive for diarrhea. Negative for abdominal distention. Endocrine: Negative for heat intolerance, polydipsia and polyphagia. Genitourinary: Negative for dysuria and flank pain. Musculoskeletal: Negative for arthralgias. Skin: Negative for color change. Allergic/Immunologic: Positive for immunocompromised state. Neurological: Negative for dizziness and seizures. Hematological: Negative for adenopathy. Psychiatric/Behavioral: Negative for agitation. Physical Examination :     Patient Vitals for the past 8 hrs:   BP Temp Temp src Pulse Resp SpO2   10/23/21 2005 126/66 99.3 °F (37.4 °C) Oral 103 18 95 %   10/23/21 1815 -- -- -- 106 29 92 %   10/23/21 1800 -- -- -- 95 (!) 34 97 %   10/23/21 1745 -- -- -- 107 (!) 31 92 %   10/23/21 1730 -- -- -- 93 (!) 33 96 %   10/23/21 1715 -- -- -- 100 (!) 35 99 %   10/23/21 1700 -- -- -- 94 (!) 34 98 %   10/23/21 1645 -- -- -- 96 (!) 33 94 %   10/23/21 1615 -- -- -- 95 (!) 33 94 %   10/23/21 1600 -- 99.5 °F (37.5 °C) -- 100 (!) 37 93 %   10/23/21 1548 117/64 99.5 °F (37.5 °C) Oral 98 29 95 %     Physical Exam  Constitutional:       General: She is not in acute distress. Appearance: Normal appearance. She is not ill-appearing. Comments: Intubated sedated   HENT:      Head: Normocephalic and atraumatic. Nose: Nose normal.   Eyes:      General: No scleral icterus. Conjunctiva/sclera: Conjunctivae normal.      Pupils: Pupils are equal, round, and reactive to light. Cardiovascular:      Rate and Rhythm: Normal rate and regular rhythm.       Heart sounds: Normal heart sounds. No murmur heard. Pulmonary:      Effort: No respiratory distress. Breath sounds: No stridor. No rhonchi. Abdominal:      Palpations: Abdomen is soft. There is no mass. Hernia: No hernia is present. Genitourinary:     Comments: Urine bong  Musculoskeletal:         General: No swelling, tenderness, deformity or signs of injury. Cervical back: Neck supple. No rigidity or tenderness. Skin:     Coloration: Skin is not jaundiced or pale. Findings: No bruising or erythema. Neurological:      General: No focal deficit present. Mental Status: She is alert and oriented to person, place, and time. Sensory: No sensory deficit. Psychiatric:         Mood and Affect: Mood normal.         Behavior: Behavior normal.          Medical Decision Making -Laboratory:   I have independently reviewed/ordered the following labs:    CBC with Differential:   Recent Labs     10/22/21  0958 10/22/21  0958 10/23/21  0632 10/23/21  1251   WBC 24.2*  --  11.6*  --    HGB 8.6*   < > 6.1* 7.5*   HCT 26.3*   < > 19.4* 23.2*     --  128*  --     < > = values in this interval not displayed. BMP:   Recent Labs     10/22/21  0958 10/23/21  0632   * 135   K 4.4 3.6*   CL 97* 102   CO2 20 21   BUN 11 7   CREATININE 0.23* 0.26*   MG 1.8 1.9     Hepatic Function Panel:   Recent Labs     10/21/21  2214 10/22/21  0958   PROT 5.4* 5.3*   LABALBU 3.1* 3.0*   BILIDIR 0.27 0.50*   IBILI 0.49 0.66   BILITOT 0.76 1.16   ALKPHOS 68 74   ALT 27 25   AST 16 20     No results for input(s): RPR in the last 72 hours. No results for input(s): HIV in the last 72 hours. No results for input(s): BC in the last 72 hours.   Lab Results   Component Value Date    MUCUS NOT REPORTED 10/13/2021    RBC 2.17 10/23/2021    TRICHOMONAS NOT REPORTED 10/13/2021    WBC 11.6 10/23/2021    YEAST NOT REPORTED 10/13/2021    TURBIDITY Clear 10/13/2021     Lab Results   Component Value Date    CREATININE 0.26 10/23/2021 GLUCOSE 119 10/23/2021       Medical Decision Making-Imaging:     Narrative   EXAMINATION:   CTA OF THE CHEST 10/13/2021 2:16 pm       TECHNIQUE:   CTA of the chest was performed after the administration of intravenous   contrast.  Multiplanar reformatted images are provided for review.  MIP   images are provided for review. Dose modulation, iterative reconstruction,   and/or weight based adjustment of the mA/kV was utilized to reduce the   radiation dose to as low as reasonably achievable.       COMPARISON:   Chest x-rays over the last few days. .       HISTORY:   ORDERING SYSTEM PROVIDED HISTORY: sob, tachy, 32 WEEKS PREGNANT, Spoke with   Radiologist   TECHNOLOGIST PROVIDED HISTORY:   sob, tachy, 32 WEEKS PREGNANT, Spoke with Radiologist   Decision Support Exception - unselect if not a suspected or confirmed   emergency medical condition->Emergency Medical Condition (MA)       FINDINGS:   Pulmonary Arteries:  There is a small nonocclusive segmental to subsegmental   embolism noted in the left lower lobe, on and around axial image [id number].  No   other definite emboli are seen.  No central emboli.  No pulmonary arterial   enlargement is identified.       Mediastinum: Mediastinal and hilar lymphadenopathy is identified.  No focal   esophageal thickening is identified.  The thyroid gland appears unremarkable.       Lungs/pleura: Multifocal ground-glass pneumonia with some mild areas of   consolidation.  No pleural effusion is identified.       Upper Abdomen: No acute process seen in the upper abdomen.       Soft Tissues/Bones: No axillary or supraclavicular lymphadenopathy is   identified.  No acute osseous abnormality is identified.           Impression   Single small nonocclusive segmental to subsegmental pulmonary embolism in the   left lower lobe.  No central emboli.  This was discussed with Neyda Corbin   at 2:44 p.m. on 10/13/2021.       Multilobar COVID-19 pneumonia.                 Medical Decision [id number]:       Medical Decision Making-Other:     Note:  Labs, medications, radiologic studies were reviewed with personal review of films  Large amounts of data were reviewed  Discussed with nursing Staff, Discharge planner  Infection Control and Prevention measures reviewed  All prior entries were reviewed  Administer medications as ordered  Prognosis: Guarded  Discharge planning reviewed  Follow up as outpatient. Thank you for allowing us to participate in the care of this patient. Please call with questions.     Azucena Hall MD  Office: [phone]

## 2021-10-24 NOTE — PROGRESS NOTES
PULMONARY & CRITICAL CARE MEDICINE PROGRESS NOTE     Patient:  Martha Zhang  MRN: [id number]  [facility] date: 10/13/2021  Primary Care Physician: Natty Thomas  Consulting Physician: Becky Pritchard DO  CODE Status: Full Code  LOS: 11     SUBJECTIVE     CHIEF COMPLAINT/REASON FOR INITIAL CONSULT: Acute respiratory failure/COVID-19 pneumonia    BRIEF HOSPITAL COURSE:   The patient is a 27 y.o. female who was 26-week pregnant initially tested positive for COVID-19 on 10/10. She presented to [facility] ED on 10/13 with respiratory distress and low home O2 saturation. She was tachypneic and tachycardic. Transferred to [facility] for further management. Initial blood gases showed evidence of combined anion gap and non-anion gap metabolic acidosis, thought to be related to starvation ketosis. During evaluation patient was also noted to have a left lower lobe subsubsegmental PE. She underwent  10/15. Postoperative course complicated by development of worsening respiratory failure and required initiation of mechanical ventilation on 10/15. Due to worsening severe hypoxemic respiratory failure decision was made to put her on VV ECMO. She had placement of 27 F VV ECMO (crescent) cannula in the right atrium on 10/17. INTERVAL HISTORY:  10/24/21  Patient remained off VV ECMO, decannulated on 10/22/2021  She denies any pain or anxiety  Had improvement in the nausea and vomiting  Able to tolerate oral diet    CT chest ordered to rule out PE on 10/22/2021 did not show any pulmonary rhythm  Completed Decadron    REVIEW OF SYSTEMS:  Review of Systems   Constitutional: Negative for appetite change, chills, fever and unexpected weight change. HENT: Negative for congestion, postnasal drip, sore throat and trouble swallowing. Eyes: Negative for visual disturbance. Respiratory: Positive for shortness of breath. Negative for cough and wheezing. Cardiovascular: Negative for chest pain, palpitations and leg swelling. rashes, no suspicious skin lesions noted     DATA REVIEW     Medications:  Scheduled Meds:   metoprolol tartrate  25 mg Oral TID    enoxaparin  1 mg/kg SubCUTAneous BID    lactobacillus  1 capsule Oral Daily with breakfast    vancomycin (VANCOCIN) intermittent dosing (placeholder)   Other RX Placeholder    vancomycin  1,250 mg IntraVENous Q8H    prenatal vitamin  1 tablet Oral Daily    Tdap-Dtap  0.5 mL IntraMUSCular Prior to discharge    lidocaine 1 % injection  5 mL IntraDERmal Once    sodium chloride flush  5-40 mL IntraVENous 2 times per day    docusate  100 mg Oral BID    sennosides-docusate sodium  2 tablet Oral Daily    insulin lispro  0-12 Units SubCUTAneous Q6H     Continuous Infusions:   dextrose 5 % and 0.9 % NaCl 75 mL/hr at 10/22/21 2032    esmolol Stopped (10/23/21 2100)    sodium chloride      HYDROmorphone Stopped (10/21/21 1900)    ketamine (KETALAR) infusion for analgosedation Stopped (10/21/21 1900)    sodium chloride      midazolam Stopped (10/21/21 1900)    dextrose      sodium chloride 5 mL/hr at 10/14/21 0700       INPUT/OUTPUT:  In: 1150 [I.V.:1150]  Out: 1045 [Urine:1045]  Date 10/24/21 0000 - 10/24/21 2359   Shift 9205-3842 0366-6191 8724-6719 24 Hour Total   INTAKE   Shift Total(mL/kg)       OUTPUT   Urine(mL/kg/hr) 745(0.9)   745   Shift Total(mL/kg) 745(7)   745(7)   Weight (kg) 107 107 107 107        LABS:  ABGs:   Recent Labs     10/21/21  1021 10/21/21  1546 10/21/21  2214 10/22/21  0414 10/22/21  1006   POCPH 7.532* 7.554* 7.505* 7.466* 7.523*   POCPCO2 35.7 31.5* 31.4* 30.6* 27.7*   POCPO2 62.2* 58.5* 63.4* 91.8 51.5*   POCHCO3 29.9* 27.8 24.8 22.0 22.8   PDLU5JAC 94 94 94 98 90*     CBC:   Recent Labs     10/21/21  2214 10/21/21  2214 10/22/21  0414 10/22/21  0958 10/23/21  0632 10/23/21  1251 10/24/21  0532   WBC 31.3*  --  27.7* 24.2* 11.6*  --  12.0*   HGB 7.9*   < > 6.5* 8.6* 6.1* 7.5* 7.1*   HCT 24.2*   < > 19.8* 26.3* 19.4* 23.2* 23.2*   MCV 90.0  -- 88.4 87.1 89.4  --  93.5     --  239 142 128*  --  159   RBC 2.69*  --  2.24* 3.02* 2.17*  --  2.48*   MCH 29.4  --  29.0 28.5 28.1  --  28.6   MCHC 32.6  --  32.8 32.7 31.4  --  30.6   RDW 14.6*  --  14.5* 14.4 15.1*  --  15.7*    < > = values in this interval not displayed. CRP:   No results for input(s): CRP in the last 72 hours. LDH:   No results for input(s): LDH in the last 72 hours. BMP:   Recent Labs     10/21/21  2214 10/22/21  0414 10/22/21  0958 10/23/21  0632 10/24/21  0532    131* 129* 135 135   K 4.8 4.6 4.4 3.6* 3.8    99 97* 102 105   CO2 20 21 20 21 19*   BUN 9 11 11 7 5*   CREATININE 0.29* 0.24* 0.23* 0.26* 0.23*   GLUCOSE 149* 143* 132* 119* 106*     Liver Function Test:   Recent Labs     10/21/21  1021 10/21/21  2214 10/22/21  0958   PROT 5.2* 5.4* 5.3*   LABALBU 2.8* 3.1* 3.0*   ALT 33 27 25   AST 21 16 20   ALKPHOS 70 68 74   BILITOT 0.46 0.76 1.16     Coagulation Profile:   Recent Labs     10/21/21  1021 10/21/21  1021 10/21/21  1548 10/21/21  1548 10/21/21  2214 10/22/21  0414 10/22/21  0958 10/23/21  0632 10/24/21  0532   INR 1.0   < > 1.0   < > 1.0 1.0 1.0 1.0 1.1   PROTIME 10.3   < > 10.4   < > 10.6 11.1 10.9 10.7 11.5   APTT 59.2*  --  65.3*  --  59.5* 82.6* 27.3  --   --     < > = values in this interval not displayed. D-Dimer:  No results for input(s): DDIMER in the last 72 hours. Ferritin:    No results for input(s): FERRITIN in the last 72 hours. Lactic Acid:  No results for input(s): LACTA in the last 72 hours. Cardiac Enzymes:  No results for input(s): CKTOTAL, CKMB, CKMBINDEX, TROPONINI in the last 72 hours. Invalid input(s): TROPONIN, HSTROP  BNP/ProBNP:   No results for input(s): BNP, PROBNP in the last 72 hours. Triglycerides:  No results for input(s): TRIG in the last 72 hours.      Microbiology:  Urine Culture:  No components found for: CURINE  Blood Culture:  No components found for: CBLOOD, CFUNGUSBL  Sputum Culture:  No components found for: CSPUTUM  Recent Labs     10/24/21  0527   [facility] . BLOOD   SPECIAL 7ML L ARM   CULTURE NO GROWTH 3 HOURS     Recent Labs     10/21/21  1859 10/23/21  0451 10/24/21  0527   SPECDESC . BLOOD  . BLOOD . BLOOD . BLOOD   SPECIAL  RT AC 10 ML  NOT REPORTED RT AC 2 ML 7ML L ARM   CULTURE POSITIVE Blood Culture Results called to and read back by: UMM Rodriguez. ON 10/22/21 AT 0750*  DIRECT GRAM STAIN FROM BOTTLE: GRAM POSITIVE COCCI IN CLUSTERS  STAPHYLOCOCCUS AUREUS For susceptibility, refer to previous culture. *  POSITIVE Blood Culture Results called to and read back by: Kenzie Rodriguez. ON 10/22/21 AT 0750*  DIRECT GRAM STAIN FROM BOTTLE: GRAM POSITIVE COCCI IN CLUSTERS  Staphylococcus aureus Detected: mecA/C and MREJ Not Detected Methodology- Polymerase Chain Reaction (PCR)*  STAPHYLOCOCCUS AUREUS This isolate is methicillin susceptible. * NO GROWTH 1 DAY NO GROWTH 3 HOURS        Pathology:    Radiology Reports:  XR ABDOMEN (KUB) (SINGLE AP VIEW)   Final Result   1. Nonspecific, nonobstructive bowel gas pattern, without evidence of free air   2. Bibasilar pulmonary opacities, suggesting atelectasis and or pneumonia         XR CHEST PORTABLE   Final Result   Poor expiratory afford. Left subclavian central line remains in place. Resolving diffuse interstitial left retrocardiac opacification. CT CHEST PULMONARY EMBOLISM W CONTRAST   Final Result   This study is nondiagnostic for pulmonary embolism. There is excessive   respiratory motion artifact degrading image resolution. There is not   significant contrast within the pulmonary artery system to adequately assess   for pulmonary embolus. Recommend either repeat exam with improved bolus timing, and decreased   patient respiratory motion or VQ scan. Improving patchy bilateral infiltrates compatible with improvement of COVID   pneumonia.       Interval development of moderate ascites         XR CHEST PORTABLE   Final Result   Stable support lines Stable multifocal infiltrates         XR CHEST PORTABLE   Final Result   Increased infiltrates seen in the right lung base and left suprahilar region,   otherwise similar appearing multifocal infiltrates. XR CHEST PORTABLE   Final Result   1. ECMO device and left central venous catheter are unchanged. Endotracheal   and enteric tubes are no longer visualized. 2. Diffuse bilateral pulmonary opacities and small left effusion. XR CHEST PORTABLE   Final Result   Overall improved appearing chest with better aeration of both lungs. The ET   tube tip lies approximately 2.5 cm above the aly. XR CHEST PORTABLE   Final Result   Similar appearing chest with consolidative airspace disease in the right   lung, and complete opacification of the left chest and right lung apex. Question of slight deeper placement of the endotracheal tube, though the   aly position is somewhat difficult to see on this exam compared to the   prior. It may lie in the region of approximately 1.8 cm above the aly,   previously measuring closer to 2.7 cm above the aly. This could be   slightly retracted if clinically concerned. XR CHEST PORTABLE   Final Result   1. Recommend retraction of endotracheal tube 1.0 cm.   2. Stable appearance of left-sided hydrothorax. 3. Right lung multifocal marked ill-defined dense consolidation, unchanged. 4. Suspected mild right-sided pleural effusion. 5. Stable positioning ECMO catheter. XR CHEST PORTABLE   Final Result   An ECMO catheter is now in place. There is new complete opacification of the   left hemithorax. Right-sided airspace opacity has significantly increased. A left subclavian catheter has its tip in the midline. The right PICC has   been removed. XR CHEST PORTABLE   Final Result   Stable bilateral lung multifocal consolidation consistent with pneumonia. Appropriate radiographic positioning of endotracheal tube. XR CHEST PORTABLE   Final Result   The tip of the endotracheal tube is at the origin the right mainstem bronchus   and the tube should be pulled back 2 cm. VL DUP LOWER EXTREMITY VENOUS BILATERAL   Final Result      VASCULAR REPORT    (Results Pending)   XR CHEST PORTABLE    (Results Pending)   XR CHEST PORTABLE    (Results Pending)        Echocardiogram:   Results for orders placed during the hospital encounter of 10/13/21    ECHO Complete 2D W Doppler W Color    Narrative  Transthoracic Echocardiography Report (TTE)    Summary  Normal left ventricle size, wall thickness and function with a calculated EF  61%. No segmental wall motion abnormalities seen. Normal right ventricular size and function. No significant valvular regurgitation or stenosis seen. ASSESSMENT AND PLAN     Assessment:    // Acute hypoxic respiratory failure, on nasal cannula  // Acute respiratory distress syndrome, s/p VV ECMO and mechanical ventilation  // Bilateral multifocal pneumonia due to COVID 19 infection  // Sepsis due to COVID 19  // Sinus tachycardia  // Staph bacteremia  // Left lower lobe subsegmental pulmonary embolism  // Left pleural effusion/atelectasis  // S/p  section, 26-week pregnancy  // Metabolic acidosis, resolved  // Leukocytosis, significantly better  //Very mild hypokalemia, continue to monitor  //Anemia, better with transfusion. No source that is evident. Continue to monitor  Plan:    I personally interviewed/examined the patient; reviewed interval history, interpreted all available radiographic and laboratory data at the time of service.     Patient remains off ECMO  Patient came off of the esmolol drip  Cardiology following patient  Had improvement in oral intake  On Reglan  Abdominal x-ray reviewed and no evidence of ileus or obstruction  She is saturating well on nasal cannula  Continue supplemental oxygen to keep oxygen saturation greater than 92%  Encourage incentive spirometry  Continue pulmonary toilet, aspiration precautions   Continue to monitor I/O with a goal of even fluid balance  Tolerating oral diet  Stress ulcer prophylaxis  Changed to Eliquis if the patient can tolerate it  Continue to monitor CBC, coagulation profile  Antimicrobials reviewed; on empiric vancomycin and cefepime  Monitor CRP, LDH, AST/ALT, D-Dimer, Ferritin   Glycemic control appropriate  Completed Decadron  Skin/wound care reviewed with the nursing staff  Physical/occupational therapy        Brigitte Altamirano MD  Pulmonary and Critical Care Medicine           10/24/2021     This patient was evaluated in the context of the global SARS-CoV-2 (COVID-19) pandemic, which necessitated considerations that the patient either has COVID-19 infection or is at risk of infection with COVID-19. Institutional protocols and algorithms that pertain to the evaluation & management of patients with COVID-19 or those at risk for COVID-19 are in a state of rapid changes based on information released by regulatory bodies including the CDC and federal and state organizations. These policies and algorithms were followed during the patient's care. Please note that this chart was generated using voice recognition Dragon dictation software. Although every effort was made to ensure the accuracy of this automated transcription, some errors in transcription may have occurred.

## 2021-10-24 NOTE — PROGRESS NOTES
[facility]   Progress Note                   Date:   10/24/2021  Patient name: Omid Hidalgo  Date of admission:  10/13/2021  8:19 PM  MRN:   [id number]  YOB: 1990  PCP: Aure Elizabeth    Reason for Admission: covid 19     Subjective:       Hemoglobin staying above 7. No acute bleeding from anywhere. Eliquis on hold. Will give one more unit of PRBC. Medications:   Scheduled Meds:   metoprolol tartrate  25 mg Oral TID    enoxaparin  1 mg/kg SubCUTAneous BID    lactobacillus  1 capsule Oral Daily with breakfast    vancomycin (VANCOCIN) intermittent dosing (placeholder)   Other RX Placeholder    vancomycin  1,250 mg IntraVENous Q8H    prenatal vitamin  1 tablet Oral Daily    Tdap-Dtap  0.5 mL IntraMUSCular Prior to discharge    lidocaine 1 % injection  5 mL IntraDERmal Once    sodium chloride flush  5-40 mL IntraVENous 2 times per day    docusate  100 mg Oral BID    sennosides-docusate sodium  2 tablet Oral Daily    insulin lispro  0-12 Units SubCUTAneous Q6H     Continuous Infusions:   dextrose 5 % and 0.9 % NaCl 75 mL/hr at 10/22/21 2032    esmolol Stopped (10/23/21 0947)    sodium chloride      HYDROmorphone Stopped (10/21/21 1900)    ketamine (KETALAR) infusion for analgosedation Stopped (10/21/21 1900)    sodium chloride      midazolam Stopped (10/21/21 1900)    dextrose      sodium chloride 5 mL/hr at 10/14/21 0700     CBC:   Recent Labs     10/22/21  0958 10/22/21  0958 10/23/21  0632 10/23/21  1251 10/24/21  0532   WBC 24.2*  --  11.6*  --  12.0*   HGB 8.6*   < > 6.1* 7.5* 7.1*     --  128*  --  159    < > = values in this interval not displayed.      BMP:    Recent Labs     10/22/21  0958 10/23/21  0632 10/24/21  0532   * 135 135   K 4.4 3.6* 3.8   CL 97* 102 105   CO2 20 21 19*   BUN 11 7 5*   CREATININE 0.23* 0.26* 0.23*   GLUCOSE 132* 119* 106*     Hepatic:   Recent Labs     10/21/21  1021 10/21/21  2214 10/22/21  0958   AST 21 16 20 ALT 33 27 25   BILITOT 0.46 0.76 1.16   ALKPHOS 70 68 74     Troponin: No results for input(s): TROPONINI in the last 72 hours. BNP: No results for input(s): BNP in the last 72 hours. Lipids: No results for input(s): CHOL, HDL in the last 72 hours. Invalid input(s): LDLCALCU  INR:   Recent Labs     10/22/21  0958 10/23/21  0632 10/24/21  0532   INR 1.0 1.0 1.1       Objective:   Vitals: /66   Pulse 103   Temp 99 °F (37.2 °C) (Oral)   Resp 18   Ht 5' 4\" (1.626 m)   Wt 235 lb 14.3 oz (107 kg)   LMP 04/01/2021 (Approximate)   SpO2 95%   Breastfeeding Unknown   BMI 40.49 kg/m²   General appearance: alert and cooperative with exam  HEENT: Head: Normocephalic, no lesions, without obvious abnormality. Neck: no adenopathy, no carotid bruit, no JVD, supple, symmetrical, trachea midline and thyroid not enlarged, symmetric, no tenderness/mass/nodules  Lungs: clear to auscultation bilaterally  Heart: regular rate and rhythm, S1, S2 normal, no murmur, click, rub or gallop  Abdomen: soft, non-tender; bowel sounds normal; no masses,  no organomegaly  Extremities: extremities normal, atraumatic, no cyanosis or edema  Neurologic: Mental status: Alert, oriented, thought content appropriate    GUILLERMO 10/13/2021:  Summary  GUILLERMO was performed to guide ECMO cannulation position. Overall normal LV size and systolic function with estimated EF 55%  No significant valvular regurgitation seen. No intracardiac shunt via color doppler  Final cannula position showed the flow jet directed towards the tricuspid  valve.       IMPRESSION:    1. Sinus tachycardia with intermittent runs of SVT likely secondary to acute stress state currently weaned off esmolol drip. On p.o. beta-blocker. 2.  ARDS 2/2 to COVID requiring VV ECMO - Decannulation on 10/22/2021  3. Small PE in Left lower lobe on CTA 10/13/2021. Eliquis on hold due to hemoglobin drop as per pulm crit. 4. Anemia requirinng 1 unit of PRBC.    5. Leucocytosis which has been improving. Managed by primary.     RECOMMENDATIONS:  Weaned off Esmolol. Continue B Blocker, Metoprolol 25 TID. Patient is now off VV ECMO Right IJ crescent cannula   No hematoma at IJ site   Awake and oriented in no distress  BP stable   Sinus tachycardia secondary to severe anemia and residual hypoxia  PRBC transfusion to keep Hb > 8   Repeat limited echo Monday to check for any RV dysfunction. Will follow. Cricket Jnesen MD       Cardiovascular Fellow PGY-4  10/24/2021, 6:13 AM         Attending Physician Statement  I have discussed the care of Shagufta Olivares, including pertinent history and exam findings,  with the cardiology fellow/resident. I have seen and examined the patient and the key elements of all parts of the encounter have been performed by me.   I agree with the assessment, plan and orders as documented by the resident with additional recommendations as below:     Off esmolol drip, HR improved after PRBC transfusion, continue po lopressor, recommend another unit of PRBC this AM     Lory Moreno MD, Leslee Kenney

## 2021-10-24 NOTE — LACTATION NOTE
Nikki Farah is feeling much better today, sitting up in a chair. Reviewed pump set up and how to use initiation phase, storage of milk, not to combine, and labelling. Assisted with pump session using 27mm flanges, instructed nurse to use 30mm flanges with next pump. 14ml milk obtained.

## 2021-10-24 NOTE — PROGRESS NOTES
After speaking to Dr Josefina Merrill and Dr Angeles Simeon removed the following: central line, art line, and miles. See orders. Detail Level: Detailed Size Of Lesion In Cm (Optional): 0

## 2021-10-24 NOTE — FLOWSHEET NOTE
10/24/21 0048   Stool Assessment   Incontinence Yes   Stool Appearance Loose   Stool Color Green   Stool Amount Large   Stool Source Rectum   Last BM (including prior to admit) 10/24/21   Unmeasured Output   Stool Occurrence 3

## 2021-10-25 ENCOUNTER — APPOINTMENT (OUTPATIENT)
Dept: GENERAL RADIOLOGY | Age: 31
DRG: 003 | End: 2021-10-25
Payer: COMMERCIAL

## 2021-10-25 LAB
ABO/RH: NORMAL
ANION GAP SERPL CALCULATED.3IONS-SCNC: 9 MMOL/L (ref 9–17)
ANTIBODY SCREEN: NEGATIVE
ARM BAND NUMBER: NORMAL
BLD PROD TYP BPU: NORMAL
BUN BLDV-MCNC: 5 MG/DL (ref 6–20)
BUN/CREAT BLD: ABNORMAL (ref 9–20)
CALCIUM IONIZED: 1.15 MMOL/L (ref 1.13–1.33)
CALCIUM SERPL-MCNC: 8.2 MG/DL (ref 8.6–10.4)
CHLORIDE BLD-SCNC: 102 MMOL/L (ref 98–107)
CO2: 21 MMOL/L (ref 20–31)
CREAT SERPL-MCNC: <0.2 MG/DL (ref 0.5–0.9)
CROSSMATCH RESULT: NORMAL
DISPENSE STATUS BLOOD BANK: NORMAL
EXPIRATION DATE: NORMAL
GFR AFRICAN AMERICAN: ABNORMAL ML/MIN
GFR NON-AFRICAN AMERICAN: ABNORMAL ML/MIN
GFR SERPL CREATININE-BSD FRML MDRD: ABNORMAL ML/MIN/{1.73_M2}
GFR SERPL CREATININE-BSD FRML MDRD: ABNORMAL ML/MIN/{1.73_M2}
GLUCOSE BLD-MCNC: 105 MG/DL (ref 70–99)
GLUCOSE BLD-MCNC: 129 MG/DL (ref 65–105)
GLUCOSE BLD-MCNC: 98 MG/DL (ref 65–105)
HCT VFR BLD CALC: 27.5 % (ref 36.3–47.1)
HEMOGLOBIN: 8.7 G/DL (ref 11.9–15.1)
INR BLD: 1.1
INTERVENTION: NORMAL
LV EF: 53 %
LVEF MODALITY: NORMAL
MAGNESIUM: 1.9 MG/DL (ref 1.6–2.6)
MCH RBC QN AUTO: 29 PG (ref 25.2–33.5)
MCHC RBC AUTO-ENTMCNC: 31.6 G/DL (ref 28.4–34.8)
MCV RBC AUTO: 91.7 FL (ref 82.6–102.9)
NRBC AUTOMATED: 0.4 PER 100 WBC
PDW BLD-RTO: 14.9 % (ref 11.8–14.4)
PLATELET # BLD: 199 K/UL (ref 138–453)
PMV BLD AUTO: 9.4 FL (ref 8.1–13.5)
POTASSIUM SERPL-SCNC: 3.5 MMOL/L (ref 3.7–5.3)
PROTHROMBIN TIME: 11.3 SEC (ref 9.1–12.3)
RBC # BLD: 3 M/UL (ref 3.95–5.11)
SODIUM BLD-SCNC: 132 MMOL/L (ref 135–144)
TRANSFUSION STATUS: NORMAL
UNIT DIVISION: 0
UNIT NUMBER: NORMAL
WBC # BLD: 10.6 K/UL (ref 3.5–11.3)

## 2021-10-25 PROCEDURE — 71045 X-RAY EXAM CHEST 1 VIEW: CPT

## 2021-10-25 PROCEDURE — 80048 BASIC METABOLIC PNL TOTAL CA: CPT

## 2021-10-25 PROCEDURE — 2580000003 HC RX 258: Performed by: INTERNAL MEDICINE

## 2021-10-25 PROCEDURE — 82330 ASSAY OF CALCIUM: CPT

## 2021-10-25 PROCEDURE — 2060000000 HC ICU INTERMEDIATE R&B

## 2021-10-25 PROCEDURE — 6370000000 HC RX 637 (ALT 250 FOR IP): Performed by: INTERNAL MEDICINE

## 2021-10-25 PROCEDURE — 82947 ASSAY GLUCOSE BLOOD QUANT: CPT

## 2021-10-25 PROCEDURE — 6370000000 HC RX 637 (ALT 250 FOR IP): Performed by: STUDENT IN AN ORGANIZED HEALTH CARE EDUCATION/TRAINING PROGRAM

## 2021-10-25 PROCEDURE — 2580000003 HC RX 258: Performed by: STUDENT IN AN ORGANIZED HEALTH CARE EDUCATION/TRAINING PROGRAM

## 2021-10-25 PROCEDURE — 87205 SMEAR GRAM STAIN: CPT

## 2021-10-25 PROCEDURE — 87186 SC STD MICRODIL/AGAR DIL: CPT

## 2021-10-25 PROCEDURE — 6360000002 HC RX W HCPCS: Performed by: INTERNAL MEDICINE

## 2021-10-25 PROCEDURE — 93306 TTE W/DOPPLER COMPLETE: CPT

## 2021-10-25 PROCEDURE — 97110 THERAPEUTIC EXERCISES: CPT

## 2021-10-25 PROCEDURE — 76937 US GUIDE VASCULAR ACCESS: CPT

## 2021-10-25 PROCEDURE — 99233 SBSQ HOSP IP/OBS HIGH 50: CPT | Performed by: INTERNAL MEDICINE

## 2021-10-25 PROCEDURE — 97116 GAIT TRAINING THERAPY: CPT

## 2021-10-25 PROCEDURE — 36415 COLL VENOUS BLD VENIPUNCTURE: CPT

## 2021-10-25 PROCEDURE — 94761 N-INVAS EAR/PLS OXIMETRY MLT: CPT

## 2021-10-25 PROCEDURE — 2700000000 HC OXYGEN THERAPY PER DAY

## 2021-10-25 PROCEDURE — 86403 PARTICLE AGGLUT ANTBDY SCRN: CPT

## 2021-10-25 PROCEDURE — 85610 PROTHROMBIN TIME: CPT

## 2021-10-25 PROCEDURE — 87040 BLOOD CULTURE FOR BACTERIA: CPT

## 2021-10-25 PROCEDURE — 85027 COMPLETE CBC AUTOMATED: CPT

## 2021-10-25 PROCEDURE — 83735 ASSAY OF MAGNESIUM: CPT

## 2021-10-25 RX ADMIN — Medication 1250 MG: at 19:00

## 2021-10-25 RX ADMIN — METOCLOPRAMIDE 5 MG: 5 INJECTION, SOLUTION INTRAMUSCULAR; INTRAVENOUS at 06:35

## 2021-10-25 RX ADMIN — METOPROLOL TARTRATE 25 MG: 25 TABLET ORAL at 11:09

## 2021-10-25 RX ADMIN — ENOXAPARIN SODIUM 105 MG: 120 INJECTION SUBCUTANEOUS at 20:58

## 2021-10-25 RX ADMIN — METOPROLOL TARTRATE 25 MG: 25 TABLET ORAL at 17:27

## 2021-10-25 RX ADMIN — SODIUM CHLORIDE, PRESERVATIVE FREE 10 ML: 5 INJECTION INTRAVENOUS at 20:59

## 2021-10-25 RX ADMIN — Medication 1 CAPSULE: at 11:10

## 2021-10-25 RX ADMIN — METOPROLOL TARTRATE 25 MG: 25 TABLET ORAL at 20:59

## 2021-10-25 RX ADMIN — ENOXAPARIN SODIUM 105 MG: 120 INJECTION SUBCUTANEOUS at 11:09

## 2021-10-25 RX ADMIN — Medication 1 TABLET: at 11:08

## 2021-10-25 RX ADMIN — POTASSIUM CHLORIDE 40 MEQ: 1500 TABLET, EXTENDED RELEASE ORAL at 11:08

## 2021-10-25 RX ADMIN — MAGNESIUM SULFATE HEPTAHYDRATE 1000 MG: 1 INJECTION, SOLUTION INTRAVENOUS at 11:06

## 2021-10-25 RX ADMIN — Medication 1250 MG: at 04:02

## 2021-10-25 RX ADMIN — SODIUM CHLORIDE, PRESERVATIVE FREE 10 ML: 5 INJECTION INTRAVENOUS at 11:07

## 2021-10-25 RX ADMIN — ACETAMINOPHEN 650 MG: 325 TABLET ORAL at 17:25

## 2021-10-25 RX ADMIN — Medication 1250 MG: at 11:08

## 2021-10-25 ASSESSMENT — ENCOUNTER SYMPTOMS
EYE REDNESS: 0
EYE PAIN: 0
ABDOMINAL DISTENTION: 0
EYE DISCHARGE: 0
APNEA: 0
SHORTNESS OF BREATH: 1
COLOR CHANGE: 0

## 2021-10-25 ASSESSMENT — PAIN SCALES - GENERAL: PAINLEVEL_OUTOF10: 4

## 2021-10-25 NOTE — PROGRESS NOTES
[facility]   Progress Note                   Date:   10/25/2021  Patient name: Josseline Stein  Date of admission:  10/13/2021  8:19 PM  MRN:   [id number]  YOB: 1990  PCP: Jose Luis Carr    Reason for Admission: covid 19     Subjective:       Hemoglobin staying above 7. No acute bleeding from anywhere. Received 2 U PRBC     Medications:   Scheduled Meds:   metoprolol tartrate  25 mg Oral TID    enoxaparin  1 mg/kg SubCUTAneous BID    lactobacillus  1 capsule Oral Daily with breakfast    vancomycin (VANCOCIN) intermittent dosing (placeholder)   Other RX Placeholder    vancomycin  1,250 mg IntraVENous Q8H    prenatal vitamin  1 tablet Oral Daily    Tdap-Dtap  0.5 mL IntraMUSCular Prior to discharge    lidocaine 1 % injection  5 mL IntraDERmal Once    sodium chloride flush  5-40 mL IntraVENous 2 times per day    docusate  100 mg Oral BID    sennosides-docusate sodium  2 tablet Oral Daily    insulin lispro  0-12 Units SubCUTAneous Q6H     Continuous Infusions:   dextrose 5 % and 0.9 % NaCl 75 mL/hr at 10/22/21 2032    esmolol Stopped (10/23/21 2100)    sodium chloride      HYDROmorphone Stopped (10/21/21 1900)    ketamine (KETALAR) infusion for analgosedation Stopped (10/21/21 1900)    sodium chloride      midazolam Stopped (10/21/21 1900)    dextrose      sodium chloride 5 mL/hr at 10/14/21 0700     CBC:   Recent Labs     10/23/21  0632 10/23/21  0632 10/23/21  1251 10/24/21  0532 10/25/21  0534   WBC 11.6*  --   --  12.0* 10.6   HGB 6.1*   < > 7.5* 7.1* 8.7*   *  --   --  159 199    < > = values in this interval not displayed.      BMP:    Recent Labs     10/23/21  0632 10/24/21  0532 10/25/21  0534    135 132*   K 3.6* 3.8 3.5*    105 102   CO2 21 19* 21   BUN 7 5* 5*   CREATININE 0.26* 0.23* <0.20*   GLUCOSE 119* 106* 105*     Hepatic:   Recent Labs     10/22/21  0958   AST 20   ALT 25   BILITOT 1.16   ALKPHOS 74     Troponin: No results for input(s): TROPONINI in the last 72 hours. BNP: No results for input(s): BNP in the last 72 hours. Lipids: No results for input(s): CHOL, HDL in the last 72 hours. Invalid input(s): LDLCALCU  INR:   Recent Labs     10/23/21  0632 10/24/21  0532 10/25/21  0534   INR 1.0 1.1 1.1       Objective:   Vitals: /74   Pulse 107   Temp 98.7 °F (37.1 °C)   Resp 22   Ht 5' 4\" (1.626 m)   Wt 231 lb 7.7 oz (105 kg)   LMP 04/01/2021 (Approximate)   SpO2 96%   Breastfeeding Unknown   BMI 39.73 kg/m²   General appearance: alert and cooperative with exam  HEENT: Head: Normocephalic, no lesions, without obvious abnormality. Neck: no adenopathy, no carotid bruit, no JVD, supple, symmetrical, trachea midline and thyroid not enlarged, symmetric, no tenderness/mass/nodules  Lungs: clear to auscultation bilaterally  Heart: regular rate and rhythm, S1, S2 normal, no murmur, click, rub or gallop  Abdomen: soft, non-tender; bowel sounds normal; no masses,  no organomegaly  Extremities: extremities normal, atraumatic, no cyanosis or edema  Neurologic: Mental status: Alert, oriented, thought content appropriate    GUILLERMO 10/13/2021:  Summary  GUILLERMO was performed to guide ECMO cannulation position. Overall normal LV size and systolic function with estimated EF 55%  No significant valvular regurgitation seen. No intracardiac shunt via color doppler  Final cannula position showed the flow jet directed towards the tricuspid  valve.       IMPRESSION:    1. Sinus tachycardia with intermittent runs of SVT likely secondary to acute stress state currently weaned off esmolol drip. On p.o. beta-blocker. 2.  ARDS 2/2 to COVID requiring VV ECMO - Decannulation on 10/22/2021  3. Small PE in Left lower lobe on CTA 10/13/2021. Eliquis on hold due to hemoglobin drop as per pulm crit. 4. Anemia requirinng 1 unit of PRBC. 5. Leucocytosis which has been improving. Managed by primary.   6 Hypokalemia      RECOMMENDATIONS:  Weaned off Esmolol. On metoprolol 25 mg TID   Patient is now off VV ECMO Right IJ crescent cannula   No hematoma at IJ site   Awake and oriented in no distress  BP stable   Sinus tachycardia secondary to severe anemia and residual hypoxia  PRBC transfusion to keep Hb > 8 . Received 2 U PRBC   Repeat limited echo today to check for any RV dysfunction. Keep K>4 and Mg > 2  Will follow. Mich marks     Attending note,   The patient was seen and examined agree with above. Off ECMO. On metoprolol.   We will continue current medications we will follow up on echocardiogram.

## 2021-10-25 NOTE — ANESTHESIA POSTPROCEDURE EVALUATION
Department of Anesthesiology  Postprocedure Note    Patient: Omid Hidalgo  MRN: [id number]  YOB: 1990  Date of evaluation: 10/25/2021  Time:  2:22 PM     Procedure Summary     Date: 10/15/21 Room / Location: [address]    Anesthesia Start: Suman Rodriguez Anesthesia Stop: 1363    Procedure:  SECTION (N/A ) Diagnosis: ([address] POS. PT. TO DELIVER IN O.R.)    Surgeons: Naina Marcus MD Responsible Provider: Carolina Ashraf MD    Anesthesia Type: general ASA Status: 4 - Emergent          Anesthesia Type: general    Jazzmine Phase I:      Jazzmine Phase II:      Last vitals: Reviewed and per EMR flowsheets.      POST-OP ANESTHESIA NOTE       /74   Pulse 105   Temp 98.7 °F (37.1 °C)   Resp 22   Ht 5' 4\" (1.626 m)   Wt 231 lb 7.7 oz (105 kg)   LMP 2021 (Approximate)   SpO2 96%   Breastfeeding Unknown   BMI 39.73 kg/m²    Pain Assessment: 0-10  Pain Level: 0       Anesthesia Post Evaluation    Patient location during evaluation: ICU  Patient participation: complete - patient cannot participate  Level of consciousness: sedated and ventilated  Pain score: 0  Airway patency: patent  Nausea & Vomiting: no vomiting and no nausea  Complications: no  Cardiovascular status: hemodynamically stable  Respiratory status: intubated and ventilator  Hydration status: stable

## 2021-10-25 NOTE — PROGRESS NOTES
Comprehensive Nutrition Assessment    Type and Reason for Visit:  Reassess    Nutrition Recommendations/Plan:   -Continue regular diet   -Pt declines all nutritional supplements at this time   -Will continue to monitor po intake, weights and wound healing     Nutrition Assessment:  Pt's appetite has slightly improved and is consuming 25-50% of her meals. Pt declines all nutritional supplements at this time. Staff continue to encourage adequate po/fluid intake. No significant wt loss noted since admission. Will continue to monitor. Malnutrition Assessment:  Malnutrition Status: At risk for malnutrition (Comment)    Context:  Acute Illness     Findings of the 6 clinical characteristics of malnutrition:  Energy Intake:  7 - 50% or less of estimated energy requirements for 5 or more days  Weight Loss:  No significant weight loss     Body Fat Loss:  No significant body fat loss     Muscle Mass Loss:  No significant muscle mass loss    Fluid Accumulation:  1 - Mild Extremities, Generalized   Strength:  Not Performed    Estimated Daily Nutrient Needs:  Energy (kcal):  1.2-1.3 ~> 8268-2329 kcals/d; Weight Used for Energy Requirements:  Current     Protein (g):  1.2-1.4 gm/kg ~> 66-77 gms/d; Weight Used for Protein Requirements:  Ideal        Fluid (ml/day):  2200 mL fluid/d or per MD; Method Used for Fluid Requirements:   (Ori Louise)      Nutrition Related Findings:  BM 10/24; Na 132, K 3.5; meds reviewed      Wounds:  Surgical Incision       Current Nutrition Therapies:    ADULT DIET;  Regular    Anthropometric Measures:  · Height: 5' 4\" (162.6 cm)  · Current Body Weight: 231 lb (104.8 kg)   · Admission Body Weight: 225 lb (102.1 kg)    · Usual Body Weight: 220 lb (99.8 kg) (Pre-Pregnancy)     · Ideal Body Weight: 120 lbs; % Ideal Body Weight 192.5 %   · BMI: 39.6  · BMI Categories: Obese Class 2 (BMI 35.0 -39.9)       Nutrition Diagnosis:   · Inadequate oral intake related to  (current medical conditions)

## 2021-10-25 NOTE — PROGRESS NOTES
Physical Therapy  Facility/Department: [facility] CAR 1  Daily Treatment Note  NAME: Michelle Corey  : 1990  MRN: [id number]    Date of Service: 10/25/2021    Discharge Recommendations:    Further therapy recommended at discharge.     PT Equipment Recommendations  Equipment Needed: No       Assessment     Body structures, Functions, Activity limitations: Decreased functional mobility ; Decreased strength;Decreased endurance;Decreased balance  Prognosis: Good  Decision Making: High Complexity  PT Education: Goals;PT Role;Plan of Care  REQUIRES PT FOLLOW UP: Yes  Activity Tolerance  Activity Tolerance: Patient limited by fatigue;Patient limited by endurance         Patient Diagnosis(es): The encounter diagnosis was 26 weeks gestation of pregnancy. has a past medical history of Anxiety, GERD (gastroesophageal reflux disease), Migraines, and Seasonal allergies. has a past surgical history that includes Lutz tooth extraction; Mandible surgery; Foot surgery; Mouth surgery;   picc powerpic triple (10/15/2021); and  section (N/A, 10/15/2021). Restrictions  Restrictions/Precautions  Restrictions/Precautions: General Precautions, Surgical Protocols, Fall Risk, Up as Tolerated  Required Braces or Orthoses?: Yes  Required Braces or Orthoses  Other: Abdominal Binder  Position Activity Restriction  Other position/activity restrictions: 3L O2 entire session, Neg for PE on 10/22, Amb pt, 7.1 Hgb  Subjective   Pt sitting up in her chair. Pt reports she's going to see her baby for the first time today. Pt has no c/o pain.    Orientation    Overall Orientation Status: Within Normal Limits  Objective     Transfers  Sit to Stand: CGA  Stand to sit: CGA  Stand Pivot Transfers: CGA  Ambulation  Ambulation?: Yes  Ambulation 1  Surface: level tile  Device: RW  Other Apparatus: O2 3L  Assistance: CGA  Ambulation 1  Surface - 1: level tile  Device 1: Rolling Walker  Other Apparatus 1: O2 3L  Assistance 1: CGA  Quality of Co-treatment   Time In  220         Time Out  250         Minutes  27                 [place], [place]

## 2021-10-25 NOTE — CARE COORDINATION
Met with patient and  to discuss transitional planning. Plan is home, may need IV antibiotics at discharge, I provided them with a list of home care agencies for review and freedom of choice.   Referral to Alexandra at [facility] to follow for infusion needs

## 2021-10-25 NOTE — PROGRESS NOTES
Changed dressing on pt's right neck where the ECMO cannulation was. Cleaned area with betadine swab, place 4 by 4 with paper tape over sutures.

## 2021-10-25 NOTE — PROGRESS NOTES
[facility] - Initial Consult Note COVID 19 Patient  Today's Date and Time: 10/25/2021, 10:31 AM    Impression :   COVID 19 pneumonia  -10/10/21: Positive  resp failure - intubated 10/14, extubated 10/21  Ecmo - decanulated 10/22  26 weeks gestation pregnancy  Baby delivered early 26 weeks  Pulmonary embolism LLL  Metabolic acidosis with euglycemic starvation  10/21 MRSA sepsis / septicemia  Bandemia 10/21  Intubated 10/14 -extubated 10/21  Recommendations:   Post Decadron-Initiated 10/14/21 - stop ad day 9 on 10/22 due to SA sepsis  anticoagulation  DEFERED barcitinib and  Actemra due to pregnancy  10/21-acute leukocytosis, Vanco cefepime started  10/22 BC SA -vanco pend ID of the SA- stop decadron day 9, pulled the left IJ as well  Repeat blood cultures daily, seem neg -    get 2D echo look for vegetation  10/23 diarrhea - probiotics - better  Anticipate 14 days AB if repeat BC/ echo are neg - till 11/4/21  10/25 CXR  infil a little worse - ? Atelectasis- Watch sp and get spirometer    start on illness 10/1  Stop isolation after 10/20    Medical Decision Making/Summary/Discussion:10/25/2021     Patient admitted with suspected COVID 19 infection  Covid test confirmed positive. Infection Control Recommendations   Kevil Precautions  Airborne isolation  Droplet plus Isolation    Antimicrobial Stewardship Recommendations   Off antibiotics    Chief complaint/reason for consultation:   Concern for COVID infection      History of Present Illness:   Renu Rhodes is a 27y.o.-year-old  female who was initially admitted on 10/13/2021. Patient seen at the request of Dr. Gabino Garcia:    Patient, who is 26 weeks pregnant, initially presented to [facility] ED on 10/10/21 presented through ER with complaints of productive cough with yellow sputum X 9 days & shortness of breath with pleuritic chest pain that started the day before.  She ws also experiencing decreased Difficulty of Paying Living Expenses:    Food Insecurity:     Worried About Running Out of Food in the Last Year:     [address] in the Last Year:    Transportation Needs:     Lack of Transportation (Medical):  Lack of Transportation (Non-Medical):    Physical Activity:     Days of Exercise per Week:     Minutes of Exercise per Session:    Stress:     Feeling of Stress :    Social Connections:     Frequency of Communication with Friends and Family:     Frequency of Social Gatherings with Friends and Family:     Attends Tenri[employer] Services:     Active Member of Clubs or Organizations:     Attends Club or Organization Meetings:     Marital Status:    Intimate Partner Violence:     Fear of Current or Ex-Partner:     Emotionally Abused:     Physically Abused:     Sexually Abused:        Family History:     Family History   Problem Relation Age of Onset    Breast Cancer Maternal Grandmother         older than 48    Heart Attack Paternal Uncle     Stroke Maternal Aunt     Hypertension Father     No Known Problems Paternal Grandfather     Stroke Paternal Grandmother     No Known Problems Maternal Grandfather     Hypertension Mother     No Known Problems Brother     No Known Problems Brother         Allergies:   Vicodin [hydrocodone-acetaminophen]     Review of Systems:     Review of Systems   Constitutional: Positive for activity change. HENT: Negative for congestion. Eyes: Negative for pain, discharge and redness. Respiratory: Positive for shortness of breath. Negative for apnea. Cardiovascular: Negative for chest pain. Gastrointestinal: Negative for abdominal distention. Endocrine: Negative for heat intolerance, polydipsia and polyphagia. Genitourinary: Negative for dysuria and flank pain. Musculoskeletal: Negative for arthralgias. Skin: Negative for color change. Allergic/Immunologic: Positive for immunocompromised state.    Neurological: Negative for dizziness, seizures, light-headedness and headaches. Hematological: Negative for adenopathy. Psychiatric/Behavioral: Negative for agitation. Physical Examination :     Patient Vitals for the past 8 hrs:   BP Temp Pulse SpO2 Weight   10/25/21 0849 -- -- 105 96 % --   10/25/21 0600 -- 98.7 °F (37.1 °C) -- -- --   10/25/21 0500 -- -- 107 96 % --   10/25/21 0400 -- 98.4 °F (36.9 °C) 106 94 % 231 lb 7.7 oz (105 kg)   10/25/21 0300 126/74 -- 99 97 % --     Physical Exam  Constitutional:       General: She is not in acute distress. Appearance: Normal appearance. She is not ill-appearing. Comments: Intubated sedated   HENT:      Head: Normocephalic and atraumatic. Nose: Nose normal.   Eyes:      General: No scleral icterus. Conjunctiva/sclera: Conjunctivae normal.      Pupils: Pupils are equal, round, and reactive to light. Cardiovascular:      Rate and Rhythm: Normal rate and regular rhythm. Heart sounds: Normal heart sounds. No murmur heard. No friction rub. Pulmonary:      Effort: No respiratory distress. Breath sounds: No stridor. No rhonchi. Abdominal:      Palpations: Abdomen is soft. There is no mass. Hernia: No hernia is present. Genitourinary:     Comments: Urine bong  Musculoskeletal:         General: No swelling, tenderness, deformity or signs of injury. Cervical back: Neck supple. No rigidity or tenderness. Skin:     Coloration: Skin is not jaundiced or pale. Findings: No bruising, erythema or rash. Neurological:      General: No focal deficit present. Mental Status: She is alert and oriented to person, place, and time. Cranial Nerves: No cranial nerve deficit. Sensory: No sensory deficit.       Coordination: Coordination normal.   Psychiatric:         Mood and Affect: Mood normal.         Behavior: Behavior normal.          Medical Decision Making -Laboratory:   I have independently reviewed/ordered the following labs:    CBC with Differential:   Recent Labs     10/24/21  0532 10/25/21  0534   WBC 12.0* 10.6   HGB 7.1* 8.7*   HCT 23.2* 27.5*    199     BMP:   Recent Labs     10/24/21  0532 10/25/21  0534    132*   K 3.8 3.5*    102   CO2 19* 21   BUN 5* 5*   CREATININE 0.23* <0.20*   MG 2.1 1.9     Hepatic Function Panel:   No results for input(s): PROT, LABALBU, BILIDIR, IBILI, BILITOT, ALKPHOS, ALT, AST in the last 72 hours. No results for input(s): RPR in the last 72 hours. No results for input(s): HIV in the last 72 hours. No results for input(s): BC in the last 72 hours. Lab Results   Component Value Date    MUCUS NOT REPORTED 10/13/2021    RBC 3.00 10/25/2021    TRICHOMONAS NOT REPORTED 10/13/2021    WBC 10.6 10/25/2021    YEAST NOT REPORTED 10/13/2021    TURBIDITY Clear 10/13/2021     Lab Results   Component Value Date    CREATININE <0.20 10/25/2021    GLUCOSE 105 10/25/2021       Medical Decision Making-Imaging:     Narrative   EXAMINATION:   CTA OF THE CHEST 10/13/2021 2:16 pm       TECHNIQUE:   CTA of the chest was performed after the administration of intravenous   contrast.  Multiplanar reformatted images are provided for review.  MIP   images are provided for review. Dose modulation, iterative reconstruction,   and/or weight based adjustment of the mA/kV was utilized to reduce the   radiation dose to as low as reasonably achievable.       COMPARISON:   Chest x-rays over the last few days. .       HISTORY:   ORDERING SYSTEM PROVIDED HISTORY: sob, tachy, 32 WEEKS PREGNANT, Spoke with   Radiologist   TECHNOLOGIST PROVIDED HISTORY:   sob, tachy, 32 WEEKS PREGNANT, Spoke with Radiologist   Decision Support Exception - unselect if not a suspected or confirmed   emergency medical condition->Emergency Medical Condition (MA)       FINDINGS:   Pulmonary Arteries:  There is a small nonocclusive segmental to subsegmental   embolism noted in the left lower lobe, on and around axial image [id number].  No   other definite emboli are seen.  No central emboli.  No pulmonary arterial   enlargement is identified.       Mediastinum: Mediastinal and hilar lymphadenopathy is identified.  No focal   esophageal thickening is identified.  The thyroid gland appears unremarkable.       Lungs/pleura: Multifocal ground-glass pneumonia with some mild areas of   consolidation.  No pleural effusion is identified.       Upper Abdomen: No acute process seen in the upper abdomen.       Soft Tissues/Bones: No axillary or supraclavicular lymphadenopathy is   identified.  No acute osseous abnormality is identified.           Impression   Single small nonocclusive segmental to subsegmental pulmonary embolism in the   left lower lobe.  No central emboli.  This was discussed with Mira Hendrix   at 2:44 p.m. on 10/13/2021.       Multilobar COVID-19 pneumonia.                 Medical Decision [id number]:       Medical Decision Making-Other:     Note:  Labs, medications, radiologic studies were reviewed with personal review of films  Large amounts of data were reviewed  Discussed with nursing Staff, Discharge planner  Infection Control and Prevention measures reviewed  All prior entries were reviewed  Administer medications as ordered  Prognosis: Guarded  Discharge planning reviewed  Follow up as outpatient. Thank you for allowing us to participate in the care of this patient. Please call with questions.     Artem Greene MD  Office: [phone]

## 2021-10-25 NOTE — FLOWSHEET NOTE
10/24/21 [address] [place] [address] care;Linens changed;Negar care;Electrodes changed daily; Bathed  (pt took a shower)   Level of Assistance Moderate assist   Skin Care Chlorhexidine solution

## 2021-10-25 NOTE — PROGRESS NOTES
patient not in room and not seen - visiting her new born     Electronically signed by Kallie Flores MD on 10/25/2021 at 8:00 PM

## 2021-10-26 ENCOUNTER — APPOINTMENT (OUTPATIENT)
Dept: GENERAL RADIOLOGY | Age: 31
DRG: 003 | End: 2021-10-26
Payer: COMMERCIAL

## 2021-10-26 LAB
ANION GAP SERPL CALCULATED.3IONS-SCNC: 11 MMOL/L (ref 9–17)
BUN BLDV-MCNC: 6 MG/DL (ref 6–20)
BUN/CREAT BLD: ABNORMAL (ref 9–20)
CALCIUM IONIZED: 1.15 MMOL/L (ref 1.13–1.33)
CALCIUM SERPL-MCNC: 8 MG/DL (ref 8.6–10.4)
CHLORIDE BLD-SCNC: 103 MMOL/L (ref 98–107)
CO2: 21 MMOL/L (ref 20–31)
CREAT SERPL-MCNC: 0.23 MG/DL (ref 0.5–0.9)
GFR AFRICAN AMERICAN: >60 ML/MIN
GFR NON-AFRICAN AMERICAN: >60 ML/MIN
GFR SERPL CREATININE-BSD FRML MDRD: ABNORMAL ML/MIN/{1.73_M2}
GFR SERPL CREATININE-BSD FRML MDRD: ABNORMAL ML/MIN/{1.73_M2}
GLUCOSE BLD-MCNC: 100 MG/DL (ref 70–99)
GLUCOSE BLD-MCNC: 98 MG/DL (ref 65–105)
HCT VFR BLD CALC: 28.3 % (ref 36.3–47.1)
HEMOGLOBIN: 8.6 G/DL (ref 11.9–15.1)
INR BLD: 1.1
MAGNESIUM: 1.9 MG/DL (ref 1.6–2.6)
MCH RBC QN AUTO: 28.2 PG (ref 25.2–33.5)
MCHC RBC AUTO-ENTMCNC: 30.4 G/DL (ref 28.4–34.8)
MCV RBC AUTO: 92.8 FL (ref 82.6–102.9)
NRBC AUTOMATED: 0.3 PER 100 WBC
PDW BLD-RTO: 14.8 % (ref 11.8–14.4)
PLATELET # BLD: 230 K/UL (ref 138–453)
PMV BLD AUTO: 9.4 FL (ref 8.1–13.5)
POTASSIUM SERPL-SCNC: 3.7 MMOL/L (ref 3.7–5.3)
PROTHROMBIN TIME: 11.5 SEC (ref 9.1–12.3)
RBC # BLD: 3.05 M/UL (ref 3.95–5.11)
SODIUM BLD-SCNC: 135 MMOL/L (ref 135–144)
WBC # BLD: 11.8 K/UL (ref 3.5–11.3)

## 2021-10-26 PROCEDURE — 99233 SBSQ HOSP IP/OBS HIGH 50: CPT | Performed by: INTERNAL MEDICINE

## 2021-10-26 PROCEDURE — 6370000000 HC RX 637 (ALT 250 FOR IP): Performed by: INTERNAL MEDICINE

## 2021-10-26 PROCEDURE — 2580000003 HC RX 258: Performed by: STUDENT IN AN ORGANIZED HEALTH CARE EDUCATION/TRAINING PROGRAM

## 2021-10-26 PROCEDURE — 2060000000 HC ICU INTERMEDIATE R&B

## 2021-10-26 PROCEDURE — 82947 ASSAY GLUCOSE BLOOD QUANT: CPT

## 2021-10-26 PROCEDURE — 85610 PROTHROMBIN TIME: CPT

## 2021-10-26 PROCEDURE — 2580000003 HC RX 258: Performed by: INTERNAL MEDICINE

## 2021-10-26 PROCEDURE — 83735 ASSAY OF MAGNESIUM: CPT

## 2021-10-26 PROCEDURE — 97110 THERAPEUTIC EXERCISES: CPT

## 2021-10-26 PROCEDURE — 6360000002 HC RX W HCPCS: Performed by: INTERNAL MEDICINE

## 2021-10-26 PROCEDURE — 6370000000 HC RX 637 (ALT 250 FOR IP): Performed by: STUDENT IN AN ORGANIZED HEALTH CARE EDUCATION/TRAINING PROGRAM

## 2021-10-26 PROCEDURE — 82330 ASSAY OF CALCIUM: CPT

## 2021-10-26 PROCEDURE — 71045 X-RAY EXAM CHEST 1 VIEW: CPT

## 2021-10-26 PROCEDURE — 85027 COMPLETE CBC AUTOMATED: CPT

## 2021-10-26 PROCEDURE — 80048 BASIC METABOLIC PNL TOTAL CA: CPT

## 2021-10-26 PROCEDURE — 36415 COLL VENOUS BLD VENIPUNCTURE: CPT

## 2021-10-26 PROCEDURE — 97116 GAIT TRAINING THERAPY: CPT

## 2021-10-26 RX ADMIN — METOPROLOL TARTRATE 25 MG: 25 TABLET ORAL at 13:39

## 2021-10-26 RX ADMIN — ACETAMINOPHEN 650 MG: 325 TABLET ORAL at 00:17

## 2021-10-26 RX ADMIN — ACETAMINOPHEN 650 MG: 325 TABLET ORAL at 23:29

## 2021-10-26 RX ADMIN — CEFAZOLIN 2000 MG: 10 INJECTION, POWDER, FOR SOLUTION INTRAVENOUS at 22:05

## 2021-10-26 RX ADMIN — Medication 1250 MG: at 10:57

## 2021-10-26 RX ADMIN — POTASSIUM CHLORIDE 30 MEQ: 1500 TABLET, EXTENDED RELEASE ORAL at 05:20

## 2021-10-26 RX ADMIN — ENOXAPARIN SODIUM 105 MG: 120 INJECTION SUBCUTANEOUS at 08:07

## 2021-10-26 RX ADMIN — Medication 1 CAPSULE: at 09:29

## 2021-10-26 RX ADMIN — MAGNESIUM SULFATE HEPTAHYDRATE 1000 MG: 1 INJECTION, SOLUTION INTRAVENOUS at 05:20

## 2021-10-26 RX ADMIN — Medication 1250 MG: at 03:28

## 2021-10-26 RX ADMIN — SODIUM CHLORIDE, PRESERVATIVE FREE 10 ML: 5 INJECTION INTRAVENOUS at 08:07

## 2021-10-26 RX ADMIN — METOPROLOL TARTRATE 25 MG: 25 TABLET ORAL at 08:07

## 2021-10-26 RX ADMIN — ENOXAPARIN SODIUM 105 MG: 120 INJECTION SUBCUTANEOUS at 20:00

## 2021-10-26 RX ADMIN — ACETAMINOPHEN 650 MG: 325 TABLET ORAL at 16:24

## 2021-10-26 RX ADMIN — Medication 1 TABLET: at 09:29

## 2021-10-26 RX ADMIN — ACETAMINOPHEN 650 MG: 325 TABLET ORAL at 09:29

## 2021-10-26 RX ADMIN — METOPROLOL TARTRATE 25 MG: 25 TABLET ORAL at 20:00

## 2021-10-26 ASSESSMENT — ENCOUNTER SYMPTOMS
EYE PAIN: 0
EYE REDNESS: 0
GASTROINTESTINAL NEGATIVE: 1
PHOTOPHOBIA: 0
COLOR CHANGE: 0
WHEEZING: 0
SHORTNESS OF BREATH: 1
SHORTNESS OF BREATH: 0
ABDOMINAL DISTENTION: 0
COUGH: 1
APNEA: 0
EYE DISCHARGE: 0

## 2021-10-26 ASSESSMENT — PAIN SCALES - GENERAL
PAINLEVEL_OUTOF10: 2
PAINLEVEL_OUTOF10: 0
PAINLEVEL_OUTOF10: 1
PAINLEVEL_OUTOF10: 0

## 2021-10-26 NOTE — CARE COORDINATION
Received call from Alexandra at [facility] patient covered at 100% for Vanco.  Tennille Duckworth relates she will look at [facility] availability.   Met with patient she is agreeable to the above, also agreeable to referral to [facility] if needed, referral faxed

## 2021-10-26 NOTE — PLAN OF CARE
Problem: Airway Clearance - Ineffective  Goal: Achieve or maintain patent airway  Outcome: Ongoing     Problem: Gas Exchange - Impaired  Goal: Absence of hypoxia  Outcome: Ongoing  Goal: Promote optimal lung function  Outcome: Ongoing     Problem: Breathing Pattern - Ineffective  Goal: Ability to achieve and maintain a regular respiratory rate  Outcome: Ongoing     Problem:  Body Temperature -  Risk of, Imbalanced  Goal: Ability to maintain a body temperature within defined limits  Outcome: Ongoing  Goal: Will regain or maintain usual level of consciousness  Outcome: Ongoing  Goal: Complications related to the disease process, condition or treatment will be avoided or minimized  Outcome: Ongoing     Problem: Isolation Precautions - Risk of Spread of Infection  Goal: Prevent transmission of infection  Outcome: Ongoing     Problem: Nutrition Deficits  Goal: Optimize nutritional status  Outcome: Ongoing     Problem: Risk for Fluid Volume Deficit  Goal: Maintain normal heart rhythm  Outcome: Ongoing  Goal: Maintain absence of muscle cramping  Outcome: Ongoing  Goal: Maintain normal serum potassium, sodium, calcium, phosphorus, and pH  Outcome: Ongoing     Problem: Loneliness or Risk for Loneliness  Goal: Demonstrate positive use of time alone when socialization is not possible  Outcome: Ongoing     Problem: Fatigue  Goal: Verbalize increase energy and improved vitality  Outcome: Ongoing     Problem: Patient Education: Go to Patient Education Activity  Goal: Patient/Family Education  Outcome: Ongoing     Problem: Nutrition  Goal: Optimal nutrition therapy  10/26/2021 0043 by James Ellison RN  Outcome: Ongoing  10/25/2021 1318 by Prema Smith RD, LD  Outcome: Ongoing  Note: Nutrition Problem #1: Inadequate oral intake  Intervention: Food and/or Nutrient Delivery: Continue Current Diet  Nutritional Goals: Intake to meet > 50% of estimated nutrition needs      Problem: Skin Integrity:  Goal: Will show no infection signs and symptoms  Description: Will show no infection signs and symptoms  Outcome: Ongoing  Goal: Absence of new skin breakdown  Description: Absence of new skin breakdown  Outcome: Ongoing     Problem: OXYGENATION/RESPIRATORY FUNCTION  Goal: Patient will maintain patent airway  Outcome: Ongoing  Goal: Patient will achieve/maintain normal respiratory rate/effort  Description: Respiratory rate and effort will be within normal limits for the patient  Outcome: Ongoing     Problem: Anxiety:  Goal: Level of anxiety will decrease  Description: Level of anxiety will decrease  Outcome: Ongoing     Problem: Musculor/Skeletal Functional Status  Goal: Highest potential functional level  Outcome: Ongoing  Goal: Absence of falls  Outcome: Ongoing

## 2021-10-26 NOTE — PROGRESS NOTES
PULMONARY & CRITICAL CARE MEDICINE PROGRESS NOTE     Patient:  [address]   MRN: [id number]  6 [facility] date: 10/13/2021  Primary Care Physician: Refugio Jeans  Consulting Physician: Stefan Beatty DO  CODE Status: Full Code  LOS: 15     SUBJECTIVE     CHIEF COMPLAINT/REASON FOR INITIAL CONSULT: Acute respiratory failure/COVID-19 pneumonia    BRIEF HOSPITAL COURSE:   The patient is a 27 y.o. female who was 26-week pregnant initially tested positive for COVID-19 on 10/10. She presented to [place] ED on 10/13 with respiratory distress and low home O2 saturation. She was tachypneic and tachycardic. Transferred to [facility] for further management. Initial blood gases showed evidence of combined anion gap and non-anion gap metabolic acidosis, thought to be related to starvation ketosis. During evaluation patient was also noted to have a left lower lobe subsubsegmental PE. She underwent  10/15. Postoperative course complicated by development of worsening respiratory failure and required initiation of mechanical ventilation on 10/15. Due to worsening severe hypoxemic respiratory failure decision was made to put her on VV ECMO. She had placement of 27 F VV ECMO (crescent) cannula in the right atrium on 10/17. INTERVAL HISTORY:  10/26/21  Patient remained off VV ECMO, decannulated on 10/22/2021  Has been working with physical therapy, ambulating. Denies any purulent sputum or hemoptysis. T-max 100.8 but tachycardia is persisting. Thoracic echo did not show vegetation. REVIEW OF SYSTEMS:  Review of Systems   Constitutional: Positive for fatigue and fever. HENT: Negative. Respiratory: Positive for cough and shortness of breath. Negative for wheezing. Cardiovascular: Negative for chest pain and leg swelling. Gastrointestinal: Negative. Genitourinary: Negative. Neurological: Negative. OBJECTIVE          PaO2/FiO2 RATIO:  No results for input(s): POCPO2 in the last 72 hours.    FiO2 : 40 % VITAL SIGNS:   LAST:  /89   Pulse 102   Temp 100.8 °F (38.2 °C) (Oral)   Resp 18   Ht 5' 4\" (1.626 m)   Wt 225 lb 12 oz (102.4 kg)   LMP 2021 (Approximate)   SpO2 95%   Breastfeeding Unknown   BMI 38.75 kg/m²   8-24 HR RANGE:  TEMP Temp  Av.4 °F (37.4 °C)  Min: 98.2 °F (36.8 °C)  Max: 100.8 °F (49.2 °C)   BP Systolic (68TJP), QBZ:779 , Min:132 , MYV:592      Diastolic (21BWO), VRR:60, Min:85, Max:92     PULSE Pulse  Av.5  Min: 102  Max: 119   RR Resp  Av  Min: 16  Max: 18   O2 SAT No data recorded   OXYGEN DELIVERY O2 Flow Rate (L/min)  Av L/min  Min: 1 L/min  Max: 1 L/min        SYSTEMIC EXAMINATION:   General appearance -much more comfortable today, no acute distress  Mental status -awake and alert  Eyes - pupils equal and reactive, sclera anicteric  Mouth - mucous membranes moist  Neck - supple, no significant adenopathy  Chest -bilateral rales  Heart -tachycardic, regular rhythm, normal S1, S2, no murmurs, rubs, clicks or gallops  Abdomen - soft, nontender, nondistended, no masses or organomegaly  Neurological -awake and following commands  Extremities -no pedal edema, no clubbing or cyanosis  Skin - normal coloration and turgor, no rashes, no suspicious skin lesions noted     DATA REVIEW     Medications:  Scheduled Meds:   metoprolol tartrate  25 mg Oral TID    enoxaparin  1 mg/kg SubCUTAneous BID    lactobacillus  1 capsule Oral Daily with breakfast    vancomycin (VANCOCIN) intermittent dosing (placeholder)   Other RX Placeholder    vancomycin  1,250 mg IntraVENous Q8H    prenatal vitamin  1 tablet Oral Daily    Tdap-Dtap  0.5 mL IntraMUSCular Prior to discharge    lidocaine 1 % injection  5 mL IntraDERmal Once    sodium chloride flush  5-40 mL IntraVENous 2 times per day    docusate  100 mg Oral BID    sennosides-docusate sodium  2 tablet Oral Daily    insulin lispro  0-12 Units SubCUTAneous Q6H     Continuous Infusions:   dextrose 5 % and 0.9 % NaCl 75 mL/hr at 10/22/21 2032    esmolol Stopped (10/23/21 2100)    sodium chloride      HYDROmorphone Stopped (10/21/21 1900)    ketamine (KETALAR) infusion for analgosedation Stopped (10/21/21 1900)    sodium chloride      midazolam Stopped (10/21/21 1900)    dextrose      sodium chloride 5 mL/hr at 10/14/21 0700       INPUT/OUTPUT:  In: 900 [P.O.:420; I.V.:480]  Out: 850 [Urine:850]  Date 10/26/21 0000 - 10/26/21 2359   Shift 8131-3760 2941-7897 5388-5724 24 Hour Total   INTAKE   P.O.(mL/kg/hr) 420(0.5)   420   I. V.(mL/kg) 480(4.7)   480(4.7)   Shift Total(mL/kg) 900(8.8)   900(8.8)   OUTPUT   Urine(mL/kg/hr) 850(1)   850   Shift Total(mL/kg) 850(8.3)   850(8.3)   Weight (kg) 102.4 102.4 102.4 102.4        LABS:  ABGs:   No results for input(s): POCPH, POCPCO2, POCPO2, POCHCO3, XJDB7LOT in the last 72 hours. CBC:   Recent Labs     10/24/21  0532 10/25/21  0534 10/26/21  0353   WBC 12.0* 10.6 11.8*   HGB 7.1* 8.7* 8.6*   HCT 23.2* 27.5* 28.3*   MCV 93.5 91.7 92.8    199 230   RBC 2.48* 3.00* 3.05*   MCH 28.6 29.0 28.2   MCHC 30.6 31.6 30.4   RDW 15.7* 14.9* 14.8*     CRP:   No results for input(s): CRP in the last 72 hours. LDH:   No results for input(s): LDH in the last 72 hours. BMP:   Recent Labs     10/24/21  0532 10/25/21  0534 10/26/21  0353    132* 135   K 3.8 3.5* 3.7    102 103   CO2 19* 21 21   BUN 5* 5* 6   CREATININE 0.23* <0.20* 0.23*   GLUCOSE 106* 105* 100*     Liver Function Test:   No results for input(s): PROT, LABALBU, ALT, AST, GGT, ALKPHOS, BILITOT in the last 72 hours. Coagulation Profile:   Recent Labs     10/24/21  0532 10/25/21  0534 10/26/21  0353   INR 1.1 1.1 1.1   PROTIME 11.5 11.3 11.5     D-Dimer:  No results for input(s): DDIMER in the last 72 hours. Ferritin:    No results for input(s): FERRITIN in the last 72 hours. Lactic Acid:  No results for input(s): LACTA in the last 72 hours.   Cardiac Enzymes:  No results for input(s): CKTOTAL, CKMB, CKMBINDEX, TROPONINI in the last 72 hours. Invalid input(s): TROPONIN, HSTROP  BNP/ProBNP:   No results for input(s): BNP, PROBNP in the last 72 hours. Triglycerides:  No results for input(s): TRIG in the last 72 hours. Microbiology:  Urine Culture:  No components found for: CURINE  Blood Culture:  No components found for: CBLOOD, CFUNGUSBL  Sputum Culture:  No components found for: CSPUTUM  Recent Labs     10/25/21  1143   SPECDESC . BLOOD   SPECIAL L HAND 20ML   CULTURE POSITIVE Blood Culture Results called to and read back by: [facility] STORM 10/26/21 0600*  DIRECT GRAM STAIN FROM BOTTLE: Cozette McFarland POSITIVE COCCI IN CLUSTERS     Recent Labs     10/24/21  0527 10/25/21  1143   SPECDESC . BLOOD . BLOOD   SPECIAL 7ML L ARM L HAND 20ML   CULTURE POSITIVE Blood Culture Results called to and read back by: [facility] SHAW AT [id number] ON 10/25/21*  DIRECT GRAM STAIN FROM BOTTLE: GRAM POSITIVE COCCI IN CLUSTERS  Staphylococcus aureus Detected: mecA/C and MREJ Not Detected Methodology- Polymerase Chain Reaction (PCR)* POSITIVE Blood Culture Results called to and read back by: [facility] STORM 10/26/21 0600*  DIRECT GRAM STAIN FROM BOTTLE: GRAM POSITIVE COCCI IN CLUSTERS        Pathology:    Radiology Reports:  XR CHEST PORTABLE   Final Result   No significant interval change in bilateral airspace disease. XR CHEST PORTABLE   Final Result   Bilateral airspace disease appears more prominent in the interval.         XR CHEST PORTABLE   Final Result   Improved pulmonary edema and bibasilar opacities likely representing   atelectasis. XR ABDOMEN (KUB) (SINGLE AP VIEW)   Final Result   1. Nonspecific, nonobstructive bowel gas pattern, without evidence of free air   2. Bibasilar pulmonary opacities, suggesting atelectasis and or pneumonia         XR CHEST PORTABLE   Final Result   Poor expiratory afford. Left subclavian central line remains in place. Resolving diffuse interstitial left retrocardiac opacification.          CT CHEST PULMONARY EMBOLISM W CONTRAST   Final Result   This study is nondiagnostic for pulmonary embolism. There is excessive   respiratory motion artifact degrading image resolution. There is not   significant contrast within the pulmonary artery system to adequately assess   for pulmonary embolus. Recommend either repeat exam with improved bolus timing, and decreased   patient respiratory motion or VQ scan. Improving patchy bilateral infiltrates compatible with improvement of COVID   pneumonia. Interval development of moderate ascites         XR CHEST PORTABLE   Final Result   Stable support lines      Stable multifocal infiltrates         XR CHEST PORTABLE   Final Result   Increased infiltrates seen in the right lung base and left suprahilar region,   otherwise similar appearing multifocal infiltrates. XR CHEST PORTABLE   Final Result   1. ECMO device and left central venous catheter are unchanged. Endotracheal   and enteric tubes are no longer visualized. 2. Diffuse bilateral pulmonary opacities and small left effusion. XR CHEST PORTABLE   Final Result   Overall improved appearing chest with better aeration of both lungs. The ET   tube tip lies approximately 2.5 cm above the aly. XR CHEST PORTABLE   Final Result   Similar appearing chest with consolidative airspace disease in the right   lung, and complete opacification of the left chest and right lung apex. Question of slight deeper placement of the endotracheal tube, though the   aly position is somewhat difficult to see on this exam compared to the   prior. It may lie in the region of approximately 1.8 cm above the aly,   previously measuring closer to 2.7 cm above the aly. This could be   slightly retracted if clinically concerned. XR CHEST PORTABLE   Final Result   1. Recommend retraction of endotracheal tube 1.0 cm.   2. Stable appearance of left-sided hydrothorax.    3. Right lung multifocal therapy and Occupational Therapy  Will transition to Eliquis from [facility]. Transesophageal echo is not planned. Await ID recommendation  Continue vancomycin  Will need home oxygen eval prior to discharge  DC daily x-ray will do x-ray as needed. Escalate lab work    Alon Stinson MD  Pulmonary and [address]           10/26/2021     This patient was evaluated in the context of the global SARS-CoV-2 (COVID-19) pandemic, which necessitated considerations that the patient either has COVID-19 infection or is at risk of infection with COVID-19. Institutional protocols and algorithms that pertain to the evaluation & management of patients with COVID-19 or those at risk for COVID-19 are in a state of rapid changes based on information released by regulatory bodies including the CDC and federal and state organizations. These policies and algorithms were followed during the patient's care. Please note that this chart was generated using voice recognition Dragon dictation software. Although every effort was made to ensure the accuracy of this automated transcription, some errors in transcription may have occurred.

## 2021-10-26 NOTE — PROGRESS NOTES
[facility]   Progress Note                   Date:   10/26/2021  Patient name: Josseline Stein  Date of admission:  10/13/2021  8:19 PM  MRN:   [id number]  YOB: 1990  PCP: Jose Luis Carr    Reason for Admission: covid 19     Subjective:   Patient is seen and examined. Tachycardia improving. Continue Lopressor 25 mg 3 times daily. Globin 8.6. Has positive blood culture. Medications:   Scheduled Meds:   metoprolol tartrate  25 mg Oral TID    enoxaparin  1 mg/kg SubCUTAneous BID    lactobacillus  1 capsule Oral Daily with breakfast    vancomycin (VANCOCIN) intermittent dosing (placeholder)   Other RX Placeholder    vancomycin  1,250 mg IntraVENous Q8H    prenatal vitamin  1 tablet Oral Daily    Tdap-Dtap  0.5 mL IntraMUSCular Prior to discharge    lidocaine 1 % injection  5 mL IntraDERmal Once    sodium chloride flush  5-40 mL IntraVENous 2 times per day    docusate  100 mg Oral BID    sennosides-docusate sodium  2 tablet Oral Daily    insulin lispro  0-12 Units SubCUTAneous Q6H     Continuous Infusions:   dextrose 5 % and 0.9 % NaCl 75 mL/hr at 10/22/21 2032    esmolol Stopped (10/23/21 2100)    sodium chloride      HYDROmorphone Stopped (10/21/21 1900)    ketamine (KETALAR) infusion for analgosedation Stopped (10/21/21 1900)    sodium chloride      midazolam Stopped (10/21/21 1900)    dextrose      sodium chloride 5 mL/hr at 10/14/21 0700     CBC:   Recent Labs     10/24/21  0532 10/25/21  0534 10/26/21  0353   WBC 12.0* 10.6 11.8*   HGB 7.1* 8.7* 8.6*    199 230     BMP:    Recent Labs     10/24/21  0532 10/25/21  0534 10/26/21  0353    132* 135   K 3.8 3.5* 3.7    102 103   CO2 19* 21 21   BUN 5* 5* 6   CREATININE 0.23* <0.20* 0.23*   GLUCOSE 106* 105* 100*     Hepatic:   No results for input(s): AST, ALT, ALB, BILITOT, ALKPHOS in the last 72 hours. Troponin: No results for input(s): TROPONINI in the last 72 hours.   BNP: No results for input(s): BNP in the last 72 hours. Lipids: No results for input(s): CHOL, HDL in the last 72 hours. Invalid input(s): LDLCALCU  INR:   Recent Labs     10/24/21  0532 10/25/21  0534 10/26/21  0353   INR 1.1 1.1 1.1       Objective:   Vitals: /89   Pulse 102   Temp 98.8 °F (37.1 °C) (Oral)   Resp 23   Ht 5' 4\" (1.626 m)   Wt 225 lb 12 oz (102.4 kg)   LMP 04/01/2021 (Approximate)   SpO2 95%   Breastfeeding Unknown   BMI 38.75 kg/m²   General appearance: alert and cooperative with exam  HEENT: Head: Normocephalic, no lesions, without obvious abnormality. Neck: no adenopathy, no carotid bruit, no JVD, supple, symmetrical, trachea midline and thyroid not enlarged, symmetric, no tenderness/mass/nodules  Lungs: clear to auscultation bilaterally  Heart: regular rate and rhythm, S1, S2 normal, no murmur, click, rub or gallop  Abdomen: soft, non-tender; bowel sounds normal; no masses,  no organomegaly  Extremities: extremities normal, atraumatic, no cyanosis or edema  Neurologic: Mental status: Alert, oriented, thought content appropriate    GUILLERMO 10/13/2021:  Summary  GUILLERMO was performed to guide ECMO cannulation position. Overall normal LV size and systolic function with estimated EF 55%  No significant valvular regurgitation seen. No intracardiac shunt via color doppler  Final cannula position showed the flow jet directed towards the tricuspid  valve.     Echo 10/25/21:  Left ventricle is normal in size. Global left ventricular systolic function  is normal. Calculated EF via Betancourt's method is 53 %. Right atrial dilatation. Mildly dilated right ventricular cavity. Right ventricular function appears  normal .  Mild tricuspid regurgitation. Estimated right ventricular systolic pressure  is 27 mmHg. Trivial pulmonic insufficiency. No obvious vegetation on this study, consider GUILLERMO if indicated. IMPRESSION:    1.   Sinus tachycardia with intermittent runs of SVT likely secondary to acute stress state currently

## 2021-10-26 NOTE — LACTATION NOTE
Feeling much better, reports increasing volume. Hoping for discharge tomorrow, reviewed discharge instructions and LC follow up if she needs assistance. Supplies given.

## 2021-10-26 NOTE — PROGRESS NOTES
Physical Therapy  Facility/Department: [facility]  Daily Treatment Note  NAME: Vandana Mercer  : 1990  MRN: [id number]    Date of Service: 10/26/2021    Discharge Recommendations:    Further therapy recommended at discharge.     PT Equipment Recommendations  Equipment Needed: No       Assessment     Body structures, Functions, Activity limitations: Decreased functional mobility ; Decreased strength;Decreased endurance;Decreased balance  Prognosis: Good  Decision Making: High Complexity  PT Education: Goals;PT Role;Plan of Care  REQUIRES PT FOLLOW UP: Yes  Activity Tolerance  Activity Tolerance: Patient limited by fatigue;Patient limited by endurance         Patient Diagnosis(es): The encounter diagnosis was 26 weeks gestation of pregnancy. has a past medical history of Anxiety, GERD (gastroesophageal reflux disease), Migraines, and Seasonal allergies. has a past surgical history that includes [place] tooth extraction; Mandible surgery; Foot surgery; Mouth surgery; hc  picc powerpic triple (10/15/2021); and  section (N/A, 10/15/2021). Restrictions  Restrictions/Precautions  Restrictions/Precautions: General Precautions, Surgical Protocols, Fall Risk, Up as Tolerated  Required Braces or Orthoses?: Yes  Required Braces or Orthoses  Other: Abdominal Binder  Position Activity Restriction  Other position/activity restrictions: 2L O2 entire session, Neg for PE on 10/22, Amb pt, 7.1 Hgb  Subjective   Pt sitting up in her chair. Pt has no c/o pain.    Orientation    Overall Orientation Status: Within Normal Limits  Objective     Transfers  Sit to Stand: CGA  Stand to sit: CGA  Stand Pivot Transfers: CGA  Ambulation  Ambulation?: Yes  Ambulation 1  Surface: level tile  Device: no AD  Other Apparatus: O2 2L  Assistance: CGA  Quality of Gait: mildly flexed posture; improved step length, improved balance  Gait Deviations: Slow Lou;Decreased arm swing;Decreased head and trunk rotation  Distance: 300'x1 with 1 LOB pt was able to self correct. Stairs/Curb  Stairs?: No  Balance  Posture: Fair  Sitting - Static: Good  Sitting - Dynamic: Good  Standing - Static: Fair  Standing - Dynamic: Fair;-   Ex's  Upper extremity exercises: Bicep curl, shoulder flexion/extension, punches Reps: 2 x 10 with 2 lb wt on a SPC. 4lbs with Biceps. Seated LE exercise program: Long Arc Quads,heel/toe raises, and marches. Reps: 20 x each with 2 lb wt on  B LE's.   AM-PAC Score  AM-PAC Inpatient Mobility Raw Score : 18 (10/26/21 1426)  AM-PAC Inpatient T-Scale Score : 43.63 (10/26/21 1426)  Mobility Inpatient CMS 0-100% Score: 46.58 (10/26/21 1426)  Mobility Inpatient CMS G-Code Modifier : CK (10/26/21 1426)     Goals  Short term goals  Time Frame for Short term goals: 12 visits  Short term goal 1: independent bed mobility, HOB flat, no use of bed rails  Short term goal 2: independent transfers  Short term goal 3: independent gait without device x 200'  Short term goal 4: stair ambulation x 3 steps without HR, CG+1  Short term goal 5: improve strength B hips/shoulders to 4-/5 throughout  Patient Goals   Patient goals : go home, be able to get around independently    Plan    Plan  Times per week: 5-6 visits weekly  Times per day: Daily  Current Treatment Recommendations: Strengthening, ROM, Balance Training, Functional Mobility Training, Transfer Training, Endurance Training, Gait Training, Stair training, Home Exercise Program, Safety Education & Training, Patient/Caregiver Education & Training, Equipment Evaluation, Education, & procurement, Positioning  Safety Devices  Type of devices: Call light within reach, Gait belt, Patient at risk for falls, Left in chair, Nurse notified  Restraints  Initially in place: No     Therapy Time   Individual Concurrent Group Co-treatment   Time In   145         Time Out   215         Minutes  [id number] [facility] [address]

## 2021-10-27 LAB
ALBUMIN SERPL-MCNC: 2.8 G/DL (ref 3.5–5.2)
ALBUMIN/GLOBULIN RATIO: 0.9 (ref 1–2.5)
ALP BLD-CCNC: 83 U/L (ref 35–104)
ALT SERPL-CCNC: 28 U/L (ref 5–33)
ANION GAP SERPL CALCULATED.3IONS-SCNC: 13 MMOL/L (ref 9–17)
AST SERPL-CCNC: 23 U/L
BILIRUB SERPL-MCNC: 1.58 MG/DL (ref 0.3–1.2)
BILIRUBIN DIRECT: 0.5 MG/DL
BILIRUBIN, INDIRECT: 1.08 MG/DL (ref 0–1)
BUN BLDV-MCNC: 8 MG/DL (ref 6–20)
BUN/CREAT BLD: ABNORMAL (ref 9–20)
CALCIUM SERPL-MCNC: 8.4 MG/DL (ref 8.6–10.4)
CHLORIDE BLD-SCNC: 101 MMOL/L (ref 98–107)
CO2: 21 MMOL/L (ref 20–31)
CREAT SERPL-MCNC: 0.26 MG/DL (ref 0.5–0.9)
CULTURE: ABNORMAL
GFR AFRICAN AMERICAN: >60 ML/MIN
GFR NON-AFRICAN AMERICAN: >60 ML/MIN
GFR SERPL CREATININE-BSD FRML MDRD: ABNORMAL ML/MIN/{1.73_M2}
GFR SERPL CREATININE-BSD FRML MDRD: ABNORMAL ML/MIN/{1.73_M2}
GLOBULIN: ABNORMAL G/DL (ref 1.5–3.8)
GLUCOSE BLD-MCNC: 125 MG/DL (ref 65–105)
GLUCOSE BLD-MCNC: 81 MG/DL (ref 70–99)
HCT VFR BLD CALC: 30.9 % (ref 36.3–47.1)
HEMOGLOBIN: 9.4 G/DL (ref 11.9–15.1)
INTERVENTION: NORMAL
Lab: ABNORMAL
Lab: ABNORMAL
MCH RBC QN AUTO: 28.5 PG (ref 25.2–33.5)
MCHC RBC AUTO-ENTMCNC: 30.4 G/DL (ref 28.4–34.8)
MCV RBC AUTO: 93.6 FL (ref 82.6–102.9)
NRBC AUTOMATED: 0.1 PER 100 WBC
PDW BLD-RTO: 14.9 % (ref 11.8–14.4)
PLATELET # BLD: 266 K/UL (ref 138–453)
PMV BLD AUTO: 9.6 FL (ref 8.1–13.5)
POTASSIUM SERPL-SCNC: 4.2 MMOL/L (ref 3.7–5.3)
RBC # BLD: 3.3 M/UL (ref 3.95–5.11)
SODIUM BLD-SCNC: 135 MMOL/L (ref 135–144)
SPECIMEN DESCRIPTION: ABNORMAL
SPECIMEN DESCRIPTION: ABNORMAL
TOTAL PROTEIN: 5.9 G/DL (ref 6.4–8.3)
WBC # BLD: 13.6 K/UL (ref 3.5–11.3)

## 2021-10-27 PROCEDURE — 6370000000 HC RX 637 (ALT 250 FOR IP): Performed by: STUDENT IN AN ORGANIZED HEALTH CARE EDUCATION/TRAINING PROGRAM

## 2021-10-27 PROCEDURE — 6360000002 HC RX W HCPCS: Performed by: INTERNAL MEDICINE

## 2021-10-27 PROCEDURE — 94761 N-INVAS EAR/PLS OXIMETRY MLT: CPT

## 2021-10-27 PROCEDURE — 36415 COLL VENOUS BLD VENIPUNCTURE: CPT

## 2021-10-27 PROCEDURE — 99233 SBSQ HOSP IP/OBS HIGH 50: CPT | Performed by: INTERNAL MEDICINE

## 2021-10-27 PROCEDURE — 6370000000 HC RX 637 (ALT 250 FOR IP): Performed by: INTERNAL MEDICINE

## 2021-10-27 PROCEDURE — 80048 BASIC METABOLIC PNL TOTAL CA: CPT

## 2021-10-27 PROCEDURE — 1200000000 HC SEMI PRIVATE

## 2021-10-27 PROCEDURE — 2580000003 HC RX 258: Performed by: STUDENT IN AN ORGANIZED HEALTH CARE EDUCATION/TRAINING PROGRAM

## 2021-10-27 PROCEDURE — 87040 BLOOD CULTURE FOR BACTERIA: CPT

## 2021-10-27 PROCEDURE — 85027 COMPLETE CBC AUTOMATED: CPT

## 2021-10-27 PROCEDURE — 2580000003 HC RX 258: Performed by: INTERNAL MEDICINE

## 2021-10-27 PROCEDURE — 80076 HEPATIC FUNCTION PANEL: CPT

## 2021-10-27 PROCEDURE — 82947 ASSAY GLUCOSE BLOOD QUANT: CPT

## 2021-10-27 RX ADMIN — ACETAMINOPHEN 650 MG: 325 TABLET ORAL at 15:01

## 2021-10-27 RX ADMIN — Medication 1 TABLET: at 09:06

## 2021-10-27 RX ADMIN — CEFAZOLIN 2000 MG: 10 INJECTION, POWDER, FOR SOLUTION INTRAVENOUS at 21:30

## 2021-10-27 RX ADMIN — METOPROLOL TARTRATE 25 MG: 25 TABLET ORAL at 14:11

## 2021-10-27 RX ADMIN — CEFAZOLIN 2000 MG: 10 INJECTION, POWDER, FOR SOLUTION INTRAVENOUS at 14:11

## 2021-10-27 RX ADMIN — CEFAZOLIN 2000 MG: 10 INJECTION, POWDER, FOR SOLUTION INTRAVENOUS at 06:10

## 2021-10-27 RX ADMIN — METOPROLOL TARTRATE 25 MG: 25 TABLET ORAL at 09:06

## 2021-10-27 RX ADMIN — Medication 1 CAPSULE: at 09:06

## 2021-10-27 RX ADMIN — SODIUM CHLORIDE, PRESERVATIVE FREE 10 ML: 5 INJECTION INTRAVENOUS at 19:37

## 2021-10-27 RX ADMIN — ENOXAPARIN SODIUM 105 MG: 120 INJECTION SUBCUTANEOUS at 19:35

## 2021-10-27 RX ADMIN — ENOXAPARIN SODIUM 105 MG: 120 INJECTION SUBCUTANEOUS at 09:06

## 2021-10-27 RX ADMIN — METOPROLOL TARTRATE 25 MG: 25 TABLET ORAL at 19:35

## 2021-10-27 ASSESSMENT — PAIN SCALES - GENERAL
PAINLEVEL_OUTOF10: 2
PAINLEVEL_OUTOF10: 0
PAINLEVEL_OUTOF10: 0

## 2021-10-27 ASSESSMENT — ENCOUNTER SYMPTOMS
SORE THROAT: 0
COUGH: 0
ABDOMINAL PAIN: 0
WHEEZING: 0
CONSTIPATION: 0
TROUBLE SWALLOWING: 0
NAUSEA: 0
SHORTNESS OF BREATH: 1
VOMITING: 0
DIARRHEA: 0

## 2021-10-27 NOTE — PLAN OF CARE
Problem: Body Temperature -  Risk of, Imbalanced  Goal: Will regain or maintain usual level of consciousness  Outcome: Met This Shift     Problem: Musculor/Skeletal Functional Status  Goal: Absence of falls  Outcome: Met This Shift     Problem: Airway Clearance - Ineffective  Goal: Achieve or maintain patent airway  Outcome: Ongoing     Problem: Gas Exchange - Impaired  Goal: Absence of hypoxia  Outcome: Ongoing     Problem: Breathing Pattern - Ineffective  Goal: Ability to achieve and maintain a regular respiratory rate  Outcome: Ongoing     Problem:  Body Temperature -  Risk of, Imbalanced  Goal: Ability to maintain a body temperature within defined limits  Outcome: Ongoing     Problem: Isolation Precautions - Risk of Spread of Infection  Goal: Prevent transmission of infection  Outcome: Ongoing     Problem: Nutrition Deficits  Goal: Optimize nutritional status  Outcome: Ongoing     Problem: Fatigue  Goal: Verbalize increase energy and improved vitality  Outcome: Ongoing     Problem: Patient Education: Go to Patient Education Activity  Goal: Patient/Family Education  Outcome: Ongoing     Problem: Nutrition  Goal: Optimal nutrition therapy  Outcome: Ongoing     Problem: Skin Integrity:  Goal: Will show no infection signs and symptoms  Description: Will show no infection signs and symptoms  Outcome: Ongoing     Problem: Skin Integrity:  Goal: Absence of new skin breakdown  Description: Absence of new skin breakdown  Outcome: Ongoing     Problem: OXYGENATION/RESPIRATORY FUNCTION  Goal: Patient will achieve/maintain normal respiratory rate/effort  Description: Respiratory rate and effort will be within normal limits for the patient  Outcome: Ongoing     Problem: Anxiety:  Goal: Level of anxiety will decrease  Description: Level of anxiety will decrease  Outcome: Ongoing

## 2021-10-27 NOTE — PROGRESS NOTES
[facility]   Progress Note                   Date:   10/26/2021  Patient name: Jo-Ann Sandra  Date of admission:  10/13/2021  8:19 PM  MRN:   [id number]  YOB: 1990  PCP: Junior Elias    Reason for Admission: covid 19     Subjective:   Patient is seen and examined. Tachycardia improving. Continue Lopressor 25 mg 3 times daily. Medications:   Scheduled Meds:   ceFAZolin  2,000 mg IntraVENous Q8H    metoprolol tartrate  25 mg Oral TID    enoxaparin  1 mg/kg SubCUTAneous BID    lactobacillus  1 capsule Oral Daily with breakfast    prenatal vitamin  1 tablet Oral Daily    Tdap-Dtap  0.5 mL IntraMUSCular Prior to discharge    lidocaine 1 % injection  5 mL IntraDERmal Once    sodium chloride flush  5-40 mL IntraVENous 2 times per day    docusate  100 mg Oral BID    sennosides-docusate sodium  2 tablet Oral Daily    insulin lispro  0-12 Units SubCUTAneous Q6H     Continuous Infusions:   sodium chloride      sodium chloride      dextrose      sodium chloride 5 mL/hr at 10/14/21 0700     CBC:   Recent Labs     10/24/21  0532 10/25/21  0534 10/26/21  0353   WBC 12.0* 10.6 11.8*   HGB 7.1* 8.7* 8.6*    199 230     BMP:    Recent Labs     10/24/21  0532 10/25/21  0534 10/26/21  0353    132* 135   K 3.8 3.5* 3.7    102 103   CO2 19* 21 21   BUN 5* 5* 6   CREATININE 0.23* <0.20* 0.23*   GLUCOSE 106* 105* 100*     Hepatic:   No results for input(s): AST, ALT, ALB, BILITOT, ALKPHOS in the last 72 hours. Troponin: No results for input(s): TROPONINI in the last 72 hours. BNP: No results for input(s): BNP in the last 72 hours. Lipids: No results for input(s): CHOL, HDL in the last 72 hours.     Invalid input(s): LDLCALCU  INR:   Recent Labs     10/24/21  0532 10/25/21  0534 10/26/21  0353   INR 1.1 1.1 1.1       Objective:   Vitals: BP (!) 136/93   Pulse 123   Temp 98.6 °F (37 °C) (Oral)   Resp 18   Ht 5' 4\" (1.626 m)   Wt 225 lb 12 oz (102.4 kg)   LMP 04/01/2021 (Approximate)   SpO2 96%   Breastfeeding Unknown   BMI 38.75 kg/m²   General appearance: alert and cooperative with exam  HEENT: Head: Normocephalic, no lesions, without obvious abnormality. Neck: no adenopathy, no carotid bruit, no JVD, supple, symmetrical, trachea midline and thyroid not enlarged, symmetric, no tenderness/mass/nodules  Lungs: clear to auscultation bilaterally  Heart: regular rate and rhythm, S1, S2 normal, no murmur, click, rub or gallop  Abdomen: soft, non-tender; bowel sounds normal; no masses,  no organomegaly  Extremities: extremities normal, atraumatic, no cyanosis or edema  Neurologic: Mental status: Alert, oriented, thought content appropriate    GUILLERMO 10/13/2021:  Summary  GUILLERMO was performed to guide ECMO cannulation position. Overall normal LV size and systolic function with estimated EF 55%  No significant valvular regurgitation seen. No intracardiac shunt via color doppler  Final cannula position showed the flow jet directed towards the tricuspid  valve.     Echo 10/25/21:  Left ventricle is normal in size. Global left ventricular systolic function  is normal. Calculated EF via Betancourt's method is 53 %. Right atrial dilatation. Mildly dilated right ventricular cavity. Right ventricular function appears  normal .  Mild tricuspid regurgitation. Estimated right ventricular systolic pressure  is 27 mmHg. Trivial pulmonic insufficiency. No obvious vegetation on this study, consider GUILLERMO if indicated. IMPRESSION:    1. Sinus tachycardia with intermittent runs of SVT likely secondary to acute stress state currently weaned off esmolol drip. On p.o. beta-blocker. 2.  ARDS 2/2 to COVID requiring VV ECMO - Decannulation on 10/22/2021  3. Small PE in Left lower lobe on CTA 10/13/2021. Eliquis on hold due to hemoglobin drop as per pulm crit. 4. Anemia requirinng 1 unit of PRBC. 5. Leucocytosis which has been improving. Managed by primary.   6 Hypokalemia   7 Positive blood culture     RECOMMENDATIONS:  On metoprolol 25 mg TID   Patient is now off VV ECMO Right IJ crescent cannula   No hematoma at IJ site   Awake and oriented in no distress  BP stable   PRBC transfusion to keep Hb > 8 . Received 2 U PRBC   ID following for bacteremia    Kalie Desouza   CV fellow     Attending Cardiologist Addendum: I have reviewed and performed the history, physical, subjective, objective, assessment, and plan with the student/resident/fellow/APN and agree with the note. I performed the history and physical personally. I have made changes to the note above as needed. Will not aggressively tread the tachycardia- as it is likely appropriate from infection/bacteremia  Continue lopressor tid  ID following- per patient no plan for GUILLERMO yet, they will notify us if necessary. Thank you for allowing me to participate in the care of this patient, please do not hesitate to call if you have any questions. Nilson Frank DO, [facility], [address], [address], [place] [address] [facility]  [facility]. [facility]  [phone]

## 2021-10-27 NOTE — CARE COORDINATION
PICC team to unit inquiring if home antx (ancef vs Vanco), pt on ancef currently, PS to Dr Cecilia Bar to see what antx and PICC vs Midline, she relates plan is Ancef but does not want line placed yet, would like fever line only for now as pt is bacteremic    Call from Britney Bautista at [facility], she relates the patient must be home bound to get care with them, if she delivered the concern is she will want to get out and see her baby, if that is the case they will not be able to accept. Will need to determine if pt will be discharged on IV antx and length of therapy, pt should be seen in infusion clinic if needed as her baby is in NICU here. Angie 49 with Eugene Pepe from [facility] she was updated regarding [facility] not being able to follow and possible need for Infusion clinic, she relates [facility] nurses may be able to follow and she can teach the patient how to give her own Ancef at home.  She will follow for antx needs and length of therapy

## 2021-10-27 NOTE — PROGRESS NOTES
for difficulty urinating, dysuria and frequency. Musculoskeletal: Negative for arthralgias and joint swelling. Skin: Negative for rash. Allergic/Immunologic: Negative for immunocompromised state. Neurological: Positive for weakness. Negative for dizziness, speech difficulty and headaches. Hematological: Negative for adenopathy. Psychiatric/Behavioral: Negative for behavioral problems and sleep disturbance. OBJECTIVE     VITAL SIGNS:   LAST:  BP (!) 112/98   Pulse 127   Temp 98.6 °F (37 °C) (Oral)   Resp (!) 36   Ht 5' 4\" (1.626 m)   Wt 225 lb 12 oz (102.4 kg)   LMP 2021 (Approximate)   SpO2 96%   Breastfeeding Unknown   BMI 38.75 kg/m²   8-24 HR RANGE:  TEMP Temp  Av.2 °F (37.3 °C)  Min: 97.8 °F (36.6 °C)  Max: 101.7 °F (82.8 °C)   BP Systolic (89IOK), PYM:664 , Min:112 , PJA:702      Diastolic (01NUW), DKF:51, Min:81, Max:98     PULSE Pulse  Av.4  Min: 107  Max: 148   RR Resp  Av  Min: 36  Max: 36   O2 SAT SpO2  Av %  Min: 96 %  Max: 96 %   OXYGEN DELIVERY No data recorded        SYSTEMIC EXAMINATION:   General appearance -comfortable, no acute distress  Mental status -awake and alert  Eyes - pupils equal and reactive, sclera anicteric  Mouth - mucous membranes moist  Neck - supple, no significant adenopathy  Chest - Breath sounds bilaterally were dimnished to auscultation at bases. There were no wheezes, rhonchi or rales.     Heart -tachycardic, regular rhythm, normal S1, S2, no murmurs, rubs, clicks or gallops  Abdomen - soft, nontender, nondistended, no masses or organomegaly  Neurological - DTR's normal and symmetric, motor and sensory grossly normal bilaterally  Extremities - peripheral pulses normal, no pedal edema, no clubbing or cyanosis  Skin - normal coloration and turgor, no rashes, no suspicious skin lesions noted     DATA REVIEW     Medications:  Scheduled Meds:   ceFAZolin  2,000 mg IntraVENous Q8H    metoprolol tartrate  25 mg Oral TID   [facility] improvement of COVID   pneumonia. Interval development of moderate ascites         XR CHEST PORTABLE   Final Result   Stable support lines      Stable multifocal infiltrates         XR CHEST PORTABLE   Final Result   Increased infiltrates seen in the right lung base and left suprahilar region,   otherwise similar appearing multifocal infiltrates. XR CHEST PORTABLE   Final Result   1. ECMO device and left central venous catheter are unchanged. Endotracheal   and enteric tubes are no longer visualized. 2. Diffuse bilateral pulmonary opacities and small left effusion. XR CHEST PORTABLE   Final Result   Overall improved appearing chest with better aeration of both lungs. The ET   tube tip lies approximately 2.5 cm above the aly. XR CHEST PORTABLE   Final Result   Similar appearing chest with consolidative airspace disease in the right   lung, and complete opacification of the left chest and right lung apex. Question of slight deeper placement of the endotracheal tube, though the   aly position is somewhat difficult to see on this exam compared to the   prior. It may lie in the region of approximately 1.8 cm above the aly,   previously measuring closer to 2.7 cm above the aly. This could be   slightly retracted if clinically concerned. XR CHEST PORTABLE   Final Result   1. Recommend retraction of endotracheal tube 1.0 cm.   2. Stable appearance of left-sided hydrothorax. 3. Right lung multifocal marked ill-defined dense consolidation, unchanged. 4. Suspected mild right-sided pleural effusion. 5. Stable positioning ECMO catheter. XR CHEST PORTABLE   Final Result   An ECMO catheter is now in place. There is new complete opacification of the   left hemithorax. Right-sided airspace opacity has significantly increased. A left subclavian catheter has its tip in the midline. The right PICC has   been removed.          XR CHEST PORTABLE   Final Result   Stable bilateral lung multifocal consolidation consistent with pneumonia. Appropriate radiographic positioning of endotracheal tube. XR CHEST PORTABLE   Final Result   The tip of the endotracheal tube is at the origin the right mainstem bronchus   and the tube should be pulled back 2 cm. VL DUP LOWER EXTREMITY VENOUS BILATERAL   Final Result      VASCULAR REPORT    (Results Pending)        Echocardiogram:   Results for orders placed during the hospital encounter of 10/13/21    ECHO Complete 2D W Doppler W Color    Narrative  Transthoracic Echocardiography Report (TTE)    Summary  Normal left ventricle size, wall thickness and function with a calculated EF  61%. No segmental wall motion abnormalities seen. Normal right ventricular size and function. No significant valvular regurgitation or stenosis seen. ASSESSMENT AND PLAN     Assessment:    // Acute hypoxic respiratory failure, on nasal cannula  // Acute respiratory distress syndrome, s/p VV ECMO and mechanical ventilation  // Bilateral multifocal pneumonia due to COVID 19 infection  // Sepsis due to COVID 19  // Sinus tachycardia  // Staph aureus bacteremia  // Left lower lobe subsegmental pulmonary embolism  // S/p  section, 26-week pregnancy  // Metabolic acidosis, resolved  // Leukocytosis    Plan:    I personally interviewed/examined the patient; reviewed interval history, interpreted all available radiographic and laboratory data at the time of service.     Patient s/p VV ECMO  Currently saturating well on nasal cannula  Continue supplemental oxygen to keep oxygen saturation greater than 92%  Encourage incentive spirometry  Continue pulmonary toilet, aspiration precautions   Continue to monitor I/O with a goal of even fluid balance  Tolerating oral diet  Stress ulcer prophylaxis  On therapeutic Lovenox  Continue to monitor CBC, coagulation profile  Chemical DVT prophylaxis not required as patient is on systemic anticoagulation  Antimicrobials reviewed; on empiric vancomycin and cefepime  Monitor CRP, LDH, AST/ALT, D-Dimer, Ferritin   Glycemic control appropriate  Skin/wound care reviewed with the nursing staff  Physical/occupational therapy    Patient will signed out of [address] team resident, will staff  the patient going forwards    Anibal Vang MD  Pulmonary and Critical Care Medicine           10/27/2021     This patient was evaluated in the context of the global SARS-CoV-2 (COVID-19) pandemic, which necessitated considerations that the patient either has COVID-19 infection or is at risk of infection with COVID-19. Institutional protocols and algorithms that pertain to the evaluation & management of patients with COVID-19 or those at risk for COVID-19 are in a state of rapid changes based on information released by regulatory bodies including the CDC and federal and state organizations. These policies and algorithms were followed during the patient's care. Please note that this chart was generated using voice recognition Dragon dictation software. Although every effort was made to ensure the accuracy of this automated transcription, some errors in transcription may have occurred.

## 2021-10-27 NOTE — PROGRESS NOTES
[facility]up[facility] [facility]  Occupational Therapy Not Seen Note    DATE: 10/27/2021    NAME: Lacinda Brittle  MRN: [id number]   : 1990      Patient not seen this date for Occupational Therapy due to:    Patient Declined: Pt being transported off floor to visit baby in NICU    Next Scheduled Treatment: Attempt at later date    Electronically signed by CAITY Padilla on 10/27/2021 at 3:07 PM

## 2021-10-27 NOTE — PROGRESS NOTES
Ob Attending     In to see patient and review details of her delivery as she does not remember the time surrounding her  section. We reviewed that she had a classical  section at 26 weeks and that with a classical  she would not be eligible for a TOLAC and would require delivery via  in any future pregnancy and that she should not labor on her classical scar. Her questions were answered. Recommended therapy for the family given the circumstances of her delivery and the emotional toll of a life threatening experience. Support offered. She seems to be coping well and had discontinued SSRI that had been started. She reports she is doing well. Her  too and was open to the idea of family counseling for coping with the stress of a little one in the NICU and the stress of her delivery. Recommend she follow up with us at [facility] for her 6 weeks post partum visit. Her questions were answered. She verbalized her understanding of her delivery and need for repeat  delivery for all future deliveries. She has no post-partum concerns at this time. Routine post-partum care once she is discharged.      Laura Amaya MD

## 2021-10-27 NOTE — PROGRESS NOTES
[facility] - Initial Consult Note COVID 19 Patient  Today's Date and Time: 10/26/2021, 11:20 PM    Impression :   COVID 19 pneumonia  -10/10/21: Positive  resp failure - intubated 10/14, extubated 10/21  Ecmo - decanulated 10/22  26 weeks gestation pregnancy  Baby delivered early 26 weeks  Pulmonary embolism LLL  Metabolic acidosis with euglycemic starvation  10/21 MSSA sepsis / septicemia  Bandemia 10/21  Intubated 10/14 -extubated 10/21  Recommendations:   Post Decadron-Initiated 10/14/21 - stop ad day 9 on 10/22 due to SA sepsis  anticoagulation  DEFERED barcitinib and  Actemra due to pregnancy  10/21-acute leukocytosis, Vanco cefepime started  10/22 BC SA -vanco pend ID of the SA- stop decadron day 9, pulled the left IJ as well  Repeat blood cultures daily, seem neg -    get 2D echo look for vegetation  10/23 diarrhea - probiotics - better  10/26 ancef and rifampin -   repeat daily BC till neg  Daily LFTs    start on illness 10/1  Stop isolation after 10/20    Medical Decision Making/Summary/Discussion:10/26/2021     Patient admitted with suspected COVID 19 infection  Covid test confirmed positive. Infection Control Recommendations   [place] Precautions  Airborne isolation  Droplet plus Isolation    Antimicrobial Stewardship Recommendations   Off antibiotics    Chief complaint/reason for consultation:   Concern for COVID infection      History of Present Illness:   Karina Aaron is a 27y.o.-year-old  female who was initially admitted on 10/13/2021. Patient seen at the request of Dr. Rahul Nichole:    Patient, who is 26 weeks pregnant, initially presented to [facility] ED on 10/10/21 presented through ER with complaints of productive cough with yellow sputum X 9 days & shortness of breath with pleuritic chest pain that started the day before. She ws also experiencing decreased appetite because of loss of taste and smell.      Work-up at that time revealed the patient to be Covid positive. She was tachycardic with an SpO2 of 94-98% on room air. CXR showed bilateral ill-defined pulmonary opacities suggesting COVID pneumonia. After receiving a small fluid bolus and discussing her case with the patient's OBGYN, Dr. Mary Ann Larose, she was discharged home with instructions to return if her symptoms worsen. On 10/13/21, the patient again presented to [facility] ED after a home SpO2 reading was 88%. A CT chest revealed a small, non-obstructing LLL PE for which she was initiated on a heparin gtt. The patient's respiratory status worsened throughout the day and she was life-flighted to [facility] for a higher level of care. The patient was found to be tachypneic and tachycardic upon arrival. She was maintaining her SpO2 >95% on 15 L/min non-re-breather. Heparin gtt was continued and the patient was also ordered celestone for fetal lung maturity. The patient was admitted to the [place] ICU    Interval changes    [facility] 10/20 SA x 2, seems to be MRSA  ECMO line out today 10/22    10/25  Diarrhea better since probiotics  Cough seems clear -  CXr a little worse bilat LL as below - on 2 L NC and feels better  [facility] still repeat neg  Await repeat echo look for vegetatin    10/26  Alert feeling much better not short of breath cough is minimal, talkative. No fever but persistent bacteremia, all blood cultures are positive through 10/25  Bacteria is now MSSA, antibiotic changed to Ancef  Discussed with patient, will add rifampin 12. The blood  She had a GUILLERMO before they placed the ECMO on 10/13 and hence will defer another GUILLERMO. The 2D echo so far is not showing endocarditis  She has no abdominal pain or lumbar spine pain to suspect another set of seeding      Echo: 10/17  Normal left ventricle size, wall thickness and function with a calculated EF   61%. No segmental wall motion abnormalities seen. Normal right ventricular size and function.    No significant valvular regurgitation or stenosis seen. Labs:  W 31 - 27 - 10  Creat 0.23    Micro:  BC MRSA 10/21  BC neg 10/23  BC neg 10/24    imaging  10/24  Bilateral airspace disease appears more prominent in the interval       10/20 cxr          CAT:  10/13/21    . I have personally reviewed the past medical history, past surgical history, medications, social history, and family history, and I have updated the database accordingly.   Past Medical History:     Past Medical History:   Diagnosis Date    Anxiety     GERD (gastroesophageal reflux disease)     Migraines     Seasonal allergies        Past Surgical  History:     Past Surgical History:   Procedure Laterality Date     SECTION N/A 10/15/2021     SECTION performed by Courtney Meeks MD at [place] [address].      nerve release, and plantar fascitis    HC  PICC POWERPIC TRIPLE  10/15/2021         MANDIBLE SURGERY      tooth implant    MOUTH SURGERY      WISDOM TOOTH EXTRACTION         Medications:      ceFAZolin  2,000 mg IntraVENous Q8H    rifampin (RIFADIN) IVPB  300 mg IntraVENous Q12H    metoprolol tartrate  25 mg Oral TID    enoxaparin  1 mg/kg SubCUTAneous BID    lactobacillus  1 capsule Oral Daily with breakfast    prenatal vitamin  1 tablet Oral Daily    Tdap-Dtap  0.5 mL IntraMUSCular Prior to discharge    lidocaine 1 % injection  5 mL IntraDERmal Once    sodium chloride flush  5-40 mL IntraVENous 2 times per day    docusate  100 mg Oral BID    sennosides-docusate sodium  2 tablet Oral Daily    insulin lispro  0-12 Units SubCUTAneous Q6H       Social History:     Social History     Socioeconomic History    Marital status:      Spouse name: Not on file    Number of children: Not on file    Years of education: Not on file    Highest education level: Not on file   Occupational History    Not on file   Tobacco Use    Smoking status: Former Smoker     Types: Cigarettes    Smokeless tobacco: Never Used   Vaping Cardiovascular: Negative for chest pain. Gastrointestinal: Negative for abdominal distention. Endocrine: Negative for heat intolerance, polydipsia and polyphagia. Genitourinary: Negative for dysuria and flank pain. Musculoskeletal: Negative for arthralgias. Skin: Negative for color change. Allergic/Immunologic: Positive for immunocompromised state. Neurological: Negative for dizziness, seizures, light-headedness and headaches. Hematological: Negative for adenopathy. Psychiatric/Behavioral: Negative for agitation. Physical Examination :     Patient Vitals for the past 8 hrs:   BP Temp Temp src Pulse Resp SpO2   10/26/21 1959 -- -- -- 123 -- --   10/26/21 1958 -- -- -- 137 -- --   10/26/21 1957 (!) 136/93 98.6 °F (37 °C) Oral 148 18 96 %   10/26/21 1620 -- 100.8 °F (38.2 °C) Oral -- 18 --     Physical Exam  Constitutional:       General: She is not in acute distress. Appearance: Normal appearance. She is not ill-appearing or diaphoretic. Comments: Intubated sedated   HENT:      Head: Normocephalic and atraumatic. Nose: Nose normal.   Eyes:      General: No scleral icterus. Conjunctiva/sclera: Conjunctivae normal.      Pupils: Pupils are equal, round, and reactive to light. Cardiovascular:      Rate and Rhythm: Normal rate and regular rhythm. Heart sounds: Normal heart sounds. No murmur heard. No friction rub. No gallop. Pulmonary:      Effort: No respiratory distress. Breath sounds: No stridor. No rhonchi. Abdominal:      Palpations: Abdomen is soft. There is no mass. Hernia: No hernia is present. Genitourinary:     Comments: Urine bong  Musculoskeletal:         General: No swelling, tenderness, deformity or signs of injury. Cervical back: Neck supple. No rigidity or tenderness. Skin:     Coloration: Skin is not jaundiced or pale. Findings: No bruising, erythema or rash. Neurological:      General: No focal deficit present. Mental Status: She is alert and oriented to person, place, and time. Cranial Nerves: No cranial nerve deficit. Sensory: No sensory deficit. Coordination: Coordination normal.   Psychiatric:         Mood and Affect: Mood normal.         Behavior: Behavior normal.          Medical Decision Making -Laboratory:   I have independently reviewed/ordered the following labs:    CBC with Differential:   Recent Labs     10/25/21  0534 10/26/21  0353   WBC 10.6 11.8*   HGB 8.7* 8.6*   HCT 27.5* 28.3*    230     BMP:   Recent Labs     10/25/21  0534 10/26/21  0353   * 135   K 3.5* 3.7    103   CO2 21 21   BUN 5* 6   CREATININE <0.20* 0.23*   MG 1.9 1.9     Hepatic Function Panel:   No results for input(s): PROT, LABALBU, BILIDIR, IBILI, BILITOT, ALKPHOS, ALT, AST in the last 72 hours. No results for input(s): RPR in the last 72 hours. No results for input(s): HIV in the last 72 hours. No results for input(s): BC in the last 72 hours. Lab Results   Component Value Date    MUCUS NOT REPORTED 10/13/2021    RBC 3.05 10/26/2021    TRICHOMONAS NOT REPORTED 10/13/2021    WBC 11.8 10/26/2021    YEAST NOT REPORTED 10/13/2021    TURBIDITY Clear 10/13/2021     Lab Results   Component Value Date    CREATININE 0.23 10/26/2021    GLUCOSE 100 10/26/2021       Medical Decision Making-Imaging:     Narrative   EXAMINATION:   CTA OF THE CHEST 10/13/2021 2:16 pm       TECHNIQUE:   CTA of the chest was performed after the administration of intravenous   contrast.  Multiplanar reformatted images are provided for review.  MIP   images are provided for review. Dose modulation, iterative reconstruction,   and/or weight based adjustment of the mA/kV was utilized to reduce the   radiation dose to as low as reasonably achievable.       COMPARISON:   Chest x-rays over the last few days. .       HISTORY:   ORDERING SYSTEM PROVIDED HISTORY: sob, tachy, 32 WEEKS PREGNANT, Spoke with   Radiologist   TECHNOLOGIST PROVIDED HISTORY:   sob, tachy, 26 WEEKS PREGNANT, Spoke with Radiologist   Decision Support Exception - unselect if not a suspected or confirmed   emergency medical condition->Emergency Medical Condition (MA)       FINDINGS:   Pulmonary Arteries: There is a small nonocclusive segmental to subsegmental   embolism noted in the left lower lobe, on and around axial image [id number].  No   other definite emboli are seen.  No central emboli.  No pulmonary arterial   enlargement is identified.       Mediastinum: Mediastinal and hilar lymphadenopathy is identified.  No focal   esophageal thickening is identified.  The thyroid gland appears unremarkable.       Lungs/pleura: Multifocal ground-glass pneumonia with some mild areas of   consolidation.  No pleural effusion is identified.       Upper Abdomen: No acute process seen in the upper abdomen.       Soft Tissues/Bones: No axillary or supraclavicular lymphadenopathy is   identified.  No acute osseous abnormality is identified.           Impression   Single small nonocclusive segmental to subsegmental pulmonary embolism in the   left lower lobe.  No central emboli.  This was discussed with Alana Tobar   at 2:44 p.m. on 10/13/2021.       Multilobar COVID-19 pneumonia.                 Medical Decision [id number]:       Medical Decision Making-Other:     Note:  Labs, medications, radiologic studies were reviewed with personal review of films  Large amounts of data were reviewed  Discussed with nursing Staff, Discharge planner  Infection Control and Prevention measures reviewed  All prior entries were reviewed  Administer medications as ordered  Prognosis: Guarded  Discharge planning reviewed  Follow up as outpatient. Thank you for allowing us to participate in the care of this patient. Please call with questions.     Aneesh Mueller MD  Office: [phone]

## 2021-10-27 NOTE — PROGRESS NOTES
If patient needs a PICC line for discharge please order early as to not hold up discharge. Continue to monitor.

## 2021-10-28 PROBLEM — Z92.81: Status: ACTIVE | Noted: 2021-10-28

## 2021-10-28 LAB
ALBUMIN SERPL-MCNC: 2.8 G/DL (ref 3.5–5.2)
ALBUMIN/GLOBULIN RATIO: 0.8 (ref 1–2.5)
ALP BLD-CCNC: 76 U/L (ref 35–104)
ALT SERPL-CCNC: 19 U/L (ref 5–33)
ANION GAP SERPL CALCULATED.3IONS-SCNC: 14 MMOL/L (ref 9–17)
AST SERPL-CCNC: 21 U/L
BILIRUB SERPL-MCNC: 1.26 MG/DL (ref 0.3–1.2)
BILIRUBIN DIRECT: 0.33 MG/DL
BILIRUBIN, INDIRECT: 0.93 MG/DL (ref 0–1)
BUN BLDV-MCNC: 8 MG/DL (ref 6–20)
BUN/CREAT BLD: ABNORMAL (ref 9–20)
CALCIUM SERPL-MCNC: 8.5 MG/DL (ref 8.6–10.4)
CHLORIDE BLD-SCNC: 101 MMOL/L (ref 98–107)
CO2: 21 MMOL/L (ref 20–31)
CREAT SERPL-MCNC: 0.3 MG/DL (ref 0.5–0.9)
GFR AFRICAN AMERICAN: >60 ML/MIN
GFR NON-AFRICAN AMERICAN: >60 ML/MIN
GFR SERPL CREATININE-BSD FRML MDRD: ABNORMAL ML/MIN/{1.73_M2}
GFR SERPL CREATININE-BSD FRML MDRD: ABNORMAL ML/MIN/{1.73_M2}
GLOBULIN: ABNORMAL G/DL (ref 1.5–3.8)
GLUCOSE BLD-MCNC: 102 MG/DL (ref 65–105)
GLUCOSE BLD-MCNC: 88 MG/DL (ref 65–105)
GLUCOSE BLD-MCNC: 96 MG/DL (ref 70–99)
HCT VFR BLD CALC: 31 % (ref 36.3–47.1)
HEMOGLOBIN: 9.7 G/DL (ref 11.9–15.1)
MCH RBC QN AUTO: 29 PG (ref 25.2–33.5)
MCHC RBC AUTO-ENTMCNC: 31.3 G/DL (ref 28.4–34.8)
MCV RBC AUTO: 92.8 FL (ref 82.6–102.9)
NRBC AUTOMATED: 0.2 PER 100 WBC
PDW BLD-RTO: 15.1 % (ref 11.8–14.4)
PLATELET # BLD: 287 K/UL (ref 138–453)
PMV BLD AUTO: 9.4 FL (ref 8.1–13.5)
POTASSIUM SERPL-SCNC: 4.1 MMOL/L (ref 3.7–5.3)
RBC # BLD: 3.34 M/UL (ref 3.95–5.11)
SODIUM BLD-SCNC: 136 MMOL/L (ref 135–144)
TOTAL PROTEIN: 6.1 G/DL (ref 6.4–8.3)
WBC # BLD: 12 K/UL (ref 3.5–11.3)

## 2021-10-28 PROCEDURE — 2580000003 HC RX 258: Performed by: INTERNAL MEDICINE

## 2021-10-28 PROCEDURE — 6370000000 HC RX 637 (ALT 250 FOR IP): Performed by: INTERNAL MEDICINE

## 2021-10-28 PROCEDURE — 87040 BLOOD CULTURE FOR BACTERIA: CPT

## 2021-10-28 PROCEDURE — 99233 SBSQ HOSP IP/OBS HIGH 50: CPT | Performed by: INTERNAL MEDICINE

## 2021-10-28 PROCEDURE — 1200000000 HC SEMI PRIVATE

## 2021-10-28 PROCEDURE — 6360000002 HC RX W HCPCS: Performed by: INTERNAL MEDICINE

## 2021-10-28 PROCEDURE — 85027 COMPLETE CBC AUTOMATED: CPT

## 2021-10-28 PROCEDURE — 82947 ASSAY GLUCOSE BLOOD QUANT: CPT

## 2021-10-28 PROCEDURE — 36415 COLL VENOUS BLD VENIPUNCTURE: CPT

## 2021-10-28 PROCEDURE — 80076 HEPATIC FUNCTION PANEL: CPT

## 2021-10-28 PROCEDURE — 6370000000 HC RX 637 (ALT 250 FOR IP): Performed by: STUDENT IN AN ORGANIZED HEALTH CARE EDUCATION/TRAINING PROGRAM

## 2021-10-28 PROCEDURE — 97116 GAIT TRAINING THERAPY: CPT

## 2021-10-28 PROCEDURE — 80048 BASIC METABOLIC PNL TOTAL CA: CPT

## 2021-10-28 PROCEDURE — 2580000003 HC RX 258: Performed by: STUDENT IN AN ORGANIZED HEALTH CARE EDUCATION/TRAINING PROGRAM

## 2021-10-28 RX ADMIN — METOPROLOL TARTRATE 25 MG: 25 TABLET ORAL at 20:41

## 2021-10-28 RX ADMIN — CEFAZOLIN 2000 MG: 10 INJECTION, POWDER, FOR SOLUTION INTRAVENOUS at 14:02

## 2021-10-28 RX ADMIN — ENOXAPARIN SODIUM 105 MG: 120 INJECTION SUBCUTANEOUS at 08:52

## 2021-10-28 RX ADMIN — CEFAZOLIN 2000 MG: 10 INJECTION, POWDER, FOR SOLUTION INTRAVENOUS at 06:39

## 2021-10-28 RX ADMIN — CEFAZOLIN 2000 MG: 10 INJECTION, POWDER, FOR SOLUTION INTRAVENOUS at 21:27

## 2021-10-28 RX ADMIN — Medication 1 TABLET: at 08:51

## 2021-10-28 RX ADMIN — METOPROLOL TARTRATE 25 MG: 25 TABLET ORAL at 14:02

## 2021-10-28 RX ADMIN — METOPROLOL TARTRATE 25 MG: 25 TABLET ORAL at 08:52

## 2021-10-28 RX ADMIN — ENOXAPARIN SODIUM 105 MG: 120 INJECTION SUBCUTANEOUS at 20:41

## 2021-10-28 RX ADMIN — SODIUM CHLORIDE, PRESERVATIVE FREE 10 ML: 5 INJECTION INTRAVENOUS at 08:57

## 2021-10-28 RX ADMIN — DOCUSATE SODIUM 50MG AND SENNOSIDES 8.6MG 2 TABLET: 8.6; 5 TABLET, FILM COATED ORAL at 08:52

## 2021-10-28 RX ADMIN — SODIUM CHLORIDE, PRESERVATIVE FREE 10 ML: 5 INJECTION INTRAVENOUS at 21:28

## 2021-10-28 RX ADMIN — Medication 1 CAPSULE: at 08:52

## 2021-10-28 ASSESSMENT — ENCOUNTER SYMPTOMS
COLOR CHANGE: 0
EYE REDNESS: 0
APNEA: 0
ABDOMINAL DISTENTION: 0
EYE PAIN: 0
PHOTOPHOBIA: 0
EYE DISCHARGE: 0
SHORTNESS OF BREATH: 0

## 2021-10-28 NOTE — LACTATION NOTE
Pt states pumping has been going well, higher volume at times compared to other pump sessions. Reassured pt, encouraged 8-12 times removing milk. Pt smiling and states she is doing what she can and isn't stressed if she pumps a lower volume. Will reach out if she has questions or needs.

## 2021-10-28 NOTE — PLAN OF CARE
Problem: Airway Clearance - Ineffective  Goal: Achieve or maintain patent airway  Outcome: Ongoing     Problem: Gas Exchange - Impaired  Goal: Absence of hypoxia  Outcome: Ongoing  Goal: Promote optimal lung function  Outcome: Ongoing     Problem: Breathing Pattern - Ineffective  Goal: Ability to achieve and maintain a regular respiratory rate  Outcome: Ongoing     Problem:  Body Temperature -  Risk of, Imbalanced  Goal: Ability to maintain a body temperature within defined limits  Outcome: Ongoing  Goal: Will regain or maintain usual level of consciousness  Outcome: Ongoing  Goal: Complications related to the disease process, condition or treatment will be avoided or minimized  Outcome: Ongoing     Problem: Isolation Precautions - Risk of Spread of Infection  Goal: Prevent transmission of infection  Outcome: Ongoing     Problem: Nutrition Deficits  Goal: Optimize nutritional status  Outcome: Ongoing     Problem: Risk for Fluid Volume Deficit  Goal: Maintain normal heart rhythm  Outcome: Ongoing  Goal: Maintain absence of muscle cramping  Outcome: Ongoing  Goal: Maintain normal serum potassium, sodium, calcium, phosphorus, and pH  Outcome: Ongoing     Problem: Loneliness or Risk for Loneliness  Goal: Demonstrate positive use of time alone when socialization is not possible  Outcome: Ongoing     Problem: Fatigue  Goal: Verbalize increase energy and improved vitality  Outcome: Ongoing     Problem: Patient Education: Go to Patient Education Activity  Goal: Patient/Family Education  Outcome: Ongoing     Problem: Nutrition  Goal: Optimal nutrition therapy  Outcome: Ongoing     Problem: Musculor/Skeletal Functional Status  Goal: Highest potential functional level  Outcome: Ongoing  Goal: Absence of falls  Outcome: Ongoing

## 2021-10-28 NOTE — PROGRESS NOTES
[facility] - Initial Consult Note COVID 19 Patient    Admission date 10/13/2021      Impression :   COVID 19 pneumonia  -10/10/21: Positive  resp failure - intubated 10/14, extubated 10/21  Ecmo - decanulated 10/22  26 weeks gestation pregnancy  Baby delivered early 26 weeks  Pulmonary embolism LLL  Metabolic acidosis with euglycemic starvation  10/21 MSSA sepsis / septicemia  Bandemia 10/21  Intubated 10/14 -extubated 10/21  Recommendations:   Post Decadron-Initiated 10/14/21 - stop ad day 9 on 10/22 due to SA sepsis  anticoagulation  DEFERED barcitinib and  Actemra due to pregnancy  10/21-acute leukocytosis, Vanco cefepime started  10/22 BC SA -vanco - stop decadron day 9, pulled the left IJ as well   10/26 BC is MSSA -  ancef   Repeat daily BC till neg  2D echo look for vegetation neg - defer a new GUILLERMO ( was done to start Ecmo)  10/23 diarrhea - probiotics - better  Watch fevers      start on illness 10/1  Stop isolation after 10/20    Medical Decision Making/Summary/Discussion:10/28/2021     Patient admitted with suspected COVID 19 infection  Covid test confirmed positive. Infection Control Recommendations   [place] Precautions  Airborne isolation  Droplet plus Isolation    Antimicrobial Stewardship Recommendations   Off antibiotics    Chief complaint/reason for consultation:   Concern for COVID infection      History of Present Illness:   Karina Aaron is a 27y.o.-year-old  female who was initially admitted on 10/13/2021. Patient seen at the request of Dr. Rahul Nichole:    Patient, who is 26 weeks pregnant, initially presented to [facility] ED on 10/10/21 presented through ER with complaints of productive cough with yellow sputum X 9 days & shortness of breath with pleuritic chest pain that started the day before. She ws also experiencing decreased appetite because of loss of taste and smell.      Work-up at that time revealed the patient to be Covid positive. She was tachycardic with an SpO2 of 94-98% on room air. CXR showed bilateral ill-defined pulmonary opacities suggesting COVID pneumonia. After receiving a small fluid bolus and discussing her case with the patient's OBGYN, Dr. Carolyne Samaniego, she was discharged home with instructions to return if her symptoms worsen. On 10/13/21, the patient again presented to [facility] ED after a home SpO2 reading was 88%. A CT chest revealed a small, non-obstructing LLL PE for which she was initiated on a heparin gtt. The patient's respiratory status worsened throughout the day and she was life-flighted to [facility] for a higher level of care. The patient was found to be tachypneic and tachycardic upon arrival. She was maintaining her SpO2 >95% on 15 L/min non-re-breather. Heparin gtt was continued and the patient was also ordered celestone for fetal lung maturity. The patient was admitted to the Covid ICU    Interval changes  BP (!) 135/92   Pulse 117   Temp 97.8 °F (36.6 °C) (Oral)   Resp 18   Ht 5' 4\" (1.626 m)   Wt 225 lb 12 oz (102.4 kg)   LMP 04/01/2021 (Approximate)   SpO2 98%   Breastfeeding Unknown   BMI 38.75 kg/m²     Had a fever 101 10/26 night but none today  In good spirits went to see the baby is coming back, not short of breath on nasal cannula  No IV phlebitis and no spinal pain no increasing abdominal pain, no true site of persistent infection  Repeat blood cultures are all positive through the 25th, repeat blood culture 10/27 pending still  Long discussion with patient and her     [facility] 10/20 SA x 2, seems to be MRSA  ECMO line out today 10/22      She had a GUILLERMO before they placed the ECMO on 10/13 and hence will defer another GUILLERMO. The 2D echo so far is not showing endocarditis        Echo: 10/17  Normal left ventricle size, wall thickness and function with a calculated EF   61%. No segmental wall motion abnormalities seen.    Normal right ventricular size and function. No significant valvular regurgitation or stenosis seen. Labs:  W 31 - 27 - 10  Creat 0.23    Micro:  BC MRSA 10/21  BC neg 10/23  BC neg 10/24    imaging  10/24  Bilateral airspace disease appears more prominent in the interval       10/20 cxr          CAT:  10/13/21    . I have personally reviewed the past medical history, past surgical history, medications, social history, and family history, and I have updated the database accordingly.   Past Medical History:     Past Medical History:   Diagnosis Date    Anxiety     GERD (gastroesophageal reflux disease)     Migraines     Seasonal allergies        Past Surgical  History:     Past Surgical History:   Procedure Laterality Date     SECTION N/A 10/15/2021     SECTION performed by [place]ia Baumann MD at [address] [place]      nerve release, and plantar fascitis    HC  PICC POWERPIC TRIPLE  10/15/2021         MANDIBLE SURGERY      tooth implant    MOUTH SURGERY      WISDOM TOOTH EXTRACTION         Medications:      ceFAZolin  2,000 mg IntraVENous Q8H    metoprolol tartrate  25 mg Oral TID    enoxaparin  1 mg/kg SubCUTAneous BID    lactobacillus  1 capsule Oral Daily with breakfast    prenatal vitamin  1 tablet Oral Daily    Tdap-Dtap  0.5 mL IntraMUSCular Prior to discharge    lidocaine 1 % injection  5 mL IntraDERmal Once    sodium chloride flush  5-40 mL IntraVENous 2 times per day    docusate  100 mg Oral BID    sennosides-docusate sodium  2 tablet Oral Daily    insulin lispro  0-12 Units SubCUTAneous Q6H       Social History:     Social History     Socioeconomic History    Marital status:      Spouse name: Not on file    Number of children: Not on file    Years of education: Not on file    Highest education level: Not on file   Occupational History    Not on file   Tobacco Use    Smoking status: Former Smoker     Types: Cigarettes    Smokeless tobacco: Never Used   Vaping Use    Vaping Negative for chest pain. Gastrointestinal: Negative for abdominal distention. Endocrine: Negative for heat intolerance, polydipsia and polyphagia. Genitourinary: Negative for dysuria and flank pain. Musculoskeletal: Negative for arthralgias. Skin: Negative for color change. Allergic/Immunologic: Positive for immunocompromised state. Neurological: Negative for dizziness, seizures, light-headedness and headaches. Hematological: Negative for adenopathy. Psychiatric/Behavioral: Negative for agitation. Physical Examination :     Patient Vitals for the past 8 hrs:   BP Temp Temp src Pulse Resp SpO2   10/27/21 2208 (!) 135/92 97.8 °F (36.6 °C) Oral 117 18 98 %   10/27/21 1935 (!) 130/110 -- -- 118 -- --     Physical Exam  Constitutional:       General: She is not in acute distress. Appearance: Normal appearance. She is not ill-appearing or diaphoretic. Comments: Intubated sedated   HENT:      Head: Normocephalic and atraumatic. Nose: Nose normal.   Eyes:      General: No scleral icterus. Conjunctiva/sclera: Conjunctivae normal.      Pupils: Pupils are equal, round, and reactive to light. Cardiovascular:      Rate and Rhythm: Normal rate and regular rhythm. Heart sounds: Normal heart sounds. No murmur heard. No friction rub. No gallop. Pulmonary:      Effort: No respiratory distress. Breath sounds: No stridor. No rhonchi. Abdominal:      Palpations: Abdomen is soft. There is no mass. Hernia: No hernia is present. Genitourinary:     Comments: Urine bong  Musculoskeletal:         General: No swelling, tenderness, deformity or signs of injury. Cervical back: Neck supple. No rigidity or tenderness. Skin:     Coloration: Skin is not jaundiced or pale. Findings: No bruising, erythema or rash. Neurological:      General: No focal deficit present. Mental Status: She is alert and oriented to person, place, and time. Cranial Nerves:  No cranial nerve deficit. Sensory: No sensory deficit. Coordination: Coordination normal.   Psychiatric:         Mood and Affect: Mood normal.         Behavior: Behavior normal.          Medical Decision Making -Laboratory:   I have independently reviewed/ordered the following labs:    CBC with Differential:   Recent Labs     10/26/21  0353 10/27/21  0736   WBC 11.8* 13.6*   HGB 8.6* 9.4*   HCT 28.3* 30.9*    266     BMP:   Recent Labs     10/25/21  0534 10/25/21  0534 10/26/21  0353 10/27/21  0736   *   < > 135 135   K 3.5*   < > 3.7 4.2      < > 103 101   CO2 21   < > 21 21   BUN 5*   < > 6 8   CREATININE <0.20*   < > 0.23* 0.26*   MG 1.9  --  1.9  --     < > = values in this interval not displayed. Hepatic Function Panel:   Recent Labs     10/27/21  0736   PROT 5.9*   LABALBU 2.8*   BILIDIR 0.50*   IBILI 1.08*   BILITOT 1.58*   ALKPHOS 83   ALT 28   AST 23     No results for input(s): RPR in the last 72 hours. No results for input(s): HIV in the last 72 hours. No results for input(s): BC in the last 72 hours. Lab Results   Component Value Date    MUCUS NOT REPORTED 10/13/2021    RBC 3.30 10/27/2021    TRICHOMONAS NOT REPORTED 10/13/2021    WBC 13.6 10/27/2021    YEAST NOT REPORTED 10/13/2021    TURBIDITY Clear 10/13/2021     Lab Results   Component Value Date    CREATININE 0.26 10/27/2021    GLUCOSE 81 10/27/2021       Medical Decision Making-Imaging:     Narrative   EXAMINATION:   CTA OF THE CHEST 10/13/2021 2:16 pm       TECHNIQUE:   CTA of the chest was performed after the administration of intravenous   contrast.  Multiplanar reformatted images are provided for review.  MIP   images are provided for review. Dose modulation, iterative reconstruction,   and/or weight based adjustment of the mA/kV was utilized to reduce the   radiation dose to as low as reasonably achievable.       COMPARISON:   Chest x-rays over the last few days. .       HISTORY:   ORDERING SYSTEM PROVIDED HISTORY: sob, tachy, 26 WEEKS PREGNANT, Spoke with   Radiologist   TECHNOLOGIST PROVIDED HISTORY:   sob, tachy, 32 WEEKS PREGNANT, Spoke with Radiologist   Decision Support Exception - unselect if not a suspected or confirmed   emergency medical condition->Emergency Medical Condition (MA)       FINDINGS:   Pulmonary Arteries: There is a small nonocclusive segmental to subsegmental   embolism noted in the left lower lobe, on and around axial image [id number].  No   other definite emboli are seen.  No central emboli.  No pulmonary arterial   enlargement is identified.       Mediastinum: Mediastinal and hilar lymphadenopathy is identified.  No focal   esophageal thickening is identified.  The thyroid gland appears unremarkable.       Lungs/pleura: Multifocal ground-glass pneumonia with some mild areas of   consolidation.  No pleural effusion is identified.       Upper Abdomen: No acute process seen in the upper abdomen.       Soft Tissues/Bones: No axillary or supraclavicular lymphadenopathy is   identified.  No acute osseous abnormality is identified.           Impression   Single small nonocclusive segmental to subsegmental pulmonary embolism in the   left lower lobe.  No central emboli.  This was discussed with Henrry Baum   at 2:44 p.m. on 10/13/2021.       Multilobar COVID-19 pneumonia.                 Medical Decision Eiehxc-Kishcnpt-Gpzlu:       Medical Decision Making-Other:     Note:  Labs, medications, radiologic studies were reviewed with personal review of films  Large amounts of data were reviewed  Discussed with nursing Staff, Discharge planner  Infection Control and Prevention measures reviewed  All prior entries were reviewed  Administer medications as ordered  Prognosis: Guarded  Discharge planning reviewed  Follow up as outpatient. Thank you for allowing us to participate in the care of this patient. Please call with questions.     Ashley Collazo MD  Office: [phone]

## 2021-10-28 NOTE — PROGRESS NOTES
INTERNAL MEDICINE                                                                             ICU PATIENT TRANSFER NOTE        Patient:  Landen Desai  YOB: 1990  MRN: [id number]     Acct: [de-identified]     Admit date: 10/13/2021    Code Status:-  Full code     Reason for ICU Admission:-worsening respiratory failure secondary to  covid 19 infection and pulmonary embolism       SUPPORT DEVICES: [] Ventilator [] BIPAP  [] Nasal Cannula [x] Room Air    Consultations:- [x] Cardiology [] Nephrology  [] Hemo onco  [] GI                               [x] ID [] ENT  [] Rheum [] Endo   [x]Physiotherapy                                 Others:-  Anaesthesia, OBGYN    NUTRITION:  [] NPO [] Tube Feeding (Specify: ) [] TPN  [x] PO    Central Lines:- [] No   [x] Yes           Date of Insertion- 10/15/21      Pt seen,examined and Chart reviewed. ICU COURSE :-   A 27 y.o. female who was 26-week pregnant initially tested positive for COVID-19 on 10/10. She presented to [place] ED on 10/13 with respiratory distress and low home O2 saturation. She was tachypneic and tachycardic. Transferred to [facility] for further management. Initial blood gases showed evidence of combined anion gap and non-anion gap metabolic acidosis, thought to be related to starvation ketosis. During evaluation patient was also noted to have a left lower lobe subsubsegmental PE. She underwent  10/15. Postoperative course complicated by development of worsening respiratory failure and required initiation of mechanical ventilation on 10/15. Extubated on 10/21  Due to worsening severe hypoxemic respiratory failure decision was made to put her on VV ECMO. She had placement of 27 F VV ECMO (crescent) cannula in the right atrium on 10/17. She came out of isolation on 10/20.    Ecmo decannulated on 10/22. Patient remained off VV ECMO, She denies any pain or anxiety  She had nausea with vomiting mucoid material, which later improved. CT chest ordered to rule out PE on 10/22/2021 did not show any pulmonary rhythm. Completed Decadron  She is started on ancef since 10/26 as the blood cultures came back positive for MSSA    Physical Exam:   Vitals: BP (!) 135/92   Pulse 117   Temp 97.8 °F (36.6 °C) (Oral)   Resp 18   Ht 5' 4\" (1.626 m)   Wt 225 lb 12 oz (102.4 kg)   LMP 04/01/2021 (Approximate)   SpO2 98%   Breastfeeding Unknown   BMI 38.75 kg/m²   24 hour intake/output:No intake or output data in the 24 hours ending 10/28/21 0117  Last 3 weights: Wt Readings from Last 3 Encounters:   10/26/21 225 lb 12 oz (102.4 kg)   10/13/21 225 lb (102.1 kg)   10/05/21 225 lb 12.8 oz (102.4 kg)     · General appearance: awake, alert, cooperative  · HEENT: Head: Normocephalic, no lesions, without obvious abnormality. · Lungs: clear to auscultation bilaterally  · Heart: regular rate and rhythm, S1, S2 normal, no murmur  · Abdomen: soft, non-tender; bowel sounds normal; no masses,  no organomegaly  · Extremities: extremities normal, atraumatic, no cyanosis or edema  · Neurological:  Awake, alert, oriented to name, place and time. Cranial nerves II-XII are grossly intact. Reflexes normal and symmetric.  Sensation grossly normal    Medications:Current Inpatient  Scheduled Meds:   ceFAZolin  2,000 mg IntraVENous Q8H    metoprolol tartrate  25 mg Oral TID    enoxaparin  1 mg/kg SubCUTAneous BID    lactobacillus  1 capsule Oral Daily with breakfast    prenatal vitamin  1 tablet Oral Daily    Tdap-Dtap  0.5 mL IntraMUSCular Prior to discharge    lidocaine 1 % injection  5 mL IntraDERmal Once    sodium chloride flush  5-40 mL IntraVENous 2 times per day    docusate  100 mg Oral BID    sennosides-docusate sodium  2 tablet Oral Daily    insulin lispro  0-12 Units SubCUTAneous Q6H     Continuous Infusions:   sodium chloride      sodium chloride      dextrose      sodium chloride 5 mL/hr at 10/14/21 0700     PRN Meds:potassium chloride **OR** potassium alternative oral replacement **OR** potassium chloride, metoclopramide, promethazine, metoprolol, sodium chloride, oxyCODONE-acetaminophen, magnesium sulfate, potassium chloride, albumin human **OR** albumin human, heparin (porcine), heparin (porcine), oxyCODONE-acetaminophen, sodium chloride flush, ondansetron, diphenhydrAMINE, simethicone, polyethylene glycol, magnesium hydroxide, bisacodyl, lanolin, LORazepam, sodium chloride flush, sodium chloride, glucose, dextrose, glucagon (rDNA), dextrose, ondansetron **OR** ondansetron, acetaminophen    Objective:    CBC:   Recent Labs     10/25/21  0534 10/26/21  0353 10/27/21  0736   WBC 10.6 11.8* 13.6*   HGB 8.7* 8.6* 9.4*    230 266     BMP:    Recent Labs     10/25/21  0534 10/26/21  0353 10/27/21  0736   * 135 135   K 3.5* 3.7 4.2    103 101   CO2 21 21 21   BUN 5* 6 8   CREATININE <0.20* 0.23* 0.26*   GLUCOSE 105* 100* 81     Calcium:  Recent Labs     10/27/21  0736   CALCIUM 8.4*     Ionized Calcium:No results for input(s): IONCA in the last 72 hours. Magnesium:  Recent Labs     10/26/21  0353   MG 1.9     Phosphorus:No results for input(s): PHOS in the last 72 hours. BNP:No results for input(s): BNP in the last 72 hours. Glucose:  Recent Labs     10/25/21  2105 10/26/21  0232 10/27/21  1501   POCGLU 98 98 125*     HgbA1C: No results for input(s): LABA1C in the last 72 hours. INR:   Recent Labs     10/26/21  0353   INR 1.1     Hepatic:   Recent Labs     10/27/21  0736   ALKPHOS 83   ALT 28   AST 23   PROT 5.9*   BILITOT 1.58*   BILIDIR 0.50*   LABALBU 2.8*     Amylase and Lipase:No results for input(s): LACTA, AMYLASE in the last 72 hours. Lactic Acid: No results for input(s): LACTA in the last 72 hours.   CARDIAC ENZYMES:No results for input(s): CKTOTAL, CKMB, CKMBINDEX, TROPONINI in the last 72 hours. BNP: No results for input(s): BNP in the last 72 hours. Lipids: No results for input(s): CHOL, TRIG, HDL, LDLCALC in the last 72 hours. Invalid input(s): LDL  ABGs: No results found for: PH, PCO2, PO2, HCO3, O2SAT  Thyroid:   Lab Results   Component Value Date    TSH 1.51 10/21/2021        Urinalysis: No results for input(s): BACTERIA, BLOODU, CLARITYU, COLORU, PHUR, PROTEINU, RBCUA, SPECGRAV, BILIRUBINUR, NITRU, WBCUA, LEUKOCYTESUR, GLUCOSEU in the last 72 hours. CULTURES:   blood cultures came back positive for MSSA    Assessment:  Principal Problem:    Acute respiratory distress syndrome (ARDS) due to severe acute respiratory syndrome coronavirus 2 (SARS-CoV-2) ([facility])  Active Problems:    Pneumonia due to COVID-19 virus    26 weeks gestation of pregnancy    Tachycardia    Tachypnea    Anxiety    Pulmonary embolism ([facility])    Marginal insertion of umbilical cord affecting management of mother    Starvation ketoacidosis    PCCS 10/15/21 M Apg 1/1/1 Wt 2#3    Immunosuppressed status ([facility] [address]    MRSA (methicillin resistant Staphylococcus aureus) septicemia ([facility] [address]    Bandemia  Resolved Problems:    Severe malnutrition ([facility] [address]        Plan:  Acute hypoxic respiratory failure-on NC  ARDS s/p VV ECMO and mechanical ventilation  Bilateral multifocal pneumonia due to COVID-19 infection-completed Decadron  Continue supplemental oxygen to keep SPO2> 92%  Incentive spirometry encouraged  Continue pulmonary toilet, aspiration precautions  Monitor I's/O with a goal of even fluid balance  Advance diet as tolerated  Sepsis due to COVID-19  MSSA septicemia-on ancef as per ID  Tachycardia-continue Lopressor 25 mg 3 times daily  Left lower lobe subsegmental pulmonary embolism-monitor  DVT PPx-on therapeutic Lovenox  GI PPx-Will start on Pepcid  OT/PT/SW-we will consult  Discharge planning- to assist in discharge planning.

## 2021-10-28 NOTE — CARE COORDINATION
Transitional Planning    Spoke to patient about plan for discharge. She relates that the dr told her that if blood culture negative tomorrow she will be discharged. She will need a PICC line and go home with IV antibiotic. She lives in [place]. She or her  is teachable for IV antibiotics. Called [facility] at [place] and told her anticipate discharge tomorrow or Saturday. She will check if they have a nurse that would be able to teach patient about antibiotic at home. Otherwise may need to pick a home care agency. Home care agency list given to patient. [phone] to patient and patient's  by telephone about plan for discharge with IV antibiotics. Questions answered. Ok to send referral to [facility]. [phone] called olegario, she still has not heard if they have staff to see patient in the home. She asked if she could call the patient. CM asked patient. She prefers that her  be called. Related this to [employer] [facility]. She will call . [facility] [facility] has a website that the patient/ can go to to see how IV antibiotics are administered. [facility] from [facility] called and said patient does not have to be homebound for insurance to cover home care if she is receiving IV antibiotics. [facility] should be able to accept. [phone] Updated patient that [facility] can accept her and insurance will cover home care for IV antibiotics. Patient relates she has been on oxygen at night and asked if she would need it when she goes home. CARLOS spoke to patient's RN and told her patient would need a nocturnal oxygen eval to qualify for oxygen at night for insurance to cover it. RN relates primary service is aware that patient has been using oxygen at night.

## 2021-10-28 NOTE — PLAN OF CARE
Problem: Airway Clearance - Ineffective  Goal: Achieve or maintain patent airway  10/28/2021 0333 by John Escobar RN  Outcome: Ongoing  10/27/2021 1726 by Vesta Primrose, RN  Outcome: Ongoing     Problem: Gas Exchange - Impaired  Goal: Absence of hypoxia  10/28/2021 0333 by John Escobar RN  Outcome: Ongoing  10/27/2021 1726 by Vesta Primrose, RN  Outcome: Ongoing  Goal: Promote optimal lung function  10/28/2021 0333 by John Escboar RN  Outcome: Ongoing  10/27/2021 1726 by Vesta Primrose, RN  Outcome: Ongoing     Problem: Breathing Pattern - Ineffective  Goal: Ability to achieve and maintain a regular respiratory rate  10/28/2021 0333 by John Escobar RN  Outcome: Ongoing  10/27/2021 1726 by Vesta Primrose, RN  Outcome: Ongoing     Problem:  Body Temperature -  Risk of, Imbalanced  Goal: Ability to maintain a body temperature within defined limits  10/28/2021 0333 by John Escobar RN  Outcome: Ongoing  10/27/2021 1726 by Vesta Primrose, RN  Outcome: Ongoing  Goal: Will regain or maintain usual level of consciousness  10/28/2021 0333 by John Escobar RN  Outcome: Ongoing  10/27/2021 1726 by Vesta Primrose, RN  Outcome: Met This Shift  Goal: Complications related to the disease process, condition or treatment will be avoided or minimized  Outcome: Ongoing     Problem: Isolation Precautions - Risk of Spread of Infection  Goal: Prevent transmission of infection  10/28/2021 0333 by John Escobar RN  Outcome: Ongoing  10/27/2021 1726 by Vesta Primrose, RN  Outcome: Ongoing     Problem: Nutrition Deficits  Goal: Optimize nutritional status  10/28/2021 0333 by John Escobar RN  Outcome: Ongoing  10/27/2021 1726 by Vesta Primrose, RN  Outcome: Ongoing     Problem: Risk for Fluid Volume Deficit  Goal: Maintain normal heart rhythm  10/28/2021 0333 by John Escobar RN  Outcome: Ongoing  10/27/2021 1726 by Vesta Primrose, RN  Outcome: Ongoing  Goal: Maintain absence of muscle cramping  Outcome: Ongoing  Goal: Maintain normal serum potassium, sodium, calcium, phosphorus, and pH  Outcome: Ongoing     Problem: Loneliness or Risk for Loneliness  Goal: Demonstrate positive use of time alone when socialization is not possible  Outcome: Ongoing     Problem: Fatigue  Goal: Verbalize increase energy and improved vitality  10/28/2021 0333 by Natalia Harris RN  Outcome: Ongoing  10/27/2021 1726 by Yesica Enrique RN  Outcome: Ongoing     Problem: Patient Education: Go to Patient Education Activity  Goal: Patient/Family Education  10/28/2021 0333 by Natalia Harris RN  Outcome: Ongoing  10/27/2021 1726 by Yesica Enrique RN  Outcome: Ongoing     Problem: Nutrition  Goal: Optimal nutrition therapy  10/28/2021 0333 by Natalia Harris RN  Outcome: Ongoing  10/27/2021 1726 by Yesica Enrique RN  Outcome: Ongoing     Problem: Skin Integrity:  Goal: Will show no infection signs and symptoms  Description: Will show no infection signs and symptoms  10/28/2021 0333 by Natalia Harris RN  Outcome: Ongoing  10/27/2021 1726 by Yesica Enrique RN  Outcome: Ongoing  Goal: Absence of new skin breakdown  Description: Absence of new skin breakdown  10/28/2021 0333 by Natalia Harris RN  Outcome: Ongoing  10/27/2021 1726 by Yesica Enrique RN  Outcome: Ongoing     Problem: OXYGENATION/RESPIRATORY FUNCTION  Goal: Patient will maintain patent airway  10/28/2021 0333 by Natalia Harris RN  Outcome: Ongoing  10/27/2021 1726 by Yesica Enrique RN  Outcome: Ongoing  Goal: Patient will achieve/maintain normal respiratory rate/effort  Description: Respiratory rate and effort will be within normal limits for the patient  10/28/2021 0333 by Natalia Harris RN  Outcome: Ongoing  10/27/2021 1726 by Yesica Enrique RN  Outcome: Ongoing     Problem: Anxiety:  Goal: Level of anxiety will decrease  Description: Level of anxiety will decrease  10/28/2021 0333 by Natalia Harris RN  Outcome: Ongoing  10/27/2021 1726 by Yesica Enrique RN  Outcome: Ongoing     Problem: Musculor/Skeletal Functional Status  Goal: Highest potential functional level  Outcome: Ongoing  Goal: Absence of falls  10/28/2021 0333 by Alejo Phillips RN  Outcome: Ongoing  10/27/2021 1726 by Nas Ochoa RN  Outcome: Met This Shift

## 2021-10-28 NOTE — PROGRESS NOTES
Per , Jose Rafael Couch. Primary needs to be aware that patient uses O2 at night and will need a nocturnal home O2 eval.    Primary team made aware.

## 2021-10-28 NOTE — PROGRESS NOTES
Currently in Pain: Denies       Orientation  Orientation  Overall Orientation Status: Within Normal Limits  Cognition      Objective      Transfers  Sit to Stand: Stand by assistance  Stand to sit: Stand by assistance  Ambulation  Ambulation?: Yes  Ambulation 1  Surface: level tile  Device: No Device  Assistance: Stand by assistance  Quality of Gait: decreased calli, lateral sway, slow cautious gait, no LOB  Gait Deviations: Slow Calli; Increased SALLY; Decreased step length;Decreased step height;Decreased arm swing;Decreased head and trunk rotation;Deviated path  Distance: 250 ft, 150 ft  Comments: seated rest break inbtwn due to fatigue, SOB.  O2 after amb 94% on room air  Stairs/Curb  Stairs?: Yes  Stairs  # Steps : 5  Stairs Height: 6\"  Rails: Bilateral  Device: No Device  Assistance: Stand by assistance     Balance  Posture: Good  Sitting - Static: Good  Sitting - Dynamic: Good  Standing - Static: Good;-  Standing - Dynamic: Fair;+  Comments: assessed without device            Goals  Short term goals  Time Frame for Short term goals: 12 visits  Short term goal 1: independent bed mobility, HOB flat, no use of bed rails  Short term goal 2: independent transfers  Short term goal 3: independent gait without device x 200'  Short term goal 4: stair ambulation x 3 steps without HR, CG+1  Short term goal 5: improve strength B hips/shoulders to 4-/5 throughout  Patient Goals   Patient goals : go home, be able to get around independently    Plan    Plan  Times per week: 5-6 visits weekly  Times per day: Daily  Specific instructions for Next Treatment: Pt requests to continue practicing stir negotiation  Current Treatment Recommendations: Strengthening, ROM, Balance Training, Functional Mobility Training, Transfer Training, Endurance Training, Gait Training, Stair training, Home Exercise Program, Safety Education & Training, Patient/Caregiver Education & Training, Equipment Evaluation, Education, & procurement, Positioning  Safety Devices  Type of devices: Call light within reach, Gait belt, Patient at risk for falls, Left in chair, Nurse notified  Restraints  Initially in place: No     Therapy Time   Individual Concurrent Group Co-treatment   Time In 1031         Time Out 1058         Minutes 27         Timed Code Treatment Minutes: C/Aries Rudolph, PTA

## 2021-10-28 NOTE — PROGRESS NOTES
[facility]  Internal Medicine Teaching Residency Program  Inpatient Daily Progress Note  ______________________________________________________________________________    Patient: Corey Rachel  YOB: 1990   QWP:[id number]    Acct: [de-identified]     Room: [address] [place] [address]  Admit date: 10/13/2021  Today's date: 10/28/21  Number of days in the hospital: 15    SUBJECTIVE   Admitting Diagnosis: Acute respiratory distress syndrome (ARDS) due to severe acute respiratory syndrome coronavirus 2 (SARS-CoV-2) ([facility])  CC: Shortness of breath  Pt seen & examined at bedside. chart & results reviewed. Hemodynamically stable, saturating well on room air. No acute events overnight. Patient denies shortness of breath or fever spikes. On Ancef as per ID for MSSA. ROS:  Constitutional:  negative for chills, fevers, sweats  Respiratory:  negative for cough, dyspnea on exertion, hemoptysis, shortness of breath, wheezing  Cardiovascular:  negative for chest pain, chest pressure/discomfort, lower extremity edema, palpitations  Gastrointestinal:  negative for abdominal pain, constipation, diarrhea, nausea, vomiting  Neurological:  negative for dizziness, headache  BRIEF HISTORY   A 31 y. o. female who was 26-week pregnant initially tested positive for COVID-19 on 10/10.    She presented to [place] ED on 10/13 with respiratory distress and low home O2 saturation.  She was tachypneic and tachycardic.    Transferred to [facility] for further management.  Initial blood gases showed evidence of combined anion gap and non-anion gap metabolic acidosis, thought to be related to starvation ketosis.  During evaluation patient was also noted to have a left lower lobe subsubsegmental PE.  She underwent  10/15.    Postoperative course complicated by development of worsening respiratory failure and required initiation of mechanical ventilation on 10/15.  Extubated on 10/21  Due to worsening severe hypoxemic respiratory failure decision was made to put her on VV ECMO.    She had placement of 30 F VV ECMO (crescent) cannula in the right atrium on 10/17. She came out of isolation on 10/20. Ecmo decannulated on 10/22. Patient remained off VV ECMO, She denies any pain or anxiety  She had nausea with vomiting mucoid material, which later improved. CT chest ordered to rule out PE on 10/22/2021 did not show any pulmonary rhythm. Completed Decadron  She is started on ancef since 10/26 as the blood cultures came back positive for MSSA    OBJECTIVE     Vital Signs:  /81   Pulse 109   Temp 99.7 °F (37.6 °C) (Oral)   Resp 17   Ht 5' 4\" (1.626 m)   Wt 225 lb 12 oz (102.4 kg)   LMP 2021 (Approximate)   SpO2 91%   Breastfeeding Unknown   BMI 38.75 kg/m²     Temp (24hrs), Av.7 °F (37.1 °C), Min:97.8 °F (36.6 °C), Max:99.7 °F (37.6 °C)    No intake/output data recorded. Physical Exam:  Constitutional: This is a well developed, well nourished, 35-39.9 - Obesity Grade II 27y.o. year old female who is alert, oriented, cooperative and in no apparent distress. Head:normocephalic and atraumatic. EENT:  PERRLA. No conjunctival injections. Septum was midline, mucosa was without erythema, exudates or cobblestoning. No thrush was noted. Neck: Supple without thyromegaly. No elevated JVP. Trachea was midline. Respiratory: Chest was symmetrical without dullness to percussion. Breath sounds bilaterally were clear to auscultation. There were no wheezes, rhonchi or rales. There is no intercostal retraction or use of accessory muscles. No egophony noted. Cardiovascular: Regular without murmur, clicks, gallops or rubs. Abdomen: Slightly rounded and soft without organomegaly. No rebound, rigidity or guarding was appreciated. Lymphatic: No lymphadenopathy. Musculoskeletal: Normal curvature of the spine. No gross muscle weakness.     Extremities:  No lower extremity edema, ulcerations, tenderness, varicosities or erythema. Muscle size, tone and strength are normal.  No involuntary movements are noted. Skin:  Warm and dry. Good color, turgor and pigmentation. No lesions or scars.   No cyanosis or clubbing  Neurological/Psychiatric: The patient's general behavior, level of consciousness, thought content and emotional status is normal.        Medications:  Scheduled Medications:    ceFAZolin  2,000 mg IntraVENous Q8H    metoprolol tartrate  25 mg Oral TID    enoxaparin  1 mg/kg SubCUTAneous BID    lactobacillus  1 capsule Oral Daily with breakfast    prenatal vitamin  1 tablet Oral Daily    Tdap-Dtap  0.5 mL IntraMUSCular Prior to discharge    lidocaine 1 % injection  5 mL IntraDERmal Once    sodium chloride flush  5-40 mL IntraVENous 2 times per day    docusate  100 mg Oral BID    sennosides-docusate sodium  2 tablet Oral Daily    insulin lispro  0-12 Units SubCUTAneous Q6H     Continuous Infusions:    sodium chloride      sodium chloride      dextrose      sodium chloride 5 mL/hr at 10/14/21 0700     PRN Medicationspotassium chloride, 40 mEq, PRN   Or  potassium alternative oral replacement, 25 mEq, PRN   Or  potassium chloride, 10 mEq, PRN  metoclopramide, 5 mg, Q6H PRN  promethazine, 6.25 mg, Q6H PRN  metoprolol, 5 mg, Q6H PRN  sodium chloride, , PRN  oxyCODONE-acetaminophen, 2 tablet, Q4H PRN  magnesium sulfate, 1,000 mg, PRN  potassium chloride, 20 mEq, PRN  albumin human, 25 g, PRN   Or  albumin human, 25 g, PRN  oxyCODONE-acetaminophen, 1 tablet, Q4H PRN  sodium chloride flush, 10 mL, PRN  ondansetron, 4 mg, Q6H PRN  diphenhydrAMINE, 25 mg, Q6H PRN  simethicone, 80 mg, Q6H PRN  polyethylene glycol, 17 g, Daily PRN  magnesium hydroxide, 30 mL, Daily PRN  bisacodyl, 10 mg, Daily PRN  lanolin, , Q1H PRN  LORazepam, 2 mg, Q5 Min PRN  sodium chloride flush, 5-40 mL, PRN  sodium chloride, 25 mL, PRN  glucose, 15 g, PRN  dextrose, 12.5 g, PRN  glucagon (rDNA), 1 mg, PRN  dextrose, 100 mL/hr, PRN  ondansetron, 4 mg, Q8H PRN   Or  ondansetron, 4 mg, Q6H PRN  acetaminophen, 650 mg, Q4H PRN        Diagnostic Labs:  CBC:   Recent Labs     10/26/21  0353 10/27/21  0736 10/28/21  0736   WBC 11.8* 13.6* 12.0*   RBC 3.05* 3.30* 3.34*   HGB 8.6* 9.4* 9.7*   HCT 28.3* 30.9* 31.0*   MCV 92.8 93.6 92.8   RDW 14.8* 14.9* 15.1*    266 287     BMP:   Recent Labs     10/26/21  0353 10/27/21  0736 10/28/21  0736    135 136   K 3.7 4.2 4.1    101 101   CO2 21 21 21   BUN 6 8 8   CREATININE 0.23* 0.26* 0.30*     BNP: No results for input(s): BNP in the last 72 hours. PT/INR:   Recent Labs     10/26/21  0353   PROTIME 11.5   INR 1.1     APTT: No results for input(s): APTT in the last 72 hours. CARDIAC ENZYMES: No results for input(s): CKMB, CKMBINDEX, TROPONINI in the last 72 hours. Invalid input(s): CKTOTAL;3  FASTING LIPID PANEL:  Lab Results   Component Value Date    TRIG 422 (H) 10/17/2021     LIVER PROFILE:   Recent Labs     10/27/21  0736 10/28/21  0736   AST 23 21   ALT 28 19   BILIDIR 0.50* 0.33*   BILITOT 1.58* 1.26*   ALKPHOS 83 76      MICROBIOLOGY:   Lab Results   Component Value Date/Time    CULTURE NO GROWTH 5 HOURS 10/28/2021 07:33 AM       Imaging:    XR ABDOMEN (KUB) (SINGLE AP VIEW)    Result Date: 10/23/2021  1. Nonspecific, nonobstructive bowel gas pattern, without evidence of free air 2. Bibasilar pulmonary opacities, suggesting atelectasis and or pneumonia     XR CHEST PORTABLE    Result Date: 10/26/2021  No significant interval change in bilateral airspace disease. XR CHEST PORTABLE    Result Date: 10/25/2021  Bilateral airspace disease appears more prominent in the interval.     XR CHEST PORTABLE    Result Date: 10/24/2021  Improved pulmonary edema and bibasilar opacities likely representing atelectasis. XR CHEST PORTABLE    Result Date: 10/23/2021  Poor expiratory afford. Left subclavian central line remains in place.  Resolving diffuse Pepcid  OT/PT/SW-consulted  Discharge planning- to assist in discharge planning. Jennifer Marr  Internal Medicine Resident, PGY-1  [facility];  [place], [address] [place] [address] Drive  10/28/2021, 7:20 AM

## 2021-10-28 NOTE — PROGRESS NOTES
[facility]   Progress Note                   Date:   10/28/2021  Patient name: Tyler Hendrickson  Date of admission:  10/13/2021  8:19 PM  MRN:   [id number]  YOB: 1990  PCP: Odilia Wilder    Reason for Admission: covid 19     Subjective:   Patient is seen and examined in room. Labs, vitals, & tele reviewed. At rest HR 90's, up into 130's at times with activity. Denies chest pain. Medications:   Scheduled Meds:   ceFAZolin  2,000 mg IntraVENous Q8H    metoprolol tartrate  25 mg Oral TID    enoxaparin  1 mg/kg SubCUTAneous BID    lactobacillus  1 capsule Oral Daily with breakfast    prenatal vitamin  1 tablet Oral Daily    Tdap-Dtap  0.5 mL IntraMUSCular Prior to discharge    lidocaine 1 % injection  5 mL IntraDERmal Once    sodium chloride flush  5-40 mL IntraVENous 2 times per day    docusate  100 mg Oral BID    sennosides-docusate sodium  2 tablet Oral Daily    insulin lispro  0-12 Units SubCUTAneous Q6H     Continuous Infusions:   sodium chloride      sodium chloride      dextrose      sodium chloride 5 mL/hr at 10/14/21 0700     CBC:   Recent Labs     10/26/21  0353 10/27/21  0736 10/28/21  0736   WBC 11.8* 13.6* 12.0*   HGB 8.6* 9.4* 9.7*    266 287     BMP:    Recent Labs     10/26/21  0353 10/27/21  0736 10/28/21  0736    135 136   K 3.7 4.2 4.1    101 101   CO2 21 21 21   BUN 6 8 8   CREATININE 0.23* 0.26* 0.30*   GLUCOSE 100* 81 96     Hepatic:   Recent Labs     10/27/21  0736 10/28/21  0736   AST 23 21   ALT 28 19   BILITOT 1.58* 1.26*   ALKPHOS 83 76     Troponin: No results for input(s): TROPONINI in the last 72 hours. BNP: No results for input(s): BNP in the last 72 hours. Lipids: No results for input(s): CHOL, HDL in the last 72 hours.     Invalid input(s): LDLCALCU  INR:   Recent Labs     10/26/21  0353   INR 1.1       Objective:   Vitals: /81   Pulse 109   Temp 99.7 °F (37.6 °C) (Oral)   Resp 17   Ht 5' 4\" (1.626 m)   Wt 225 lb 12 oz (102.4 kg)   LMP 04/01/2021 (Approximate)   SpO2 91%   Breastfeeding Unknown   BMI 38.75 kg/m²   General appearance: alert and cooperative with exam  HEENT: Head: Normocephalic, no lesions, without obvious abnormality. Neck: no adenopathy, no carotid bruit, no JVD, supple, symmetrical, trachea midline and thyroid not enlarged, symmetric, no tenderness/mass/nodules  Lungs: clear to auscultation bilaterally  Heart: regular rate and rhythm, S1, S2 normal, no murmur, click, rub or gallop  Abdomen: soft, non-tender; bowel sounds normal; no masses,  no organomegaly  Extremities: extremities normal, atraumatic, no cyanosis or edema  Neurologic: Mental status: Alert, oriented, thought content appropriate    GUILLERMO 10/13/2021:  Summary  GUILLERMO was performed to guide ECMO cannulation position. Overall normal LV size and systolic function with estimated EF 55%  No significant valvular regurgitation seen. No intracardiac shunt via color doppler  Final cannula position showed the flow jet directed towards the tricuspid  valve.     Echo 10/25/21:  Left ventricle is normal in size. Global left ventricular systolic function  is normal. Calculated EF via Betancourt's method is 53 %. Right atrial dilatation. Mildly dilated right ventricular cavity. Right ventricular function appears  normal .  Mild tricuspid regurgitation. Estimated right ventricular systolic pressure  is 27 mmHg. Trivial pulmonic insufficiency. No obvious vegetation on this study, consider GUILLERMO if indicated. IMPRESSION:    1. Sinus tachycardia with intermittent runs of SVT likely secondary to acute stress state currently weaned off esmolol drip. On p.o. beta-blocker. 2.  ARDS 2/2 to COVID requiring VV ECMO - Decannulation on 10/22/2021  3. Small PE in Left lower lobe on CTA 10/13/2021. Eliquis on hold due to hemoglobin drop as per pulm crit. 4. Anemia requirinng 1 unit of PRBC. 5. Leucocytosis which has been improving. Managed by primary.   6 Hypokalemia   7 Positive blood culture     RECOMMENDATIONS:  1. Stable. As per Dr. Kelvin Garcia recommendations, Will not aggressively tread the tachycardia- as it is likely appropriate from infection/bacteremia. Continue metoprolol 25 mg TID. 2. PRBC transfusion to keep Hb > 8 .   3. Keep K > 4 and Mg >2  4. Continued COVID-19/infection tx per ID  5. Will sign off for now. If repeat GUILLERMO indicated please re-consult.       Padma Arias, APRN - CNP

## 2021-10-29 VITALS
DIASTOLIC BLOOD PRESSURE: 86 MMHG | HEIGHT: 64 IN | SYSTOLIC BLOOD PRESSURE: 126 MMHG | RESPIRATION RATE: 14 BRPM | HEART RATE: 114 BPM | TEMPERATURE: 99 F | WEIGHT: 210.98 LBS | OXYGEN SATURATION: 96 % | BODY MASS INDEX: 36.02 KG/M2

## 2021-10-29 PROBLEM — E44.0 MODERATE MALNUTRITION (HCC): Status: ACTIVE | Noted: 2021-10-29

## 2021-10-29 LAB
ALBUMIN SERPL-MCNC: 3 G/DL (ref 3.5–5.2)
ALBUMIN/GLOBULIN RATIO: 0.9 (ref 1–2.5)
ALP BLD-CCNC: 76 U/L (ref 35–104)
ALT SERPL-CCNC: 16 U/L (ref 5–33)
ANION GAP SERPL CALCULATED.3IONS-SCNC: 13 MMOL/L (ref 9–17)
AST SERPL-CCNC: 20 U/L
BILIRUB SERPL-MCNC: 1.15 MG/DL (ref 0.3–1.2)
BILIRUBIN DIRECT: 0.29 MG/DL
BILIRUBIN, INDIRECT: 0.86 MG/DL (ref 0–1)
BUN BLDV-MCNC: 8 MG/DL (ref 6–20)
BUN/CREAT BLD: ABNORMAL (ref 9–20)
CALCIUM SERPL-MCNC: 8.5 MG/DL (ref 8.6–10.4)
CHLORIDE BLD-SCNC: 102 MMOL/L (ref 98–107)
CO2: 21 MMOL/L (ref 20–31)
CREAT SERPL-MCNC: 0.33 MG/DL (ref 0.5–0.9)
CULTURE: NORMAL
GFR AFRICAN AMERICAN: >60 ML/MIN
GFR NON-AFRICAN AMERICAN: >60 ML/MIN
GFR SERPL CREATININE-BSD FRML MDRD: ABNORMAL ML/MIN/{1.73_M2}
GFR SERPL CREATININE-BSD FRML MDRD: ABNORMAL ML/MIN/{1.73_M2}
GLOBULIN: ABNORMAL G/DL (ref 1.5–3.8)
GLUCOSE BLD-MCNC: 94 MG/DL (ref 70–99)
HCT VFR BLD CALC: 33.9 % (ref 36.3–47.1)
HEMOGLOBIN: 10.4 G/DL (ref 11.9–15.1)
Lab: NORMAL
MCH RBC QN AUTO: 28.7 PG (ref 25.2–33.5)
MCHC RBC AUTO-ENTMCNC: 30.7 G/DL (ref 28.4–34.8)
MCV RBC AUTO: 93.4 FL (ref 82.6–102.9)
NRBC AUTOMATED: 0.2 PER 100 WBC
PDW BLD-RTO: 15.3 % (ref 11.8–14.4)
PLATELET # BLD: 299 K/UL (ref 138–453)
PMV BLD AUTO: 9.6 FL (ref 8.1–13.5)
POTASSIUM SERPL-SCNC: 4 MMOL/L (ref 3.7–5.3)
RBC # BLD: 3.63 M/UL (ref 3.95–5.11)
SODIUM BLD-SCNC: 136 MMOL/L (ref 135–144)
SPECIMEN DESCRIPTION: NORMAL
TOTAL PROTEIN: 6.5 G/DL (ref 6.4–8.3)
WBC # BLD: 10.6 K/UL (ref 3.5–11.3)

## 2021-10-29 PROCEDURE — 2580000003 HC RX 258: Performed by: STUDENT IN AN ORGANIZED HEALTH CARE EDUCATION/TRAINING PROGRAM

## 2021-10-29 PROCEDURE — 6370000000 HC RX 637 (ALT 250 FOR IP): Performed by: INTERNAL MEDICINE

## 2021-10-29 PROCEDURE — 87040 BLOOD CULTURE FOR BACTERIA: CPT

## 2021-10-29 PROCEDURE — 80076 HEPATIC FUNCTION PANEL: CPT

## 2021-10-29 PROCEDURE — 99239 HOSP IP/OBS DSCHRG MGMT >30: CPT | Performed by: INTERNAL MEDICINE

## 2021-10-29 PROCEDURE — 80048 BASIC METABOLIC PNL TOTAL CA: CPT

## 2021-10-29 PROCEDURE — 05HF33Z INSERTION OF INFUSION DEVICE INTO LEFT CEPHALIC VEIN, PERCUTANEOUS APPROACH: ICD-10-PCS | Performed by: INTERNAL MEDICINE

## 2021-10-29 PROCEDURE — 6360000002 HC RX W HCPCS: Performed by: INTERNAL MEDICINE

## 2021-10-29 PROCEDURE — 2580000003 HC RX 258: Performed by: INTERNAL MEDICINE

## 2021-10-29 PROCEDURE — C1751 CATH, INF, PER/CENT/MIDLINE: HCPCS

## 2021-10-29 PROCEDURE — 6370000000 HC RX 637 (ALT 250 FOR IP): Performed by: STUDENT IN AN ORGANIZED HEALTH CARE EDUCATION/TRAINING PROGRAM

## 2021-10-29 PROCEDURE — 99232 SBSQ HOSP IP/OBS MODERATE 35: CPT | Performed by: INTERNAL MEDICINE

## 2021-10-29 PROCEDURE — 36415 COLL VENOUS BLD VENIPUNCTURE: CPT

## 2021-10-29 PROCEDURE — 76937 US GUIDE VASCULAR ACCESS: CPT

## 2021-10-29 PROCEDURE — 1200000000 HC SEMI PRIVATE

## 2021-10-29 PROCEDURE — 85027 COMPLETE CBC AUTOMATED: CPT

## 2021-10-29 RX ORDER — LANOLIN 72 %
1 OINTMENT (GRAM) TOPICAL
Qty: 100 G | Refills: 0 | Status: SHIPPED | OUTPATIENT
Start: 2021-10-29 | End: 2022-03-10

## 2021-10-29 RX ORDER — LACTOBACILLUS RHAMNOSUS GG 10B CELL
1 CAPSULE ORAL
Qty: 60 CAPSULE | Refills: 0 | Status: SHIPPED | OUTPATIENT
Start: 2021-10-30 | End: 2021-12-02 | Stop reason: ALTCHOICE

## 2021-10-29 RX ADMIN — METOPROLOL TARTRATE 25 MG: 25 TABLET ORAL at 14:08

## 2021-10-29 RX ADMIN — METOPROLOL TARTRATE 25 MG: 25 TABLET ORAL at 08:38

## 2021-10-29 RX ADMIN — SODIUM CHLORIDE, PRESERVATIVE FREE 10 ML: 5 INJECTION INTRAVENOUS at 21:05

## 2021-10-29 RX ADMIN — CEFAZOLIN 2000 MG: 10 INJECTION, POWDER, FOR SOLUTION INTRAVENOUS at 14:07

## 2021-10-29 RX ADMIN — METOPROLOL TARTRATE 25 MG: 25 TABLET ORAL at 21:04

## 2021-10-29 RX ADMIN — ENOXAPARIN SODIUM 105 MG: 120 INJECTION SUBCUTANEOUS at 08:37

## 2021-10-29 RX ADMIN — DOCUSATE SODIUM 50MG AND SENNOSIDES 8.6MG 2 TABLET: 8.6; 5 TABLET, FILM COATED ORAL at 08:37

## 2021-10-29 RX ADMIN — Medication 1 CAPSULE: at 08:38

## 2021-10-29 RX ADMIN — CEFAZOLIN 2000 MG: 10 INJECTION, POWDER, FOR SOLUTION INTRAVENOUS at 06:13

## 2021-10-29 RX ADMIN — CEFAZOLIN 2000 MG: 10 INJECTION, POWDER, FOR SOLUTION INTRAVENOUS at 21:32

## 2021-10-29 RX ADMIN — ENOXAPARIN SODIUM 105 MG: 120 INJECTION SUBCUTANEOUS at 21:04

## 2021-10-29 RX ADMIN — Medication 1 TABLET: at 08:38

## 2021-10-29 ASSESSMENT — ENCOUNTER SYMPTOMS
SHORTNESS OF BREATH: 0
COLOR CHANGE: 0
APNEA: 0
ABDOMINAL DISTENTION: 0
EYE REDNESS: 0
PHOTOPHOBIA: 0
EYE DISCHARGE: 0
EYE PAIN: 0

## 2021-10-29 NOTE — PROGRESS NOTES
Pt given instructions . Pt also instructed on to rotate sites on lovenox and to give more in sides of abd. Pt verbalized understanding Night RN to instruct pt tonight on how to give lovenox shot since pt has lovenox dose due at 9pm. Pt instructed that she will have home care that will come out to give her her antibiotics. Pt also instructed if she has chest pain or shortness of breath to call 911. Pt ordered tetanus shot prior to dc. Pt refused and educated and still refused tetanus shot.

## 2021-10-29 NOTE — PLAN OF CARE
Problem: Airway Clearance - Ineffective  Goal: Achieve or maintain patent airway  10/29/2021 0425 by Leann Mathur RN  Outcome: Ongoing  10/28/2021 1812 by Colby Greco RN  Outcome: Ongoing     Problem: Gas Exchange - Impaired  Goal: Absence of hypoxia  10/29/2021 0425 by Leann Mathur RN  Outcome: Ongoing  10/28/2021 1812 by Colby Greco RN  Outcome: Ongoing  Goal: Promote optimal lung function  10/29/2021 0425 by Leann Mathur RN  Outcome: Ongoing  10/28/2021 1812 by Colby Greco RN  Outcome: Ongoing     Problem: Breathing Pattern - Ineffective  Goal: Ability to achieve and maintain a regular respiratory rate  10/29/2021 0425 by Leann Mathur RN  Outcome: Ongoing  10/28/2021 1812 by Colby Greco RN  Outcome: Ongoing     Problem: Nutrition Deficits  Goal: Optimize nutritional status  10/29/2021 0425 by Leann Mathur RN  Outcome: Ongoing  10/28/2021 1812 by Colby Greco RN  Outcome: Ongoing     Problem: Risk for Fluid Volume Deficit  Goal: Maintain normal heart rhythm  10/29/2021 0425 by Leann Mathur RN  Outcome: Ongoing  10/28/2021 1812 by Colby Greco RN  Outcome: Ongoing  Goal: Maintain absence of muscle cramping  10/29/2021 0425 by Leann Mathur RN  Outcome: Ongoing  10/28/2021 1812 by Colby Greco RN  Outcome: Ongoing  Goal: Maintain normal serum potassium, sodium, calcium, phosphorus, and pH  10/29/2021 0425 by Leann Mathur RN  Outcome: Ongoing  10/28/2021 1812 by Colby rGeco RN  Outcome: Ongoing     Problem: Fatigue  Goal: Verbalize increase energy and improved vitality  10/29/2021 0425 by Leann Mathur RN  Outcome: Ongoing  10/28/2021 1812 by Colby Greco RN  Outcome: Ongoing     Problem: Patient Education: Go to Patient Education Activity  Goal: Patient/Family Education  10/29/2021 0425 by Leann Mathur RN  Outcome: Ongoing  10/28/2021 1812 by Colby Greco RN  Outcome: Ongoing     Problem: Nutrition  Goal: Optimal nutrition therapy  10/29/2021 0425 by Faustina Fiore RN  Outcome: Ongoing  10/28/2021 1812 by Héctor Griffith RN  Outcome: Ongoing     Problem: Musculor/Skeletal Functional Status  Goal: Highest potential functional level  10/29/2021 0425 by Faustina Fiore RN  Outcome: Ongoing  10/28/2021 1812 by Héctor Griffith RN  Outcome: Ongoing  Goal: Absence of falls  10/29/2021 0425 by Faustina Fiore RN  Outcome: Ongoing  10/28/2021 1812 by Héctor Griffith RN  Outcome: Ongoing     Problem: Skin Integrity:  Goal: Will show no infection signs and symptoms  Description: Will show no infection signs and symptoms  10/29/2021 0425 by Faustina Fiore RN  Outcome: Ongoing  10/28/2021 1812 by Héctor Griffith RN  Outcome: Ongoing  Goal: Absence of new skin breakdown  Description: Absence of new skin breakdown  10/29/2021 0425 by Faustina Fiore RN  Outcome: Ongoing  10/28/2021 1812 by Héctor Griffith RN  Outcome: Ongoing

## 2021-10-29 NOTE — PROCEDURES
4 FR BARD power midline placed in left cephalic vein under sterile precautions. Line was trimmed at 13 cm marking with 1 cm left on skin for linie manipulation. CHG Tegaderm dressing placed over insertions site. No bleeding noted. Line flushes and draws with ease. Thank you for the consult.

## 2021-10-29 NOTE — PROGRESS NOTES
[facility] - Initial Consult Note COVID 19 Patient    Admission date 10/13/2021      Impression :   COVID 19 pneumonia  -10/10/21: Positive  resp failure - intubated 10/14, extubated 10/21  Ecmo - decanulated 10/22  26 weeks gestation pregnancy  Baby delivered early 26 weeks  Pulmonary embolism LLL  Metabolic acidosis with euglycemic starvation  10/21 MSSA sepsis / septicemia  Bandemia 10/21  Intubated 10/14 -extubated 10/21  Recommendations:   Post Decadron-Initiated 10/14/21 - stop ad day 9 on 10/22 due to SA sepsis  anticoagulation  DEFERED barcitinib and  Actemra due to pregnancy  10/21-acute leukocytosis, Vanco cefepime started  10/22 BC SA -vanco - stop decadron day 9, pulled the left IJ as well   10/26 BC is MSSA -  ancef   Repeat daily BC till neg  2D echo look for vegetation neg - defer a new GUILLERMO ( was done to start Ecmo)  10/23 diarrhea - probiotics - better  Watch fevers      start on illness 10/1  Stop isolation after 10/20    Medical Decision Making/Summary/Discussion:10/28/2021     Patient admitted with suspected COVID 19 infection  Covid test confirmed positive. Infection Control Recommendations   [place] Precautions  Airborne isolation  Droplet plus Isolation    Antimicrobial Stewardship Recommendations   Off antibiotics    Chief complaint/reason for consultation:   Concern for COVID infection      History of Present Illness:   Sandhya Carvalho is a 27y.o.-year-old  female who was initially admitted on 10/13/2021. Patient seen at the request of Dr. Logan Alvarado:    Patient, who is 26 weeks pregnant, initially presented to [facility] ED on 10/10/21 presented through ER with complaints of productive cough with yellow sputum X 9 days & shortness of breath with pleuritic chest pain that started the day before. She ws also experiencing decreased appetite because of loss of taste and smell.      Work-up at that time revealed the patient to be Covid positive. She was tachycardic with an SpO2 of 94-98% on room air. CXR showed bilateral ill-defined pulmonary opacities suggesting COVID pneumonia. After receiving a small fluid bolus and discussing her case with the patient's OBGYN, Dr. Valeria Zambrano, she was discharged home with instructions to return if her symptoms worsen. On 10/13/21, the patient again presented to [facility] ED after a home SpO2 reading was 88%. A CT chest revealed a small, non-obstructing LLL PE for which she was initiated on a heparin gtt. The patient's respiratory status worsened throughout the day and she was life-flighted to [facility] for a higher level of care. The patient was found to be tachypneic and tachycardic upon arrival. She was maintaining her SpO2 >95% on 15 L/min non-re-breather. Heparin gtt was continued and the patient was also ordered celestone for fetal lung maturity. The patient was admitted to the Covid ICU    Interval changes  /80   Pulse 114   Temp 98.9 °F (37.2 °C) (Oral)   Resp 14   Ht 5' 4\" (1.626 m)   Wt 225 lb 12 oz (102.4 kg)   LMP 04/01/2021 (Approximate)   SpO2 93%   Breastfeeding Unknown   BMI 38.75 kg/m²     Had a fever 101 10/26 night but none today  In good spirits went to see the baby is coming back, not short of breath on nasal cannula  No IV phlebitis and no spinal pain no increasing abdominal pain, no true site of persistent infection  Repeat blood cultures are all positive through the 25th, repeat blood culture 10/27 pending still  Long discussion with patient and her     10/28  Feeling much better comfortable not short of breath, blood cultures remain negative from 10/27 and 10/28  Tolerating Ancef very well,        BC 10/20 SA x 2, seems to be MRSA  ECMO line out 10/22      She had a GUILLERMO before they placed the ECMO on 10/13 and hence will defer another GUILLERMO.   The 2D echo so far is not showing endocarditis        Echo: 10/17  Normal left ventricle size, wall thickness and function with a calculated EF   61%. No segmental wall motion abnormalities seen. Normal right ventricular size and function. No significant valvular regurgitation or stenosis seen. Labs:  W 31 - 27 - 10  Creat 0.23    Micro:  BC MRSA 10/21  BC neg 10/23  BC neg 10/24    imaging  10/24  Bilateral airspace disease appears more prominent in the interval       10/20 cxr          CAT:  10/13/21    . I have personally reviewed the past medical history, past surgical history, medications, social history, and family history, and I have updated the database accordingly.   Past Medical History:     Past Medical History:   Diagnosis Date    Anxiety     GERD (gastroesophageal reflux disease)     Migraines     Seasonal allergies        Past Surgical  History:     Past Surgical History:   Procedure Laterality Date     SECTION N/A 10/15/2021     SECTION performed by Octavio Santacruz MD at [address]      nerve release, and plantar fascitis    HC  PICC POWERPIC TRIPLE  10/15/2021         MANDIBLE SURGERY      tooth implant    MOUTH SURGERY      WISDOM TOOTH EXTRACTION         Medications:      ceFAZolin  2,000 mg IntraVENous Q8H    metoprolol tartrate  25 mg Oral TID    enoxaparin  1 mg/kg SubCUTAneous BID    lactobacillus  1 capsule Oral Daily with breakfast    prenatal vitamin  1 tablet Oral Daily    Tdap-Dtap  0.5 mL IntraMUSCular Prior to discharge    lidocaine 1 % injection  5 mL IntraDERmal Once    sodium chloride flush  5-40 mL IntraVENous 2 times per day    docusate  100 mg Oral BID    sennosides-docusate sodium  2 tablet Oral Daily    insulin lispro  0-12 Units SubCUTAneous Q6H       Social History:     Social History     Socioeconomic History    Marital status:      Spouse name: Not on file    Number of children: Not on file    Years of education: Not on file    Highest education level: Not on file   Occupational History    Not on file   Tobacco Use    Smoking status: Former Smoker     Types: Cigarettes    Smokeless tobacco: Never Used   Vaping Use    Vaping Use: Former    Substances: Nicotine   Substance and Sexual Activity    Alcohol use: Not Currently    Drug use: Not Currently     Types: Marijuana     Comment: last smoked May 2021    Sexual activity: Yes   Other Topics Concern    Not on file   Social History Narrative    Not on file     Social Determinants of Health     Financial Resource Strain:     Difficulty of Paying Living Expenses:    Food Insecurity:     Worried About Running Out of Food in the Last Year:     [address] in the Last Year:    Transportation Needs:     Lack of Transportation (Medical):  Lack of Transportation (Non-Medical):    Physical Activity:     Days of Exercise per Week:     Minutes of Exercise per Session:    Stress:     Feeling of Stress :    Social Connections:     Frequency of Communication with Friends and Family:     Frequency of Social Gatherings with Friends and Family:     Attends [employer] Services:     Active Member of Clubs or Organizations:     Attends Club or Organization Meetings:     Marital Status:    Intimate Partner Violence:     Fear of Current or Ex-Partner:     Emotionally Abused:     Physically Abused:     Sexually Abused:        Family History:     Family History   Problem Relation Age of Onset    Breast Cancer Maternal Grandmother         older than 48    Heart Attack Paternal Uncle     Stroke Maternal Aunt     Hypertension Father     No Known Problems Paternal Grandfather     Stroke Paternal Grandmother     No Known Problems Maternal Grandfather     Hypertension Mother     No Known Problems Brother     No Known Problems Brother         Allergies:   Vicodin [hydrocodone-acetaminophen]     Review of Systems:     Review of Systems   Constitutional: Positive for activity change. Negative for chills and diaphoresis.    HENT: Negative for congestion. Eyes: Negative for photophobia, pain, discharge and redness. Respiratory: Negative for apnea and shortness of breath. Cardiovascular: Negative for chest pain. Gastrointestinal: Negative for abdominal distention. Endocrine: Negative for heat intolerance, polydipsia, polyphagia and polyuria. Genitourinary: Negative for dysuria and flank pain. Musculoskeletal: Negative for arthralgias. Skin: Negative for color change. Allergic/Immunologic: Positive for immunocompromised state. Neurological: Negative for dizziness, seizures, light-headedness and headaches. Hematological: Negative for adenopathy. Psychiatric/Behavioral: Negative for agitation. Physical Examination :     Patient Vitals for the past 8 hrs:   BP Temp Temp src Pulse Resp SpO2   10/28/21 2030 121/80 98.9 °F (37.2 °C) Oral 114 14 93 %     Physical Exam  Constitutional:       General: She is not in acute distress. Appearance: Normal appearance. She is not ill-appearing or diaphoretic. Comments: Intubated sedated   HENT:      Head: Normocephalic and atraumatic. Nose: Nose normal.   Eyes:      General: No scleral icterus. Conjunctiva/sclera: Conjunctivae normal.      Pupils: Pupils are equal, round, and reactive to light. Cardiovascular:      Rate and Rhythm: Normal rate and regular rhythm. Heart sounds: Normal heart sounds. No murmur heard. No friction rub. No gallop. Pulmonary:      Effort: No respiratory distress. Breath sounds: No stridor. No rhonchi. Abdominal:      Palpations: Abdomen is soft. There is no mass. Hernia: No hernia is present. Genitourinary:     Comments: Urine bong  Musculoskeletal:         General: No swelling, tenderness, deformity or signs of injury. Cervical back: Neck supple. No rigidity or tenderness. Right lower leg: No edema. Left lower leg: No edema. Skin:     Coloration: Skin is not jaundiced or pale.       Findings: No bruising, erythema, lesion or rash. Neurological:      General: No focal deficit present. Mental Status: She is alert and oriented to person, place, and time. Cranial Nerves: No cranial nerve deficit. Sensory: No sensory deficit. Coordination: Coordination normal.   Psychiatric:         Mood and Affect: Mood normal.         Behavior: Behavior normal.          Medical Decision Making -Laboratory:   I have independently reviewed/ordered the following labs:    CBC with Differential:   Recent Labs     10/27/21  0736 10/28/21  0736   WBC 13.6* 12.0*   HGB 9.4* 9.7*   HCT 30.9* 31.0*    287     BMP:   Recent Labs     10/26/21  0353 10/26/21  0353 10/27/21  0736 10/28/21  0736      < > 135 136   K 3.7   < > 4.2 4.1      < > 101 101   CO2 21   < > 21 21   BUN 6   < > 8 8   CREATININE 0.23*   < > 0.26* 0.30*   MG 1.9  --   --   --     < > = values in this interval not displayed. Hepatic Function Panel:   Recent Labs     10/27/21  0736 10/28/21  0736   PROT 5.9* 6.1*   LABALBU 2.8* 2.8*   BILIDIR 0.50* 0.33*   IBILI 1.08* 0.93   BILITOT 1.58* 1.26*   ALKPHOS 83 76   ALT 28 19   AST 23 21     No results for input(s): RPR in the last 72 hours. No results for input(s): HIV in the last 72 hours. No results for input(s): BC in the last 72 hours. Lab Results   Component Value Date    MUCUS NOT REPORTED 10/13/2021    RBC 3.34 10/28/2021    TRICHOMONAS NOT REPORTED 10/13/2021    WBC 12.0 10/28/2021    YEAST NOT REPORTED 10/13/2021    TURBIDITY Clear 10/13/2021     Lab Results   Component Value Date    CREATININE 0.30 10/28/2021    GLUCOSE 96 10/28/2021       Medical Decision Making-Imaging:     Narrative   EXAMINATION:   CTA OF THE CHEST 10/13/2021 2:16 pm       TECHNIQUE:   CTA of the chest was performed after the administration of intravenous   contrast.  Multiplanar reformatted images are provided for review.  MIP   images are provided for review.  Dose modulation, iterative reconstruction,   and/or weight based adjustment of the mA/kV was utilized to reduce the   radiation dose to as low as reasonably achievable.       COMPARISON:   Chest x-rays over the last few days. .       HISTORY:   ORDERING SYSTEM PROVIDED HISTORY: sob, tachy, 32 WEEKS PREGNANT, Spoke with   Radiologist   TECHNOLOGIST PROVIDED HISTORY:   sob, tachy, 32 WEEKS PREGNANT, Spoke with Radiologist   Decision Support Exception - unselect if not a suspected or confirmed   emergency medical condition->Emergency Medical Condition (MA)       FINDINGS:   Pulmonary Arteries:  There is a small nonocclusive segmental to subsegmental   embolism noted in the left lower lobe, on and around axial image [id number].  No   other definite emboli are seen.  No central emboli.  No pulmonary arterial   enlargement is identified.       Mediastinum: Mediastinal and hilar lymphadenopathy is identified.  No focal   esophageal thickening is identified.  The thyroid gland appears unremarkable.       Lungs/pleura: Multifocal ground-glass pneumonia with some mild areas of   consolidation.  No pleural effusion is identified.       Upper Abdomen: No acute process seen in the upper abdomen.       Soft Tissues/Bones: No axillary or supraclavicular lymphadenopathy is   identified.  No acute osseous abnormality is identified.           Impression   Single small nonocclusive segmental to subsegmental pulmonary embolism in the   left lower lobe.  No central emboli.  This was discussed with Marvin Saxena   at 2:44 p.m. on 10/13/2021.       Multilobar COVID-19 pneumonia.                 Medical Decision [id number]:       Medical Decision Making-Other:     Note:  Labs, medications, radiologic studies were reviewed with personal review of films  Large amounts of data were reviewed  Discussed with nursing Staff, Discharge planner  Infection Control and Prevention measures reviewed  All prior entries were reviewed  Administer medications as ordered  Prognosis: Guarded  Discharge planning reviewed  Follow up as outpatient. Thank you for allowing us to participate in the care of this patient. Please call with questions.     Celestina Roach MD  Office: [phone]

## 2021-10-29 NOTE — PROGRESS NOTES
Home Oxygen Evaluation    Home Oxygen Evaluation completed. Patient is on room air. Resting SpO2 on room air = 96%    SpO2 on room air with exercise = 93%    Nocturnal Oximetry with patient on room air is recommended is SpO2 is between 89% and 95% (requires additional order).     Fabi Abarca RCP  11:25 AM

## 2021-10-29 NOTE — PLAN OF CARE
Problem: Airway Clearance - Ineffective  Goal: Achieve or maintain patent airway  10/29/2021 1207 by Bobo Haq RN  Outcome: Ongoing  10/29/2021 0425 by Ru Darby RN  Outcome: Ongoing     Problem: Gas Exchange - Impaired  Goal: Absence of hypoxia  10/29/2021 1207 by Bobo Haq RN  Outcome: Ongoing  10/29/2021 0425 by Ru Darby RN  Outcome: Ongoing  Goal: Promote optimal lung function  10/29/2021 1207 by Bobo Haq RN  Outcome: Ongoing  10/29/2021 0425 by Ru Darby RN  Outcome: Ongoing     Problem: Breathing Pattern - Ineffective  Goal: Ability to achieve and maintain a regular respiratory rate  10/29/2021 1207 by Bobo Haq RN  Outcome: Ongoing  10/29/2021 0425 by Ru Darby RN  Outcome: Ongoing     Problem:  Body Temperature -  Risk of, Imbalanced  Goal: Ability to maintain a body temperature within defined limits  10/29/2021 1207 by Bobo Haq RN  Outcome: Ongoing  10/29/2021 0425 by Ru Darby RN  Outcome: Ongoing  Goal: Will regain or maintain usual level of consciousness  10/29/2021 1207 by Bobo Haq RN  Outcome: Ongoing  10/29/2021 0425 by Ru Darby RN  Outcome: Ongoing  Goal: Complications related to the disease process, condition or treatment will be avoided or minimized  10/29/2021 1207 by Bobo Haq RN  Outcome: Ongoing  10/29/2021 0425 by Ru Darby RN  Outcome: Ongoing     Problem: Isolation Precautions - Risk of Spread of Infection  Goal: Prevent transmission of infection  10/29/2021 1207 by Bobo Haq RN  Outcome: Ongoing  10/29/2021 0425 by Ru Darby RN  Outcome: Ongoing     Problem: Nutrition Deficits  Goal: Optimize nutritional status  10/29/2021 1207 by Bobo Haq RN  Outcome: Ongoing  10/29/2021 0425 by Ru Darby RN  Outcome: Ongoing     Problem: Risk for Fluid Volume Deficit  Goal: Maintain normal heart rhythm  10/29/2021 1207 by Bobo Haq RN  Outcome: Ongoing  10/29/2021 0425 by Slime Lam RN  Outcome: Ongoing  Goal: Maintain absence of muscle cramping  10/29/2021 1207 by Sandip Pulliam RN  Outcome: Ongoing  10/29/2021 0425 by Slime Lam RN  Outcome: Ongoing  Goal: Maintain normal serum potassium, sodium, calcium, phosphorus, and pH  10/29/2021 1207 by Sandip Pulliam RN  Outcome: Ongoing  10/29/2021 0425 by Slime Lam RN  Outcome: Ongoing     Problem: Loneliness or Risk for Loneliness  Goal: Demonstrate positive use of time alone when socialization is not possible  10/29/2021 1207 by Sandip Pulliam RN  Outcome: Ongoing  10/29/2021 0425 by Slime Lam RN  Outcome: Ongoing     Problem: Fatigue  Goal: Verbalize increase energy and improved vitality  10/29/2021 1207 by Sandip Pulliam RN  Outcome: Ongoing  10/29/2021 0425 by Slime Lam RN  Outcome: Ongoing     Problem: Patient Education: Go to Patient Education Activity  Goal: Patient/Family Education  10/29/2021 1207 by Sandip Pulliam RN  Outcome: Ongoing  10/29/2021 0425 by Slime Lam RN  Outcome: Ongoing     Problem: Nutrition  Goal: Optimal nutrition therapy  10/29/2021 1207 by Sandip Pulliam RN  Outcome: Ongoing  10/29/2021 0425 by Slime Lam RN  Outcome: Ongoing     Problem: Skin Integrity:  Goal: Will show no infection signs and symptoms  Description: Will show no infection signs and symptoms  10/29/2021 1207 by Sandip Pulliam RN  Outcome: Ongoing  10/29/2021 0425 by Slime Lam RN  Outcome: Ongoing  Goal: Absence of new skin breakdown  Description: Absence of new skin breakdown  10/29/2021 1207 by Sandip Pulliam RN  Outcome: Ongoing  10/29/2021 0425 by Slime Lam RN  Outcome: Ongoing     Problem: OXYGENATION/RESPIRATORY FUNCTION  Goal: Patient will maintain patent airway  10/29/2021 1207 by Sandip Pulliam RN  Outcome: Ongoing  10/29/2021 0425 by Slime Lam RN  Outcome: Ongoing  Goal: Patient will achieve/maintain normal respiratory rate/effort  Description: Respiratory rate and effort will be within normal limits for the patient  10/29/2021 1207 by Robbin Givens RN  Outcome: Ongoing  10/29/2021 0425 by Taryn Lassiter RN  Outcome: Ongoing     Problem: Anxiety:  Goal: Level of anxiety will decrease  Description: Level of anxiety will decrease  10/29/2021 1207 by Robbin Givens RN  Outcome: Ongoing  10/29/2021 0425 by Taryn Lassiter RN  Outcome: Ongoing     Problem: Musculor/Skeletal Functional Status  Goal: Highest potential functional level  10/29/2021 1207 by Robbin Givens RN  Outcome: Ongoing  10/29/2021 0425 by Taryn Lassiter RN  Outcome: Ongoing  Goal: Absence of falls  10/29/2021 1207 by Robbin Givesn RN  Outcome: Ongoing  10/29/2021 0425 by Taryn Lassiter RN  Outcome: Ongoing

## 2021-10-29 NOTE — DISCHARGE INSTR - COC
Continuity of Care Form    Patient Name: Kt Desouza   :  1990  MRN:  [id number]    Admit date:  10/13/2021  Discharge date:  10/29/2021    Code Status Order: Full Code   Advance Directives:      Admitting Physician:  Eli Campbell DO  PCP: Karla Cheng    Discharging Nurse: [facility] Unit/Room#: 1862/7646-50  Discharging Unit Phone Number: [phone]    Emergency Contact:   Extended Emergency Contact Information  Primary Emergency Contact: Boo Hall  Home Phone: [phone]  Relation: Spouse   needed? No  Secondary Emergency Contact: Presley Jones  Mobile Phone: [phone]  Relation: Parent    Past Surgical History:  Past Surgical History:   Procedure Laterality Date     SECTION N/A 10/15/2021     SECTION performed by Rosa Freeman MD at [address]      nerve release, and plantar fascitis    HC  PICC POWERPIC TRIPLE  10/15/2021         MANDIBLE SURGERY      tooth implant    MOUTH SURGERY      WISDOM TOOTH EXTRACTION         Immunization History: There is no immunization history for the selected administration types on file for this patient.     Active Problems:  Patient Active Problem List   Diagnosis Code    Pneumonia due to COVID-19 virus U07.1, J12.82    26 weeks gestation of pregnancy Z3A.26    Tachycardia R00.0    Tachypnea R06.82    Anxiety F41.9    Pulmonary embolism (HCC) I26.99    Marginal insertion of umbilical cord affecting management of mother O43.80    Starvation ketoacidosis E87.2    PCCS 10/15/21 M Apg  Wt 2#3 C71.184    Acute respiratory distress syndrome (ARDS) due to severe acute respiratory syndrome coronavirus 2 (SARS-CoV-2) ([facility]) U07.1, J80    Immunosuppressed status ([address] D84.9    MRSA (methicillin resistant Staphylococcus aureus) septicemia ([facility] [address] A41.02    Bandemia D72.825    History of extracorporeal membrane oxygenation treatment Z92.81       Isolation/Infection:   Isolation No Isolation          Patient Infection Status       Infection Onset Added Last Indicated Last Indicated By Review Planned Expiration Resolved Resolved By    None active    Resolved    COVID-19 10/10/21 10/10/21 10/10/21 COVID-19, Rapid   10/21/21 David Buckley RN    Ok to remove isolation per ID    COVID-19 Rule Out 10/10/21 10/10/21 10/10/21 COVID-19, Rapid (Ordered)   10/10/21 Rule-Out Test Resulted            Nurse Assessment:  Last Vital Signs: /71   Pulse 114   Temp 99.2 °F (37.3 °C) (Oral)   Resp 19   Ht 5' 4\" (1.626 m)   Wt 210 lb 15.7 oz (95.7 kg)   LMP 04/01/2021 (Approximate)   SpO2 93%   Breastfeeding Unknown   BMI 36.21 kg/m²     Last documented pain score (0-10 scale): Pain Level: 0  Last Weight:   Wt Readings from Last 1 Encounters:   10/29/21 210 lb 15.7 oz (95.7 kg)     Mental Status:  oriented and alert    IV Access:  [facility] IV ACCESS:[id number]:::0}    Nursing Mobility/ADLs:  Walking   Independent  Transfer  Independent  Bathing  Independent  Dressing  Independent  [address]  Med Delivery   whole    Wound Care Documentation and Therapy:        Elimination:  Continence:   · Bowel: Yes  · Bladder: Yes  Urinary Catheter: None   Colostomy/Ileostomy/Ileal Conduit: No         Safety Concerns:     None    Impairments/Disabilities:      None    Nutrition Therapy:  Current Nutrition Therapy:   - Oral Diet:  General    Routes of Feeding: Oral  Liquids: No Restrictions  Daily Fluid Restriction: no  Last Modified Barium Swallow with Video (Video Swallowing Test): not done    Treatments at the Time of Hospital Discharge:   Respiratory Treatments: none  Oxygen Therapy:  is not on home oxygen therapy.   Ventilator:    - No ventilator support    Rehab Therapies: none  Weight Bearing Status/Restrictions: No weight bearing restirctions  Other Medical Equipment (for information only, NOT a DME order):  none  Other Treatments: Lab work: CBC with diff Creat CRP weekly   No blood draws from midline  Pull midline IV after antibiotics done  Ancef 2grams IV every 8 hours IV 6am 2pm 10pm   Patient's personal belongings (please select all that are sent with patient):  all belongings sent with pt     RN SIGNATURE:  Electronically signed by Parmjit Manjarrez RN on 10/29/21 at 1:33 PM EDT    CASE MANAGEMENT/SOCIAL WORK SECTION    Inpatient Status Date: ***    Readmission Risk Assessment Score:  Readmission Risk              Risk of Unplanned Readmission:  23           Discharging to Facility/ [facility] Details  [address] [address]       Phone: [phone]       Fax: [phone]        ·     Dialysis Facility (if applicable)   · Name:  · Address:  · Dialysis Schedule:  · Phone:  · Fax:    / signature: Electronically signed by Leyla Amos RN on 10/29/21 at 3:04 PM EDT    PHYSICIAN SECTION    Prognosis: Good    Condition at Discharge: Stable    Rehab Potential (if transferring to Rehab): Good    Recommended Labs or Other Treatments After Discharge:   Please follow up with primary care physician, cardiology, infectious disease and pulmonologist    Please take lovenox shots twice weekly for atleast 3 months, duration to be determined by PCP    Lab work: CBC with diff Creat CRP weekly     No blood draws from midline    Pull midline IV after antibiotics done    Ancef 2grams IV every 8 hours IV 6am 2pm 10pm till 60-    Physician Certification: I certify the above information and transfer of Martha Zhang  is necessary for the continuing treatment of the diagnosis listed and that she requires Home Care for greater 30 days.      Update Admission H&P: No change in H&P    PHYSICIAN SIGNATURE:  Electronically signed by Marcelino Lozano MD on 10/29/21 at 12:44 PM EDT

## 2021-10-29 NOTE — CARE COORDINATION
Spoke with Papa Calhoun at [facility] to confirm acceptance, Papa Calhoun will call writer back  11:00 Accepted with [facility] per Papa Calhoun and can start service 8:00am on 10/29. Ancef order faxed to Alexandra at [facility] will be delivered this evening, pt will need 10 pm dose here before discharge Discussed with pt she is fine with late discharge.  C.O.C. complete and signed

## 2021-10-29 NOTE — PROGRESS NOTES
Comprehensive Nutrition Assessment    Type and Reason for Visit:  Reassess    Nutrition Recommendations/Plan:   - Continue current diet, Regular  - Encourage/monitor po intake    Nutrition Assessment:  Pt appetite is \"not great\" and does not like the food here. Poor appetite d/t loss of taste. No n/v. Declined ONS. If current wt accurate, pt with 6% wt loss since admission (2 weeks - significant). Encouraged adequate protein/kcal intake at home, discussed available PRO kcal source if her appetite is still poor once home (expecting d/c later today). Encouraged pt  to bring in foods she enjoys if not d/c later today. Malnutrition Assessment:  Malnutrition Status: Moderate malnutrition    Context:  Acute Illness     Findings of the 6 clinical characteristics of malnutrition:  Energy Intake:  7 - 50% or less of estimated energy requirements for 5 or more days  Weight Loss:  7 - Greater than 5% over 1 month     Body Fat Loss:  Unable to assess     Muscle Mass Loss:  Unable to assess    Fluid Accumulation:  No significant fluid accumulation Extremities, Generalized   Strength:  Not Performed    Estimated Daily Nutrient Needs:  Energy (kcal):  1.2-1.3 ~> 7124-2501 kcals/d; Weight Used for Energy Requirements:  Current     Protein (g):  1.2-1.4 gm/kg ~> 66-77 gms/d; Weight Used for Protein Requirements:  Ideal        Fluid (ml/day):  2200 mL fluid/d or per MD; Method Used for Fluid Requirements:   (Ori Louise)      Nutrition Related Findings:  Labs/meds reviewed. LBM 10/27. Wounds:  Surgical Incision       Current Nutrition Therapies:    ADULT DIET;  Regular    Anthropometric Measures:  · Height: 5' 4\" (162.6 cm)  · Current Body Weight: 210 lb 15.7 oz (95.7 kg) (10/29, bedscale)   · Admission Body Weight: 225 lb (102.1 kg)    · Usual Body Weight: 220 lb (99.8 kg) (Pre-Pregnancy)     · BMI: 36.2  · BMI Categories: Obese Class 2 (BMI 35.0 -39.9)       Nutrition Diagnosis:   · Inadequate oral intake related to  (current medical conditions) as evidenced by intake 26-50%    Nutrition Interventions:   Food and/or Nutrient Delivery:  Continue Current Diet  Nutrition Education/Counseling:  Education not indicated   Coordination of Nutrition Care:  Continue to monitor while inpatient    Goals:  Intake to meet > 50% of estimated nutrition needs       Nutrition Monitoring and Evaluation:   Behavioral-Environmental Outcomes:  None Identified   Food/Nutrient Intake Outcomes:  Food and Nutrient Intake  Physical Signs/Symptoms Outcomes:  Biochemical Data, Nutrition Focused Physical Findings, Skin, Weight, GI Status, Fluid Status or Edema     Discharge Planning:     Too soon to determine     Electronically signed by Leda Singh, MS, RD, LD on 10/29/21 at 1:46 PM EDT    Contact: [phone] or floor RD

## 2021-10-29 NOTE — PROGRESS NOTES
[facility]  Internal Medicine Teaching Residency Program  Inpatient Daily Progress Note  ______________________________________________________________________________    Patient: Kyree Novoa  YOB: 1990   AGAPITO:[id number]    Acct: [de-identified]     Room: [address] [place] [address]  Admit date: 10/13/2021  Today's date: 10/29/21  Number of days in the hospital: 16    SUBJECTIVE   Admitting Diagnosis: Acute respiratory distress syndrome (ARDS) due to severe acute respiratory syndrome coronavirus 2 (SARS-CoV-2) ([facility])  CC: Shortness of breath  Pt seen & examined at bedside. chart & results reviewed. Hemodynamically stable, saturating well on room air. No acute events overnight. Patient denies shortness of breath or fever spikes. On Ancef as per ID for MSSA. ROS:  Constitutional:  negative for chills, fevers, sweats  Respiratory:  negative for cough, dyspnea on exertion, hemoptysis, shortness of breath, wheezing  Cardiovascular:  negative for chest pain, chest pressure/discomfort, lower extremity edema, palpitations  Gastrointestinal:  negative for abdominal pain, constipation, diarrhea, nausea, vomiting  Neurological:  negative for dizziness, headache  BRIEF HISTORY   A 31 y. o. female who was 26-week pregnant initially tested positive for COVID-19 on 10/10.    She presented to [place] ED on 10/13 with respiratory distress and low home O2 saturation.  She was tachypneic and tachycardic.    Transferred to [facility] for further management.  Initial blood gases showed evidence of combined anion gap and non-anion gap metabolic acidosis, thought to be related to starvation ketosis.  During evaluation patient was also noted to have a left lower lobe subsubsegmental PE.  She underwent  10/15.    Postoperative course complicated by development of worsening respiratory failure and required initiation of mechanical ventilation on 10/15.  Extubated on 10/21  Due to worsening severe hypoxemic respiratory failure decision was made to put her on VV ECMO.    She had placement of 30 F VV ECMO (crescent) cannula in the right atrium on 10/17. She came out of isolation on 10/20. Ecmo decannulated on 10/22. Patient remained off VV ECMO, She denies any pain or anxiety  She had nausea with vomiting mucoid material, which later improved. CT chest ordered to rule out PE on 10/22/2021 did not show any pulmonary rhythm. Completed Decadron  She is started on ancef since 10/26 as the blood cultures came back positive for MSSA    OBJECTIVE     Vital Signs:  /80   Pulse 114   Temp 98.9 °F (37.2 °C) (Oral)   Resp 14   Ht 5' 4\" (1.626 m)   Wt 225 lb 12 oz (102.4 kg)   LMP 2021 (Approximate)   SpO2 93%   Breastfeeding Unknown   BMI 38.75 kg/m²     Temp (24hrs), Av.3 °F (37.4 °C), Min:98.9 °F (37.2 °C), Max:99.7 °F (37.6 °C)    No intake/output data recorded. Physical Exam:  Constitutional: This is a well developed, well nourished, 35-39.9 - Obesity Grade II 27y.o. year old female who is alert, oriented, cooperative and in no apparent distress. Head:normocephalic and atraumatic. EENT:  PERRLA. No conjunctival injections. Septum was midline, mucosa was without erythema, exudates or cobblestoning. No thrush was noted. Neck: Supple without thyromegaly. No elevated JVP. Trachea was midline. Respiratory: Chest was symmetrical without dullness to percussion. Breath sounds bilaterally were clear to auscultation. There were no wheezes, rhonchi or rales. There is no intercostal retraction or use of accessory muscles. No egophony noted. Cardiovascular: Regular without murmur, clicks, gallops or rubs. Abdomen: Slightly rounded and soft without organomegaly. No rebound, rigidity or guarding was appreciated. Lymphatic: No lymphadenopathy. Musculoskeletal: Normal curvature of the spine. No gross muscle weakness.     Extremities:  No lower extremity edema, ulcerations, tenderness, varicosities or erythema. Muscle size, tone and strength are normal.  No involuntary movements are noted. Skin:  Warm and dry. Good color, turgor and pigmentation. No lesions or scars.   No cyanosis or clubbing  Neurological/Psychiatric: The patient's general behavior, level of consciousness, thought content and emotional status is normal.        Medications:  Scheduled Medications:    ceFAZolin  2,000 mg IntraVENous Q8H    metoprolol tartrate  25 mg Oral TID    enoxaparin  1 mg/kg SubCUTAneous BID    lactobacillus  1 capsule Oral Daily with breakfast    prenatal vitamin  1 tablet Oral Daily    Tdap-Dtap  0.5 mL IntraMUSCular Prior to discharge    lidocaine 1 % injection  5 mL IntraDERmal Once    sodium chloride flush  5-40 mL IntraVENous 2 times per day    docusate  100 mg Oral BID    sennosides-docusate sodium  2 tablet Oral Daily    insulin lispro  0-12 Units SubCUTAneous Q6H     Continuous Infusions:    sodium chloride      sodium chloride      dextrose      sodium chloride 5 mL/hr at 10/14/21 0700     PRN Medicationspotassium chloride, 40 mEq, PRN   Or  potassium alternative oral replacement, 25 mEq, PRN   Or  potassium chloride, 10 mEq, PRN  metoclopramide, 5 mg, Q6H PRN  promethazine, 6.25 mg, Q6H PRN  metoprolol, 5 mg, Q6H PRN  sodium chloride, , PRN  oxyCODONE-acetaminophen, 2 tablet, Q4H PRN  magnesium sulfate, 1,000 mg, PRN  potassium chloride, 20 mEq, PRN  albumin human, 25 g, PRN   Or  albumin human, 25 g, PRN  oxyCODONE-acetaminophen, 1 tablet, Q4H PRN  sodium chloride flush, 10 mL, PRN  ondansetron, 4 mg, Q6H PRN  diphenhydrAMINE, 25 mg, Q6H PRN  simethicone, 80 mg, Q6H PRN  polyethylene glycol, 17 g, Daily PRN  magnesium hydroxide, 30 mL, Daily PRN  bisacodyl, 10 mg, Daily PRN  lanolin, , Q1H PRN  LORazepam, 2 mg, Q5 Min PRN  sodium chloride flush, 5-40 mL, PRN  sodium chloride, 25 mL, PRN  glucose, 15 g, PRN  dextrose, 12.5 g, PRN  glucagon (rDNA), 1 mg, 10/22/2021  Stable support lines Stable multifocal infiltrates     CT CHEST PULMONARY EMBOLISM W CONTRAST    Result Date: 10/22/2021  This study is nondiagnostic for pulmonary embolism. There is excessive respiratory motion artifact degrading image resolution. There is not significant contrast within the pulmonary artery system to adequately assess for pulmonary embolus. Recommend either repeat exam with improved bolus timing, and decreased patient respiratory motion or VQ scan. Improving patchy bilateral infiltrates compatible with improvement of COVID pneumonia.  Interval development of moderate ascites       ASSESSMENT & PLAN   Assessment:  Principal Problem:    Acute respiratory distress syndrome (ARDS) due to severe acute respiratory syndrome coronavirus 2 (SARS-CoV-2) (HCC)  Active Problems:    Pneumonia due to COVID-19 virus    26 weeks gestation of pregnancy    Tachycardia    Tachypnea    Anxiety    Pulmonary embolism (HCC)    Marginal insertion of umbilical cord affecting management of mother    Starvation ketoacidosis    PCCS 10/15/21 M Apg 1/1/1 Wt 2#3    Immunosuppressed status ([address]    MRSA (methicillin resistant Staphylococcus aureus) septicemia ([facility]    Bandemia  Resolved Problems:    Severe malnutrition ([facility] [address]         Plan:  Acute hypoxic respiratory failure-on NC  ARDS s/p VV ECMO and mechanical ventilation  Bilateral multifocal pneumonia due to COVID-19 infection-completed Decadron  Continue supplemental oxygen to keep SPO2> 92%  Incentive spirometry encouraged  Continue pulmonary toilet, aspiration precautions  Monitor I/O with a goal of even fluid balance  Advance diet as tolerated  Sepsis due to COVID-19  MSSA septicemia-on ancef as per ID  Tachycardia-continue Lopressor 25 mg 3 times daily  Left lower lobe subsegmental pulmonary embolism-monitor  DVT PPx-on therapeutic Lovenox  GI PPx-Will start on Pepcid  OT/PT/SW-consulted  Discharge planning- to assist in discharge planning. Bailey Lopez  Internal Medicine Resident, PGY-1  [address];  [facility], [facility]  10/29/2021, 7:20 AM

## 2021-10-29 NOTE — CARE COORDINATION
Discharge [facility] Case Management Department  Written by: Adali Jackson RN    Patient Name: Garret Fontanez  Attending Provider: Kwaku Pelayo DO  Admit Date: 10/13/2021  8:19 PM  MRN: [id number]  Account: [de-identified]                     : 1990  Discharge Date:       Disposition: home with [facility]/ Conchis Jackson RN

## 2021-10-29 NOTE — PROGRESS NOTES
Terri Coates NP with cardio thoracic surgery here to remove sutures from right  side of neck an sutures removed and steri strips applied

## 2021-10-30 NOTE — PROGRESS NOTES
RN explained lovenox injection procedure to pt. Pt verbalized understanding and was able to demonstrate technique appropriately. All questions answered at this time.

## 2021-10-30 NOTE — PROGRESS NOTES
[facility] - Initial Consult Note COVID 19 Patient    Admission date 10/13/2021      Impression :   COVID 19 pneumonia  -10/10/21: Positive  resp failure - intubated 10/14, extubated 10/21  Ecmo - decanulated 10/22  26 weeks gestation pregnancy  Baby delivered early 26 weeks  Pulmonary embolism LLL  Metabolic acidosis with euglycemic starvation  10/21 MSSA sepsis / septicemia  Bandemia 10/21  Intubated 10/14 -extubated 10/21  Recommendations:   Post Decadron-Initiated 10/14/21 - stop ad day 9 on 10/22 due to SA sepsis  anticoagulation  DEFERED barcitinib and  Actemra due to pregnancy  10/21-acute leukocytosis, Vanco cefepime started  10/22 BC SA -vanco - stop decadron day 9, pulled the left IJ as well   Last + BC 10/25 -  ancef x 28 days after first [facility] neg  Repeat daily BC till neg  2D echo look for vegetation neg - defer a new GUILLERMO ( was done to start Ecmo)  10/23 diarrhea - probiotics - better  Chart reconciled, okay for discharge, follow-up with me in 4 weeks      start on illness 10/1  Stop isolation after 10/20    Medical Decision Making/Summary/Discussion:10/29/2021     Patient admitted with suspected COVID 19 infection  Covid test confirmed positive. Infection Control Recommendations   Rawlings Precautions  Airborne isolation  Droplet plus Isolation    Antimicrobial Stewardship Recommendations   Off antibiotics    Chief complaint/reason for consultation:   Concern for COVID infection      History of Present Illness:   Kwadwo Barton is a 27y.o.-year-old  female who was initially admitted on 10/13/2021. Patient seen at the request of Dr. Armani Quinones:    Patient, who is 26 weeks pregnant, initially presented to [facility] ED on 10/10/21 presented through ER with complaints of productive cough with yellow sputum X 9 days & shortness of breath with pleuritic chest pain that started the day before.  She ws also experiencing decreased appetite because of loss of taste and smell. Work-up at that time revealed the patient to be Covid positive. She was tachycardic with an SpO2 of 94-98% on room air. CXR showed bilateral ill-defined pulmonary opacities suggesting COVID pneumonia. After receiving a small fluid bolus and discussing her case with the patient's OBGYN, Dr. Mary Ann Larose, she was discharged home with instructions to return if her symptoms worsen. On 10/13/21, the patient again presented to [facility] ED after a home SpO2 reading was 88%. A CT chest revealed a small, non-obstructing LLL PE for which she was initiated on a heparin gtt. The patient's respiratory status worsened throughout the day and she was life-flighted to [facility] ([place], [place]) for a higher level of care. The patient was found to be tachypneic and tachycardic upon arrival. She was maintaining her SpO2 >95% on 15 L/min non-re-breather. Heparin gtt was continued and the patient was also ordered celestone for fetal lung maturity. The patient was admitted to the [facility] ICU    Interval changes  /86   Pulse 114   Temp 99 °F (37.2 °C) (Oral)   Resp 14   Ht 5' 4\" (1.626 m)   Wt 210 lb 15.7 oz (95.7 kg)   LMP 04/01/2021 (Approximate)   SpO2 96%   Breastfeeding Unknown   BMI 36.21 kg/m²       10/28  Feeling much better comfortable not short of breath, blood cultures remain negative from 10/27 and 10/28  Tolerating Ancef very well,    10/29  Looks good and feels good  Fever resolved almost,  Blood cultures negative x2, midline to be placed today discharge planning this evening after her p.m. dose of Ancef  In good spirits. [facility] 10/20 SA x 2, seems to be MRSA  ECMO line out 10/22      She had a GUILLERMO before they placed the ECMO on 10/13 and hence will defer another GUILLERMO. The 2D echo so far is not showing endocarditis        Echo: 10/17  Normal left ventricle size, wall thickness and function with a calculated EF   61%. No segmental wall motion abnormalities seen.    Normal right ventricular size and function. No significant valvular regurgitation or stenosis seen. Labs:  W 31 - 27 - 10  Creat 0.23    Micro:  BC MRSA 10/21  BC neg 10/23  BC neg 10/24    imaging  10/24  Bilateral airspace disease appears more prominent in the interval       10/20 cxr          CAT:  10/13/21    . I have personally reviewed the past medical history, past surgical history, medications, social history, and family history, and I have updated the database accordingly.   Past Medical History:     Past Medical History:   Diagnosis Date    Anxiety     GERD (gastroesophageal reflux disease)     Migraines     Moderate malnutrition ([address] 10/29/2021    Seasonal allergies        Past Surgical  History:     Past Surgical History:   Procedure Laterality Date     SECTION N/A 10/15/2021     SECTION performed by [place]ia Baumann MD at [facility]      nerve release, and plantar fascitis    HC  PICC POWERPIC TRIPLE  10/15/2021         MANDIBLE SURGERY      tooth implant    MOUTH SURGERY      WISDOM TOOTH EXTRACTION         Medications:      ceFAZolin  2,000 mg IntraVENous Q8H    metoprolol tartrate  25 mg Oral TID    enoxaparin  1 mg/kg SubCUTAneous BID    lactobacillus  1 capsule Oral Daily with breakfast    prenatal vitamin  1 tablet Oral Daily    Tdap-Dtap  0.5 mL IntraMUSCular Prior to discharge    lidocaine 1 % injection  5 mL IntraDERmal Once    sodium chloride flush  5-40 mL IntraVENous 2 times per day    docusate  100 mg Oral BID    sennosides-docusate sodium  2 tablet Oral Daily       Social History:     Social History     Socioeconomic History    Marital status:      Spouse name: Not on file    Number of children: Not on file    Years of education: Not on file    Highest education level: Not on file   Occupational History    Not on file   Tobacco Use    Smoking status: Former Smoker     Types: Cigarettes    Smokeless tobacco: Never Used Vaping Use    Vaping Use: Former    Substances: Nicotine   Substance and Sexual Activity    Alcohol use: Not Currently    Drug use: Not Currently     Types: Marijuana     Comment: last smoked May 2021    Sexual activity: Yes   Other Topics Concern    Not on file   Social History Narrative    Not on file     Social Determinants of Health     Financial Resource Strain:     Difficulty of Paying Living Expenses:    Food Insecurity:     Worried About Running Out of Food in the Last Year:     [address] in the Last Year:    Transportation Needs:     Lack of Transportation (Medical):  Lack of Transportation (Non-Medical):    Physical Activity:     Days of Exercise per Week:     Minutes of Exercise per Session:    Stress:     Feeling of Stress :    Social Connections:     Frequency of Communication with Friends and Family:     Frequency of Social Gatherings with Friends and Family:     Attends Sikhism Services:     Active Member of Clubs or Organizations:     Attends Club or Organization Meetings:     Marital Status:    Intimate Partner Violence:     Fear of Current or Ex-Partner:     Emotionally Abused:     Physically Abused:     Sexually Abused:        Family History:     Family History   Problem Relation Age of Onset    Breast Cancer Maternal Grandmother         older than 48    Heart Attack Paternal Uncle     Stroke Maternal Aunt     Hypertension Father     No Known Problems Paternal Grandfather     Stroke Paternal Grandmother     No Known Problems Maternal Grandfather     Hypertension Mother     No Known Problems Brother     No Known Problems Brother         Allergies:   Vicodin [hydrocodone-acetaminophen]     Review of Systems:     Review of Systems   Constitutional: Positive for activity change. Negative for chills and diaphoresis. HENT: Negative for congestion. Eyes: Negative for photophobia, pain, discharge and redness.    Respiratory: Negative for apnea and shortness of breath. Cardiovascular: Negative for chest pain. Gastrointestinal: Negative for abdominal distention. Endocrine: Negative for heat intolerance, polydipsia, polyphagia and polyuria. Genitourinary: Negative for dysuria and flank pain. Musculoskeletal: Negative for arthralgias. Skin: Negative for color change. Allergic/Immunologic: Positive for immunocompromised state. Neurological: Negative for dizziness, seizures, light-headedness and headaches. Hematological: Negative for adenopathy. Psychiatric/Behavioral: Negative for agitation, confusion and decreased concentration. Physical Examination :     Patient Vitals for the past 8 hrs:   BP Temp Temp src Resp SpO2   10/29/21 2100 126/86 99 °F (37.2 °C) Oral 14 96 %     Physical Exam  Constitutional:       General: She is not in acute distress. Appearance: Normal appearance. She is not ill-appearing or diaphoretic. Comments: Intubated sedated   HENT:      Head: Normocephalic and atraumatic. Nose: Nose normal.   Eyes:      General: No scleral icterus. Conjunctiva/sclera: Conjunctivae normal.      Pupils: Pupils are equal, round, and reactive to light. Cardiovascular:      Rate and Rhythm: Normal rate and regular rhythm. Heart sounds: Normal heart sounds. No murmur heard. No friction rub. No gallop. Pulmonary:      Effort: No respiratory distress. Breath sounds: No stridor. No rhonchi. Abdominal:      Palpations: Abdomen is soft. There is no mass. Hernia: No hernia is present. Genitourinary:     Comments: Urine bong  Musculoskeletal:         General: No swelling, tenderness, deformity or signs of injury. Cervical back: Neck supple. No rigidity or tenderness. Right lower leg: No edema. Left lower leg: No edema. Skin:     Coloration: Skin is not jaundiced or pale. Findings: No bruising, erythema, lesion or rash. Neurological:      General: No focal deficit present. Mental Status: She is alert and oriented to person, place, and time. Cranial Nerves: No cranial nerve deficit. Sensory: No sensory deficit. Coordination: Coordination normal.   Psychiatric:         Mood and Affect: Mood normal.         Behavior: Behavior normal.          Medical Decision Making -Laboratory:   I have independently reviewed/ordered the following labs:    CBC with Differential:   Recent Labs     10/28/21  0736 10/29/21  0651   WBC 12.0* 10.6   HGB 9.7* 10.4*   HCT 31.0* 33.9*    299     BMP:   Recent Labs     10/28/21  0736 10/29/21  0651    136   K 4.1 4.0    102   CO2 21 21   BUN 8 8   CREATININE 0.30* 0.33*     Hepatic Function Panel:   Recent Labs     10/28/21  0736 10/29/21  0651   PROT 6.1* 6.5   LABALBU 2.8* 3.0*   BILIDIR 0.33* 0.29   IBILI 0.93 0.86   BILITOT 1.26* 1.15   ALKPHOS 76 76   ALT 19 16   AST 21 20     No results for input(s): RPR in the last 72 hours. No results for input(s): HIV in the last 72 hours. No results for input(s): BC in the last 72 hours. Lab Results   Component Value Date    MUCUS NOT REPORTED 10/13/2021    RBC 3.63 10/29/2021    TRICHOMONAS NOT REPORTED 10/13/2021    WBC 10.6 10/29/2021    YEAST NOT REPORTED 10/13/2021    TURBIDITY Clear 10/13/2021     Lab Results   Component Value Date    CREATININE 0.33 10/29/2021    GLUCOSE 94 10/29/2021       Medical Decision Making-Imaging:     Narrative   EXAMINATION:   CTA OF THE CHEST 10/13/2021 2:16 pm       TECHNIQUE:   CTA of the chest was performed after the administration of intravenous   contrast.  Multiplanar reformatted images are provided for review.  MIP   images are provided for review. Dose modulation, iterative reconstruction,   and/or weight based adjustment of the mA/kV was utilized to reduce the   radiation dose to as low as reasonably achievable.       COMPARISON:   Chest x-rays over the last few days. .       HISTORY:   ORDERING SYSTEM PROVIDED HISTORY: sob, tachy, 26 WEEKS PREGNANT, Spoke with   Radiologist   TECHNOLOGIST PROVIDED HISTORY:   sob, tachy, 32 WEEKS PREGNANT, Spoke with Radiologist   Decision Support Exception - unselect if not a suspected or confirmed   emergency medical condition->Emergency Medical Condition (MA)       FINDINGS:   Pulmonary Arteries: There is a small nonocclusive segmental to subsegmental   embolism noted in the left lower lobe, on and around axial image [id number].  No   other definite emboli are seen.  No central emboli.  No pulmonary arterial   enlargement is identified.       Mediastinum: Mediastinal and hilar lymphadenopathy is identified.  No focal   esophageal thickening is identified.  The thyroid gland appears unremarkable.       Lungs/pleura: Multifocal ground-glass pneumonia with some mild areas of   consolidation.  No pleural effusion is identified.       Upper Abdomen: No acute process seen in the upper abdomen.       Soft Tissues/Bones: No axillary or supraclavicular lymphadenopathy is   identified.  No acute osseous abnormality is identified.           Impression   Single small nonocclusive segmental to subsegmental pulmonary embolism in the   left lower lobe.  No central emboli.  This was discussed with Shanita Sutton   at 2:44 p.m. on 10/13/2021.       Multilobar COVID-19 pneumonia.                 Medical Decision [facility]arbsd[facility]-Rwdlz:       Medical Decision Making-Other:     Note:  Labs, medications, radiologic studies were reviewed with personal review of films  Large amounts of data were reviewed  Discussed with nursing Staff, Discharge planner  Infection Control and Prevention measures reviewed  All prior entries were reviewed  Administer medications as ordered  Prognosis: Guarded  Discharge planning reviewed  Follow up as outpatient. Thank you for allowing us to participate in the care of this patient. Please call with questions.     George Pedersen MD  Office: [phone]

## 2021-11-01 NOTE — PROGRESS NOTES
Contacted patient and she states she prefers going to [facility] office.    She will contact their office to schedule

## 2021-11-02 ENCOUNTER — TELEPHONE (OUTPATIENT)
Dept: PULMONOLOGY | Age: 31
End: 2021-11-02

## 2021-11-02 LAB
CULTURE: NORMAL
Lab: NORMAL
SPECIMEN DESCRIPTION: NORMAL

## 2021-11-02 NOTE — TELEPHONE ENCOUNTER
----- Message from Woodward Brittle sent at 11/2/2021  8:24 AM EDT -----  Flat, please see message from Dr Don Holloway and call to schedule. Thank you.   ----- Message -----  From: Arleen Stack RN  Sent: 11/2/2021   8:18 AM EDT  To: Woodward Brittle    When I called this patient, she stated she wanted to go to [facility] office. Will need appointment for 6-8 weeks. ----- Message -----  From: Johanne Powers MD  Sent: 11/1/2021   8:24 PM EDT  To: Arleen Stack RN    Please have her see me in 6 to 8 weeks. Thank you  ----- Message -----  From: Arleen Stack RN  Sent: 11/1/2021   9:49 AM EDT  To: Johanne Powers MD        ----- Message -----  From: Johanen Powers MD  Sent: 10/30/2021  11:40 PM EDT  To: Nereyda Andrews LPN    Please have the patient see me as requested in [place].   Thank you  ----- Message -----  From: Stefan Beatty DO  Sent: 10/30/2021   2:24 AM EDT  To: Johanne Powers MD

## 2021-11-03 LAB
CULTURE: NORMAL
Lab: NORMAL
SPECIMEN DESCRIPTION: NORMAL

## 2021-11-04 LAB
CULTURE: NORMAL
Lab: NORMAL
SPECIMEN DESCRIPTION: NORMAL

## 2021-11-08 ENCOUNTER — TELEPHONE (OUTPATIENT)
Dept: INFECTIOUS DISEASES | Age: 31
End: 2021-11-08

## 2021-11-08 ENCOUNTER — TELEPHONE (OUTPATIENT)
Dept: OBGYN CLINIC | Age: 31
End: 2021-11-08

## 2021-11-08 ENCOUNTER — HOSPITAL ENCOUNTER (OUTPATIENT)
Dept: ONCOLOGY | Age: 31
Discharge: HOME | End: 2021-11-08
Attending: INTERNAL MEDICINE | Admitting: INTERNAL MEDICINE
Payer: COMMERCIAL

## 2021-11-08 VITALS
SYSTOLIC BLOOD PRESSURE: 99 MMHG | TEMPERATURE: 98.3 F | DIASTOLIC BLOOD PRESSURE: 75 MMHG | RESPIRATION RATE: 18 BRPM | HEART RATE: 100 BPM | OXYGEN SATURATION: 98 %

## 2021-11-08 PROBLEM — R78.81 MSSA BACTEREMIA: Status: ACTIVE | Noted: 2021-11-08

## 2021-11-08 PROBLEM — B95.61 MSSA BACTEREMIA: Status: ACTIVE | Noted: 2021-11-08

## 2021-11-08 PROCEDURE — C1751 CATH, INF, PER/CENT/MIDLINE: HCPCS

## 2021-11-08 PROCEDURE — 76937 US GUIDE VASCULAR ACCESS: CPT

## 2021-11-08 PROCEDURE — 36410 VNPNXR 3YR/> PHY/QHP DX/THER: CPT

## 2021-11-08 PROCEDURE — 36569 INSJ PICC 5 YR+ W/O IMAGING: CPT

## 2021-11-08 NOTE — TELEPHONE ENCOUNTER
Jeri Juan MD  Forwarded 11/07/2021 8:48 PM  [facility] - 11/07/2021 6:24 PM  [phone] From: magen RE: Jorge Ansfritz Severs ptn central line is leaking, need to send to er tonight or schedule a replacement tomorrow? Need Callback: NEEDS CALLBACK  Forwarded 11/07/2021 8:48 PM  11/7/2021 8:48 PM  Hi all! Can someone schedule this patient for ASAP midline ? At [facility] on Monday. Because she needs to keep her Antib q8 hours. Ty!! Perfect serve to picc office:    good morning! I received a message from Dr Gabo Lopez requesting a mid line replacement on pt Vahe Fernandez [birth year] -  pt lives in [place] and can be here in 45-50 min. please call or let me know when I can have her come in.  thanks :)  [facility]  2 of 2 read  On-Call, PICC Office Phone - 11/8/2021 8:33 AM  2-230 would be best for us if thats ok with the infusion center and the patient. 11/8/2021 8:34 AM  they are not picking up the phone, I will let you know. 1 of 2 read      PER MARY KAY ON 3C 2:00 is fine  0 of 2 read      Informed pt.

## 2021-11-08 NOTE — TELEPHONE ENCOUNTER
Could she see lactation up there in [facility] while her baby is still there?   Could be issue with pump settings, flange fit, infection

## 2021-11-08 NOTE — PROCEDURES
Product type Bard 4 Fr single lumen PowerMidline  History/Labs/Allergies Reviewed  Placed By: Tessa Frances. Kari Carroll RN  Assisted By: N/A  Time out Performed using Two Identifiers  Lot # [id number]  Expiration date 2/28/2023  Catheter size 4 Ni[place]  Trimmed at 13 cm  Total length inserted 12 cm  External catheter length 1cm  Location Right basilic vein  Number of attempts 1  Estimated blood loss 1 ml  Placement verified by- positive blood return & flushes easily  Special equipment used- ultrasound & micro-introducer technique   Catheter secured with statlock  Dressing applied- Tegaderm CHG  Lidocaine administered intradermally conc.1% 2 mL  Midline education:   [ x ] Discussed with patient/Family or POA prior to procedure. Risks and Benefits along with reason for procedure were discussed and teaching was reinforced with an education handout on Midline insertion. [facility] FAQ Catheter Associated Blood Stream Infections and [address] [facility] [address] REV. 7/13 Nursing and Booklet left at bedside or in chart. Patient (Family or POA) acknowledged understanding of information taught and agreed to procedure. [  ] Was not discussed with patient/family or POA due to pts medical status at time of procedure. pts family or POA not available to discuss Midline education.  [facility] FAQ Catheter Associated Blood Stream Infections and [address] [facility] REV. 7/13 Nursing and Booklet left at bedside or in chart    Bruno Sanchez RN

## 2021-11-08 NOTE — TELEPHONE ENCOUNTER
Patient called stating that she started noticing some bilateral breast pain this am.  She is pumping and denies any changes in milk production. She states that her nipples seem to be more swollen after pumping. She applied some nipple cream and that did not help. She denies any redness or warmth to her breasts. She has an appt this afternoon in [facility] for midline placement for IV antibiotics. Do you want to call something in or does she need an appointment?

## 2021-11-09 ENCOUNTER — OFFICE VISIT (OUTPATIENT)
Dept: OBGYN CLINIC | Age: 31
End: 2021-11-09
Payer: COMMERCIAL

## 2021-11-09 VITALS
SYSTOLIC BLOOD PRESSURE: 100 MMHG | WEIGHT: 200.2 LBS | DIASTOLIC BLOOD PRESSURE: 70 MMHG | BODY MASS INDEX: 34.18 KG/M2 | HEIGHT: 64 IN

## 2021-11-09 DIAGNOSIS — O92.70 LACTATION DISORDER: Primary | ICD-10-CM

## 2021-11-09 PROCEDURE — 99213 OFFICE O/P EST LOW 20 MIN: CPT | Performed by: ADVANCED PRACTICE MIDWIFE

## 2021-11-09 PROCEDURE — 1036F TOBACCO NON-USER: CPT | Performed by: ADVANCED PRACTICE MIDWIFE

## 2021-11-09 PROCEDURE — G8417 CALC BMI ABV UP PARAM F/U: HCPCS | Performed by: ADVANCED PRACTICE MIDWIFE

## 2021-11-09 PROCEDURE — 1111F DSCHRG MED/CURRENT MED MERGE: CPT | Performed by: ADVANCED PRACTICE MIDWIFE

## 2021-11-09 PROCEDURE — G8484 FLU IMMUNIZE NO ADMIN: HCPCS | Performed by: ADVANCED PRACTICE MIDWIFE

## 2021-11-09 PROCEDURE — G8427 DOCREV CUR MEDS BY ELIG CLIN: HCPCS | Performed by: ADVANCED PRACTICE MIDWIFE

## 2021-11-09 ASSESSMENT — ENCOUNTER SYMPTOMS
RESPIRATORY NEGATIVE: 1
GASTROINTESTINAL NEGATIVE: 1

## 2021-11-09 NOTE — DISCHARGE SUMMARY
[facility]     Department of Internal Medicine - Staff Internal Medicine Teaching Service    INPATIENT DISCHARGE SUMMARY      Patient Identification:  Freda Bose is a 27 y.o. female. :  1990  MRN: [id number]     Acct: [de-identified]   PCP: Laure Renee  Admit Date:  10/13/2021  Discharge date and time: 10/29/2021 11:00 PM   Attending Provider: No att. providers found                                     [address] Problem Lists:  Principal Problem:    Acute respiratory distress syndrome (ARDS) due to severe acute respiratory syndrome coronavirus 2 (SARS-CoV-2) (HCC)  Active Problems:    Pneumonia due to COVID-19 virus    26 weeks gestation of pregnancy    Tachycardia    Tachypnea    Anxiety    Pulmonary embolism (HCC)    Marginal insertion of umbilical cord affecting management of mother    Starvation ketoacidosis    PCCS 10/15/21 M Apg  Wt 2#3    Immunosuppressed status ([address]    MRSA (methicillin resistant Staphylococcus aureus) septicemia ([facility]    Bandemia    History of extracorporeal membrane oxygenation treatment    Moderate malnutrition ([facility]  Resolved Problems:    Severe malnutrition [facility])      HOSPITAL STAY     Brief Inpatient course:   Freda Bose is a 27 y.o. female who was admitted for the management of Acute respiratory distress syndrome (ARDS) due to severe acute respiratory syndrome coronavirus 2 (SARS-CoV-2) ([facility] She was transferred from [facility] where she presented with shortness of breath and cough. She was transferred to [facility] labor and delivery unit for higher level care as she was 26 weeks pregnant and had Covid pneumonia. She was also found to have small subsegmental pulmonary embolism. She also had metabolic acidosis with respiratory compensation. She was started on nonrebreather at 15 L in labor and delivery unit. She received betamethasone for lung maturity.   And was started on Decadron for COVID-19 pneumonia. She was treated with IV fluids and bicarb drip to optimize metabolic acidosis and decrease respiratory workload. Patient's respiratory status worsened on high flow, was placed on BiPAP however she continued to decline. Decision was made for emergent  and delivered the baby. Baby was transferred to NICU. Patient intubated, paralyzed and sedated  Plan was made to place patient on ECMO, transfer initiated and she was accepted at [place] [address] for ECMO. But then that bed was used for [facility] Chel's other decompensated patient. Cardiothoracic surgery was consulted for consideration of ECMO at [facility]. [facility]. She was on heparin drip for subsegmental pulmonary embolism. She was proned as a treatment for acute hypoxic respiratory failure secondary to ARDS secondary to Covid pneumonia prior to starting for ECMO. she underwent ultrasound guided right IJ venous access cannulation. VV ECMO [place] cannula was placed in the right atrium. She underwent transesophageal echocardiogram for it. She was started on ECMO. She underwent USG guided thoracocentesis  She was extubated. She clinically improved on ECMO. Weaning trial was given/ECMO decannulation done. Patient's cannulation site healed appropriately. During the stay, Cardiology was consulted for tachycardia and presence of SVT. She was started on esmolol drip. Drip was weaned off and she was started on Lopressor. Sinus tachycardia is likely secondary to multiple factors like anemia and infection/bacteremia. ECMO support was successfully terminated. She was able to maintain oxygen saturation on room air prior to discharge. All her medications were optimized and she was discharged with instructions as given below.      Procedures/ Significant Interventions:     section  Right IJ cannulation  thoracocenthesis    Consults:     Consults:     Final Specialist Recommendations/Findings:   IP CONSULT TO CRITICAL CARE  IP CONSULT TO INFECTIOUS DISEASES  IP CONSULT TO DIETITIAN  IP CONSULT TO CARDIOTHORACIC SURGERY  IP CONSULT TO LACTATION  IP CONSULT TO CARDIOLOGY  IP CONSULT TO IV TEAM  IP CONSULT TO IV TEAM  IP CONSULT TO IV TEAM  IP CONSULT TO CASE MANAGEMENT      Any Hospital Acquired Infections: none    Discharge Functional Status:  stable    DISCHARGE PLAN     Disposition: home    Patient Instructions:   Discharge Medication List as of 10/29/2021  6:15 PM      START taking these medications    Details   lactobacillus (CULTURELLE) capsule Take 1 capsule by mouth daily (with breakfast), Disp-60 capsule, R-0Normal      ceFAZolin (ANCEF) infusion Infuse 2,000 mg intravenously every 8 hours Stop after 11/28/21  Cbc diff creat crp weekly  No line draws  Pull line at DC  Compound per protocol, Disp-180 g, R-0Print      Lanolin Hydrous OINT EVERY 1 HOUR PRN Starting Fri 10/29/2021, Disp-100 g, R-0, Normal      enoxaparin (LOVENOX) 120 MG/0.8ML injection Inject 0.67 mLs into the skin 2 times daily, Disp-180 each, R-3Normal         CONTINUE these medications which have CHANGED    Details   metoprolol tartrate (LOPRESSOR) 25 MG tablet Take 1 tablet by mouth 3 times daily, Disp-260 tablet, R-3Normal         CONTINUE these medications which have NOT CHANGED    Details   omeprazole (PRILOSEC) 20 MG delayed release capsule Historical Med      Prenatal Vit-Fe Fumarate-FA (PRENATAL VITAMIN PO) Take 1 tablet by mouth daily Historical Med         STOP taking these medications       doxyLAMINE succinate (UNISOM SLEEPTABS) 25 MG tablet Comments:   Reason for Stopping:               Activity: activity as tolerated    Diet: regular diet    Follow-up:    [facility]  [address] [place] [address]  [phone]  Schedule an appointment as soon as possible for a visit in 2 weeks  We also have a [place] office if that location is convenient for you     Naun Lopez MD  [address] [place] [address], [address], [address] [facility] [address]  [phone]    In 4 weeks  infectious disease Dr Myrick Spore  [address]  [phone]    In 1 week      Kiana Ledesma MD  [place] [address]  [address]  [phone]      left lower lobe pulmonary embolus (blood clot )       Patient Instructions:   Please follow up with primary care physician, cardiology, infectious disease and pulmonologist    Please take lovenox shots twice weekly for atleast 3 months, duration to be determined by PCP    Lab work: CBC with diff Creat CRP weekly   No blood draws from midline  Pull midline IV after antibiotics done  Ancef 2grams IV every 8 hours IV 6am 2pm 10pm till 11/28/2021    Follow up labs: CBC with diff, CRP     Follow up imaging: none    Note that over 30 minutes was spent in preparing discharge papers, discussing discharge with patient, medication review, etc.      Lanette Varela MD,   Internal Medicine Resident, PGY-1  [facility];  [place], [place]  11/8/2021, 7:05 PM

## 2021-11-10 ENCOUNTER — TELEPHONE (OUTPATIENT)
Dept: OBGYN CLINIC | Age: 31
End: 2021-11-10

## 2021-11-10 NOTE — TELEPHONE ENCOUNTER
Pt has appointment with lactation consultant tomorrow at [facility]'s when she goes to meet her baby. If that visit is not helpful she was told to call Gianni Bar at [address] [facility].

## 2021-11-10 NOTE — PROGRESS NOTES
PROBLEM VISIT     Date of service: 2021    Kwadwo Barton  Is a 27 y.o.  female    PT's PCP is: [facility]     : 1990                                          Review of Systems   Constitutional: Negative. Breast issues - pumping     HENT: Negative. Respiratory: Negative. Cardiovascular: Negative. Gastrointestinal: Negative. Skin:        Dressing from line while in hospital - to right neck     Neurological: Negative. Hematological:        Central Line access in right arm       Patient's last menstrual period was 2021 (approximate).    OB History    Para Term  AB Living   1 1 0 1 0 1   SAB IAB Ectopic Molar Multiple Live Births   0 0 0 0 0 1      # Outcome Date GA Lbr Charli/2nd Weight Sex Delivery Anes PTL Lv   1  10/15/21 26w5d  2 lb 3.3 oz (1 kg) M CS-Classical Gen N YUMIKO        Social History     Tobacco Use   Smoking Status Former Smoker    Types: Cigarettes   Smokeless Tobacco Never Used        Social History     Substance and Sexual Activity   Alcohol Use Not Currently       Allergies: Vicodin [hydrocodone-acetaminophen]      Current Outpatient Medications:     lactobacillus (CULTURELLE) capsule, Take 1 capsule by mouth daily (with breakfast), Disp: 60 capsule, Rfl: 0    ceFAZolin (ANCEF) infusion, Infuse 2,000 mg intravenously every 8 hours Stop after 21 Cbc diff creat crp weekly No line draws Pull line at [place] Compound per protocol, Disp: 180 g, Rfl: 0    Lanolin Hydrous OINT, Apply 1 Container topically every hour as needed (nipple discomfort), Disp: 100 g, Rfl: 0    enoxaparin (LOVENOX) 120 MG/0.8ML injection, Inject 0.67 mLs into the skin 2 times daily, Disp: 180 each, Rfl: 3    metoprolol tartrate (LOPRESSOR) 25 MG tablet, Take 1 tablet by mouth 3 times daily, Disp: 260 tablet, Rfl: 3    omeprazole (PRILOSEC) 20 MG delayed release capsule, , Disp: , Rfl:     Prenatal Vit-Fe Fumarate-FA (PRENATAL VITAMIN PO), Take 1 tablet by mouth daily , Disp: , Rfl:     Social History     Substance and Sexual Activity   Sexual Activity Yes       Chief Complaint   Patient presents with    Breast Problem     Pt has been pumping since delivery. C/o tenderness in nipples and pain with pumping. Denies warmth, redness. Physical Exam  Constitutional:       Appearance: Normal appearance. She is obese. Genitourinary:   Breasts:      Right: Tenderness present. Left: Tenderness present. Breast exam comments: Bilateral nipples found to be inflammed, tender, chapped on tips. No open sores present. Lactating bilaterally via pumping only due to infant still in NICU. HENT:      Head: Normocephalic. Eyes:      Pupils: Pupils are equal, round, and reactive to light. Pulmonary:      Effort: Pulmonary effort is normal.   Musculoskeletal:         General: Normal range of motion. Cervical back: Normal range of motion. Neurological:      Mental Status: She is alert and oriented to person, place, and time. Skin:     General: Skin is warm and dry. Psychiatric:         Behavior: Behavior normal.   Vitals and nursing note reviewed. Vital Signs  Blood pressure 100/70, height 5' 4\" (1.626 m), weight 200 lb 3.2 oz (90.8 kg), last menstrual period 04/01/2021, unknown if currently breastfeeding. HPI Here for eval of possible infection related to pumping. Reports that is using Medela pump in style advanced with a 30mm flange which was fit by lactation while inpatient at hospital in critical care. Pumping every 3 hours for 30 minutes and is able to produce 10-60ml each session. Per her providers from critical care - does not wake at night to pump (via alarm) but can pump if wakes on her own. Reports right breast produces more than left. Reports no changes since coming home from hospital.  Does not use breast pads, wearing nursing bras which are loose. Denies redness, fever.                               Assessment and Plan          Diagnosis Orders   1. Lactation disorder       Discussed proper flange fit and that often adjustments are needed during pumping duration. Encouraged to meet with lactation at [facility] or [facility] for eval with her pump - she is agreeable. Discussed ensuring that nipples are not rubbing on flange and to use a barrier such as coconut oil with pumping. Discussed use of all purpose nipple ointment - recommended mixture of clotrimazone, hydrocortisone, and polysporin in equal amounts and apply after each session of pumping. Wash pump parts often. Discussed probiotic continuation. Encouraged to call for additional needs/concerns. I am having Shaista Marlen. Rafael maintain her omeprazole, Prenatal Vit-Fe Fumarate-FA (PRENATAL VITAMIN PO), lactobacillus, ceFAZolin, Lanolin Hydrous, enoxaparin, and metoprolol tartrate. No follow-ups on file. She was also counseled on her preventative health maintenance recommendations and follow-up. There are no Patient Instructions on file for this visit.     FADUMO Bowser CNM,11/9/2021 8:14 PM                   Electronically signed by FADUMO Bowser CNM

## 2021-11-10 NOTE — TELEPHONE ENCOUNTER
Please call patient - spoke with Lactation consultant - Chacorta Roca - today at [facility] I discussed briefly her presentation yesterday - she is agreeable that possibly improper flange size and needs evaluated. She also mentioned Pumpin Pal Flanges that work better for many women - can be purchased online. Due to her travel and illness amongst other concerns - she is able to do a telehealth type visit with [facility] if she would like. Chacorta Roca said that all Jodie Calumet would need is a device with a camera so they can talk and visualize.       If she would like to do this - please give her [facility] number and she can ask for lactation consultant when calls [phone]

## 2021-11-29 NOTE — OP NOTE
[address] [facility], [address]                                OPERATIVE REPORT    PATIENT NAME: Yasmin Choi                   :        1990  MED REC NO:   [id number]                             ROOM:       [id number]  ACCOUNT NO:   [de-identified]                           ADMIT DATE: 10/13/2021  PROVIDER:     Payton Lemon MD    DATE OF PROCEDURE:  10/17/2021    PRIMARY ATTENDING SURGEON/CO-SURGEON:  Kassie Nicole MD and Pedro Bradshaw MD.    PREOPERATIVE DIAGNOSES:  Severe ARDS secondary to COVID-19, end-stage  respiratory failure, refractory hypoxia. POSTOPERATIVE DIAGNOSES:  Severe ARDS secondary to COVID-19, end-stage  respiratory failure, refractory hypoxia. PROCEDURES:  GUILLERMO, ultrasound-guided right IJ access, cannulation of  right internal jugular vein with 30-[place] Medtronic ECMO  cannula, ECMO cannula positioning under fluoroscopy and GUILLERMO, initiation  and management of ECMO support. COMPLICATIONS:  None. CONDITION:  Stable. DISPOSITION:  To CVICU. ESTIMATED BLOOD LOSS:  Not applicable. ANESTHESIA:  General endotracheal with Dr. Cathy Guillermo. INDICATIONS FOR SURGERY:  This is a patient, who was referred to the  ECMO team after having had a  section for a viable pregnancy,  which was complicated by severe COVID ARDS. The baby survived in a   section; however, the patient went on to severely  decompensating within the first 24 hours of her  section with  refractory hypoxia and end-stage respiratory failure from COVID-19 ARDS  infection. For this reason, the ECMO service was consulted, and Dr. Hernando Aldridge and I found her to be an acceptable candidate for ECMO  therapy, and she was taken to the catheterization lab on 10/17/2021 for  the cannulation and initiation of ECMO support.     FINDINGS AT SURGERY:  There was a satisfactory cannulation of the right  internal jugular vein with a 30-Citizen [place] ECMO [place] Medtronic cannula  and the initiation of support was successful with both GUILLERMO and  fluoroscopic confirmation of adequate cannula positioning. PROCEDURE IN DETAIL:  The patient was identified in the CVICU and was  transported to the cath lab where she was induced for general  endotracheal anesthesia without difficulty. The appropriate placement  of lines and access were performed, and the operation began with the  performance of an ultrasound-guided right internal jugular vein access. Working through a 7-[place]izen [place] sheath in the right internal jugular vein, a  stiff wire was placed into the SVC and across the right atrium into the  IVC. We confirmed fluoroscopically that the wire was in the IVC. We  thus dilated the access to the right internal jugular vein up to  30-[place]izen [place] and placed a 30-[place] cannula. The cannula was  confirmed to be in good position by both fluoroscopy and GUILLERMO. Then the  patient was connected to the VV-ECMO circuit. ECMO support was  initiated and we used a combination of GUILLERMO and fluoroscopy to confirm  the positioning of the cannula with the return jet being across the  tricuspid valve. Once satisfactory positioning was obtained and  confirmed, then the ECMO cannula was sewn in place. The site was  dressed appropriately and the procedure was concluded with successful  initiation of ECMO support. She was taken to CVICU in stable condition  where she remained stable on ECMO support. There were no adequately trained or available residents for assistance  in this operation, and the presence of Dr. Kevin Rodgers and I were critical for  the initiation of access, the confirmation of access, the placement of  the cannula, and the connection to the cardiopulmonary bypass machine  with both of us acting as co-surgeon. RAHUL Guillermo MD    D: 11/29/2021 2:56:05       T: 11/29/2021 4:27:44     [email or web address]  Job#: [id number] Doc#: [id number]    CC:

## 2021-12-01 ENCOUNTER — OFFICE VISIT (OUTPATIENT)
Dept: INFECTIOUS DISEASES | Age: 31
End: 2021-12-01
Payer: COMMERCIAL

## 2021-12-01 VITALS
WEIGHT: 204 LBS | RESPIRATION RATE: 16 BRPM | OXYGEN SATURATION: 96 % | BODY MASS INDEX: 34.83 KG/M2 | HEIGHT: 64 IN | TEMPERATURE: 98.5 F | DIASTOLIC BLOOD PRESSURE: 78 MMHG | HEART RATE: 98 BPM | SYSTOLIC BLOOD PRESSURE: 113 MMHG

## 2021-12-01 DIAGNOSIS — R51.9 HEADACHE ASSOCIATED WITH INFECTION: Primary | ICD-10-CM

## 2021-12-01 DIAGNOSIS — R51.9 POST-COVID CHRONIC HEADACHE: ICD-10-CM

## 2021-12-01 DIAGNOSIS — R78.81 STAPHYLOCOCCUS AUREUS BACTEREMIA: ICD-10-CM

## 2021-12-01 DIAGNOSIS — G89.29 POST-COVID CHRONIC HEADACHE: ICD-10-CM

## 2021-12-01 DIAGNOSIS — U09.9 POST-COVID CHRONIC HEADACHE: ICD-10-CM

## 2021-12-01 DIAGNOSIS — B99.9 HEADACHE ASSOCIATED WITH INFECTION: Primary | ICD-10-CM

## 2021-12-01 DIAGNOSIS — B95.61 STAPHYLOCOCCUS AUREUS BACTEREMIA: ICD-10-CM

## 2021-12-01 PROCEDURE — G8427 DOCREV CUR MEDS BY ELIG CLIN: HCPCS | Performed by: INTERNAL MEDICINE

## 2021-12-01 PROCEDURE — 1036F TOBACCO NON-USER: CPT | Performed by: INTERNAL MEDICINE

## 2021-12-01 PROCEDURE — G8484 FLU IMMUNIZE NO ADMIN: HCPCS | Performed by: INTERNAL MEDICINE

## 2021-12-01 PROCEDURE — 99214 OFFICE O/P EST MOD 30 MIN: CPT | Performed by: INTERNAL MEDICINE

## 2021-12-01 PROCEDURE — G8417 CALC BMI ABV UP PARAM F/U: HCPCS | Performed by: INTERNAL MEDICINE

## 2021-12-01 RX ORDER — L. ACIDOPHILUS/PECTIN, CITRUS 25MM-100MG
TABLET ORAL
COMMUNITY
Start: 2021-11-19 | End: 2022-03-10

## 2021-12-01 ASSESSMENT — ENCOUNTER SYMPTOMS
ABDOMINAL DISTENTION: 0
COLOR CHANGE: 0
EYE DISCHARGE: 0
APNEA: 0

## 2021-12-01 NOTE — PROGRESS NOTES
[facility] - Initial Consult Note  Today's Date: 12/1/2021    Impression :   Visit STV 10/2021  · Severe covid- while pregnenet - C section BB in NICU  · MSSA septicemia GUILLERMO neg, post 4 weeks ancef  · pulm emboli LLL post covid  · Post steroids for covid   · 12/1/21 Post covid HA    Recommendations   Need to R/O pseudoaneurysms from the covid - get MRA  If all neg, consider SZ and neuro referral  ·    Diagnosis Orders   1. Headache associated with infection  MRA Zafar Mayer MD, [facility], [place]   2. Post-COVID chronic headache  MRI Philomena Lino MD, [facility]   3. Staphylococcus aureus bacteremia  [facility] Jessy Jerry MD, [facility]       Return if symptoms worsen or fail to improve. History of Present Illness:   Jo-Ann Sandra is a 27y.o.-year-old  female who presents with   Chief Complaint   Patient presents with    Frequent Infections     post [facility] f/u   post STV 10/13/21  · COVID 19 pneumonia  -10/10/21: Positive  ? resp failure - intubated 10/14, extubated 10/21  ? Ecmo - decanulated 10/22  · 26 weeks gestation pregnancy  ? Baby delivered early 32 weeks  · Pulmonary embolism LLL  · Metabolic acidosis with euglycemic starvation  · 10/21 MSSA sepsis / septicemia  · Bandemia 10/21  Intubated 10/14 -extubated 10/21    Post Decadron-Initiated 10/14/21 - stop ad day 9 on 10/22 due to SA sepsis  · anticoagulation  DEFERED barcitinib and  Actemra due to pregnancy  ? 10/21-acute leukocytosis, Vanco cefepime started  § 10/22 BC SA -vanco - stop decadron day 9, pulled the left IJ as well   § Last + BC 10/25 -  § ancef x 28 days after first [facility] neg  ? Repeat daily BC till neg  ? 2D echo look for vegetation neg - defer a new GUILLERMO ( was done to start Ecmo)  ?  10/23 diarrhea - probiotics - better  Chart reconciled, okay for discharge, follow-up with me in 4 weeks    Office visit 21  AB done 21 midline is out  Feels good not SOB and no fever  Saturation high 90s daily  Autonomous  Site of the midlines irritated    Gets R  occipital sudden sharp pain and goes sec later. Less than before   Gets it 7 -10 x per day  started 11/10/21    Gets HAs all her life and those are same as before, all over the head  Dry skin and bumps over the upper arms - needs creams    Plan:  Need to R/O pseudoaneurysms from the covid - get MRA  If all neg, consider SZ        I have personally reviewed the past medical history, past surgical history, medications, social history, and family history, and I haveupdated the database accordingly.   Past Medical History:     Past Medical History:   Diagnosis Date    Anxiety     GERD (gastroesophageal reflux disease)     Migraines     Moderate malnutrition ([address] 10/29/2021    Seasonal allergies        Past Surgical  History:     Past Surgical History:   Procedure Laterality Date     SECTION N/A 10/15/2021     SECTION performed by Cooper Morse MD at [address]      nerve release, and plantar fascitis    HC  PICC POWERPIC TRIPLE  10/15/2021         INSERT MIDLINE CATHETER  2021         MANDIBLE SURGERY      tooth implant    MOUTH SURGERY      WISDOM TOOTH EXTRACTION         Medications:     Current Outpatient Medications:     Lactobacillus Acid-Pectin (ACIDOPHILUS/CITRUS PECTIN) TABS, , Disp: , Rfl:     enoxaparin (LOVENOX) 100 MG/ML injection, , Disp: , Rfl:     Lanolin Hydrous OINT, Apply 1 Container topically every hour as needed (nipple discomfort), Disp: 100 g, Rfl: 0    metoprolol tartrate (LOPRESSOR) 25 MG tablet, Take 1 tablet by mouth 3 times daily, Disp: 260 tablet, Rfl: 3    omeprazole (PRILOSEC) 20 MG delayed release capsule, Take 20 mg by mouth daily as needed , Disp: , Rfl:     Prenatal Vit-Fe Fumarate-FA (PRENATAL VITAMIN PO), Take 1 tablet by mouth daily , Disp: , Rfl:    lactobacillus (CULTURELLE) capsule, Take 1 capsule by mouth daily (with breakfast) (Patient not taking: Reported on 12/1/2021), Disp: 60 capsule, Rfl: 0    enoxaparin (LOVENOX) 120 MG/0.8ML injection, Inject 0.67 mLs into the skin 2 times daily (Patient not taking: Reported on 12/1/2021), Disp: 180 each, Rfl: 3      Social History:     Social History     Socioeconomic History    Marital status:      Spouse name: Not on file    Number of children: Not on file    Years of education: Not on file    Highest education level: Not on file   Occupational History    Not on file   Tobacco Use    Smoking status: Former Smoker     Years: 3.00     Types: Cigarettes     Quit date: 12/1/2011     Years since quitting: 10.0    Smokeless tobacco: Never Used   Vaping Use    Vaping Use: Former    Substances: Nicotine   Substance and Sexual Activity    Alcohol use: Not Currently    Drug use: Not Currently     Types: Marijuana Berneta Yovany)     Comment: last smoked May 2021    Sexual activity: Yes   Other Topics Concern    Not on file   Social History Narrative    Not on file     Social Determinants of Health     Financial Resource Strain:     Difficulty of Paying Living Expenses: Not on file   Food Insecurity:     Worried About [address] in the Last Year: Not on file    [address] in the Last Year: Not on file   Transportation Needs:     Lack of Transportation (Medical): Not on file    Lack of Transportation (Non-Medical):  Not on file   Physical Activity:     Days of Exercise per Week: Not on file    Minutes of Exercise per Session: Not on file   Stress:     Feeling of Stress : Not on file   Social Connections:     Frequency of Communication with Friends and Family: Not on file    Frequency of Social Gatherings with Friends and Family: Not on file    Attends Anglican Services: Not on file    Active Member of Clubs or Organizations: Not on file    Attends Club or Organization Meetings: Not on file    Marital Status: Not on file   Intimate Partner Violence:     Fear of Current or Ex-Partner: Not on file    Emotionally Abused: Not on file    Physically Abused: Not on file    Sexually Abused: Not on file   Housing Stability:     Unable to Pay for Housing in the Last Year: Not on file    Number of [place] in the Last Year: Not on file    Unstable Housing in the Last Year: Not on file       Family History:     Family History   Problem Relation Age of Onset    Breast Cancer Maternal Grandmother         older than 48    Heart Attack Paternal Uncle     Stroke Maternal Aunt     Hypertension Father     No Known Problems Paternal Grandfather     Stroke Paternal Grandmother     No Known Problems Maternal Grandfather     Hypertension Mother     No Known Problems Brother     No Known Problems Brother         Allergies:   Vicodin [hydrocodone-acetaminophen]     Review of Systems:   Review of Systems   Constitutional: Negative for activity change and fatigue. HENT: Negative for congestion. Eyes: Negative for discharge and visual disturbance. Respiratory: Negative for apnea. Cardiovascular: Negative for chest pain. Gastrointestinal: Negative for abdominal distention. Endocrine: Negative for polyphagia. Genitourinary: Negative for dysuria. Musculoskeletal: Negative for arthralgias. Skin: Negative for color change. Allergic/Immunologic: Negative for immunocompromised state. Neurological: Positive for headaches. Hematological: Negative for adenopathy. Psychiatric/Behavioral: Negative for agitation. Physical Examination :   Blood pressure 113/78, pulse 98, temperature 98.5 °F (36.9 °C), temperature source Temporal, resp. rate 16, height 5' 4\" (1.626 m), weight 204 lb (92.5 kg), last menstrual period 04/01/2021, SpO2 96 %, unknown if currently breastfeeding. Physical Exam  Constitutional:       Appearance: Normal appearance. HENT:      Head: Normocephalic. Nose: Nose normal.      Mouth/Throat:      Mouth: Mucous membranes are moist.   Eyes:      General: No scleral icterus. Conjunctiva/sclera: Conjunctivae normal.   Cardiovascular:      Rate and Rhythm: Normal rate and regular rhythm. Heart sounds: Normal heart sounds. No murmur heard. Pulmonary:      Effort: No respiratory distress. Breath sounds: Normal breath sounds. Abdominal:      General: There is no distension. Tenderness: There is no abdominal tenderness. Genitourinary:     Comments: No miles  Musculoskeletal:         General: No swelling or deformity. Cervical back: Neck supple. No rigidity. Skin:     General: Skin is dry. Coloration: Skin is not jaundiced. Neurological:      General: No focal deficit present. Mental Status: She is alert and oriented to person, place, and time. Psychiatric:         Mood and Affect: Mood normal.         Thought Content:  Thought content normal.           Medical Decision Making:   I have independently reviewed/ordered the following labs:    CBCwith Differential:   Lab Results   Component Value Date    WBC 10.6 10/29/2021    WBC 12.0 10/28/2021    HGB 10.4 10/29/2021    HGB 9.7 10/28/2021    HCT 33.9 10/29/2021    HCT 31.0 10/28/2021     10/29/2021     10/28/2021    LYMPHOPCT 20 10/17/2021    LYMPHOPCT 11 10/16/2021    MONOPCT 4 10/17/2021    MONOPCT 3 10/16/2021     BMP:  Lab Results   Component Value Date     10/29/2021     10/28/2021    K 4.0 10/29/2021    K 4.1 10/28/2021     10/29/2021     10/28/2021    CO2 21 10/29/2021    CO2 21 10/28/2021    BUN 8 10/29/2021    BUN 8 10/28/2021    CREATININE 0.33 10/29/2021    CREATININE 0.30 10/28/2021    MG 1.9 10/26/2021    MG 1.9 10/25/2021     Hepatic Function Panel:   Lab Results   Component Value Date    PROT 6.5 10/29/2021    PROT 6.1 10/28/2021    LABALBU 3.0 10/29/2021    LABALBU 2.8 10/28/2021    BILIDIR 0.29 10/29/2021    BILIDIR 0.33 10/28/2021    IBILI 0.86 10/29/2021    IBILI 0.93 10/28/2021    BILITOT 1.15 10/29/2021    BILITOT 1.26 10/28/2021    ALKPHOS 76 10/29/2021    ALKPHOS 76 10/28/2021    ALT 16 10/29/2021    ALT 19 10/28/2021    AST 20 10/29/2021    AST 21 10/28/2021     No results found for: RPR  No results found for: HIV  No results found for: [facility]  Lab Results   Component Value Date    MUCUS NOT REPORTED 10/13/2021    RBC 3.63 10/29/2021    TRICHOMONAS NOT REPORTED 10/13/2021    WBC 10.6 10/29/2021    YEAST NOT REPORTED 10/13/2021    TURBIDITY Clear 10/13/2021     Lab Results   Component Value Date    CREATININE 0.33 10/29/2021    GLUCOSE 94 10/29/2021   you for allowing us to participate in the care of this patient. Please call with questions. Aneesh Mueller MD  - Office: [phone]    Please note that this chart was generated using voice recognition Dragon dictation software. Although every effort was made to ensure the accuracy of this automated transcription, some errors in transcription mayhave occurred.

## 2021-12-02 ENCOUNTER — POSTPARTUM VISIT (OUTPATIENT)
Dept: OBGYN CLINIC | Age: 31
End: 2021-12-02

## 2021-12-02 VITALS
SYSTOLIC BLOOD PRESSURE: 100 MMHG | HEIGHT: 64 IN | DIASTOLIC BLOOD PRESSURE: 64 MMHG | BODY MASS INDEX: 34.79 KG/M2 | WEIGHT: 203.8 LBS

## 2021-12-02 PROCEDURE — 0503F POSTPARTUM CARE VISIT: CPT | Performed by: ADVANCED PRACTICE MIDWIFE

## 2021-12-02 RX ORDER — ACETAMINOPHEN AND CODEINE PHOSPHATE 120; 12 MG/5ML; MG/5ML
1 SOLUTION ORAL DAILY
Qty: 28 TABLET | Refills: 3 | Status: SHIPPED | OUTPATIENT
Start: 2021-12-02 | End: 2022-02-22 | Stop reason: SDUPTHER

## 2021-12-02 NOTE — PROGRESS NOTES
POSTPARTUM EXAM    Date of service: 2021    Landen Desai  Is a 27 y.o.  female    PT's PCP is: Daja Green     : 1990     OB History    Para Term  AB Living   1 1 0 1 0 1   SAB IAB Ectopic Molar Multiple Live Births   0 0 0 0 0 1      # Outcome Date GA Lbr Charli/2nd Weight Sex Delivery Anes PTL Lv   1  10/15/21 26w5d  2 lb 3.3 oz (1 kg) M CS-Classical Gen N YUMIKO        Social History     Tobacco Use   Smoking Status Former Smoker    Years: 3.00    Types: Cigarettes    Quit date: 2011    Years since quitting: 10.0   Smokeless Tobacco Never Used         Social History     Substance and Sexual Activity   Alcohol Use Not Currently         Delivery date 10/29/2021    Type of delivery:  Primary  section    Laceration:Yes, Low transverse    Infant gender: male    Are you breast or bottle feeding? pumping and supplementing with formula    Have you been sexually active since delivery? No    Vital Signs: Blood pressure 100/64, height 5' 4\" (1.626 m), weight 203 lb 12.8 oz (92.4 kg), last menstrual period 2021, unknown if currently breastfeeding. Labs:    Blood Type and Rh: O POSITIVE          Allergies: Vicodin [hydrocodone-acetaminophen]      Current Outpatient Medications:     Lactobacillus Acid-Pectin (ACIDOPHILUS/CITRUS PECTIN) TABS, , Disp: , Rfl:     Lanolin Hydrous OINT, Apply 1 Container topically every hour as needed (nipple discomfort), Disp: 100 g, Rfl: 0    metoprolol tartrate (LOPRESSOR) 25 MG tablet, Take 1 tablet by mouth 3 times daily, Disp: 260 tablet, Rfl: 3    omeprazole (PRILOSEC) 20 MG delayed release capsule, Take 20 mg by mouth daily as needed , Disp: , Rfl:     Prenatal Vit-Fe Fumarate-FA (PRENATAL VITAMIN PO), Take 1 tablet by mouth daily , Disp: , Rfl:       Chief Complaint   Patient presents with    Postpartum Care     PPV. . Pt is pumping. Pt states left breast is not making much milk.  Pt unsure when stopped bleeding. Pt has not resumed or intercourse. How many Hours of sleep do you get a night: Great    Do you have a normal appetite: yes    Any problems or pain: denies aside from low breast milk supply    Do you feel like you coping well: yes    Is baby sleeping: remains in nicu - may need intubated again    How is baby eating: tube feeds    How did first pediatrician visit go: n/a    EPPDS:Denies blues/depression    No results found for this visit on 12/02/21. HPI:  PT presents for Post partum exam Follow up prn after delivery. Lochia ceased. Menses not resumed. Pumping and supplements. Continues to follow with all providers in [place]. Objective  Physical Exam  Constitutional:       Appearance: Normal appearance. She is normal weight. HENT:      Head: Normocephalic. Eyes:      Pupils: Pupils are equal, round, and reactive to light. Pulmonary:      Effort: Pulmonary effort is normal.   Musculoskeletal:         General: Normal range of motion. Cervical back: Normal range of motion. Neurological:      Mental Status: She is alert and oriented to person, place, and time. Skin:     General: Skin is warm and dry. Psychiatric:         Behavior: Behavior normal.   Vitals and nursing note reviewed. Assessment and Plan          Diagnosis Orders   1. Postpartum care and examination         Reviewed return to normal activities. Will send in [address] for menses control should they return      I have discontinued Juan Hall's lactobacillus, enoxaparin, and enoxaparin. I am also having her start on norethindrone. Additionally, I am having her maintain her omeprazole, Prenatal Vit-Fe Fumarate-FA (PRENATAL VITAMIN PO), Lanolin Hydrous, metoprolol tartrate, and acidophilus/citrus pectin. Return in about 3 months (around 3/2/2022) for Yearly. She was also counseled on her preventative health maintenance recommendations and follow-up.      There are no Patient Instructions on file for this visit.     FADUMO Bowser CNM,12/2/2021 3:46 PM

## 2021-12-15 ENCOUNTER — HOSPITAL ENCOUNTER (OUTPATIENT)
Dept: MRI IMAGING | Age: 31
Discharge: HOME | End: 2021-12-17
Payer: COMMERCIAL

## 2021-12-15 DIAGNOSIS — R51.9 HEADACHE ASSOCIATED WITH INFECTION: ICD-10-CM

## 2021-12-15 DIAGNOSIS — R51.9 POST-COVID CHRONIC HEADACHE: ICD-10-CM

## 2021-12-15 DIAGNOSIS — U09.9 POST-COVID CHRONIC HEADACHE: ICD-10-CM

## 2021-12-15 DIAGNOSIS — B99.9 HEADACHE ASSOCIATED WITH INFECTION: ICD-10-CM

## 2021-12-15 DIAGNOSIS — G89.29 POST-COVID CHRONIC HEADACHE: ICD-10-CM

## 2021-12-15 PROCEDURE — 70544 MR ANGIOGRAPHY HEAD W/O DYE: CPT

## 2021-12-15 PROCEDURE — 70553 MRI BRAIN STEM W/O & W/DYE: CPT

## 2021-12-15 PROCEDURE — A9576 INJ PROHANCE MULTIPACK: HCPCS | Performed by: INTERNAL MEDICINE

## 2021-12-15 PROCEDURE — 6360000004 HC RX CONTRAST MEDICATION: Performed by: INTERNAL MEDICINE

## 2021-12-15 RX ORDER — SODIUM CHLORIDE 0.9 % (FLUSH) 0.9 %
10 SYRINGE (ML) INJECTION PRN
Status: DISCONTINUED | OUTPATIENT
Start: 2021-12-15 | End: 2021-12-18 | Stop reason: HOSPADM

## 2021-12-15 RX ADMIN — GADOTERIDOL 20 ML: 279.3 INJECTION, SOLUTION INTRAVENOUS at 17:19

## 2021-12-16 ENCOUNTER — VIRTUAL VISIT (OUTPATIENT)
Dept: PULMONOLOGY | Age: 31
End: 2021-12-16
Payer: COMMERCIAL

## 2021-12-16 DIAGNOSIS — Z92.81 HISTORY OF EXTRACORPOREAL MEMBRANE OXYGENATION TREATMENT: ICD-10-CM

## 2021-12-16 DIAGNOSIS — U07.1 COVID-19: Primary | ICD-10-CM

## 2021-12-16 DIAGNOSIS — Z87.891 SMOKING HISTORY: ICD-10-CM

## 2021-12-16 DIAGNOSIS — I26.99 OTHER ACUTE PULMONARY EMBOLISM WITHOUT ACUTE COR PULMONALE (HCC): ICD-10-CM

## 2021-12-16 PROCEDURE — 99214 OFFICE O/P EST MOD 30 MIN: CPT | Performed by: INTERNAL MEDICINE

## 2021-12-16 PROCEDURE — G8427 DOCREV CUR MEDS BY ELIG CLIN: HCPCS | Performed by: INTERNAL MEDICINE

## 2021-12-20 NOTE — PROGRESS NOTES
PULMONARY OP  PROGRESS NOTE    Moni Wilson is a 27 y.o. female being evaluated by a Virtual Visit (video visit) encounter to address concerns as mentioned above. A caregiver was present when appropriate. Due to this being a TeleHealth encounter ([facility]), evaluation of the following organ systems was limited: Vitals/Constitutional/EENT/Resp/CV/GI//MS/Neuro/Skin/Heme-Lymph-Imm. Pursuant to the emergency declaration under the [address] [place] [address] and the [employer] Act, this Virtual Visit was conducted with patient's (and/or legal guardian's) consent, to reduce the patient's risk of exposure to COVID-19 and provide necessary medical care. The patient (and/or legal guardian) has also been advised to contact this office for worsening conditions or problems, and seek emergency medical treatment and/or call 911 if deemed necessary. Patient identification was verified at the start of the visit: Yes    Total time spent for this encounter: Not billed by time    Services were provided through a video synchronous discussion virtually to substitute for in-person clinic visit. Patient and provider were located at their individual homes. --Johanne Powers MD on 12/19/2021 at 7:18 PM    An electronic signature was used to authenticate this note. Patient:  Moni Wilson  YOB: 1990    MRN: [id number]     Acct:        Pt seen and Chart reviewed. Moni Wilson is here in followup for   1. COVID-19    2. Other acute pulmonary embolism without acute cor pulmonale ([facility] [address]    3. History of extracorporeal membrane oxygenation treatment    4. Smoking history          Pt  has not been hospitalizedor been to er since last visit. Has been using meds as recommended.   Has been using Lovenox without problems  No bleeding complications  Because of her breast-feeding, she could not use Eliquis or Xarelto  Does not like using Coumadin and hence we will continue using Lovenox  Recommended using for at least 3 months given the predisposing condition of being in the hospital that led for her to have the pulmonary embolism  Denied any shortness of breath  No cough or sputum production  No hemoptysis  No orthopnea or PND  No chest pain or pressure    Subjective:  Review of Systems    Review of Systems -   General ROS: Completed and except as mentioned above were negative   Psychological ROS:  Completed and except as mentioned above were negative  Ophthalmic ROS:  Completed and except as mentioned above were negative  ENT ROS:  Completed and except as mentioned above were negative  Allergy and Immunology ROS:  Completed and except as mentioned above werenegative  Hematological and Lymphatic ROS:  Completed and except as mentioned above were negative  Endocrine ROS: Completed and except as mentioned above were negative  Respiratory ROS:  Completed and except asmentioned above were negative  Cardiovascular ROS:  Completed and except as mentioned above were negative  Gastrointestinal ROS: Completed and except as mentioned above were negative  Genito-Urinary ROS:  Completedand except as mentioned above were negative  Musculoskeletal ROS:  Completed and except as mentioned above were negative  Neurological ROS:  Completed and except as mentioned above were negative  Dermatological ROS:Completed and except as mentioned above were negative    SLEEP  No epistaxis or sore throat. does not have fatigue, tiredness or sleepiness in am.   No MVA. No edema. Allergies:   Allergies   Allergen Reactions    Vicodin [Hydrocodone-Acetaminophen]      nausea       Medications:    Current Outpatient Medications:     enoxaparin (LOVENOX) 100 MG/ML injection, Inject 0.9 mLs into the skin 2 times daily for 16 days, Disp: 32 each, Rfl: 0    norethindrone (ORTHO MICRONOR) 0.35 MG tablet, Take 1 tablet by mouth daily, Disp: 28 tablet, Rfl: 3    Lactobacillus Acid-Pectin (ACIDOPHILUS/CITRUS PECTIN) TABS, , Disp: , Rfl:     Lanolin Hydrous OINT, Apply 1 Container topically every hour as needed (nipple discomfort), Disp: 100 g, Rfl: 0    metoprolol tartrate (LOPRESSOR) 25 MG tablet, Take 1 tablet by mouth 3 times daily, Disp: 260 tablet, Rfl: 3    omeprazole (PRILOSEC) 20 MG delayed release capsule, Take 20 mg by mouth daily as needed , Disp: , Rfl:     Prenatal Vit-Fe Fumarate-FA (PRENATAL VITAMIN PO), Take 1 tablet by mouth daily , Disp: , Rfl:       Objective:    Physical Exam:  Vitals: There were no vitals taken for this visit. Last 3 weights: Wt Readings from Last 3 Encounters:   12/02/21 203 lb 12.8 oz (92.4 kg)   12/01/21 204 lb (92.5 kg)   11/09/21 200 lb 3.2 oz (90.8 kg)     There is no height or weight on file to calculate BMI. Physical Examination:   PHYSICAL EXAMINATION:    There were no vitals filed for this visit. PHYSICAL EXAMINATION:  Due to this being a TeleHealth encounter, evaluation of the following organ systems is limited: Vitals/Constitutional/EENT/Resp/CV/GI//MS/Neuro/Skin/Heme-Lymph-Imm. Constitutional: [x] Appears well-developed and well-nourished. [] Abnormal  Mental status  [x] Alert and awake  [x] Oriented to person/place/time [x]Able to follow commands    [x] No apparent distress      Eyes:  EOM    [x]  Normal  [] Abnormal-  Sclera  [x]  Normal  [] Abnormal -         Discharge [x]  None visible  [] Abnormal -    HENT:   [x] Normocephalic, atraumatic. [] Abnormal shaped head   [x] Mouth/Throat: Mucous membranes are moist.     Ears [x] Normal  [] Abnormal-    Neck: [x] Normal range of motion [x] Supple [] No visualized mass. Pulmonary/Chest: [x] Respiratory effort normal.  [x] No visualized signs of difficulty breathing or respiratory distress        [] Abnormal      Musculoskeletal:   [x] Normal range of motion. [] Normal gait with no signs of ataxia.          [x] No signs of cyanosis of the peripheral portions of extremities. [] Abnormal       Neurological:        [x] Normal cranial nerve (limited exam to video visit) [] No focal weakness observed       [] Abnormal          Speech       [x] Normal   [] Abnormal     Skin:        [x] No rash on visible skin  [x] Normal  [] Abnormal     Psychiatric:       [x] Normal  [] Abnormal        [x] Normal Mood  [] Anxious appearing      Other pertinent exam findings:-               DATA:     Pulmonary function tests: none    CT scan of the chest in October 2021 showed left lower lobe pulmonary embolism and subsequent CT scan in 9 days that showed improvement in the pulmonary embolism    Venous Dopplers on October 14, 2021 did not show any DVT        IMPRESSION:   1. COVID-19    2. Other acute pulmonary embolism without acute cor pulmonale ([address]    3. History of extracorporeal membrane oxygenation treatment    4. Smoking history                   PLAN:         Reviewed the CT scan of the chest and venous Dopplers with the patient  As mentioned above, patient will continue with Lovenox as she does not like using Coumadin  Recommended using anticoagulation for 3 months  Refills were provided -Lovenox   Sameer Caldear received counseling on the following healthy behaviors: nutrition, exercise and medication adherence  Recommend flu vaccination in the fall annually. Recommendations given regarding pneumococcal vaccinations. Recommend COVID-19 vaccination after 90 days since her Covid test was positive  Patient is not uptodate with vaccinations from pulmonary perspective. Maintain an active lifestyle. continue smoking cessation. All the questions that the patient and spouse has had were answered to   their satisfaction. Home O2 evaluation was done. Supplemental oxygen was not indicated   After reviewing the patient's smoking history and her age patient does not meet the criteria for lung cancer screening.   We'll see the patient back in 3 months or earlier if needed. Patient will call us if she is sick, so she can be seen sooner. Thank you for having us involved in the care of your patient. Please call us if you have any questions or concerns.           Silver Shafer MD, MD             12/19/2021, 7:18 PM

## 2021-12-23 ENCOUNTER — TELEPHONE (OUTPATIENT)
Dept: INFECTIOUS DISEASES | Age: 31
End: 2021-12-23

## 2022-01-03 ENCOUNTER — TELEPHONE (OUTPATIENT)
Dept: INFECTIOUS DISEASES | Age: 32
End: 2022-01-03

## 2022-01-03 NOTE — TELEPHONE ENCOUNTER
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 She is wondering what this means. Please advise. Thank you.

## 2022-01-04 DIAGNOSIS — R51.9 HEADACHE DISORDER: Primary | ICD-10-CM

## 2022-01-06 NOTE — TELEPHONE ENCOUNTER
Donna Galvan MD  You 2 days ago     AA    Called her - only a variant of the normal -I referred to neuro for EEG in case her pain is a SZ-    Message text

## 2022-02-14 ENCOUNTER — TELEPHONE (OUTPATIENT)
Dept: OBGYN CLINIC | Age: 32
End: 2022-02-14

## 2022-02-22 ENCOUNTER — TELEPHONE (OUTPATIENT)
Dept: OBGYN CLINIC | Age: 32
End: 2022-02-22

## 2022-02-22 RX ORDER — ACETAMINOPHEN AND CODEINE PHOSPHATE 120; 12 MG/5ML; MG/5ML
1 SOLUTION ORAL DAILY
Qty: 28 TABLET | Refills: 0 | Status: SHIPPED | OUTPATIENT
Start: 2022-02-22 | End: 2022-04-04

## 2022-02-22 NOTE — TELEPHONE ENCOUNTER
Patient has an annual appointment scheduled 3/14, but needs a refill to last until that visit. Ok to send in refills to last until that visit?

## 2022-03-10 ENCOUNTER — OFFICE VISIT (OUTPATIENT)
Dept: FAMILY MEDICINE CLINIC | Age: 32
End: 2022-03-10
Payer: COMMERCIAL

## 2022-03-10 VITALS
DIASTOLIC BLOOD PRESSURE: 78 MMHG | OXYGEN SATURATION: 97 % | HEIGHT: 64 IN | SYSTOLIC BLOOD PRESSURE: 128 MMHG | HEART RATE: 94 BPM | WEIGHT: 210 LBS | BODY MASS INDEX: 35.85 KG/M2

## 2022-03-10 DIAGNOSIS — Z00.00 HEALTHCARE MAINTENANCE: Primary | ICD-10-CM

## 2022-03-10 DIAGNOSIS — Z13.1 SCREENING FOR DIABETES MELLITUS: ICD-10-CM

## 2022-03-10 DIAGNOSIS — Z76.89 ENCOUNTER TO ESTABLISH CARE WITH NEW DOCTOR: ICD-10-CM

## 2022-03-10 DIAGNOSIS — Z13.220 SCREENING FOR LIPID DISORDERS: ICD-10-CM

## 2022-03-10 DIAGNOSIS — Z13.0 SCREENING FOR DEFICIENCY ANEMIA: ICD-10-CM

## 2022-03-10 PROBLEM — I26.99 PULMONARY EMBOLISM (HCC): Status: RESOLVED | Noted: 2021-10-14 | Resolved: 2022-03-10

## 2022-03-10 PROBLEM — B95.61 MSSA BACTEREMIA: Status: RESOLVED | Noted: 2021-11-08 | Resolved: 2022-03-10

## 2022-03-10 PROBLEM — J12.82 PNEUMONIA DUE TO COVID-19 VIRUS: Status: RESOLVED | Noted: 2021-10-13 | Resolved: 2022-03-10

## 2022-03-10 PROBLEM — E44.0 MODERATE MALNUTRITION (HCC): Status: RESOLVED | Noted: 2021-10-29 | Resolved: 2022-03-10

## 2022-03-10 PROBLEM — U07.1 PNEUMONIA DUE TO COVID-19 VIRUS: Status: RESOLVED | Noted: 2021-10-13 | Resolved: 2022-03-10

## 2022-03-10 PROBLEM — J80 ACUTE RESPIRATORY DISTRESS SYNDROME (ARDS) DUE TO SEVERE ACUTE RESPIRATORY SYNDROME CORONAVIRUS 2 (SARS-COV-2) (HCC): Status: RESOLVED | Noted: 2021-10-15 | Resolved: 2022-03-10

## 2022-03-10 PROBLEM — R78.81 MSSA BACTEREMIA: Status: RESOLVED | Noted: 2021-11-08 | Resolved: 2022-03-10

## 2022-03-10 PROBLEM — Z92.81: Status: RESOLVED | Noted: 2021-10-28 | Resolved: 2022-03-10

## 2022-03-10 PROBLEM — T73.0XXA STARVATION KETOACIDOSIS: Status: RESOLVED | Noted: 2021-10-14 | Resolved: 2022-03-10

## 2022-03-10 PROBLEM — Z3A.26 26 WEEKS GESTATION OF PREGNANCY: Status: RESOLVED | Noted: 2021-10-13 | Resolved: 2022-03-10

## 2022-03-10 PROBLEM — R00.0 TACHYCARDIA: Status: RESOLVED | Noted: 2021-10-14 | Resolved: 2022-03-10

## 2022-03-10 PROBLEM — U07.1 ACUTE RESPIRATORY DISTRESS SYNDROME (ARDS) DUE TO SEVERE ACUTE RESPIRATORY SYNDROME CORONAVIRUS 2 (SARS-COV-2) (HCC): Status: RESOLVED | Noted: 2021-10-15 | Resolved: 2022-03-10

## 2022-03-10 PROBLEM — E87.29 STARVATION KETOACIDOSIS: Status: RESOLVED | Noted: 2021-10-14 | Resolved: 2022-03-10

## 2022-03-10 PROBLEM — R06.82 TACHYPNEA: Status: RESOLVED | Noted: 2021-10-14 | Resolved: 2022-03-10

## 2022-03-10 PROBLEM — Z98.891 HISTORY OF CLASSICAL CESAREAN SECTION: Status: RESOLVED | Noted: 2021-10-15 | Resolved: 2022-03-10

## 2022-03-10 PROCEDURE — 99385 PREV VISIT NEW AGE 18-39: CPT | Performed by: INTERNAL MEDICINE

## 2022-03-10 PROCEDURE — G8484 FLU IMMUNIZE NO ADMIN: HCPCS | Performed by: INTERNAL MEDICINE

## 2022-03-10 ASSESSMENT — PATIENT HEALTH QUESTIONNAIRE - PHQ9
1. LITTLE INTEREST OR PLEASURE IN DOING THINGS: 0
SUM OF ALL RESPONSES TO PHQ QUESTIONS 1-9: 0
SUM OF ALL RESPONSES TO PHQ9 QUESTIONS 1 & 2: 0
2. FEELING DOWN, DEPRESSED OR HOPELESS: 0
SUM OF ALL RESPONSES TO PHQ QUESTIONS 1-9: 0

## 2022-03-10 NOTE — PROGRESS NOTES
[address]  [address] [facility], [address]  [facility], [facility] [address]      Date of Visit:  3/10/2022  Patient Name: Adis Abraham   Patient :  1990     CHIEF COMPLAINT:     Adis Abraham is a 34 y.o. female who presents today for an general visit to be evaluated for the following condition(s):  Chief Complaint   Patient presents with   [address] Annual Exam       REVIEW OF SYSTEM      Review of Systems   Constitutional: Negative. HENT: Negative. Eyes: Negative. Respiratory: Negative. Cardiovascular: Negative. Gastrointestinal: Negative. Endocrine: Negative. Genitourinary: Negative. Musculoskeletal: Negative. Skin: Negative. Allergic/Immunologic: Negative. Neurological: Negative. Hematological: Negative. Psychiatric/Behavioral: Negative. All other systems reviewed and are negative. HISTORY OF PRESENT ILLNESS     HPI    Patient here to establish care as a new patient and for annual check up. Overall patient feels well today with no concerns. She has a significant recent past medical history due to COVID 19 infection in 7153 that was complicated by pneumonia, acute respiratory failure and bilateral pulmonary embolisms. She was lifeflighted to [address] from [facility], intubated and in ICU care and also required two days of ECMO. She was pregnant during this time and delivered at 26 weeks via c section during this hospitalization.     REVIEWED INFORMATION      Allergies   Allergen Reactions    Vicodin [Hydrocodone-Acetaminophen]      nausea       Patient Active Problem List   Diagnosis    Pneumonia due to COVID-19 virus    26 weeks gestation of pregnancy    Tachycardia    Tachypnea    Anxiety    Pulmonary embolism ([facility] [address]    Marginal insertion of umbilical cord affecting management of mother    Starvation ketoacidosis    PCCS 10/15/21 M Apg  Wt 2#3    Acute respiratory distress syndrome (ARDS) due to severe acute respiratory syndrome coronavirus 2 (SARS-CoV-2) ([facility]    Immunosuppressed status (HCC)    MRSA (methicillin resistant Staphylococcus aureus) septicemia (HCC)    Bandemia    History of extracorporeal membrane oxygenation treatment    Moderate malnutrition ([facility]    MSSA bacteremia       Past Medical History:   Diagnosis Date    Anxiety     GERD (gastroesophageal reflux disease)     Migraines     Moderate malnutrition ([facility] 10/29/2021    Seasonal allergies        Past Surgical History:   Procedure Laterality Date     SECTION N/A 10/15/2021     SECTION performed by Delisa Nam MD at [address] [place] [address] [place]      nerve release, and plantar fascitis    HC  PICC POWERPIC TRIPLE  10/15/2021         INSERT MIDLINE CATHETER  2021         MANDIBLE SURGERY      tooth implant    MOUTH SURGERY      WISDOM TOOTH EXTRACTION          Social History     Socioeconomic History    Marital status:      Spouse name: Not on file    Number of children: Not on file    Years of education: Not on file    Highest education level: Not on file   Occupational History    Not on file   Tobacco Use    Smoking status: Former Smoker     Years: 3.00     Types: Cigarettes     Quit date: 2011     Years since quitting: 10.2    Smokeless tobacco: Never Used   Vaping Use    Vaping Use: Former    Substances: Nicotine   Substance and Sexual Activity    Alcohol use: Not Currently    Drug use: Not Currently     Types: Marijuana Joaquina Suero     Comment: last smoked May 2021    Sexual activity: Yes   Other Topics Concern    Not on file   Social History Narrative    Not on file     Social Determinants of Health     Financial Resource Strain:     Difficulty of Paying Living Expenses: Not on file   Food Insecurity:     Worried About Running Out of Food in the Last Year: Not on file    Danyell of Food in the Last Year: Not on file   Transportation Needs:     Lack of Transportation (Medical): Not on file    Lack of Transportation (Non-Medical): Not on file   Physical Activity:     Days of Exercise per Week: Not on file    Minutes of Exercise per Session: Not on file   Stress:     Feeling of Stress : Not on file   Social Connections:     Frequency of Communication with Friends and Family: Not on file    Frequency of Social Gatherings with Friends and Family: Not on file    Attends Mandaen Services: Not on file    Active Member of [address] [place] [address] [employer] or Organizations: Not on file    Attends Club or Organization Meetings: Not on file    Marital Status: Not on file   Intimate Partner Violence:     Fear of Current or Ex-Partner: Not on file    Emotionally Abused: Not on file    Physically Abused: Not on file    Sexually Abused: Not on file   Housing Stability:     Unable to Pay for Housing in the Last Year: Not on file    Number of [place] in the Last Year: Not on file    Unstable Housing in the Last Year: Not on file        Family History   Problem Relation Age of Onset    Breast Cancer Maternal Grandmother         older than 48    Heart Attack Paternal Uncle     Stroke Maternal Aunt     Hypertension Father     No Known Problems Paternal Grandfather     Stroke Paternal Grandmother     No Known Problems Maternal Grandfather     Hypertension Mother     No Known Problems Brother     No Known Problems Brother        PHYSICAL EXAM     /78   Pulse 94   Ht 5' 4\" (1.626 m)   Wt 210 lb (95.3 kg)   SpO2 97%   BMI 36.05 kg/m²    Physical Exam  Vitals and nursing note reviewed. Constitutional:       Appearance: Normal appearance. She is normal weight. HENT:      Head: Normocephalic and atraumatic.       Right Ear: Tympanic membrane, ear canal and external ear normal.      Left Ear: Tympanic membrane, ear canal and external ear normal.      Nose: Nose normal.      Mouth/Throat:      Mouth: Mucous membranes are moist. Eyes:      Extraocular Movements: Extraocular movements intact. Conjunctiva/sclera: Conjunctivae normal.      Pupils: Pupils are equal, round, and reactive to light. Cardiovascular:      Rate and Rhythm: Normal rate and regular rhythm. Pulses: Normal pulses. Heart sounds: Normal heart sounds. Pulmonary:      Effort: Pulmonary effort is normal.      Breath sounds: Normal breath sounds. Abdominal:      General: Abdomen is flat. Bowel sounds are normal.      Palpations: Abdomen is soft. Musculoskeletal:         General: Normal range of motion. Cervical back: Normal range of motion and neck supple. Skin:     General: Skin is warm. Capillary Refill: Capillary refill takes less than 2 seconds. Neurological:      General: No focal deficit present. Mental Status: She is alert and oriented to person, place, and time. Psychiatric:         Mood and Affect: Mood normal.         Behavior: Behavior normal.         Thought Content: Thought content normal.         Judgment: Judgment normal.         ASSESSMENT/PLAN     1. Screening for deficiency anemia  - CBC with Auto Differential; Future    2. Screening for lipid disorders  - Lipid, Fasting; Future    3. Screening for diabetes mellitus  - Comprehensive Metabolic Panel, Fasting; Future    4. Encounter to establish care with new doctor    5. Healthcare maintenance    Records reviewed    Patient here to establish care as a new patient and for annual check up. Overall patient feels well today with no concerns. She has a significant recent past medical history due to COVID 19 infection in October 7635 that was complicated by pneumonia, acute respiratory failure and bilateral pulmonary embolisms. She was lifeflighted to [facility] from [facility], intubated and in ICU care and also required two days of ECMO.    She was pregnant during this time and delivered at 26 weeks via c section during this hospitalization. Healthcare maintenance - overall patient doing well. Screening labs due and ordered. Continue healthy diet. Patient did lose a significant amount of her hair due to her medical issues in the Fall - so she shaved her hair, but her hair is showing growth at this time. She declines COVID vaccination at this time.     Follow up yearly or sooner if needed    COMMUNICATION:       Electronically signed by Brittany De La Cruz MD on 3/10/2022 at 9:05 AM

## 2022-03-13 PROBLEM — Z00.00 HEALTHCARE MAINTENANCE: Status: ACTIVE | Noted: 2022-03-13

## 2022-03-13 ASSESSMENT — ENCOUNTER SYMPTOMS
ALLERGIC/IMMUNOLOGIC NEGATIVE: 1
EYES NEGATIVE: 1
GASTROINTESTINAL NEGATIVE: 1
RESPIRATORY NEGATIVE: 1

## 2022-03-14 ENCOUNTER — HOSPITAL ENCOUNTER (OUTPATIENT)
Age: 32
Discharge: HOME | End: 2022-03-14
Payer: COMMERCIAL

## 2022-03-14 DIAGNOSIS — Z13.220 SCREENING FOR LIPID DISORDERS: ICD-10-CM

## 2022-03-14 DIAGNOSIS — Z13.1 SCREENING FOR DIABETES MELLITUS: ICD-10-CM

## 2022-03-14 DIAGNOSIS — Z13.0 SCREENING FOR DEFICIENCY ANEMIA: ICD-10-CM

## 2022-03-14 LAB
ABSOLUTE EOS #: 0.17 K/UL (ref 0–0.44)
ABSOLUTE IMMATURE GRANULOCYTE: <0.03 K/UL (ref 0–0.3)
ABSOLUTE LYMPH #: 1.92 K/UL (ref 1.1–3.7)
ABSOLUTE MONO #: 0.31 K/UL (ref 0.1–1.2)
ALBUMIN SERPL-MCNC: 4.5 G/DL (ref 3.5–5.2)
ALBUMIN/GLOBULIN RATIO: 1.7 (ref 1–2.5)
ALP BLD-CCNC: 88 U/L (ref 35–104)
ALT SERPL-CCNC: 16 U/L (ref 5–33)
ANION GAP SERPL CALCULATED.3IONS-SCNC: 13 MMOL/L (ref 9–17)
AST SERPL-CCNC: 15 U/L
BASOPHILS # BLD: 0 % (ref 0–2)
BASOPHILS ABSOLUTE: <0.03 K/UL (ref 0–0.2)
BILIRUB SERPL-MCNC: 0.28 MG/DL (ref 0.3–1.2)
BUN BLDV-MCNC: 13 MG/DL (ref 6–20)
CALCIUM SERPL-MCNC: 9.9 MG/DL (ref 8.6–10.4)
CHLORIDE BLD-SCNC: 105 MMOL/L (ref 98–107)
CHOLESTEROL, FASTING: 205 MG/DL
CHOLESTEROL/HDL RATIO: 3.9
CO2: 25 MMOL/L (ref 20–31)
CREAT SERPL-MCNC: 0.46 MG/DL (ref 0.5–0.9)
EOSINOPHILS RELATIVE PERCENT: 2 % (ref 1–4)
GFR AFRICAN AMERICAN: >60 ML/MIN
GFR NON-AFRICAN AMERICAN: >60 ML/MIN
GFR SERPL CREATININE-BSD FRML MDRD: ABNORMAL ML/MIN/{1.73_M2}
GLUCOSE FASTING: 97 MG/DL (ref 70–99)
HCT VFR BLD CALC: 42.7 % (ref 36.3–47.1)
HDLC SERPL-MCNC: 53 MG/DL
HEMOGLOBIN: 14.1 G/DL (ref 11.9–15.1)
IMMATURE GRANULOCYTES: 0 %
LDL CHOLESTEROL: 135 MG/DL (ref 0–130)
LYMPHOCYTES # BLD: 27 % (ref 24–43)
MCH RBC QN AUTO: 31.1 PG (ref 25.2–33.5)
MCHC RBC AUTO-ENTMCNC: 33 G/DL (ref 28.4–34.8)
MCV RBC AUTO: 94.1 FL (ref 82.6–102.9)
MONOCYTES # BLD: 4 % (ref 3–12)
NRBC AUTOMATED: 0 PER 100 WBC
PDW BLD-RTO: 11.9 % (ref 11.8–14.4)
PLATELET # BLD: 283 K/UL (ref 138–453)
PMV BLD AUTO: 9.3 FL (ref 8.1–13.5)
POTASSIUM SERPL-SCNC: 5 MMOL/L (ref 3.7–5.3)
RBC # BLD: 4.54 M/UL (ref 3.95–5.11)
SEG NEUTROPHILS: 67 % (ref 36–65)
SEGMENTED NEUTROPHILS ABSOLUTE COUNT: 4.65 K/UL (ref 1.5–8.1)
SODIUM BLD-SCNC: 143 MMOL/L (ref 135–144)
TOTAL PROTEIN: 7.1 G/DL (ref 6.4–8.3)
TRIGLYCERIDE, FASTING: 83 MG/DL
WBC # BLD: 7.1 K/UL (ref 3.5–11.3)

## 2022-03-14 PROCEDURE — 85025 COMPLETE CBC W/AUTO DIFF WBC: CPT

## 2022-03-14 PROCEDURE — 36415 COLL VENOUS BLD VENIPUNCTURE: CPT

## 2022-03-14 PROCEDURE — 80061 LIPID PANEL: CPT

## 2022-03-14 PROCEDURE — 80053 COMPREHEN METABOLIC PANEL: CPT

## 2022-04-04 ENCOUNTER — OFFICE VISIT (OUTPATIENT)
Dept: OBGYN CLINIC | Age: 32
End: 2022-04-04
Payer: COMMERCIAL

## 2022-04-04 VITALS
HEIGHT: 64 IN | BODY MASS INDEX: 36.7 KG/M2 | SYSTOLIC BLOOD PRESSURE: 120 MMHG | WEIGHT: 215 LBS | DIASTOLIC BLOOD PRESSURE: 82 MMHG

## 2022-04-04 DIAGNOSIS — Z12.72 SMEAR, VAGINAL, AS PART OF ROUTINE GYNECOLOGICAL EXAMINATION: Primary | ICD-10-CM

## 2022-04-04 DIAGNOSIS — Z01.419 SMEAR, VAGINAL, AS PART OF ROUTINE GYNECOLOGICAL EXAMINATION: Primary | ICD-10-CM

## 2022-04-04 PROCEDURE — 99395 PREV VISIT EST AGE 18-39: CPT | Performed by: ADVANCED PRACTICE MIDWIFE

## 2022-04-04 RX ORDER — ACETAMINOPHEN AND CODEINE PHOSPHATE 120; 12 MG/5ML; MG/5ML
1 SOLUTION ORAL DAILY
Qty: 90 TABLET | Refills: 4 | Status: SHIPPED | OUTPATIENT
Start: 2022-04-04

## 2022-04-04 ASSESSMENT — ENCOUNTER SYMPTOMS
RESPIRATORY NEGATIVE: 1
DIARRHEA: 0
CONSTIPATION: 0
GASTROINTESTINAL NEGATIVE: 1
ABDOMINAL PAIN: 0
SHORTNESS OF BREATH: 0

## 2022-04-04 NOTE — PROGRESS NOTES
10/13/2021    Acute respiratory distress syndrome (ARDS) due to severe acute respiratory syndrome coronavirus 2 (SARS-CoV-2) ([facility] [address] 10/15/2021    Anxiety     Bandemia     GERD (gastroesophageal reflux disease)     History of extracorporeal membrane oxygenation treatment 10/28/2021    Immunosuppressed status ([facility] [address]     Migraines     Moderate malnutrition ([facility] [address] 10/29/2021    MRSA (methicillin resistant Staphylococcus aureus) septicemia ([facility]     MSSA bacteremia 2021    PCCS 10/15/21 M Apg  Wt 2#3 10/15/2021    Pneumonia due to COVID-19 virus 10/13/2021    Pulmonary embolism ([facility] [address] 10/14/2021    Seasonal allergies        Past Surgical History:   Procedure Laterality Date     SECTION N/A 10/15/2021     SECTION performed by Kelsie Soliman MD at [address] [place] [address] Rd      nerve release, and plantar fascitis    HC  PICC POWERPIC TRIPLE  10/15/2021         INSERT MIDLINE CATHETER  2021         MANDIBLE SURGERY      tooth implant    MOUTH SURGERY      WISDOM TOOTH EXTRACTION         Family History   Problem Relation Age of Onset    Breast Cancer Maternal Grandmother         older than 48    Heart Attack Paternal Uncle     Stroke Maternal Aunt     Hypertension Father     No Known Problems Paternal Grandfather     Stroke Paternal Grandmother     No Known Problems Maternal Grandfather     Hypertension Mother     No Known Problems Brother     No Known Problems Brother        Chief Complaint   Patient presents with    Annual Exam     Pap NL 21. Pt is pumping. Pt isnot having cycles yet. Pt would like 90 day supply of OCP. Labs:    No results found for this visit on 22. HPI: Annual.  Denies breast/pelvic concerns. No menses since delivery - continues to pump and also using progestin only pill. Pap UTD. Monogamous relationship    Review of Systems   Constitutional: Negative. Negative for chills, fatigue and fever. HENT: Negative. Respiratory: Negative. Negative for shortness of breath. Cardiovascular: Negative. Negative for chest pain. Gastrointestinal: Negative. Negative for abdominal pain, constipation and diarrhea. Genitourinary: Negative for dysuria, enuresis, frequency, menstrual problem, pelvic pain, urgency and vaginal bleeding. Musculoskeletal: Negative. Neurological: Negative. Negative for dizziness, light-headedness and headaches. Psychiatric/Behavioral: Negative. Objective  Blood pressure 120/82, height 5' 4\" (1.626 m), weight 215 lb (97.5 kg), unknown if currently breastfeeding. Physical Exam  Constitutional:       Appearance: Normal appearance. She is normal weight. Genitourinary:      Vulva, bladder and urethral meatus normal.      No vaginal discharge or tenderness. No vaginal prolapse present. Right Adnexa: not tender. Left Adnexa: not tender. No cervical motion tenderness or discharge. Uterus is not tender. Pelvic exam was performed with patient in the lithotomy position. Breasts: Breasts are symmetrical.      Breasts are soft. Right: No mass, nipple discharge, skin change or tenderness. Left: No mass, nipple discharge, skin change or tenderness. Breast exam comments: Continues to lactate. HENT:      Head: Normocephalic. Eyes:      Pupils: Pupils are equal, round, and reactive to light. Cardiovascular:      Rate and Rhythm: Normal rate and regular rhythm. Pulses: Normal pulses. Heart sounds: Normal heart sounds. No murmur heard. Pulmonary:      Effort: Pulmonary effort is normal.      Breath sounds: Normal breath sounds. No wheezing. Abdominal:      Palpations: Abdomen is soft. Tenderness: There is no abdominal tenderness. Musculoskeletal:         General: Normal range of motion. Cervical back: Normal range of motion. No muscular tenderness.    Neurological:      Mental Status: She is alert and oriented to person, place, and time. Skin:     General: Skin is warm and dry. Psychiatric:         Behavior: Behavior normal.   Vitals and nursing note reviewed. Chaperone present: Declined. Assessment and Plan          Diagnosis Orders   1. Smear, vaginal, as part of routine gynecological examination          Repeat Annual every 1 year  Cervical Cytology Evaluation begins at 24years old. If Negative Cytology, Follow-up screening per current guidelines. Mammograms every 1year. If 37 yo and last mammogram was negative. Calcium and Vitamin D dosing reviewed. Birth control and barrier recommendationsdiscussed. STD counseling and prevention reviewed. Routine healthmaintenance per patients PCP. I am having Lola WareMariza Hall start on norethindrone. Return in about 1 year (around 4/4/2023) for Yearly. She was also counseled on her preventative health maintenance recommendations and follow-up. There are no Patient Instructions on file for this visit.     [facility], FADUMO - PEGGYM,4/4/2022 4:50 PM

## 2022-04-12 PROBLEM — Z00.00 HEALTHCARE MAINTENANCE: Status: RESOLVED | Noted: 2022-03-13 | Resolved: 2022-04-12

## 2022-10-31 NOTE — PROGRESS NOTES
Ecmo End of Shift Note:       Cannulation date/time: 10/7/21 @ 1332  ECMO type: V/V    Cannula sizes/locations: Right IJ 30 Fr. [place]  Flow ordered at:        Currently flowing at: 3.2 LPM       FIO2 at: 21%       Sweep at: 0    Delta pressure: 15    Clot burden: None       Oxygenator:       Circuit:    Anticoagulation: Heparin gtt @ 18u/kg/hr SHUT OFF @ 0400  Products given:        PRBC's: 1 out of 2 ordered       PLT's:       FFP:       Cryo: Albumin:    Plans for decannulation this morning w/ Dr. Chase Coker. Prilosec has been refilled.

## 2023-02-07 ENCOUNTER — TELEPHONE (OUTPATIENT)
Dept: OBGYN CLINIC | Age: 33
End: 2023-02-07

## 2023-02-07 ENCOUNTER — OFFICE VISIT (OUTPATIENT)
Dept: OBGYN CLINIC | Age: 33
End: 2023-02-07
Payer: COMMERCIAL

## 2023-02-07 VITALS
DIASTOLIC BLOOD PRESSURE: 78 MMHG | HEIGHT: 64 IN | WEIGHT: 218 LBS | BODY MASS INDEX: 37.22 KG/M2 | SYSTOLIC BLOOD PRESSURE: 110 MMHG

## 2023-02-07 DIAGNOSIS — L29.0 RECTAL ITCHING: ICD-10-CM

## 2023-02-07 DIAGNOSIS — N92.6 ENCOUNTER FOR MANAGEMENT OF MENSTRUAL ISSUE: Primary | ICD-10-CM

## 2023-02-07 DIAGNOSIS — R10.9 ABDOMINAL CRAMPING: ICD-10-CM

## 2023-02-07 PROCEDURE — 99214 OFFICE O/P EST MOD 30 MIN: CPT | Performed by: ADVANCED PRACTICE MIDWIFE

## 2023-02-07 PROCEDURE — 1036F TOBACCO NON-USER: CPT | Performed by: ADVANCED PRACTICE MIDWIFE

## 2023-02-07 PROCEDURE — G8484 FLU IMMUNIZE NO ADMIN: HCPCS | Performed by: ADVANCED PRACTICE MIDWIFE

## 2023-02-07 PROCEDURE — G8427 DOCREV CUR MEDS BY ELIG CLIN: HCPCS | Performed by: ADVANCED PRACTICE MIDWIFE

## 2023-02-07 PROCEDURE — G8417 CALC BMI ABV UP PARAM F/U: HCPCS | Performed by: ADVANCED PRACTICE MIDWIFE

## 2023-02-07 RX ORDER — NORGESTIMATE AND ETHINYL ESTRADIOL 0.25-0.035
1 KIT ORAL DAILY
Qty: 1 PACKET | Refills: 3 | Status: SHIPPED | OUTPATIENT
Start: 2023-02-07

## 2023-02-07 NOTE — TELEPHONE ENCOUNTER
Desires referral to [facility] GI specialist.  Abd cramping, nausea, diarrhea episodes 10 since august.  No current PCP    Thanks

## 2023-02-08 DIAGNOSIS — R10.9 ABDOMINAL CRAMPING: Primary | ICD-10-CM

## 2023-02-09 ASSESSMENT — ENCOUNTER SYMPTOMS
ANAL BLEEDING: 0
BLOOD IN STOOL: 0
RESPIRATORY NEGATIVE: 1
NAUSEA: 1
VOMITING: 1
RECTAL PAIN: 0
CONSTIPATION: 0
DIARRHEA: 1
ABDOMINAL PAIN: 0

## 2023-02-09 NOTE — PROGRESS NOTES
PROBLEM VISIT     Date of service: 2023    Alejandro Mccall  Is a 34 y.o.  female    PT's PCP is: No primary care provider on file. : 1990                                          HPI Here for eval - patient very open stating \"may not really need to see you but just not sure where to go\". Had PCP but that person has since moved and therefore no current PCP. Reports since August has had episodes in middle of the night - wakes with significant abdominal cramping and gets to the bathroom - has nausea and sometimes emesis and always diarrhea. Episodes last approx 20 minutes and happen 2-3 times a month. Reports that has tried to track food to see if any factors reveal themselves but was unable to find any obvious factors. Also having rectal itching - no bleeding. Denies vaginal itch/burn/odor. Denies pelvic pain. In addition to loose stool episodes - has normal BM daily. Has hx of GI issues since childhood - \"they could never find anything wrong though\". Denies reflux s/s. Review of Systems   Constitutional: Negative. HENT: Negative. Respiratory: Negative. Gastrointestinal:  Positive for diarrhea, nausea and vomiting. Negative for abdominal pain, anal bleeding, blood in stool, constipation and rectal pain (Itching). Genitourinary: Negative. Musculoskeletal: Negative. Neurological: Negative. Psychiatric/Behavioral: Negative. Patient's last menstrual period was 2023 (approximate).    OB History    Para Term  AB Living   1 1 0 1 0 1   SAB IAB Ectopic Molar Multiple Live Births   0 0 0 0 0 1      # Outcome Date GA Lbr Charli/2nd Weight Sex Delivery Anes PTL Lv   1  10/15/21 26w5d  2 lb 3.3 oz (1 kg) M CS-Classical Gen N YUMIKO        Social History     Tobacco Use   Smoking Status Former    Years: 3.00    Types: Cigarettes    Quit date: 2011    Years since quittin.2   Smokeless Tobacco Never        Social History Substance and Sexual Activity   Alcohol Use Not Currently       Allergies: Vicodin [hydrocodone-acetaminophen]      Current Outpatient Medications:     norgestimate-ethinyl estradiol (SPRINTEC 28) 0.25-35 MG-MCG per tablet, Take 1 tablet by mouth daily, Disp: 1 packet, Rfl: 3    norethindrone (ORTHO MICRONOR) 0.35 MG tablet, Take 1 tablet by mouth daily, Disp: 90 tablet, Rfl: 4    Social History     Substance and Sexual Activity   Sexual Activity Yes    Partners: Male       Chief Complaint   Patient presents with    Pelvic Pain     Pt states since August wakes up fells cramping then feels like gets sick and feels like will have to vomit or had a BM. Medication Refill     Pt is not breast feedng any more and would like to change OCP. Physical Exam  Constitutional:       Appearance: Normal appearance. She is obese. HENT:      Head: Normocephalic. Eyes:      Pupils: Pupils are equal, round, and reactive to light. Pulmonary:      Effort: Pulmonary effort is normal.   Musculoskeletal:         General: Normal range of motion. Cervical back: Normal range of motion. Neurological:      Mental Status: She is alert and oriented to person, place, and time. Skin:     General: Skin is warm and dry. Psychiatric:         Behavior: Behavior normal.   Vitals and nursing note reviewed. Vital Signs  Blood pressure 110/78, height 5' 4\" (1.626 m), weight 218 lb (98.9 kg), last menstrual period 01/17/2023, unknown if currently breastfeeding. Assessment and Plan          Diagnosis Orders   1. Encounter for management of menstrual issue        2. Abdominal cramping            Discussed that this is unlikely gyn nature.   Stressed avoiding high fat and greasy foods  Can use topical prep h and tucks pads for rectal itching  Encouraged to find a PCP - given names of those accepting patients currently  Discussed possible GI work up and possibly additional testing that may need done - patient agreeable. She would like referral to [facility]. Can refer to general surgeon for gi work up or gi specialist.       I am having Lauren Hall start on norgestimate-ethinyl estradiol. I am also having her maintain her norethindrone. Return in about 1 week (around 2/14/2023) for Gyn f/u, Pelvic USN. She was also counseled on her preventative health maintenance recommendations and follow-up. There are no Patient Instructions on file for this visit.     FADUMO Brownlee CNM,2/9/2023 7:18 AM                     Electronically signed by FADUMO Brownlee CNM

## 2023-05-08 NOTE — PROGRESS NOTES
CLINICAL PHARMACY NOTE: MEDS TO BEDS    Total # of Prescriptions Filled: 3   The following medications were delivered to the patient:  · Enoxaparin 100mg/ml  · Probiotic  · Metoprolol Tartrate 25mg    Additional Documentation: delivered to patient in room 247 10/29 at 4:36pm. Co-pay paid with credit over the phone ($210.43). Thank you for your visit today!    Please follow up with Dr. Deal in 6 months with ultrasound prior.

## 2023-05-15 ENCOUNTER — TELEPHONE (OUTPATIENT)
Dept: OBGYN CLINIC | Age: 33
End: 2023-05-15

## 2023-05-15 NOTE — TELEPHONE ENCOUNTER
I called patient and left message informing that MERE Fortune would like Factor V labs before changing pill and that order has been placed in out lab.

## 2023-05-15 NOTE — TELEPHONE ENCOUNTER
I would like to check her for Factor 5 before making any changes to her OCP. Can you place order and she can stop in for it.

## 2023-05-15 NOTE — TELEPHONE ENCOUNTER
Patient called. She reports that over the weekend she  found out that her paternal uncle and 2 of her paternal cousins have Factor 11. She is wondering if her ocp should be switched back to progesterone only? She is currently on Sprintec, she just recently switched from ortho Micronor when she finished breastfeeding. She is supposed to be getting her gallbladder out next month and has a call in with her surgeon to see if they want to do Factor 5 testing prior to surgery. Do you want to switch  her ocp back to progesterone only? She is currently close to the end of her current pack. 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

## 2023-05-17 ENCOUNTER — HOSPITAL ENCOUNTER (OUTPATIENT)
Age: 33
Setting detail: SPECIMEN
Discharge: HOME | End: 2023-05-17

## 2023-05-17 DIAGNOSIS — Z83.49 FAMILY HISTORY OF MTHFR DEFICIENCY: Primary | ICD-10-CM

## 2023-05-17 DIAGNOSIS — Z83.49 FAMILY HISTORY OF MTHFR DEFICIENCY: ICD-10-CM

## 2023-05-22 LAB
MTHFR INTERPRETATION: NORMAL
MTHFR MUTATION A1286C: NEGATIVE
MTHFR MUTATION C665T: NORMAL
SPECIMEN: NORMAL

## 2023-05-24 DIAGNOSIS — Z83.49 FAMILY HISTORY OF MTHFR DEFICIENCY: Primary | ICD-10-CM

## 2023-06-20 ENCOUNTER — TELEPHONE (OUTPATIENT)
Dept: OBGYN CLINIC | Age: 33
End: 2023-06-20

## 2023-06-20 RX ORDER — NORGESTIMATE AND ETHINYL ESTRADIOL 0.25-0.035
1 KIT ORAL DAILY
Qty: 1 PACKET | Refills: 0 | Status: SHIPPED | OUTPATIENT
Start: 2023-06-20

## 2023-06-20 NOTE — TELEPHONE ENCOUNTER
Patient called and cancelled her annual for today (6/20) due to her cycle starting. She needs a refill on her birth control though as this is her last week. She rescheduled her appointment for July 17th due to having gal bladder surgery next week.

## 2023-06-20 NOTE — TELEPHONE ENCOUNTER
Does she want to stay on her OCP - this may be contributing to her gallstones. Do they want her off the OCP for surgery or is she unsure? Can increase risk of thrombosis for women.   Can change to a POP if she would like

## 2023-07-18 ENCOUNTER — OFFICE VISIT (OUTPATIENT)
Dept: OBGYN CLINIC | Age: 33
End: 2023-07-18
Payer: COMMERCIAL

## 2023-07-18 VITALS
DIASTOLIC BLOOD PRESSURE: 74 MMHG | BODY MASS INDEX: 35.55 KG/M2 | WEIGHT: 208.2 LBS | SYSTOLIC BLOOD PRESSURE: 110 MMHG | HEIGHT: 64 IN

## 2023-07-18 DIAGNOSIS — Z30.41 ORAL CONTRACEPTIVE PILL SURVEILLANCE: ICD-10-CM

## 2023-07-18 DIAGNOSIS — Z12.72 SMEAR, VAGINAL, AS PART OF ROUTINE GYNECOLOGICAL EXAMINATION: Primary | ICD-10-CM

## 2023-07-18 DIAGNOSIS — Z01.419 SMEAR, VAGINAL, AS PART OF ROUTINE GYNECOLOGICAL EXAMINATION: Primary | ICD-10-CM

## 2023-07-18 PROCEDURE — 99395 PREV VISIT EST AGE 18-39: CPT | Performed by: ADVANCED PRACTICE MIDWIFE

## 2023-07-18 RX ORDER — NORGESTIMATE AND ETHINYL ESTRADIOL 0.25-0.035
1 KIT ORAL DAILY
Qty: 1 PACKET | Refills: 12 | Status: SHIPPED | OUTPATIENT
Start: 2023-07-18

## 2023-07-18 ASSESSMENT — ENCOUNTER SYMPTOMS
SHORTNESS OF BREATH: 0
CONSTIPATION: 0
GASTROINTESTINAL NEGATIVE: 1
DIARRHEA: 0
RESPIRATORY NEGATIVE: 1
ABDOMINAL PAIN: 0

## 2023-07-18 NOTE — PROGRESS NOTES
YEARLY PHYSICAL    Date of service: 2023    Gumaro Mathis  Is a 28 y.o.   female    PT's PCP is: No primary care provider on file. : 1990                                             Subjective:       Patient's last menstrual period was 2023 (approximate). Are your menses regular: yes    OB History    Para Term  AB Living   1 1 0 1 0 1   SAB IAB Ectopic Molar Multiple Live Births   0 0 0 0 0 1      # Outcome Date GA Lbr Charli/2nd Weight Sex Delivery Anes PTL Lv   1  10/15/21 26w5d  2 lb 3.3 oz (1 kg) M CS-Classical Gen N YUMIKO        Social History     Tobacco Use   Smoking Status Former    Years: 3.00    Types: Cigarettes    Quit date: 2011    Years since quittin.6   Smokeless Tobacco Never        Social History     Substance and Sexual Activity   Alcohol Use Yes    Comment: rare       Family History   Problem Relation Age of Onset    Hypertension Mother     Hypertension Father     No Known Problems Brother     No Known Problems Brother     Breast Cancer Maternal Grandmother         older than 48    No Known Problems Maternal Grandfather     Stroke Paternal Grandmother     No Known Problems Paternal Grandfather     Stroke Maternal Aunt     Heart Attack Paternal Uncle     Clotting Disorder Paternal Uncle     Heart Attack Paternal Cousin     Heart Attack Paternal Cousin        Any family history of breast or ovarian cancer:  Yes    Any family history of blood clots: No      Allergies: Vicodin [hydrocodone-acetaminophen]      Current Outpatient Medications:     norgestimate-ethinyl estradiol ([address]) 0.25-35 MG-MCG per tablet, Take 1 tablet by mouth daily, Disp: 1 packet, Rfl: 12    Social History     Substance and Sexual Activity   Sexual Activity Yes    Partners: Male       Any bleeding or pain with intercourse: No    Last Yearly:      Last pap:  - normal    Last HPV:  -

## 2024-02-20 ENCOUNTER — TELEPHONE (OUTPATIENT)
Dept: OBGYN CLINIC | Age: 34
End: 2024-02-20

## 2024-02-20 ENCOUNTER — HOSPITAL ENCOUNTER (OUTPATIENT)
Age: 34
Setting detail: SPECIMEN
Discharge: HOME | End: 2024-02-20

## 2024-02-20 ENCOUNTER — OFFICE VISIT (OUTPATIENT)
Dept: OBGYN CLINIC | Age: 34
End: 2024-02-20
Payer: COMMERCIAL

## 2024-02-20 VITALS
WEIGHT: 199 LBS | BODY MASS INDEX: 33.97 KG/M2 | DIASTOLIC BLOOD PRESSURE: 74 MMHG | HEIGHT: 64 IN | SYSTOLIC BLOOD PRESSURE: 118 MMHG

## 2024-02-20 DIAGNOSIS — N89.8 VAGINAL DRYNESS: Primary | ICD-10-CM

## 2024-02-20 DIAGNOSIS — I99.9 CIRCULATION PROBLEM: Primary | ICD-10-CM

## 2024-02-20 DIAGNOSIS — I99.9 CIRCULATION PROBLEM: ICD-10-CM

## 2024-02-20 PROCEDURE — G8484 FLU IMMUNIZE NO ADMIN: HCPCS | Performed by: ADVANCED PRACTICE MIDWIFE

## 2024-02-20 PROCEDURE — G8427 DOCREV CUR MEDS BY ELIG CLIN: HCPCS | Performed by: ADVANCED PRACTICE MIDWIFE

## 2024-02-20 PROCEDURE — 99214 OFFICE O/P EST MOD 30 MIN: CPT | Performed by: ADVANCED PRACTICE MIDWIFE

## 2024-02-20 PROCEDURE — G8417 CALC BMI ABV UP PARAM F/U: HCPCS | Performed by: ADVANCED PRACTICE MIDWIFE

## 2024-02-20 PROCEDURE — 1036F TOBACCO NON-USER: CPT | Performed by: ADVANCED PRACTICE MIDWIFE

## 2024-02-21 DIAGNOSIS — N89.8 VAGINAL DRYNESS: ICD-10-CM

## 2024-02-21 LAB
CANDIDA SPECIES: NEGATIVE
GARDNERELLA VAGINALIS: NEGATIVE
SOURCE: NORMAL
TRICHOMONAS: NEGATIVE

## 2024-02-21 ASSESSMENT — ENCOUNTER SYMPTOMS
GASTROINTESTINAL NEGATIVE: 1
RESPIRATORY NEGATIVE: 1

## 2024-02-21 NOTE — PROGRESS NOTES
PROBLEM VISIT     Date of service: 2024    Anika Hall  Is a 34 y.o.  female    PT's PCP is: No primary care provider on file.     : 1990                                          HPI Reports vaginal dryness \"really all my life\" but more problematic the last few months.  Previously during intercourse felt had adequate lubrication and dryness less of an issue but now has started to note dryness during intercourse also.  Due to the dryness has pain with intercourse.  Denies all vaginal infection s/s - itch/burn/odor  Does have occasional vaginal discharge  Able to orgasm, no changes in lubrication with foreplay  Wants to continue having intercourse 3 times a week with spouse but for hte last time dryness has been an issue \"every time\".  Has had relationship issues \"but less now than before\" for approx 3 years.    Would like to establish with PCP as desires head CT \"just not thinking through things right\" - was referred in the past to neuro but never got an appt - unsure where exactly communication break down occurred.    Also wants to discuss \"circulation issues\" - hands and feet.  Since christofer has noted that hands get tingly and \"fall asleep\" very quickly and if sitting for even less than 5 minutes has same thing with feet.  Denies any color or temp changes in hands/feet.  Denies headaches - has hx of these but \"better since I delivered\"    Review of Systems   Constitutional: Negative.    HENT: Negative.     Respiratory: Negative.     Cardiovascular: Negative.    Gastrointestinal: Negative.    Genitourinary:  Positive for dyspareunia, vaginal discharge and vaginal pain (due to dryness). Negative for menstrual problem, pelvic pain and vaginal bleeding.   Musculoskeletal:  Negative for gait problem.        Tingling in hands/feet   Skin: Negative.    Neurological: Negative.    Psychiatric/Behavioral: Negative.         Patient's last menstrual period was 2024 (exact date).   OB

## 2024-06-25 SDOH — HEALTH STABILITY: PHYSICAL HEALTH: ON AVERAGE, HOW MANY MINUTES DO YOU ENGAGE IN EXERCISE AT THIS LEVEL?: 90 MIN

## 2024-06-25 SDOH — HEALTH STABILITY: PHYSICAL HEALTH: ON AVERAGE, HOW MANY DAYS PER WEEK DO YOU ENGAGE IN MODERATE TO STRENUOUS EXERCISE (LIKE A BRISK WALK)?: 5 DAYS

## 2024-06-26 ENCOUNTER — OFFICE VISIT (OUTPATIENT)
Dept: FAMILY MEDICINE CLINIC | Age: 34
End: 2024-06-26
Payer: COMMERCIAL

## 2024-06-26 VITALS
DIASTOLIC BLOOD PRESSURE: 82 MMHG | SYSTOLIC BLOOD PRESSURE: 112 MMHG | RESPIRATION RATE: 18 BRPM | OXYGEN SATURATION: 98 % | HEIGHT: 64 IN | HEART RATE: 67 BPM | WEIGHT: 197.4 LBS | BODY MASS INDEX: 33.7 KG/M2

## 2024-06-26 DIAGNOSIS — R20.2 NUMBNESS AND TINGLING IN BOTH HANDS: ICD-10-CM

## 2024-06-26 DIAGNOSIS — Z00.00 ENCOUNTER FOR MEDICAL EXAMINATION TO ESTABLISH CARE: Primary | ICD-10-CM

## 2024-06-26 DIAGNOSIS — G56.03 BILATERAL CARPAL TUNNEL SYNDROME: ICD-10-CM

## 2024-06-26 DIAGNOSIS — R20.0 NUMBNESS AND TINGLING IN BOTH HANDS: ICD-10-CM

## 2024-06-26 DIAGNOSIS — R23.3 EASY BRUISING: ICD-10-CM

## 2024-06-26 DIAGNOSIS — E61.1 LOW IRON: ICD-10-CM

## 2024-06-26 DIAGNOSIS — R53.83 OTHER FATIGUE: ICD-10-CM

## 2024-06-26 DIAGNOSIS — E66.09 CLASS 1 OBESITY DUE TO EXCESS CALORIES IN ADULT, UNSPECIFIED BMI, UNSPECIFIED WHETHER SERIOUS COMORBIDITY PRESENT: ICD-10-CM

## 2024-06-26 PROCEDURE — 99385 PREV VISIT NEW AGE 18-39: CPT | Performed by: STUDENT IN AN ORGANIZED HEALTH CARE EDUCATION/TRAINING PROGRAM

## 2024-06-26 SDOH — ECONOMIC STABILITY: HOUSING INSECURITY
IN THE LAST 12 MONTHS, WAS THERE A TIME WHEN YOU DID NOT HAVE A STEADY PLACE TO SLEEP OR SLEPT IN A SHELTER (INCLUDING NOW)?: NO

## 2024-06-26 SDOH — ECONOMIC STABILITY: FOOD INSECURITY: WITHIN THE PAST 12 MONTHS, YOU WORRIED THAT YOUR FOOD WOULD RUN OUT BEFORE YOU GOT MONEY TO BUY MORE.: NEVER TRUE

## 2024-06-26 SDOH — ECONOMIC STABILITY: FOOD INSECURITY: WITHIN THE PAST 12 MONTHS, THE FOOD YOU BOUGHT JUST DIDN'T LAST AND YOU DIDN'T HAVE MONEY TO GET MORE.: NEVER TRUE

## 2024-06-26 SDOH — ECONOMIC STABILITY: INCOME INSECURITY: HOW HARD IS IT FOR YOU TO PAY FOR THE VERY BASICS LIKE FOOD, HOUSING, MEDICAL CARE, AND HEATING?: HARD

## 2024-06-26 ASSESSMENT — PATIENT HEALTH QUESTIONNAIRE - PHQ9
SUM OF ALL RESPONSES TO PHQ QUESTIONS 1-9: 0
1. LITTLE INTEREST OR PLEASURE IN DOING THINGS: NOT AT ALL
SUM OF ALL RESPONSES TO PHQ QUESTIONS 1-9: 0
SUM OF ALL RESPONSES TO PHQ QUESTIONS 1-9: 0
SUM OF ALL RESPONSES TO PHQ9 QUESTIONS 1 & 2: 0
SUM OF ALL RESPONSES TO PHQ QUESTIONS 1-9: 0
2. FEELING DOWN, DEPRESSED OR HOPELESS: NOT AT ALL

## 2024-06-26 ASSESSMENT — ENCOUNTER SYMPTOMS
DIARRHEA: 0
NAUSEA: 0
SHORTNESS OF BREATH: 0
BACK PAIN: 0
VOMITING: 0
CONSTIPATION: 0
COUGH: 0

## 2024-06-26 NOTE — PATIENT INSTRUCTIONS
Can also try night wrist splints for carpal tunnel (can check [employer] or [facility]/pharmacy)    COVID and Tdap vaccines recommended

## 2024-06-26 NOTE — PROGRESS NOTES
[facility] PROFESSIONAL SERVS  [facility]  [address] [address]  Dept: [phone]  Loc: [phone]    Anika Hall is a 34 y.o. female who presents today for:  Chief Complaint   Patient presents with    New Patient     Est. Care. Wanting to talk about long lasting bruising. Tingling/numbness in bilaterally toes, feet, hands. Either doing something for away or not doing something.         Goals    None         Assessment/Plan:     Anika was seen today for new patient.    Diagnoses and all orders for this visit:    Encounter for medical examination to establish care    Class 1 obesity due to excess calories in adult, unspecified BMI, unspecified whether serious comorbidity present  -     TSH; Future  -     T4, Free; Future  -     Lipid, Fasting; Future  -     Hemoglobin A1C; Future    Easy bruising  -     CBC with Auto Differential; Future  -     Protime-INR; Future  -     Comprehensive Metabolic Panel; Future  -     Vitamin C; Future    Bilateral carpal tunnel syndrome    Numbness and tingling in both hands  -     Vitamin B12 & Folate; Future  -     Vitamin B6; Future    Low iron  -     Iron and TIBC; Future  -     Ferritin; Future    Other fatigue  -     Vitamin D 25 Hydroxy; Future    New patient establish care    Obesity: Chronic/uncontrolled, labs as above, consider dietary and excise modification, consider checking with insurance to see if she qualifies for weight loss medications.    Easy bruising: Acute/uncontrolled, labs as above    Numbness and tingling: Chronic/uncontrolled, labs as above,     Carpal tunnel: Tinel testing positive handout given for carpal tunnel exercises, night wrist splints recommended. If worsens can consider referral to [facility] for intervention.     Low iron: History of, iron labs as above    Fatigue: Chronic/uncontrolled, labs as above and add vitamin D.  Consider dietary and exercise modification, sleep lifestyle

## 2024-06-27 ENCOUNTER — PATIENT MESSAGE (OUTPATIENT)
Dept: FAMILY MEDICINE CLINIC | Age: 34
End: 2024-06-27

## 2024-06-27 DIAGNOSIS — M79.672 LEFT FOOT PAIN: Primary | ICD-10-CM

## 2024-06-27 NOTE — TELEPHONE ENCOUNTER
From: Anika Hall  To: Dr. Rosalva Cobb  Sent: 6/27/2024 11:51 AM EDT  Subject: Other issue     Hey   So I forgot to talk to you yesterday about my left foot I am having pain near were the toes meet the foot area not all the time but when pressure is applied in certain areas.  Not sure what to do about it now.

## 2024-06-28 ENCOUNTER — PATIENT MESSAGE (OUTPATIENT)
Dept: FAMILY MEDICINE CLINIC | Age: 34
End: 2024-06-28

## 2024-06-28 LAB
ALBUMIN SERPL-MCNC: 4.2 G/DL
ALP BLD-CCNC: 66 U/L
ALT SERPL-CCNC: 25 U/L
ANION GAP SERPL CALCULATED.3IONS-SCNC: 8 MMOL/L
AST SERPL-CCNC: 19 U/L
BASOPHILS ABSOLUTE: 0 /ΜL
BASOPHILS RELATIVE PERCENT: 0.5 %
BILIRUB SERPL-MCNC: 0.5 MG/DL (ref 0.1–1.4)
BUN BLDV-MCNC: 14 MG/DL
CALCIUM SERPL-MCNC: 9 MG/DL
CHLORIDE BLD-SCNC: 101 MMOL/L
CHOLESTEROL, FASTING: 264
CO2: 25 MMOL/L
CREAT SERPL-MCNC: 0.58 MG/DL
EGFR: >60
EOSINOPHILS ABSOLUTE: 0.2 /ΜL
EOSINOPHILS RELATIVE PERCENT: 2.1 %
ESTIMATED AVERAGE GLUCOSE: 105
FERRITIN: 133.6 NG/ML (ref 9–150)
FOLATE: 15.4
GLUCOSE BLD-MCNC: 104 MG/DL
HBA1C MFR BLD: 5.3 %
HCT VFR BLD CALC: 42.5 % (ref 36–46)
HDLC SERPL-MCNC: 49 MG/DL (ref 35–70)
HEMOGLOBIN: 14.5 G/DL (ref 12–16)
INR BLD: 1
IRON: 84
LDL CHOLESTEROL CALCULATED: 201 MG/DL (ref 0–160)
LYMPHOCYTES ABSOLUTE: 1.5 /ΜL
LYMPHOCYTES RELATIVE PERCENT: 19.5 %
MCH RBC QN AUTO: 31 PG
MCHC RBC AUTO-ENTMCNC: 34.1 G/DL
MCV RBC AUTO: 91.1 FL
MONOCYTES ABSOLUTE: 0.3 /ΜL
MONOCYTES RELATIVE PERCENT: 4.2 %
NEUTROPHILS ABSOLUTE: 5.7 /ΜL
NEUTROPHILS RELATIVE PERCENT: 73.7 %
PDW BLD-RTO: 12.6 %
PLATELET # BLD: 304 K/ΜL
PMV BLD AUTO: 8.1 FL
POTASSIUM SERPL-SCNC: 4.4 MMOL/L
PROTIME: 10.2 SECONDS
RBC # BLD: 4.66 10^6/ΜL
SODIUM BLD-SCNC: 134 MMOL/L
T4 FREE: 0.7
TOTAL IRON BINDING CAPACITY: 403
TOTAL PROTEIN: 7.9
TRIGLYCERIDE, FASTING: 67
TSH SERPL DL<=0.05 MIU/L-ACNC: 1.85 UIU/ML
VITAMIN B-12: 347
VITAMIN D 25-HYDROXY: 27
VITAMIN D2, 25 HYDROXY: NORMAL
VITAMIN D3,25 HYDROXY: NORMAL
WBC # BLD: 7.7 10^3/ML

## 2024-06-28 NOTE — TELEPHONE ENCOUNTER
From: Anika Hall  Sent: 6/28/2024 10:43 AM EDT  To: Reza Muller Clinical Staff  Subject: xray    Can I go somewhere else not talking today just curious

## 2024-07-01 DIAGNOSIS — R20.2 NUMBNESS AND TINGLING IN BOTH HANDS: ICD-10-CM

## 2024-07-01 DIAGNOSIS — E61.1 LOW IRON: ICD-10-CM

## 2024-07-01 DIAGNOSIS — R23.3 EASY BRUISING: ICD-10-CM

## 2024-07-01 DIAGNOSIS — R53.83 OTHER FATIGUE: ICD-10-CM

## 2024-07-01 DIAGNOSIS — R20.0 NUMBNESS AND TINGLING IN BOTH HANDS: ICD-10-CM

## 2024-07-01 DIAGNOSIS — E66.09 CLASS 1 OBESITY DUE TO EXCESS CALORIES IN ADULT, UNSPECIFIED BMI, UNSPECIFIED WHETHER SERIOUS COMORBIDITY PRESENT: ICD-10-CM

## 2024-07-02 ENCOUNTER — TELEPHONE (OUTPATIENT)
Dept: FAMILY MEDICINE CLINIC | Age: 34
End: 2024-07-02

## 2024-07-02 DIAGNOSIS — E78.5 HYPERLIPIDEMIA, UNSPECIFIED HYPERLIPIDEMIA TYPE: Primary | ICD-10-CM

## 2024-07-02 NOTE — TELEPHONE ENCOUNTER
----- Message from Rosalva Cobb MD sent at 7/2/2024 11:06 AM EDT -----  Message sent to Gracy    Good morning overall your labs look okay, however your cholesterol was high.  At this time I would recommend consider starting a statin medication and/or taking fish oil supplement.  Additionally I would recommend starting to exercise even which is mild walking 30 minutes a day 5 days a week.  Labs do not show any significant findings for easy bruising, numbness and tingling.  I do think that the numbness and tingling could be due to carpal tunnel syndrome.  Please let me know if you have any other questions.

## 2024-07-03 DIAGNOSIS — M79.672 LEFT FOOT PAIN: ICD-10-CM

## 2024-07-03 PROCEDURE — 73630 X-RAY EXAM OF FOOT: CPT | Performed by: STUDENT IN AN ORGANIZED HEALTH CARE EDUCATION/TRAINING PROGRAM

## 2024-07-03 RX ORDER — NORGESTIMATE AND ETHINYL ESTRADIOL 0.25-0.035
1 KIT ORAL DAILY
Qty: 28 TABLET | Refills: 0 | Status: SHIPPED | OUTPATIENT
Start: 2024-07-03

## 2024-07-10 ENCOUNTER — PATIENT MESSAGE (OUTPATIENT)
Dept: FAMILY MEDICINE CLINIC | Age: 34
End: 2024-07-10

## 2024-07-11 NOTE — TELEPHONE ENCOUNTER
From: Anika Hall  To: Dr. Rosalva Cobb  Sent: 7/10/2024 7:49 AM EDT  Subject: X ray    Is there any way to see the x ray on my chart

## 2024-07-11 NOTE — TELEPHONE ENCOUNTER
Spoke with patient and she was hoping to see the actual imaging, but understands that is not viewable on [employer]. She will plan to ask the provider to pull up the imaging for her in the future when she has another appt. Patient did not want to schedule at this time.

## 2024-07-22 ENCOUNTER — OFFICE VISIT (OUTPATIENT)
Dept: OBGYN CLINIC | Age: 34
End: 2024-07-22
Payer: COMMERCIAL

## 2024-07-22 ENCOUNTER — HOSPITAL ENCOUNTER (OUTPATIENT)
Age: 34
Setting detail: SPECIMEN
Discharge: HOME | End: 2024-07-22

## 2024-07-22 VITALS
DIASTOLIC BLOOD PRESSURE: 74 MMHG | WEIGHT: 195 LBS | HEIGHT: 64 IN | BODY MASS INDEX: 33.29 KG/M2 | SYSTOLIC BLOOD PRESSURE: 110 MMHG

## 2024-07-22 DIAGNOSIS — Z12.4 SCREENING FOR CERVICAL CANCER: ICD-10-CM

## 2024-07-22 DIAGNOSIS — Z01.419 SMEAR, VAGINAL, AS PART OF ROUTINE GYNECOLOGICAL EXAMINATION: Primary | ICD-10-CM

## 2024-07-22 DIAGNOSIS — Z12.72 SMEAR, VAGINAL, AS PART OF ROUTINE GYNECOLOGICAL EXAMINATION: Primary | ICD-10-CM

## 2024-07-22 DIAGNOSIS — Z30.41 ORAL CONTRACEPTIVE PILL SURVEILLANCE: ICD-10-CM

## 2024-07-22 PROCEDURE — 99395 PREV VISIT EST AGE 18-39: CPT | Performed by: ADVANCED PRACTICE MIDWIFE

## 2024-07-22 RX ORDER — NORGESTIMATE AND ETHINYL ESTRADIOL 0.25-0.035
1 KIT ORAL DAILY
Qty: 28 TABLET | Refills: 12 | Status: SHIPPED | OUTPATIENT
Start: 2024-07-22

## 2024-07-22 ASSESSMENT — ENCOUNTER SYMPTOMS
DIARRHEA: 0
CONSTIPATION: 0
SHORTNESS OF BREATH: 0
GASTROINTESTINAL NEGATIVE: 1
RESPIRATORY NEGATIVE: 1
ABDOMINAL PAIN: 0

## 2024-07-22 NOTE — PROGRESS NOTES
Chaperone for Intimate Exam  Chaperone was offered as part of the rooming process. Patient declined and agrees to continue with exam without a chaperone.       
with intercourse: No    Last Yearly:      Last pap: Due    Last HPV: Due    Do you do self breast exams: Encouraged    Past Medical History:   Diagnosis Date    26 weeks gestation of pregnancy 10/13/2021    Acute respiratory distress syndrome (ARDS) due to severe acute respiratory syndrome coronavirus 2 (SARS-CoV-2) ([facility]) 10/15/2021    Anxiety     Bandemia     GERD (gastroesophageal reflux disease)     History of extracorporeal membrane oxygenation treatment 10/28/2021    Immunosuppressed status ([facility])     Migraines     Moderate malnutrition ([facility]) 10/29/2021    MRSA (methicillin resistant Staphylococcus aureus) septicemia ([facility])     MSSA bacteremia 2021    MTHFR gene mutation 2023    Heterozygous    PCCS 10/15/21 M Apg  Wt 2#3 10/15/2021    Pneumonia due to COVID-19 virus 10/13/2021    Pulmonary embolism ([facility]) 10/14/2021    Due to COVID complications    Seasonal allergies        Past Surgical History:   Procedure Laterality Date     SECTION N/A 10/15/2021     SECTION performed by Emily Livingston MD at [facility] OR    CHOLECYSTECTOMY  2023    FOOT SURGERY      nerve release, and plantar fascitis    HC  PICC POWERPIC TRIPLE  10/15/2021         INSERT MIDLINE CATHETER  2021         MANDIBLE SURGERY      tooth implant    MOUTH SURGERY      WISDOM TOOTH EXTRACTION         Family History   Problem Relation Age of Onset    Hypertension Mother     Hypertension Father     No Known Problems Brother     No Known Problems Brother     Breast Cancer Maternal Grandmother         older than 50    No Known Problems Maternal Grandfather     Stroke Paternal Grandmother     No Known Problems Paternal Grandfather     Stroke Maternal Aunt     Heart Attack Paternal Uncle     Clotting Disorder Paternal Uncle     Heart Attack Paternal Cousin     Heart Attack Paternal Cousin     Heart Attack Paternal Uncle        Chief Complaint   Patient presents with    Annual Exam     Pap due, Denies concerns.

## 2024-07-23 LAB
HPV I/H RISK 4 DNA CVX QL NAA+PROBE: NOT DETECTED
HPV SAMPLE: NORMAL
HPV, INTERPRETATION: NORMAL
HPV16 DNA CVX QL NAA+PROBE: NOT DETECTED
HPV18 DNA CVX QL NAA+PROBE: NOT DETECTED
SPECIMEN DESCRIPTION: NORMAL

## 2024-07-31 LAB — CYTOLOGY REPORT: NORMAL

## 2025-02-26 ENCOUNTER — OFFICE VISIT (OUTPATIENT)
Dept: FAMILY MEDICINE CLINIC | Age: 35
End: 2025-02-26
Payer: COMMERCIAL

## 2025-02-26 VITALS
RESPIRATION RATE: 18 BRPM | DIASTOLIC BLOOD PRESSURE: 82 MMHG | BODY MASS INDEX: 33.03 KG/M2 | WEIGHT: 192.4 LBS | OXYGEN SATURATION: 98 % | SYSTOLIC BLOOD PRESSURE: 124 MMHG | HEART RATE: 75 BPM

## 2025-02-26 DIAGNOSIS — R05.1 ACUTE COUGH: Primary | ICD-10-CM

## 2025-02-26 PROCEDURE — 99213 OFFICE O/P EST LOW 20 MIN: CPT | Performed by: STUDENT IN AN ORGANIZED HEALTH CARE EDUCATION/TRAINING PROGRAM

## 2025-02-26 PROCEDURE — G8417 CALC BMI ABV UP PARAM F/U: HCPCS | Performed by: STUDENT IN AN ORGANIZED HEALTH CARE EDUCATION/TRAINING PROGRAM

## 2025-02-26 PROCEDURE — G8427 DOCREV CUR MEDS BY ELIG CLIN: HCPCS | Performed by: STUDENT IN AN ORGANIZED HEALTH CARE EDUCATION/TRAINING PROGRAM

## 2025-02-26 PROCEDURE — 1036F TOBACCO NON-USER: CPT | Performed by: STUDENT IN AN ORGANIZED HEALTH CARE EDUCATION/TRAINING PROGRAM

## 2025-02-26 SDOH — ECONOMIC STABILITY: FOOD INSECURITY: WITHIN THE PAST 12 MONTHS, THE FOOD YOU BOUGHT JUST DIDN'T LAST AND YOU DIDN'T HAVE MONEY TO GET MORE.: NEVER TRUE

## 2025-02-26 SDOH — ECONOMIC STABILITY: FOOD INSECURITY: WITHIN THE PAST 12 MONTHS, YOU WORRIED THAT YOUR FOOD WOULD RUN OUT BEFORE YOU GOT MONEY TO BUY MORE.: NEVER TRUE

## 2025-02-26 ASSESSMENT — PATIENT HEALTH QUESTIONNAIRE - PHQ9
SUM OF ALL RESPONSES TO PHQ QUESTIONS 1-9: 0
1. LITTLE INTEREST OR PLEASURE IN DOING THINGS: NOT AT ALL
2. FEELING DOWN, DEPRESSED OR HOPELESS: NOT AT ALL
SUM OF ALL RESPONSES TO PHQ9 QUESTIONS 1 & 2: 0
SUM OF ALL RESPONSES TO PHQ QUESTIONS 1-9: 0

## 2025-02-26 ASSESSMENT — ENCOUNTER SYMPTOMS
WHEEZING: 0
NAUSEA: 0
DIARRHEA: 0
SHORTNESS OF BREATH: 0
CONSTIPATION: 0
CHEST TIGHTNESS: 0
VOMITING: 0
COUGH: 1

## 2025-02-26 NOTE — PROGRESS NOTES
Health Maintenance Due   Topic Date Due    DTaP/Tdap/Td vaccine (1 - Tdap) Never done    Flu vaccine (1) Never done    COVID-19 Vaccine (1 - 2024-25 season) Never done

## 2025-02-26 NOTE — PROGRESS NOTES
[facility] PROFESSIONAL SERVS  [facility]  [address] [address]  [phone]     Anika Hall is a 35 y.o. female who presents today for:  Chief Complaint   Patient presents with    Cough     X 1.5 week, cough will cause her to be SOB, wanting lungs checked. Productive cough.       Assessment/Plan:     Anika was seen today for cough.    Diagnoses and all orders for this visit:    Acute cough        Cough: Acute/improved, lung exam reassuring, if cough returns or worsens recommended patient contact us for potential chest x-ray.  Okay to continue over-the-counter cough medications as needed.         No follow-ups on file.      Medications Prescribed:  No orders of the defined types were placed in this encounter.      Future Appointments   Date Time Provider Department Center   7/29/2025  8:00 AM Jolanta Fortune APRN - [id number]       HPI:     HPI  35-year-old female past medical history significant for anxiety, migraines, PE who presents as acute care visit for cough.      Patient states that she had about 1 and half weeks of a cough.  The cough was so intense that it did cause her to be short of breath.   has been worried since she had a really bad time with COVID in the past.  She does feel that she is getting better but wanting lungs checked.  Cough is productive.     and kid were sick too. Was having coughing fits. Yesterday only twice and today not happening yet. Thinks it's getting better.     Was using tylenol cold and flu and sudafed when she was feeling symptoms. But stopped which started feeling better.    No fevers with all of this.     More active- more fits. Did have shortness of breath on Sunday. Didn't work Monday. Worked yesterday and today better.   Subjective:      Review of Systems   Constitutional:  Negative for fatigue and fever.   HENT:  Negative for congestion.    Respiratory:  Positive for cough.

## 2025-07-28 ASSESSMENT — ENCOUNTER SYMPTOMS
GASTROINTESTINAL NEGATIVE: 1
SHORTNESS OF BREATH: 0
DIARRHEA: 0
CONSTIPATION: 0
RESPIRATORY NEGATIVE: 1
ABDOMINAL PAIN: 0

## 2025-07-28 NOTE — PROGRESS NOTES
YEARLY PHYSICAL    Date of service: 2025    Anika Hall  Is a 35 y.o.   female    PT's PCP is: Rosalva Cobb MD     : 1990                                             Subjective:       Patient's last menstrual period was 2025 (approximate).     Are your menses regular: yes    OB History    Para Term  AB Living   1 1 0 1 0 1   SAB IAB Ectopic Molar Multiple Live Births   0 0 0 0 0 1      # Outcome Date GA Lbr Charli/2nd Weight Sex Type Anes PTL Lv   1  10/15/21 26w5d  1 kg (2 lb 3.3 oz) M CS-Classical Gen N YUMIKO        Social History     Tobacco Use   Smoking Status Former    Current packs/day: 0.00    Types: Cigarettes    Start date: 2008    Quit date: 2011    Years since quittin.6   Smokeless Tobacco Never        Social History     Substance and Sexual Activity   Alcohol Use Yes    Comment: Socially < 1 a week       Family History   Problem Relation Age of Onset    Hypertension Mother     Hypertension Father     No Known Problems Brother     No Known Problems Brother     Breast Cancer Maternal Grandmother         older than 50    No Known Problems Maternal Grandfather     Stroke Paternal Grandmother     No Known Problems Paternal Grandfather     Stroke Maternal Aunt     Heart Attack Paternal Uncle     Clotting Disorder Paternal Uncle     Heart Attack Paternal Cousin     Heart Attack Paternal Cousin     Heart Attack Paternal Uncle        Any family history of breast or ovarian cancer: Yes    Any family history of blood clots: No      Allergies: Vicodin [hydrocodone-acetaminophen]      Current Outpatient Medications:     norgestimate-ethinyl estradiol (MONO-LINYAH) 0.25-35 MG-MCG per tablet, Take 1 tablet by mouth daily, Disp: 28 tablet, Rfl: 12    Social History     Substance and Sexual Activity   Sexual Activity Yes    Partners: Male    Comment: One partner       Any bleeding or pain with

## 2025-07-29 ENCOUNTER — OFFICE VISIT (OUTPATIENT)
Dept: OBGYN CLINIC | Age: 35
End: 2025-07-29
Payer: COMMERCIAL

## 2025-07-29 VITALS
HEIGHT: 64 IN | DIASTOLIC BLOOD PRESSURE: 82 MMHG | BODY MASS INDEX: 33.63 KG/M2 | SYSTOLIC BLOOD PRESSURE: 120 MMHG | WEIGHT: 197 LBS

## 2025-07-29 DIAGNOSIS — Z30.41 ORAL CONTRACEPTIVE PILL SURVEILLANCE: ICD-10-CM

## 2025-07-29 DIAGNOSIS — Z01.419 VISIT FOR GYNECOLOGIC EXAMINATION: Primary | ICD-10-CM

## 2025-07-29 PROCEDURE — 99395 PREV VISIT EST AGE 18-39: CPT | Performed by: ADVANCED PRACTICE MIDWIFE

## 2025-07-29 RX ORDER — NORGESTIMATE AND ETHINYL ESTRADIOL 0.25-0.035
1 KIT ORAL DAILY
Qty: 28 TABLET | Refills: 12 | Status: SHIPPED | OUTPATIENT
Start: 2025-07-29

## 2025-08-21 ENCOUNTER — OFFICE VISIT (OUTPATIENT)
Dept: FAMILY MEDICINE CLINIC | Age: 35
End: 2025-08-21
Payer: COMMERCIAL

## 2025-08-21 VITALS
DIASTOLIC BLOOD PRESSURE: 86 MMHG | RESPIRATION RATE: 18 BRPM | BODY MASS INDEX: 33.81 KG/M2 | OXYGEN SATURATION: 95 % | WEIGHT: 197 LBS | SYSTOLIC BLOOD PRESSURE: 122 MMHG | HEART RATE: 100 BPM

## 2025-08-21 DIAGNOSIS — R41.840 IMPAIRED CONCENTRATION: ICD-10-CM

## 2025-08-21 DIAGNOSIS — R10.13 DYSPEPSIA: ICD-10-CM

## 2025-08-21 DIAGNOSIS — Z00.00 WELL ADULT EXAM: Primary | ICD-10-CM

## 2025-08-21 PROCEDURE — G8417 CALC BMI ABV UP PARAM F/U: HCPCS | Performed by: STUDENT IN AN ORGANIZED HEALTH CARE EDUCATION/TRAINING PROGRAM

## 2025-08-21 PROCEDURE — 99395 PREV VISIT EST AGE 18-39: CPT | Performed by: STUDENT IN AN ORGANIZED HEALTH CARE EDUCATION/TRAINING PROGRAM

## 2025-08-21 PROCEDURE — G8427 DOCREV CUR MEDS BY ELIG CLIN: HCPCS | Performed by: STUDENT IN AN ORGANIZED HEALTH CARE EDUCATION/TRAINING PROGRAM

## 2025-08-21 PROCEDURE — 99214 OFFICE O/P EST MOD 30 MIN: CPT | Performed by: STUDENT IN AN ORGANIZED HEALTH CARE EDUCATION/TRAINING PROGRAM

## 2025-08-21 PROCEDURE — 1036F TOBACCO NON-USER: CPT | Performed by: STUDENT IN AN ORGANIZED HEALTH CARE EDUCATION/TRAINING PROGRAM

## 2025-08-21 RX ORDER — BUPROPION HYDROCHLORIDE 150 MG/1
150 TABLET ORAL EVERY MORNING
Qty: 30 TABLET | Refills: 3 | Status: SHIPPED | OUTPATIENT
Start: 2025-08-21

## 2025-09-02 PROBLEM — R41.840 IMPAIRED CONCENTRATION: Status: ACTIVE | Noted: 2025-09-02

## 2025-09-02 PROBLEM — R10.13 DYSPEPSIA: Status: ACTIVE | Noted: 2025-09-02

## 2025-09-02 ASSESSMENT — ENCOUNTER SYMPTOMS
VOMITING: 0
NAUSEA: 1
DIARRHEA: 0
CONSTIPATION: 0
COUGH: 0
SHORTNESS OF BREATH: 0

## (undated) DEVICE — DRESSING TRNSPAR W5XL4.5IN FLM SHT SEMIPERMEABLE WIND

## (undated) DEVICE — YANKAUER,POOLE TIP,STERILE,50/CS: Brand: MEDLINE

## (undated) DEVICE — TOWEL,OR,DSP,ST,NATURAL,DLX,4/PK,20PK/CS: Brand: MEDLINE

## (undated) DEVICE — GLOVE ORANGE PI 8   MSG9080

## (undated) DEVICE — SOLUTION SCRB 4OZ 4% CHG H2O AIDED FOR PREOPERATIVE SKIN

## (undated) DEVICE — YANKAUER,FLEXIBLE HANDLE,REGLR CAPACITY: Brand: MEDLINE INDUSTRIES, INC.

## (undated) DEVICE — PREVENA INCISION MANAGEMENT SYSTEM- PEEL & PLACE DRESSING: Brand: PREVENA™ PEEL & PLACE™

## (undated) DEVICE — GLOVE ORANGE PI 7 1/2   MSG9075

## (undated) DEVICE — SOLUTION IV IRRIG WATER 500ML POUR BRL ST 2F7113

## (undated) DEVICE — GOWN,SIRUS,POLYRNF,BRTHSLV,LG,30/CS: Brand: MEDLINE

## (undated) DEVICE — SPONGE LAP W18XL18IN WHT COT 4 PLY FLD STRUNG RADPQ DISP ST

## (undated) DEVICE — GLOVE SURG SZ 65 THK91MIL LTX FREE SYN POLYISOPRENE

## (undated) DEVICE — AGENT HEMSTAT 3GM OXIDIZED REGENERATED CELOS ABSRB FOR CONT (ORDER MULTIPLES OF 5EA)

## (undated) DEVICE — GLOVE SURG SZ 6 THK91MIL LTX FREE SYN POLYISOPRENE ANTI

## (undated) DEVICE — GOWN,SIRUS,POLYRNF,BRTHSLV,XL,30/CS: Brand: MEDLINE

## (undated) DEVICE — TOTAL TRAY, 16FR 10ML SIL FOLEY, URN: Brand: MEDLINE

## (undated) DEVICE — PAD,NON-ADHERENT,3X8,STERILE,LF,1/PK: Brand: MEDLINE

## (undated) DEVICE — 3M™ STERI-STRIP™ ANTIMICROBIAL SKIN CLOSURES 1 IN X 5 IN, 25/CAR, 4 CAR/CASE A1848: Brand: 3M™ STERI-STRIP™

## (undated) DEVICE — PAD,ABDOMINAL,5"X9",ST,LF,25/BX: Brand: MEDLINE INDUSTRIES, INC.

## (undated) DEVICE — TOWEL SURG W16XL26IN WHT NONFENESTRATED ST 2 PER PK

## (undated) DEVICE — SOLUTION SOD CHL 0.9% 1000ML

## (undated) DEVICE — Device

## (undated) DEVICE — NEPTUNE E-SEP SMOKE EVACUATION PENCIL, COATED, 70MM BLADE, PUSH BUTTON SWITCH: Brand: NEPTUNE E-SEP

## (undated) DEVICE — SWAB MEDICATED TINC BENZ

## (undated) DEVICE — TUBING, SUCTION, 9/32" X 20', STRAIGHT: Brand: MEDLINE INDUSTRIES, INC.

## (undated) DEVICE — SUTURE COAT VCRL SZ 0 L36IN ABSRB VLT CTX L48MM TAPERPOINT J370H